# Patient Record
Sex: FEMALE | Race: WHITE | NOT HISPANIC OR LATINO | Employment: OTHER | URBAN - METROPOLITAN AREA
[De-identification: names, ages, dates, MRNs, and addresses within clinical notes are randomized per-mention and may not be internally consistent; named-entity substitution may affect disease eponyms.]

---

## 2017-05-01 ENCOUNTER — HOSPITAL ENCOUNTER (OUTPATIENT)
Dept: RADIOLOGY | Facility: HOSPITAL | Age: 67
Discharge: HOME | End: 2017-05-01
Attending: ORTHOPAEDIC SURGERY
Payer: COMMERCIAL

## 2017-05-01 ENCOUNTER — OFFICE VISIT (OUTPATIENT)
Dept: ORTHOPEDICS | Facility: CLINIC | Age: 67
End: 2017-05-01
Payer: COMMERCIAL

## 2017-05-01 VITALS — WEIGHT: 209 LBS | HEIGHT: 66 IN | BODY MASS INDEX: 33.59 KG/M2

## 2017-05-01 DIAGNOSIS — M25.812 SHOULDER IMPINGEMENT, LEFT: Primary | ICD-10-CM

## 2017-05-01 DIAGNOSIS — M25.511 BILATERAL SHOULDER PAIN, UNSPECIFIED CHRONICITY: ICD-10-CM

## 2017-05-01 DIAGNOSIS — M75.42 IMPINGEMENT SYNDROME OF LEFT SHOULDER: ICD-10-CM

## 2017-05-01 DIAGNOSIS — R52 PAIN: ICD-10-CM

## 2017-05-01 DIAGNOSIS — M25.512 BILATERAL SHOULDER PAIN, UNSPECIFIED CHRONICITY: ICD-10-CM

## 2017-05-01 DIAGNOSIS — R52 PAIN: Primary | ICD-10-CM

## 2017-05-01 PROCEDURE — 1160F RVW MEDS BY RX/DR IN RCRD: CPT | Mod: S$GLB,,, | Performed by: ORTHOPAEDIC SURGERY

## 2017-05-01 PROCEDURE — 99999 PR PBB SHADOW E&M-EST. PATIENT-LVL II: CPT | Mod: PBBFAC,,, | Performed by: ORTHOPAEDIC SURGERY

## 2017-05-01 PROCEDURE — 99213 OFFICE O/P EST LOW 20 MIN: CPT | Mod: 25,S$GLB,, | Performed by: ORTHOPAEDIC SURGERY

## 2017-05-01 PROCEDURE — 73030 X-RAY EXAM OF SHOULDER: CPT | Mod: TC,LT

## 2017-05-01 PROCEDURE — 73030 X-RAY EXAM OF SHOULDER: CPT | Mod: 26,LT,, | Performed by: RADIOLOGY

## 2017-05-01 PROCEDURE — 20610 DRAIN/INJ JOINT/BURSA W/O US: CPT | Mod: LT,S$GLB,, | Performed by: ORTHOPAEDIC SURGERY

## 2017-05-01 RX ORDER — DICLOFENAC SODIUM 10 MG/G
GEL TOPICAL
Qty: 100 G | Refills: 3 | Status: SHIPPED | OUTPATIENT
Start: 2017-05-01 | End: 2017-08-08

## 2017-05-01 RX ORDER — TRIAMCINOLONE ACETONIDE 40 MG/ML
40 INJECTION, SUSPENSION INTRA-ARTICULAR; INTRAMUSCULAR
Status: COMPLETED | OUTPATIENT
Start: 2017-05-01 | End: 2017-05-01

## 2017-05-01 RX ADMIN — TRIAMCINOLONE ACETONIDE 40 MG: 40 INJECTION, SUSPENSION INTRA-ARTICULAR; INTRAMUSCULAR at 11:05

## 2017-05-01 NOTE — PROGRESS NOTES
HISTORY OF PRESENT ILLNESS:  Ms. Fong seen previously for right shoulder   rotator cuff repair, doing fine with the right shoulder, but now having a   flare-up of pain in the left shoulder.  She had an injection about two years ago   with great results.  She would like to have that repeated today.    PHYSICAL EXAMINATION:  LEFT SHOULDER:  No tenderness, no swelling.  Range of motion full.  She does   have a mildly positive impingement sign with abduction, internal rotation of the   shoulder.  Strength is good.  No instability.  NEUROLOGIC:  Normal.    X-RAYS:  AP and lateral, left shoulder, demonstrates spurring at the   anterolateral acromion, some involvement of the AC joint and slight irregularity   at the greater tuberosity.    IMPRESSION:  Left shoulder impingement.    PLAN:  Injection performed left shoulder, combination of Kenalog 40 mg, 2 mL   Xylocaine, sterile technique.  Also recommended she avoid overhead lifting,   continue anti-inflammatory medication by mouth and if symptoms persist, we may   need to get an MRI scan to check the rotator cuff.  Follow up in 1-2 months.      BRITTNY  dd: 05/01/2017 11:34:47 (CDT)  td: 05/02/2017 04:13:56 (CDT)  Doc ID   #[id number]  Job ID #[id number]    CC:

## 2017-07-16 ENCOUNTER — HOSPITAL ENCOUNTER (OUTPATIENT)
Facility: HOSPITAL | Age: 67
Discharge: HOME | End: 2017-07-17
Attending: EMERGENCY MEDICINE | Admitting: INTERNAL MEDICINE
Payer: COMMERCIAL

## 2017-07-16 DIAGNOSIS — E21.0 PRIMARY HYPERPARATHYROIDISM: ICD-10-CM

## 2017-07-16 DIAGNOSIS — R42 VERTIGO: Primary | ICD-10-CM

## 2017-07-16 DIAGNOSIS — R42 DIZZINESS: ICD-10-CM

## 2017-07-16 DIAGNOSIS — R11.0 NAUSEA: ICD-10-CM

## 2017-07-16 LAB
ALBUMIN SERPL BCP-MCNC: 4.1 G/DL
ALP SERPL-CCNC: 105 U/L
ALT SERPL W/O P-5'-P-CCNC: 17 U/L
ANION GAP SERPL CALC-SCNC: 17 MMOL/L
AST SERPL-CCNC: 15 U/L
BASOPHILS # BLD AUTO: 0.05 K/UL
BASOPHILS NFR BLD: 0.5 %
BILIRUB SERPL-MCNC: 0.4 MG/DL
BUN SERPL-MCNC: 18 MG/DL
CALCIUM SERPL-MCNC: 11.6 MG/DL
CHLORIDE SERPL-SCNC: 107 MMOL/L
CO2 SERPL-SCNC: 17 MMOL/L
CREAT SERPL-MCNC: 0.8 MG/DL
DIFFERENTIAL METHOD: ABNORMAL
EOSINOPHIL # BLD AUTO: 0.2 K/UL
EOSINOPHIL NFR BLD: 2.3 %
ERYTHROCYTE [DISTWIDTH] IN BLOOD BY AUTOMATED COUNT: 13.1 %
EST. GFR  (AFRICAN AMERICAN): >60 ML/MIN/1.73 M^2
EST. GFR  (NON AFRICAN AMERICAN): >60 ML/MIN/1.73 M^2
GLUCOSE SERPL-MCNC: 141 MG/DL
HCT VFR BLD AUTO: 42.3 %
HGB BLD-MCNC: 14.4 G/DL
LYMPHOCYTES # BLD AUTO: 4.3 K/UL
LYMPHOCYTES NFR BLD: 41.6 %
MCH RBC QN AUTO: 31.4 PG
MCHC RBC AUTO-ENTMCNC: 34 %
MCV RBC AUTO: 92 FL
MONOCYTES # BLD AUTO: 0.6 K/UL
MONOCYTES NFR BLD: 5.9 %
NEUTROPHILS # BLD AUTO: 5.1 K/UL
NEUTROPHILS NFR BLD: 49.5 %
PLATELET # BLD AUTO: 324 K/UL
PMV BLD AUTO: 9.9 FL
POTASSIUM SERPL-SCNC: 3.1 MMOL/L
PROT SERPL-MCNC: 7.3 G/DL
RBC # BLD AUTO: 4.59 M/UL
SODIUM SERPL-SCNC: 141 MMOL/L
WBC # BLD AUTO: 10.36 K/UL

## 2017-07-16 PROCEDURE — 63600175 PHARM REV CODE 636 W HCPCS: Performed by: EMERGENCY MEDICINE

## 2017-07-16 PROCEDURE — 99285 EMERGENCY DEPT VISIT HI MDM: CPT | Mod: 25

## 2017-07-16 PROCEDURE — 93010 ELECTROCARDIOGRAM REPORT: CPT | Mod: ,,, | Performed by: INTERNAL MEDICINE

## 2017-07-16 PROCEDURE — 85025 COMPLETE CBC W/AUTO DIFF WBC: CPT

## 2017-07-16 PROCEDURE — 93005 ELECTROCARDIOGRAM TRACING: CPT

## 2017-07-16 PROCEDURE — 80053 COMPREHEN METABOLIC PANEL: CPT

## 2017-07-16 PROCEDURE — 96361 HYDRATE IV INFUSION ADD-ON: CPT

## 2017-07-16 PROCEDURE — 96365 THER/PROPH/DIAG IV INF INIT: CPT

## 2017-07-16 PROCEDURE — 96375 TX/PRO/DX INJ NEW DRUG ADDON: CPT

## 2017-07-16 PROCEDURE — 25000003 PHARM REV CODE 250: Performed by: EMERGENCY MEDICINE

## 2017-07-16 RX ORDER — SODIUM CHLORIDE 9 MG/ML
1000 INJECTION, SOLUTION INTRAVENOUS
Status: COMPLETED | OUTPATIENT
Start: 2017-07-16 | End: 2017-07-16

## 2017-07-16 RX ORDER — SODIUM CHLORIDE 9 MG/ML
1000 INJECTION, SOLUTION INTRAVENOUS
Status: COMPLETED | OUTPATIENT
Start: 2017-07-16 | End: 2017-07-17

## 2017-07-16 RX ORDER — DIAZEPAM 10 MG/2ML
5 INJECTION INTRAMUSCULAR
Status: COMPLETED | OUTPATIENT
Start: 2017-07-16 | End: 2017-07-16

## 2017-07-16 RX ORDER — ONDANSETRON 2 MG/ML
4 INJECTION INTRAMUSCULAR; INTRAVENOUS
Status: COMPLETED | OUTPATIENT
Start: 2017-07-16 | End: 2017-07-16

## 2017-07-16 RX ORDER — MECLIZINE HYDROCHLORIDE 25 MG/1
25 TABLET ORAL
Status: COMPLETED | OUTPATIENT
Start: 2017-07-16 | End: 2017-07-16

## 2017-07-16 RX ADMIN — SODIUM CHLORIDE 1000 ML: 0.9 INJECTION, SOLUTION INTRAVENOUS at 06:07

## 2017-07-16 RX ADMIN — DIAZEPAM 5 MG: 5 INJECTION, SOLUTION INTRAMUSCULAR; INTRAVENOUS at 06:07

## 2017-07-16 RX ADMIN — PROMETHAZINE HYDROCHLORIDE 25 MG: 25 INJECTION INTRAMUSCULAR; INTRAVENOUS at 08:07

## 2017-07-16 RX ADMIN — ONDANSETRON 4 MG: 2 INJECTION INTRAMUSCULAR; INTRAVENOUS at 06:07

## 2017-07-16 RX ADMIN — SODIUM CHLORIDE 1000 ML: 0.9 INJECTION, SOLUTION INTRAVENOUS at 10:07

## 2017-07-16 RX ADMIN — MECLIZINE HYDROCHLORIDE 25 MG: 25 TABLET ORAL at 07:07

## 2017-07-16 NOTE — ED NOTES
Pt arrives from home with c/o nausea, dizziness, and generalized weakness that started 30 min PTA. Denies vomiting, diarrhea, CP, and SOB. Pt is AAO upon arrival. Started vomiting when arrived to exam room. VSS.    APPEARANCE: Alert, oriented. +fatigue  CARDIAC: Normal rate and rhythm.  PERIPHERAL VASCULAR: peripheral pulses present. Normal cap refill. No edema. Warm to touch.    RESPIRATORY:Normal rate and effort. Respirations are equal and unlabored no obvious signs of distress.  GASTRO: soft, no tenderness, no abdominal distention. +N/V  MUSC: Full ROM. No bony tenderness or soft tissue tenderness. No obvious deformity.  SKIN: Skin is warm and dry, normal skin turgor.  NEURO: 5/5 strength major flexors/extensors bilaterally. Linda coma scale: eyes open spontaneously-4, oriented & converses-5, obeys commands-6. No neurological abnormalities.   MENTAL STATUS: awake, alert and aware of environment.

## 2017-07-17 ENCOUNTER — TELEPHONE (OUTPATIENT)
Dept: FAMILY MEDICINE | Facility: CLINIC | Age: 67
End: 2017-07-17

## 2017-07-17 VITALS
OXYGEN SATURATION: 98 % | WEIGHT: 185 LBS | HEIGHT: 65 IN | RESPIRATION RATE: 18 BRPM | TEMPERATURE: 97 F | SYSTOLIC BLOOD PRESSURE: 164 MMHG | DIASTOLIC BLOOD PRESSURE: 85 MMHG | BODY MASS INDEX: 30.82 KG/M2 | HEART RATE: 80 BPM

## 2017-07-17 LAB
25(OH)D3+25(OH)D2 SERPL-MCNC: 40 NG/ML
ALBUMIN SERPL BCP-MCNC: 3.6 G/DL
ALP SERPL-CCNC: 92 U/L
ALT SERPL W/O P-5'-P-CCNC: 11 U/L
ANION GAP SERPL CALC-SCNC: 7 MMOL/L
AST SERPL-CCNC: 12 U/L
BASOPHILS # BLD AUTO: 0.01 K/UL
BASOPHILS NFR BLD: 0.1 %
BILIRUB SERPL-MCNC: 0.4 MG/DL
BUN SERPL-MCNC: 11 MG/DL
CALCIUM SERPL-MCNC: 10.1 MG/DL
CHLORIDE SERPL-SCNC: 113 MMOL/L
CHOLEST/HDLC SERPL: 3.2 {RATIO}
CO2 SERPL-SCNC: 23 MMOL/L
CREAT SERPL-MCNC: 0.7 MG/DL
DIFFERENTIAL METHOD: ABNORMAL
EOSINOPHIL # BLD AUTO: 0 K/UL
EOSINOPHIL NFR BLD: 0.1 %
ERYTHROCYTE [DISTWIDTH] IN BLOOD BY AUTOMATED COUNT: 13.2 %
EST. GFR  (AFRICAN AMERICAN): >60 ML/MIN/1.73 M^2
EST. GFR  (NON AFRICAN AMERICAN): >60 ML/MIN/1.73 M^2
GLUCOSE SERPL-MCNC: 95 MG/DL
HCT VFR BLD AUTO: 41.1 %
HDL/CHOLESTEROL RATIO: 30.8 %
HDLC SERPL-MCNC: 185 MG/DL
HDLC SERPL-MCNC: 57 MG/DL
HGB BLD-MCNC: 13.6 G/DL
LDLC SERPL CALC-MCNC: 117.6 MG/DL
LYMPHOCYTES # BLD AUTO: 0.9 K/UL
LYMPHOCYTES NFR BLD: 11 %
MAGNESIUM SERPL-MCNC: 2.3 MG/DL
MCH RBC QN AUTO: 31.1 PG
MCHC RBC AUTO-ENTMCNC: 33.1 %
MCV RBC AUTO: 94 FL
MONOCYTES # BLD AUTO: 0.3 K/UL
MONOCYTES NFR BLD: 3.8 %
NEUTROPHILS # BLD AUTO: 7.2 K/UL
NEUTROPHILS NFR BLD: 84.8 %
NONHDLC SERPL-MCNC: 128 MG/DL
PHOSPHATE SERPL-MCNC: 2.5 MG/DL
PLATELET # BLD AUTO: 279 K/UL
PMV BLD AUTO: 9.7 FL
POTASSIUM SERPL-SCNC: 3.6 MMOL/L
PROT SERPL-MCNC: 6.5 G/DL
PTH-INTACT SERPL-MCNC: 169 PG/ML
RBC # BLD AUTO: 4.37 M/UL
SODIUM SERPL-SCNC: 143 MMOL/L
TRIGL SERPL-MCNC: 52 MG/DL
TSH SERPL DL<=0.005 MIU/L-ACNC: 0.94 UIU/ML
VIT B12 SERPL-MCNC: 602 PG/ML
WBC # BLD AUTO: 8.48 K/UL

## 2017-07-17 PROCEDURE — 85025 COMPLETE CBC W/AUTO DIFF WBC: CPT

## 2017-07-17 PROCEDURE — 80053 COMPREHEN METABOLIC PANEL: CPT

## 2017-07-17 PROCEDURE — 83735 ASSAY OF MAGNESIUM: CPT

## 2017-07-17 PROCEDURE — 36415 COLL VENOUS BLD VENIPUNCTURE: CPT

## 2017-07-17 PROCEDURE — 82607 VITAMIN B-12: CPT

## 2017-07-17 PROCEDURE — 94761 N-INVAS EAR/PLS OXIMETRY MLT: CPT

## 2017-07-17 PROCEDURE — 83970 ASSAY OF PARATHORMONE: CPT

## 2017-07-17 PROCEDURE — 25000003 PHARM REV CODE 250: Performed by: STUDENT IN AN ORGANIZED HEALTH CARE EDUCATION/TRAINING PROGRAM

## 2017-07-17 PROCEDURE — 82306 VITAMIN D 25 HYDROXY: CPT

## 2017-07-17 PROCEDURE — 84100 ASSAY OF PHOSPHORUS: CPT

## 2017-07-17 PROCEDURE — G0378 HOSPITAL OBSERVATION PER HR: HCPCS

## 2017-07-17 PROCEDURE — 84443 ASSAY THYROID STIM HORMONE: CPT

## 2017-07-17 PROCEDURE — 80061 LIPID PANEL: CPT

## 2017-07-17 RX ORDER — MECLIZINE HYDROCHLORIDE 25 MG/1
25 TABLET ORAL 3 TIMES DAILY PRN
Qty: 30 TABLET | Refills: 3 | Status: SHIPPED | OUTPATIENT
Start: 2017-07-17 | End: 2017-09-01 | Stop reason: SDUPTHER

## 2017-07-17 RX ORDER — SODIUM CHLORIDE 9 MG/ML
INJECTION, SOLUTION INTRAVENOUS CONTINUOUS
Status: DISCONTINUED | OUTPATIENT
Start: 2017-07-17 | End: 2017-07-17 | Stop reason: HOSPADM

## 2017-07-17 RX ORDER — POTASSIUM CHLORIDE 20 MEQ/1
20 TABLET, EXTENDED RELEASE ORAL ONCE
Status: COMPLETED | OUTPATIENT
Start: 2017-07-17 | End: 2017-07-17

## 2017-07-17 RX ORDER — ENOXAPARIN SODIUM 100 MG/ML
40 INJECTION SUBCUTANEOUS EVERY 24 HOURS
Status: DISCONTINUED | OUTPATIENT
Start: 2017-07-17 | End: 2017-07-17 | Stop reason: HOSPADM

## 2017-07-17 RX ORDER — IBUPROFEN 200 MG
16 TABLET ORAL
Status: DISCONTINUED | OUTPATIENT
Start: 2017-07-17 | End: 2017-07-17 | Stop reason: HOSPADM

## 2017-07-17 RX ORDER — SODIUM,POTASSIUM PHOSPHATES 280-250MG
1 POWDER IN PACKET (EA) ORAL ONCE
Status: COMPLETED | OUTPATIENT
Start: 2017-07-17 | End: 2017-07-17

## 2017-07-17 RX ORDER — GLUCAGON 1 MG
1 KIT INJECTION
Status: DISCONTINUED | OUTPATIENT
Start: 2017-07-17 | End: 2017-07-17 | Stop reason: HOSPADM

## 2017-07-17 RX ORDER — ONDANSETRON 8 MG/1
8 TABLET, ORALLY DISINTEGRATING ORAL EVERY 6 HOURS PRN
Status: DISCONTINUED | OUTPATIENT
Start: 2017-07-17 | End: 2017-07-17 | Stop reason: HOSPADM

## 2017-07-17 RX ORDER — IBUPROFEN 200 MG
24 TABLET ORAL
Status: DISCONTINUED | OUTPATIENT
Start: 2017-07-17 | End: 2017-07-17 | Stop reason: HOSPADM

## 2017-07-17 RX ORDER — CHOLECALCIFEROL (VITAMIN D3) 25 MCG
1000 TABLET ORAL DAILY
Status: DISCONTINUED | OUTPATIENT
Start: 2017-07-17 | End: 2017-07-17 | Stop reason: HOSPADM

## 2017-07-17 RX ORDER — MECLIZINE HYDROCHLORIDE 25 MG/1
25 TABLET ORAL 3 TIMES DAILY PRN
Status: DISCONTINUED | OUTPATIENT
Start: 2017-07-17 | End: 2017-07-17 | Stop reason: HOSPADM

## 2017-07-17 RX ADMIN — MECLIZINE HYDROCHLORIDE 25 MG: 25 TABLET ORAL at 05:07

## 2017-07-17 RX ADMIN — VITAMIN D, TAB 1000IU (100/BT) 1000 UNITS: 25 TAB at 09:07

## 2017-07-17 RX ADMIN — ONDANSETRON 8 MG: 4 TABLET, ORALLY DISINTEGRATING ORAL at 09:07

## 2017-07-17 RX ADMIN — SODIUM CHLORIDE: 0.9 INJECTION, SOLUTION INTRAVENOUS at 03:07

## 2017-07-17 RX ADMIN — ESTROGENS, CONJUGATED 0.62 MG: 0.62 TABLET, FILM COATED ORAL at 09:07

## 2017-07-17 RX ADMIN — POTASSIUM & SODIUM PHOSPHATES POWDER PACK 280-160-250 MG 1 PACKET: 280-160-250 PACK at 09:07

## 2017-07-17 RX ADMIN — SODIUM CHLORIDE: 0.9 INJECTION, SOLUTION INTRAVENOUS at 08:07

## 2017-07-17 RX ADMIN — POTASSIUM CHLORIDE 20 MEQ: 1500 TABLET, EXTENDED RELEASE ORAL at 01:07

## 2017-07-17 RX ADMIN — ONDANSETRON 8 MG: 4 TABLET, ORALLY DISINTEGRATING ORAL at 01:07

## 2017-07-17 RX ADMIN — SODIUM CHLORIDE: 0.9 INJECTION, SOLUTION INTRAVENOUS at 05:07

## 2017-07-17 RX ADMIN — MECLIZINE HYDROCHLORIDE 25 MG: 25 TABLET ORAL at 01:07

## 2017-07-17 RX ADMIN — POTASSIUM CHLORIDE 20 MEQ: 20 TABLET, EXTENDED RELEASE ORAL at 05:07

## 2017-07-17 NOTE — PLAN OF CARE
TN met with patient, spouse and son at bedside.   Patient lives with spouse.  No HH or DME.   Patient still drives.   TN will continue to assess discharge needs.    Follow-up With  Details  Why  Contact Info   Ashli Stubbs MD     josee browning, will call you with follow-up appointment date/time.  [address]  [address]  [place] [address]  [phone]           07/17/17 1041   Discharge Assessment   Assessment Type Discharge Planning Assessment   Confirmed/corrected address and phone number on facesheet? Yes   Assessment information obtained from? Patient;Medical Record   Type of Healthcare Directive Received Living will   If Healthcare Directive is received, is it scanned into Epic? no (comment)   Prior to hospitilization cognitive status: Alert/Oriented   Prior to hospitalization functional status: Independent   Current cognitive status: Alert/Oriented   Current Functional Status: Independent   Arrived From home or self-care   Lives With spouse   Able to Return to Prior Arrangements yes   Is patient able to care for self after discharge? Yes   How many people do you have in your home that can help with your care after discharge? 1   Who are your caregiver(s) and their phone number(s)? Christopher [phone]   Patient's perception of discharge disposition home or selfcare   Readmission Within The Last 30 Days no previous admission in last 30 days   Patient currently receives home health services? No   Does the patient currently use HME? No   Equipment Currently Used at Home none   Do you have any problems affording any of your prescribed medications? No   Is the patient taking medications as prescribed? yes   Does the patient have transportation to healthcare appointments? Yes   Transportation Available car;family or friend will provide   On Dialysis? No   Does the patient receive services at the [facility]? No   Discharge Plan A Home with family   Discharge Plan B Home with  family;Home Health   Patient/Family In Agreement With Plan yes     Jie Olivarez RN  Transition Navigator  [phone]

## 2017-07-17 NOTE — TELEPHONE ENCOUNTER
----- Message from Robin Tucker sent at 7/17/2017 11:41 AM CDT -----  Contact: Case Mgt  Please contact patient at [phone] to schedule hospital fu in two weeks.

## 2017-07-17 NOTE — PLAN OF CARE
Problem: Patient Care Overview  Goal: Plan of Care Review  Pt on room air with SpO2 100 %. No respiratory distress noted. Will continue to monitor.

## 2017-07-17 NOTE — ED PROVIDER NOTES
Encounter Date: 7/16/2017       History     Chief Complaint   Patient presents with    Dizziness     with nausea and generalized weakness x 30 min PTA. -vomiting, CP, or SOB     This is a 66-year-old female reports that she had a strange popping/buzzing sensation in her ear earlier today followed by severe dizziness and vomiting which started after eating dinner.  She reports that it feels like the room was spinning around her.  With any movement of her head that dizziness gets much worse.  The patient denies any headache.  No chest pain or shortness of breath.  She has generalized weakness but no focal weakness.  No sensory loss.  No visual change.  No past history of vertigo.      The history is provided by the patient.     Review of patient's allergies indicates:   Allergen Reactions    Requip [ropinirole] Swelling     Other reaction(s): palpitations, swelling of tongue     Past Medical History:   Diagnosis Date    Allergy     Diverticulosis     Obesity (BMI 30.0-34.9) 12/1/2015    Osteopenia 12/1/2015    next bone density 4/2016, seeing endocrine for hyperparathyroidism      Past Surgical History:   Procedure Laterality Date    APPENDECTOMY      HYSTERECTOMY      ROTATOR CUFF REPAIR Right 12/2014    TUBAL LIGATION       Family History   Problem Relation Age of Onset    Diabetes Father     Dementia Father     Rheum arthritis Neg Hx     Lupus Neg Hx     Inflammatory bowel disease Neg Hx      Social History   Substance Use Topics    Smoking status: Never Smoker    Smokeless tobacco: Not on file    Alcohol use No     Review of Systems   Constitutional: Negative for fever.   HENT: Negative for sore throat.    Respiratory: Negative for shortness of breath.    Cardiovascular: Negative for chest pain.   Gastrointestinal: Negative for nausea.   Genitourinary: Negative for dysuria.   Musculoskeletal: Negative for back pain.   Skin: Negative for rash.   Neurological: Positive for dizziness. Negative for  weakness and numbness.   Hematological: Does not bruise/bleed easily.   All other systems reviewed and are negative.      Physical Exam     Initial Vitals [07/16/17 1822]   BP Pulse Resp Temp SpO2   (!) 155/83 95 16 97.7 °F (36.5 °C) 100 %      MAP       107         Physical Exam    Nursing note and vitals reviewed.  Constitutional: She appears well-developed and well-nourished.   HENT:   Head: Normocephalic and atraumatic.   Right Ear: External ear normal.   Left Ear: External ear normal. A middle ear effusion is present.   Eyes: Conjunctivae are normal. Pupils are equal, round, and reactive to light. Right eye exhibits no discharge. Left eye exhibits no discharge. No scleral icterus. Right eye exhibits nystagmus. Right eye exhibits normal extraocular motion. Left eye exhibits nystagmus.   Neck: Normal range of motion. Neck supple.   Cardiovascular: Normal rate, regular rhythm and normal heart sounds. Exam reveals no gallop and no friction rub.    No murmur heard.  Pulmonary/Chest: Breath sounds normal. No stridor. No respiratory distress. She has no wheezes. She has no rhonchi. She has no rales.   Abdominal: Soft. Bowel sounds are normal. She exhibits no distension and no mass. There is no tenderness. There is no rebound and no guarding.   Neurological: She is alert and oriented to person, place, and time. She has normal strength. She displays normal reflexes. No cranial nerve deficit or sensory deficit.   +ross halpike    Skin: Skin is warm and dry. Capillary refill takes less than 2 seconds.   Psychiatric: She has a normal mood and affect. Her behavior is normal. Judgment and thought content normal.         ED Course   Procedures  Labs Reviewed   CBC W/ AUTO DIFFERENTIAL - Abnormal; Notable for the following:        Result Value    MCH 31.4 (*)     All other components within normal limits   COMPREHENSIVE METABOLIC PANEL - Abnormal; Notable for the following:     Potassium 3.1 (*)     CO2 17 (*)     Glucose 141  (*)     Calcium 11.6 (*)     Anion Gap 17 (*)     All other components within normal limits     EKG Readings: (Independently Interpreted)   Rhythm: Normal Sinus Rhythm. Heart Rate: 85. Ectopy: No Ectopy. Conduction: Normal. T Waves: Normal. Clinical Impression: Normal Sinus Rhythm Other Impression: Nonspecific ST abnoirmality      Imaging Results          CT Head Without Contrast (Final result)  Result time 07/16/17 20:34:12    Final result by Waqar Lopez MD (07/16/17 20:34:12)                 Impression:        1.  No acute intracranial findings identified. Further evaluation/followup as warranted.        Electronically signed by: WAQAR LOPEZ MD, MD  Date:     07/16/17  Time:    20:34              Narrative:    COMPARISON: None    FINDINGS: Patient is rotated and tilted within the scanner. No evidence of hemorrhage, mass, midline shift or acute major vascular territory infarct.  Gray white interface appears intact.  There is age appropriate mild generalized cerebral atrophy.  The ventricles are midline without distortion by mass effect.  No extra-axial hemorrhage or mass.      The extracranial structures are within normal limits.  Calvarium is intact.  Visualized paranasal sinuses are clear.  Mastoid air cells are clear.                                 Medical Decision Making:   Initial Assessment:   Patient with severe vertigo which appears peripheral, no neurologic findings on exam except nystagmus. Vertigo is increased with head movement and patient did have some ear popping/irritation earlier today which increases suspicion of inner ear pathology/vestibular neuritis or otolith malpositioning.  Patient actively vomiting on arrival in the ED. Patient will be admitted because of intractability of vertigo, although she did have some improvement in the ED>                    ED Course     Clinical Impression:   The primary encounter diagnosis was Vertigo. Diagnoses of Dizziness, Primary hyperparathyroidism, and  Nausea were also pertinent to this visit.    Disposition:   Disposition: Admitted  Condition: Stable                        Peggy Sotelo MD  07/19/17 3316

## 2017-07-17 NOTE — TELEPHONE ENCOUNTER
----- Message from Dedra Eisenberg sent at 7/17/2017  1:41 PM CDT -----  Contact: [phone]/ self   Patient called in returning your call. Please advise

## 2017-07-17 NOTE — MEDICAL/APP STUDENT
[facility] L3 Progress Note    S: Patient still feels dizzy and ringing in her left ear, but is less dizzy than yesterday. She feels nauseous, but has not vomited overnight. She denies fever, chills, shortness of breath and pain.    O:  Vitals: T 97.7-98.2 P 67-96 R 16-23 //83  SpO2   I/O: In 240 mL (PO) Out 875 mL (urine+1 stool)     Orthostatic Bp:  Supine: 130/70  P 92  Sittin/89   P 78  Standin/72  P 87    General:  HEENT: Normocephalic. On examination of extraocular eye motion, nystagmus in both eyes when looking to the right, but not when looking towards the left. Nystagmus when attempting to converge. Pupils were equal and reactive to light and accomodation. Palate was midline. Moist mucous membrane.   Resp: Lungs clear to auscultation bilaterally  Cv: Regular rate and rhythm. No murmurs heard. Dorsalis pedis 2+ bilaterally. Radial pulse 2+ bilaterally.  Abdo: Active bowel sounds. Non-distended and non-tender to palpation.  Extremities: Upper extremity strength 4/5 on right (previous rotator cuff tear and repair), 5/5 on left. Equal  strength in both hands. Lower extremity strength 5/5 bilaterally. Able to localize pressure in all extremities.   Neuro: Both hands shake when attempting to touch finger to nose and tracking. Patient is able to alternate hand movements.    Labs (last 24 hours):  CBC:   WBC  8.48   Hemoglobin 13.6   Hematocrit 41.1   Platelets 279   MCV  94   MCH  31.1   Gran  7.2 84.8%   Lymph  0.9 11%   Mono  0.3 3.8%   Eos  0.0 0.1%   Baso  0.01 0.1%     Metabolic Panel:   Na  143   K  3.6   Cl  113   Co2  23   BUN  11   Cr  0.7   Glucose 95   Ca  10.1   Phos  2.5   Mg  2.3   Alk Phos 92   Tot Prot 6.5   Albumin 3.6   Tbili  0.4   AST  12   ALT  11    Vitamins:   Vitamin B-12  602   Vit D, 25-hydroxy 40    Thyroid:   TSH  0.942   PTH  169     Lipid Profile:   Cholesterol 185   HDL  57   LDL  117.6   Triglycerides 52    Imaging:  CT Scan without  contrast: no acute intracranial findings.    EKG: Normal sinus rhythm  Possible Left atrial enlargement  Nonspecific ST and T wave abnormality     Medications:   Enoxaparin 40 mg  SubQ Daily   Estrogens 0.625 mg PO Daily   Vitamin D 1000 U  PO Daily   Meclizine 25 mg  PO TID, PRN   Ondansetron 8 mg  PO Q6hrs, PRN    Allergies: Ropinirole (dizziness, vertigo)   Ropinirole was tried in the past due to potential restless leg syndrome. Pt no longer takes medications, and denies having symptoms of RLS.    A/P:    Vertigo and Nausea:  -Patient is still has vertigo and nausea. She feels improved from yesterday.  -CT with contrast was negative for acute changes.  -EKG showed possible left atrial enlargement and non-specific S and T wave abnormality.  -Neuro consulted. Recommends outpatient PT for vestibular PT for BPPV.    Hyperparathyroidism:  -PTH elevated at 169.   -Ca was 11.6 on admission. Now 10.1.  -continue Vit D.    Ppx: enoxaparin  Diet: Adult regular  Dispo: with resolution of symptoms, follow neuro recs.    Assessment and plan to be discussed with team.    Cole Dominguez  [facility] L3 Progress Note  7.17.2017 8:50 AM

## 2017-07-17 NOTE — H&P
[facility] - [facility]   History and Physical  Intern Note    Admitting Team: [facility] Internal Medicine Team B  Attending Physician: Dr. Kaden Richard  Resident: Dr. Gerri Torres  Interns: Dr. Victor Manuel Pleitez and Dr. Rasheed Cuellar  Night Float: Dr. Ja Cruz    Date of Admit: 7/16/2017    Chief Complaint     Dizziness for 6 hours    Subjective:      History of Present Illness:  Carlene Fong is a 66 y.o.  female with no active PMHx. The patient presented to [facility] on 7/16/2017 with a primary complaint of dizziness x 6 hours.    The patient was in their usual state of health until yesterday evening while as she was painting she began to feel very dizzy and weak. She noted that the room suddenly started spinning and she became very nauseous. She endorses a buzzing sound in her L ear which has been continuous since the dizziness first began. She denies any headache, vision changes, or focal weakness, chest pain, SOB, fever, or recent illness. She states that the dizziness is worsened with any movement of her head, but not worsened or brought on by certain positions. Her dizziness has continued since it first began at home and was only made better by the meclizine given in the ED. She vomited more than 5 times while in the ER which relieved with zofran. She denies every having any episodes like this before. Her only difference in normal routine is that she took a suppository earlier today for constipation.     Past Medical History:  Past Medical History:   Diagnosis Date    Allergy     Diverticulosis     Obesity (BMI 30.0-34.9) 12/1/2015    Osteopenia 12/1/2015    next bone density 4/2016, seeing endocrine for hyperparathyroidism        Past Surgical History:  Past Surgical History:   Procedure Laterality Date    APPENDECTOMY      HYSTERECTOMY      ROTATOR CUFF REPAIR Right 12/2014    TUBAL LIGATION         Allergies:  Review of patient's allergies  "indicates:   Allergen Reactions    Requip [ropinirole] Swelling     Other reaction(s): palpitations, swelling of tongue       Home Medications:  Prior to Admission medications    Medication Sig Start Date End Date Taking? Authorizing Provider   estrogens, conjugated, (PREMARIN) 0.625 MG tablet Take 0.625 mg by mouth as directed. Every other day    Historical Provider, MD   meloxicam (MOBIC) 7.5 MG tablet Take 1-2 pills daily for arthritis symptoms  Patient taking differently: Take 7.5 mg by mouth once daily. Take 1-2 pills daily for arthritis symptoms 16   Ashli Stubbs MD   vitamin D 1000 units Tab Take 185 mg by mouth once daily.    Historical Provider, MD       Family History:  Family History   Problem Relation Age of Onset    Diabetes Father     Dementia Father        Social History:  Social History   Substance Use Topics    Smoking status: Never Smoker    Smokeless tobacco: Not on file    Alcohol use No       Review of Systems:  Pertinent items are noted in HPI. All other systems are reviewed and are negative.    Health Maintaince :   Primary Care Physician: Dr. Stubbs  Immunizations:   TDap is up to date.  Influenza is up to date.  Pneumovax is up to date.  Cancer Screening:  PAP: is up to date.  Mammogram: is up to date.   Colonoscopy: is up to date.      Objective:   Last 24 Hour Vital Signs:  BP  Min: 116/55  Max: 155/83  Temp  Av.7 °F (36.5 °C)  Min: 97.7 °F (36.5 °C)  Max: 97.7 °F (36.5 °C)  Pulse  Av.6  Min: 79  Max: 96  Resp  Av  Min: 16  Max: 23  SpO2  Av.2 %  Min: 95 %  Max: 100 %  Height  Av' 5" (165.1 cm)  Min: 5' 5" (165.1 cm)  Max: 5' 5" (165.1 cm)  Weight  Av.9 kg (185 lb)  Min: 83.9 kg (185 lb)  Max: 83.9 kg (185 lb)  Body mass index is 30.79 kg/m².  No intake/output data recorded.    Physical Examination:  General appearance: cooperative, fatigued and mild distress  Head: Normocephalic, without obvious abnormality, atraumatic  Eyes: positive " findings: PERRL, EOMI, horizontal nystagmus noted on R gaze and at rest  Neck: no adenopathy, no carotid bruit, no JVD and supple, symmetrical, trachea midline  Lungs: clear to auscultation bilaterally  Heart: regular rate and rhythm, S1, S2 normal, no murmur, click, rub or gallop  Abdomen: soft, non-tender; bowel sounds normal; no masses,  no organomegaly  Extremities: extremities normal, atraumatic, no cyanosis or edema  Pulses: 2+ and symmetric  Neurologic: Mental status: Alert, oriented, thought content appropriate  Cranial nerves: II: visual field normal, II: pupils equal, round, reactive to light and accommodation, III,IV,VI: extraocular muscles horizontal nystagmus noted at rest, accentuated on R gaze  Motor:grossly normal  Gait: Unable to be assessed secondary to dizziness    Laboratory:  Most Recent Data:  CBC: Lab Results   Component Value Date    WBC 10.36 07/16/2017    HGB 14.4 07/16/2017    HCT 42.3 07/16/2017     07/16/2017    MCV 92 07/16/2017    RDW 13.1 07/16/2017     BMP: Lab Results   Component Value Date     07/16/2017    K 3.1 (L) 07/16/2017     07/16/2017    CO2 17 (L) 07/16/2017    BUN 18 07/16/2017    CREATININE 0.8 07/16/2017     (H) 07/16/2017    CALCIUM 11.6 (H) 07/16/2017    PHOS 3.0 10/31/2011     LFTs: Lab Results   Component Value Date    PROT 7.3 07/16/2017    ALBUMIN 4.1 07/16/2017    BILITOT 0.4 07/16/2017    AST 15 07/16/2017    ALKPHOS 105 07/16/2017    ALT 17 07/16/2017     Trended Lab Data:    Recent Labs  Lab 07/16/17  1837   WBC 10.36   HGB 14.4   HCT 42.3      MCV 92   RDW 13.1      K 3.1*      CO2 17*   BUN 18   CREATININE 0.8   *   PROT 7.3   ALBUMIN 4.1   BILITOT 0.4   AST 15   ALKPHOS 105   ALT 17     Radiology:  Imaging Results          CT Head Without Contrast (Final result)  Result time 07/16/17 20:34:12    Final result by Musa Lopez MD (07/16/17 20:34:12)                 Impression:        1.  No acute  intracranial findings identified. Further evaluation/followup as warranted.        Electronically signed by: WAQAR BASS MD, MD  Date:     07/16/17  Time:    20:34              Narrative:    COMPARISON: None    FINDINGS: Patient is rotated and tilted within the scanner. No evidence of hemorrhage, mass, midline shift or acute major vascular territory infarct.  Gray white interface appears intact.  There is age appropriate mild generalized cerebral atrophy.  The ventricles are midline without distortion by mass effect.  No extra-axial hemorrhage or mass.      The extracranial structures are within normal limits.  Calvarium is intact.  Visualized paranasal sinuses are clear.  Mastoid air cells are clear.                                 Assessment:     Carlene Fong is a 66 y.o. female with no active PMHx presenting at [facility] with new onset continuous vertigo x 6 hours.      Plan:     Vertigo   - non-positional, continuous room spinning with associated N/V and tinnitus  - denies HA, no focal neuro deficits, denies hearing loss  - CT head neg  - received meclizine, zofran, phenergan, diazepam and 1L NS in the ED  - differential includes Meniere's vs Vestibular neuritis vs ischemic cause   - q4 neurochecks, meclizine 25mg PRN, Zofran PRN  - reevaluate in the AM, if continued vertigo, consider steroids for vestibular neuritis    Hypokalemia   - K at 3.1 on admit, likely 2/2 vomiting  -replace and recheck in Am, check Mg in AM    Hx of primary hyperparathyrodism  - check PTH, Ca at 11.6 on admission  - DXA in 12/16 w/ osteopenia in R femoral neck  - continue Vit D / D 25      PPX: Lovenox  Diet: Adult Regular  Dispo: likely DC tomorrow w/ resolution of symptoms    Code Status:     Full    Ja Cruz MD  [facility] HO-I  [facility] IM Service Team B    [facility] Medicine Hospitalist Pager numbers:   [facility] Hospitalist Medicine Team A (Theo/Felisa): [phone]  [facility] Hospitalist Medicine Team B  (Jose Manuel/Darvin):

## 2017-07-17 NOTE — CONSULTS
Consult Note  [facility]  Admit Date: 7/16/2017   LOS: 0 days   SUBJECTIVE:   Follow-up For: Vertigo    65 yo female with pmhx of arthritis presented to the hospital for dizziness x 6 hours. She was at her patio fixing the window and felt the room spinning around her. She also became nauseas. She had this feeling persistently for about 6 hours. When she came to the ED she vomited multiple times. She also noticed buzzing in her left ear which has been there since she started with dizziness. She denies headache, vision changes, focal weaknes, chest pain, light headedness, fever, chills, SOB, recent illness or sick contact. The dizziness has subsided now but whenever she gets up and moves or goes to the restroom she endorses mild dizziness. She had meclizine and zofran given in the ED that helped with her symptoms. She denies any similar episodes in the past. She denies getting dehydrated. She does endorse intentional weight loss of 37 lbs in the past 10 months. Her diet has been healthy and she drinks lot of water. She denies smoking, etoh and any other illicit drugs.      Review of Systems   Constitutional: Negative.    HENT: Negative.    Respiratory: Negative.    Cardiovascular: Negative.    Genitourinary: Negative.    Musculoskeletal: Negative.    Skin: Negative.    Neurological: Positive for dizziness. Negative for tingling, tremors, sensory change, speech change, focal weakness, seizures and loss of consciousness.     OBJECTIVE:   Vital Signs (Most Recent)  Temp: 98.4 °F (36.9 °C) (07/17/17 0800)  Pulse: 92 (07/17/17 0800)  Resp: 18 (07/17/17 0800)  BP: (!) 143/77 (07/17/17 0800)  SpO2: 98 % (07/17/17 1146)    I & O (Last 24H):  Intake/Output Summary (Last 24 hours) at 07/17/17 1232  Last data filed at 07/17/17 0900   Gross per 24 hour   Intake              240 ml   Output              875 ml   Net             -635 ml     Wt Readings from Last 3 Encounters:   07/16/17 83.9 kg (185 lb)   05/01/17 94.8 kg (209  lb)   12/02/16 95.2 kg (209 lb 14.1 oz)       Current Diet Order   Procedures    Diet Adult Regular        Physical Exam   Constitutional: She is oriented to person, place, and time and well-developed, well-nourished, and in no distress. No distress.   HENT:   Head: Normocephalic and atraumatic.   Eyes: Conjunctivae and EOM are normal. Pupils are equal, round, and reactive to light. Right eye exhibits no discharge. Left eye exhibits no discharge.   Neck: Normal range of motion. Neck supple. No JVD present. No tracheal deviation present. No thyromegaly present.   Cardiovascular: Normal rate, regular rhythm, normal heart sounds and intact distal pulses.    Pulmonary/Chest: Effort normal and breath sounds normal. No respiratory distress. She has no wheezes. She has no rales.   Abdominal: Soft. Bowel sounds are normal. She exhibits no distension. There is no tenderness. There is no rebound and no guarding.   Musculoskeletal: Normal range of motion. She exhibits no edema, tenderness or deformity.   Neurological: She is alert and oriented to person, place, and time. She has normal sensation, normal strength, normal reflexes and intact cranial nerves. She is not agitated and not disoriented. She displays no weakness, facial symmetry and normal speech. No cranial nerve deficit or sensory deficit. She exhibits normal muscle tone. She has a normal Romberg Test. She shows no pronator drift. Coordination and gait normal.   Reflex Scores:       Tricep reflexes are 2+ on the right side and 2+ on the left side.       Bicep reflexes are 2+ on the right side and 2+ on the left side.       Brachioradialis reflexes are 2+ on the right side and 2+ on the left side.       Patellar reflexes are 2+ on the right side and 2+ on the left side.       Achilles reflexes are 2+ on the right side and 2+ on the left side.  Skin: She is not diaphoretic.   Nursing note and vitals reviewed.    Laboratory Data:  CBC    Recent Labs  Lab 07/16/17  1930  07/17/17  0529   WBC 10.36 8.48   RBC 4.59 4.37   HGB 14.4 13.6   HCT 42.3 41.1    279   MCV 92 94   MCH 31.4* 31.1*   MCHC 34.0 33.1     CMP    Recent Labs  Lab 07/16/17  1837 07/17/17  0528   CALCIUM 11.6* 10.1   PROT 7.3 6.5    143   K 3.1* 3.6   CO2 17* 23    113*   BUN 18 11   CREATININE 0.8 0.7   ALKPHOS 105 92   ALT 17 11   AST 15 12   BILITOT 0.4 0.4     POCT-Glucose  No results found for: POCTGLUCOSE  COAGS  No results for input(s): INR, APTT in the last 168 hours.    Invalid input(s): PT  UA  No results for input(s): COLORU, CLARITYU, SPECGRAV, PHUR, PROTEINUA, GLUCOSEU, BLOODU, WBCU, RBCU, BACTERIA, MUCUS in the last 24 hours.    Invalid input(s):  BILIRUBINCON  MICRO  Microbiology Results (last 7 days)     ** No results found for the last 168 hours. **        LIPID PANEL  Lab Results   Component Value Date    CHOL 185 07/17/2017     Lab Results   Component Value Date    HDL 57 07/17/2017     Lab Results   Component Value Date    LDLCALC 117.6 07/17/2017     Lab Results   Component Value Date    TRIG 52 07/17/2017     Lab Results   Component Value Date    CHOLHDL 30.8 07/17/2017       ASSESSMENT/PLAN:   Carlene Fong is a 66 y.o. female with no pmhx admitted for dizziness.    Vertigo:   - likely 2/2 benign positional vertigo vs vestibular neuritis   - No HA, focal weakness or neuro deficits, no hearing loss  - S/P meclizine and zofran in the ED that resolved the symptoms.   - CT of the head: Negative.   - Meclizine 25 mg PRN.   - She can follow up with her PCP and MRI can be done as in outpt.   - Recommend outpt PT for vestibular PT for BPPV.     Case to be discussed with Dr. Nelson.      7/17/2017 Jorge Kwong MD  [facility]   12:32 PM

## 2017-07-17 NOTE — PLAN OF CARE
Problem: Patient Care Overview  Goal: Plan of Care Review  Room air SpO2   96%. Pt with no apparent distress noted. Will continue to monitor.

## 2017-07-18 NOTE — PLAN OF CARE
Follow-up With  Details  Why  Contact Info   Ashli Stubbs MD     josee browning, will call you with follow-up appointment date/time.  [address]  [address]  [place] [address]  [phone]   Luda Nelson MD  Schedule an appointment as soon as possible for a visit in 1 week    [address]  [address]  [place] [address]  [phone]        Future Appointments  Date Time Provider Department Center   8/8/2017 11:00 AM Ashli Stubbs MD [facility] [place] [facility]        07/18/17 0805   Final Note   Assessment Type Final Discharge Note   Discharge Disposition Home   Discharge planning education complete? Yes   Hospital Follow Up  Appt(s) scheduled? Yes   Discharge plans and expectations educations in teach back method with documentation complete? Yes   Offered [facility] -- Bedside Delivery? n/a   Discharge/Hospital Encounter Summary to (non-[facility]) PCP n/a   Referral to Outpatient Case Management complete? n/a   Referral to / orders for Home Health Complete? n/a   30 day supply of medicines given at discharge, if documented non-compliance / non-adherence? n/a   Any social issues identified prior to discharge? No   Did you assess the readiness or willingness of the family or caregiver to support self management of care? Yes   Right Care Referral Info   Post Acute Recommendation No Care     Jie Olivarez RN  Transition Navigator  [phone]

## 2017-07-18 NOTE — NURSING
Iv site removed. NO active bleeding noted. Tele monitor removed for discharge. Patient discharge instruction instruction sheets and educational printouts given to pt and discussed with patient and  . Both verbalized clear understanding of all discussed. Patient d/c'd via w/c with escort service and  with all of patient's belongings. At present no distress noted.

## 2017-07-18 NOTE — DISCHARGE INSTRUCTIONS
DIZZINESS (VERTIGO) WITH MEDICINES, MANAGING (ENGLISH) View Edit Remove  VERTIGO, UNSPECIFIED (ENGLISH) View Edit Remove  DIZZINESS (VERTIGO) AND BALANCE PROBLEMS: STAYING SAFE (ENGLISH) View Edit Remove  MECLIZINE TABLETS OR CAPSULES (ENGLISH) View Edit Remove

## 2017-07-19 NOTE — DISCHARGE SUMMARY
[facility] Hospitalist Medicine Discharge Summary    Primary Team: U Medicine Team B  Attending Physician: Kaden Richard  Resident: Gerri Torres  Intern: Victor Manuel Pleitez    Date of Admit: 7/16/2017  Date of Discharge: 7/17/17    Discharge to: home  Condition: stable    Discharge Diagnoses     Patient Active Problem List   Diagnosis    Primary hyperparathyroidism    Mass of breast-benign    Positive anti-CCP test    Rheumatoid factor positive    Impingement syndrome of left shoulder    Dry eyes    Arthritis of knee    Joint pain    Obesity (BMI 30.0-34.9)    Vitamin D deficiency    H/O hysterectomy for benign disease    Osteopenia    History of appendectomy    Abnormal mammogram    Vertigo    Dizziness    Nausea       Consultants and Procedures     Consultants:  Neurology    Procedures:   --    Brief History of Present Illness     Carlene Fong is a 66 y.o.  female with no active PMHx. The patient presented to [facility] on 7/16/2017 with a primary complaint of dizziness x 6 hours.     The patient was in their usual state of health until yesterday evening while as she was painting she began to feel very dizzy and weak. She noted that the room suddenly started spinning and she became very nauseous. She endorses a buzzing sound in her L ear which has been continuous since the dizziness first began. She denies any headache, vision changes, or focal weakness, chest pain, SOB, fever, or recent illness. She states that the dizziness is worsened with any movement of her head, but not worsened or brought on by certain positions. Her dizziness has continued since it first began at home and was only made better by the meclizine given in the ED. She vomited more than 5 times while in the ER which relieved with zofran. She denies every having any episodes like this before. Her only difference in normal routine is that she took a suppository earlier today for constipation.      In hospital, CT head was negative. Patient tested positive with ross- hallpike maneuver and  responded to meclizine and zofran and symptoms subsided. Patient was diagnosed with benign positional vertigo and per neurology recommendation patient was scheduled for outpatient MRI and followup with neurologist. Patient discharged with meclizine.    Hospital Course By Problem with Pertinent Findings     Vertigo   - non-positional, continuous room spinning with associated N/V and tinnitus  - denies HA, no focal neuro deficits, denies hearing loss  - CT head neg  - received meclizine, zofran, phenergan, diazepam and 1L NS in the ED  - q4 neurochecks, meclizine 25mg PRN, Zofran PRN  - neurology consulted, patient tested positive with ross-hallpike maneuver  - neuro recommends follow up MRI and follow up in neurology clinic  - discharge with meclizine     Hypokalemia   - K at 3.1 on admit, likely 2/2 vomiting  -replaced, today K 3.6 and Mg 2.3     Hx of primary hyperparathyrodism  - Ca at 11.6 on admission,   - DXA in 12/16 w/ osteopenia in R femoral neck  - continue Vit D / D 25  - Phos of 2.5  - TSH of 0.94         Discharge Medications        Medication List      START taking these medications    meclizine 25 mg tablet  Commonly known as:  ANTIVERT  Take 1 tablet (25 mg total) by mouth 3 (three) times daily as needed for Dizziness.        CHANGE how you take these medications    meloxicam 7.5 MG tablet  Commonly known as:  MOBIC  Take 1-2 pills daily for arthritis symptoms  What changed:  · how much to take  · how to take this  · when to take this  · additional instructions        CONTINUE taking these medications    co-enzyme Q-10 30 mg capsule     diclofenac sodium 1 % Gel  Commonly known as:  VOLTAREN  APPLY 2GRAMS TO THE SKIN 3 TIMES A DAY     econazole nitrate 1 % cream  Apply topically 2 (two) times daily. Apply to affected toenail and surrounding skin twice a day.     estrogens (conjugated) 0.625 MG  tablet  Commonly known as:  PREMARIN     FLAXSEED ORAL     vitamin D 1000 units Tab           Where to Get Your Medications      These medications were sent to [facility]/pharmacy #[id number] - [place] - [address] AT [facility]  [address] [place] [address]    Phone:  [phone]   · meclizine 25 mg tablet         Discharge Information:   Diet:  regular    Physical Activity:  As tolerated    Instructions:  1. Take all medications as prescribed  2. Keep all follow-up appointments  3. Return to the hospital or call your primary care physicians if any worsening symptoms such as dizziness, lightheadedness, fainting, shortness of breath, chest pain occur.      Follow-Up Appointments:  Neurology - Dr Nelson (within 1 week)    Victor Manuel Pleitez  [facility] Internal Medicine, -I

## 2017-08-08 ENCOUNTER — OFFICE VISIT (OUTPATIENT)
Dept: FAMILY MEDICINE | Facility: CLINIC | Age: 67
End: 2017-08-08
Payer: COMMERCIAL

## 2017-08-08 ENCOUNTER — HOSPITAL ENCOUNTER (OUTPATIENT)
Dept: RADIOLOGY | Facility: HOSPITAL | Age: 67
Discharge: HOME | End: 2017-08-08
Attending: FAMILY MEDICINE
Payer: COMMERCIAL

## 2017-08-08 VITALS
DIASTOLIC BLOOD PRESSURE: 85 MMHG | SYSTOLIC BLOOD PRESSURE: 123 MMHG | BODY MASS INDEX: 29.69 KG/M2 | HEIGHT: 66 IN | OXYGEN SATURATION: 96 % | HEART RATE: 96 BPM | WEIGHT: 184.75 LBS

## 2017-08-08 DIAGNOSIS — E55.9 VITAMIN D DEFICIENCY: ICD-10-CM

## 2017-08-08 DIAGNOSIS — R42 VERTIGO: Primary | ICD-10-CM

## 2017-08-08 DIAGNOSIS — R06.2 WHEEZING: ICD-10-CM

## 2017-08-08 DIAGNOSIS — R11.2 NAUSEA AND VOMITING IN ADULT: ICD-10-CM

## 2017-08-08 DIAGNOSIS — F41.9 ANXIETY: ICD-10-CM

## 2017-08-08 DIAGNOSIS — Z79.899 MEDICATION MANAGEMENT: ICD-10-CM

## 2017-08-08 DIAGNOSIS — E66.3 OVERWEIGHT (BMI 25.0-29.9): ICD-10-CM

## 2017-08-08 DIAGNOSIS — E21.0 PRIMARY HYPERPARATHYROIDISM: ICD-10-CM

## 2017-08-08 PROCEDURE — 99999 PR PBB SHADOW E&M-EST. PATIENT-LVL III: CPT | Mod: PBBFAC,,, | Performed by: FAMILY MEDICINE

## 2017-08-08 PROCEDURE — 71020 XR CHEST PA AND LATERAL: CPT | Mod: TC

## 2017-08-08 PROCEDURE — 71020 XR CHEST PA AND LATERAL: CPT | Mod: 26,,, | Performed by: RADIOLOGY

## 2017-08-08 PROCEDURE — 99215 OFFICE O/P EST HI 40 MIN: CPT | Mod: S$GLB,,, | Performed by: FAMILY MEDICINE

## 2017-08-08 PROCEDURE — 3008F BODY MASS INDEX DOCD: CPT | Mod: S$GLB,,, | Performed by: FAMILY MEDICINE

## 2017-08-08 RX ORDER — DIAZEPAM 2 MG/1
2 TABLET ORAL EVERY 6 HOURS PRN
COMMUNITY
End: 2017-12-06

## 2017-08-08 RX ORDER — DIAZEPAM 5 MG/1
5 TABLET ORAL EVERY 6 HOURS PRN
Qty: 15 TABLET | Refills: 0 | Status: SHIPPED | OUTPATIENT
Start: 2017-08-08 | End: 2017-09-07

## 2017-08-08 RX ORDER — ONDANSETRON HYDROCHLORIDE 8 MG/1
8 TABLET, FILM COATED ORAL EVERY 8 HOURS PRN
Qty: 15 TABLET | Refills: 1 | Status: SHIPPED | OUTPATIENT
Start: 2017-08-08 | End: 2022-03-13 | Stop reason: ALTCHOICE

## 2017-08-08 NOTE — PROGRESS NOTES
" Office Visit    Patient Name: Carlene Fong    : 1950  MRN: [id number]    Subjective:  Carlene is a 66 y.o. female who presents today for:    Follow-up (hospital follow up)    Carlene Fong is a 66 y.o. caucasion female, overall healthy, with past medical history significant only for primary hyperparathyroidism that is followed by endocrine Dr. Archuleta. . The patient presented to [facility] on 2017 with a primary complaint of dizziness x 6 hours.     Excerpt from From discharge summary "The patient was in their usual state of health until yesterday evening while as she was painting she began to feel very dizzy and weak. She noted that the room suddenly started spinning and she became very nauseous. She endorses a buzzing sound in her L ear which has been continuous since the dizziness first began. She denies any headache, vision changes, or focal weakness, chest pain, SOB, fever, or recent illness. She states that the dizziness is worsened with any movement of her head, but not worsened or brought on by certain positions. Her dizziness has continued since it first began at home and was only made better by the meclizine given in the ED. She vomited more than 5 times while in the ER which relieved with zofran. She denies every having any episodes like this before. Her only difference in normal routine is that she took a suppository earlier today for constipation.   In hospital, CT head was negative. Patient tested positive with ross- hallpike maneuver and  responded to meclizine and zofran and symptoms subsided. Patient was diagnosed with benign positional vertigo and per neurology recommendation patient was scheduled for outpatient MRI and followup with neurologist. Patient discharged with meclizine."    Lab work performed during observation in the hospital including CBC, CMP, TSH, B12, PTH, vitamin D, magnesium, phosphorus overall unremarkable except for mildly low " potassium and phosphorus, likely due to vomiting.  Labs otherwise unremarkable. As per above CT scan of head unremarkable.     Since discharge she is no longer dizzy-- has had no further episodes of room spinning and no further nausea/ vomiting.  Has some associated anxiety (underlying family stress with her son and her new grand baby). No neurologic deficits but feels like she is slightly leaning to the left. Her left ear feels a little stopped up.  She saw an outside ENT Dr. Callejas, and overall his ENT evaluation was unremarkable.  He did order a vestibular study that she has scheduled for next week.  He is considering a possible diagnosis of Ménière's along with BPPV.     Prior to her incident of vertigo, patient was exercising regularly with excellent exercise tolerance.  She is not having chest pain shortness of breath or palpitations with exertion.  She had been feeling in her usual state of health other than with some increased anxiety as mentioned.         Past Medical History  Past Medical History:   Diagnosis Date    Diverticulosis     Osteopenia 12/1/2015    next bone density 4/2016, seeing endocrine for hyperparathyroidism        Past Surgical History  Past Surgical History:   Procedure Laterality Date    APPENDECTOMY      HYSTERECTOMY      ROTATOR CUFF REPAIR Right 12/2014    TUBAL LIGATION         Family History  Family History   Problem Relation Age of Onset    Diabetes Father     Dementia Father     Rheum arthritis Neg Hx     Lupus Neg Hx     Inflammatory bowel disease Neg Hx        Social History  Social History     Social History    Marital status:      Spouse name: N/A    Number of children: N/A    Years of education: N/A     Occupational History    Not on file.     Social History Main Topics    Smoking status: Never Smoker    Smokeless tobacco: Never Used    Alcohol use No    Drug use: No    Sexual activity: Yes     Partners: Male      Comment: ; 2 children  "    Other Topics Concern    Not on file     Social History Narrative    No narrative on file       Current Medications  Medications reviewed and updated.     Allergies   Review of patient's allergies indicates:   Allergen Reactions    Requip [ropinirole] Swelling     Other reaction(s): palpitations, swelling of tongue       Review of Systems (Pertinent positives)  Review of Systems   Constitutional: Negative for activity change, chills, fever and unexpected weight change.   HENT: Negative for hearing loss, rhinorrhea and trouble swallowing.    Eyes: Positive for visual disturbance. Negative for discharge.   Respiratory: Negative for chest tightness and wheezing.    Cardiovascular: Positive for palpitations. Negative for chest pain.   Gastrointestinal: Negative for blood in stool, constipation, diarrhea and vomiting (resolved).   Endocrine: Negative for polydipsia and polyuria.   Genitourinary: Negative for difficulty urinating, dysuria, hematuria and menstrual problem.   Musculoskeletal: Negative for arthralgias, joint swelling and neck pain.   Neurological: Positive for weakness and headaches. Negative for numbness.   Psychiatric/Behavioral: Negative for confusion and dysphoric mood. The patient is nervous/anxious.        /85   Pulse 96   Ht 5' 6" (1.676 m)   Wt 83.8 kg (184 lb 11.9 oz)   SpO2 96%   BMI 29.82 kg/m²     Physical Exam   Constitutional: She is oriented to person, place, and time. She appears well-developed and well-nourished. No distress.   HENT:   Head: Normocephalic and atraumatic.   Right Ear: Tympanic membrane normal. Tympanic membrane is not erythematous and not bulging.   Left Ear: Tympanic membrane normal. Tympanic membrane is not erythematous and not bulging.   Nose: No mucosal edema or rhinorrhea.   Mouth/Throat: No oropharyngeal exudate or posterior oropharyngeal erythema.   Eyes: Conjunctivae are normal. Right eye exhibits nystagmus (with left gomez gaze). Left eye exhibits " nystagmus (with leftward gaze).   Cardiovascular: Normal rate, regular rhythm and normal heart sounds.    Pulmonary/Chest: Effort normal. She has wheezes (isolated wheeze of right lower lung field) in the right lower field.   Musculoskeletal: She exhibits no edema.   Neurological: She is alert and oriented to person, place, and time. She has normal strength. No cranial nerve deficit. She displays a negative Romberg sign. Coordination normal.   Skin: Skin is warm and dry.   Psychiatric: She has a normal mood and affect.   Vitals reviewed.        Assessment/Plan:  Carlene Fong is a 66 y.o. female who presents today for :    Carlene was seen today for follow-up.    Diagnoses and all orders for this visit:    Vertigo  Comments:  suspected BPPV, weaning off meclezine and overall ok. ENT eval ongoing, vestibular study and MRI scheduled. if eval neg, next step is therapy for BPPV  Orders:  -     Cancel: MRI Brain W WO Contrast; Future  -     diazePAM (VALIUM) 5 MG tablet; Take 1 tablet (5 mg total) by mouth every 6 (six) hours as needed for Anxiety.  -     MRI Brain W WO Contrast; Future    Nausea and vomiting in adult  Comments:  resolved since discharge  Orders:  -     ondansetron (ZOFRAN) 8 MG tablet; Take 1 tablet (8 mg total) by mouth every 8 (eight) hours as needed for Nausea.    Primary hyperparathyroidism  Comments:  PTH improved to 169 with testing in the ER, calcium and vitamin D level normal    Vitamin D deficiency    Overweight (BMI 25.0-29.9)    Medication management    Wheezing  -     X-Ray Chest PA And Lateral; Future    Anxiety  Comments:  will need to readdress this if work up is negative and symptoms persist            ICD-10-CM ICD-9-CM    1. Vertigo R42 780.4 diazePAM (VALIUM) 5 MG tablet      MRI Brain W WO Contrast      CANCELED: MRI Brain W WO Contrast    suspected BPPV, weaning off meclezine and overall ok. ENT eval ongoing, vestibular study and MRI scheduled. if eval neg, next step is  therapy for BPPV   2. Nausea and vomiting in adult R11.2 787.01 ondansetron (ZOFRAN) 8 MG tablet    resolved since discharge   3. Primary hyperparathyroidism E21.0 252.01     PTH improved to 169 with testing in the ER, calcium and vitamin D level normal   4. Vitamin D deficiency E55.9 268.9    5. Overweight (BMI 25.0-29.9) E66.3 278.02    6. Medication management Z79.899 V58.69    7. Wheezing R06.2 786.07 X-Ray Chest PA And Lateral   8. Anxiety F41.9 300.00     will need to readdress this if work up is negative and symptoms persist       Return for return as needed for new concerns.

## 2017-08-10 ENCOUNTER — TELEPHONE (OUTPATIENT)
Dept: FAMILY MEDICINE | Facility: CLINIC | Age: 67
End: 2017-08-10

## 2017-08-10 NOTE — TELEPHONE ENCOUNTER
Called patient to review results: that chest x-ray is overall unremarkable.  There are no acute findings, but there is some chronic, possible scar tissue, that likely accounts for the wheezing heard on her exam.  No further testing is needed for this. Patient verbalized understanding.

## 2017-08-15 ENCOUNTER — PATIENT MESSAGE (OUTPATIENT)
Dept: FAMILY MEDICINE | Facility: CLINIC | Age: 67
End: 2017-08-15

## 2017-08-15 DIAGNOSIS — H81.09 MENIERE DISEASE, UNSPECIFIED LATERALITY: Primary | ICD-10-CM

## 2017-08-18 ENCOUNTER — PATIENT MESSAGE (OUTPATIENT)
Dept: FAMILY MEDICINE | Facility: CLINIC | Age: 67
End: 2017-08-18

## 2017-08-18 ENCOUNTER — HOSPITAL ENCOUNTER (OUTPATIENT)
Dept: RADIOLOGY | Facility: HOSPITAL | Age: 67
Discharge: HOME | End: 2017-08-18
Attending: FAMILY MEDICINE
Payer: COMMERCIAL

## 2017-08-18 DIAGNOSIS — R42 VERTIGO: ICD-10-CM

## 2017-08-18 PROCEDURE — 25500020 PHARM REV CODE 255: Performed by: FAMILY MEDICINE

## 2017-08-18 PROCEDURE — 70553 MRI BRAIN STEM W/O & W/DYE: CPT | Mod: 26,,, | Performed by: RADIOLOGY

## 2017-08-18 PROCEDURE — 70553 MRI BRAIN STEM W/O & W/DYE: CPT | Mod: TC

## 2017-08-18 PROCEDURE — 63600175 PHARM REV CODE 636 W HCPCS: Performed by: STUDENT IN AN ORGANIZED HEALTH CARE EDUCATION/TRAINING PROGRAM

## 2017-08-18 PROCEDURE — A9585 GADOBUTROL INJECTION: HCPCS | Performed by: FAMILY MEDICINE

## 2017-08-18 RX ORDER — GADOBUTROL 604.72 MG/ML
9 INJECTION INTRAVENOUS
Status: COMPLETED | OUTPATIENT
Start: 2017-08-18 | End: 2017-08-18

## 2017-08-18 RX ORDER — MIDAZOLAM HYDROCHLORIDE 1 MG/ML
2 INJECTION INTRAMUSCULAR; INTRAVENOUS ONCE
Status: COMPLETED | OUTPATIENT
Start: 2017-08-18 | End: 2017-08-18

## 2017-08-18 RX ADMIN — GADOBUTROL 9 ML: 604.72 INJECTION INTRAVENOUS at 02:08

## 2017-08-18 RX ADMIN — MIDAZOLAM HYDROCHLORIDE 2 MG: 1 INJECTION, SOLUTION INTRAMUSCULAR; INTRAVENOUS at 02:08

## 2017-08-18 NOTE — PROGRESS NOTES
Patient to MRI wide bore scanner, versed 2 mg IVP given for anxiolysis, patient tolerated well, left in care of MRI staff

## 2017-08-23 ENCOUNTER — TELEPHONE (OUTPATIENT)
Dept: FAMILY MEDICINE | Facility: CLINIC | Age: 67
End: 2017-08-23

## 2017-08-23 NOTE — TELEPHONE ENCOUNTER
----- Message from Zora Zamora sent at 8/22/2017  4:08 PM CDT -----  Contact: [phone]  Patient called in returning your call. Please advise.

## 2017-08-23 NOTE — TELEPHONE ENCOUNTER
Returned patient's call. Patient stated she found out who was calling her from [facility], it wasn't our office.

## 2017-08-30 ENCOUNTER — PATIENT MESSAGE (OUTPATIENT)
Dept: FAMILY MEDICINE | Facility: CLINIC | Age: 67
End: 2017-08-30

## 2017-09-01 RX ORDER — MECLIZINE HYDROCHLORIDE 25 MG/1
25 TABLET ORAL 3 TIMES DAILY PRN
Qty: 30 TABLET | Refills: 3 | Status: SHIPPED | OUTPATIENT
Start: 2017-09-01 | End: 2018-01-10

## 2017-09-01 NOTE — TELEPHONE ENCOUNTER
----- Message from Beulah Espinoza sent at 9/1/2017  3:14 PM CDT -----  Contact: Self [phone]  Patient is calling to get refills on her medication sent to [facility]/pharmacy #[id number] - [place], [place] - [address] AT [facility] [phone] (Phone)  [phone] (Fax)      1. meclizine (ANTIVERT) 25 mg tablet 90 tablet

## 2017-10-05 ENCOUNTER — TELEPHONE (OUTPATIENT)
Dept: FAMILY MEDICINE | Facility: CLINIC | Age: 67
End: 2017-10-05

## 2017-10-05 NOTE — TELEPHONE ENCOUNTER
----- Message from Keira Herrera MA sent at 10/4/2017  1:59 PM CDT -----  Contact: [phone]/self  Annual is scheduled in January. Patient is requesting labs prior to appointment. Please advise.   ----- Message -----  From: Hellen Stubbs  Sent: 10/4/2017  11:41 AM  To: [facility] Staff    Patient is requesting orders for blood work. Please advise.

## 2017-10-05 NOTE — TELEPHONE ENCOUNTER
I reviewed Carlene's chart and she recently had extensive Lab work in July when she went to the ER-- everything from all electrolytes, to blood count and thyroid, to cholesterol and vitamin D and A1c. All of these labs looked great.  Because she is not on any prescription medications, I only feel that she needs labs at most once yearly. I would recommend that she just wait to see me and then depending on how she is feeling we go ahead and order any pertinent ones at that time.  Does this sound like a good plan to Carlene? If not then please let me know thanks

## 2017-10-12 DIAGNOSIS — M19.90 ARTHRITIS: ICD-10-CM

## 2017-10-12 RX ORDER — MELOXICAM 7.5 MG/1
TABLET ORAL
Qty: 180 TABLET | Refills: 2 | Status: SHIPPED | OUTPATIENT
Start: 2017-10-12 | End: 2018-02-26 | Stop reason: ALTCHOICE

## 2017-12-04 ENCOUNTER — TELEPHONE (OUTPATIENT)
Dept: FAMILY MEDICINE | Facility: CLINIC | Age: 67
End: 2017-12-04

## 2017-12-04 NOTE — TELEPHONE ENCOUNTER
----- Message from Antonella Conway sent at 12/4/2017 12:34 PM CST -----  Contact: self, [phone]  Patient called in requesting to speak with you, states she fell a week ago and hurt the back of her head and is now having headaches. States a nurse, friend of hers, checked on her and made sure she didn't have a concussion but wants to make sure.  Please advise.

## 2017-12-04 NOTE — TELEPHONE ENCOUNTER
Returned patient's call. She stated she fell off a chair last Sunday putting up her [place]. She hit the back of her head. Her neighbor is a nurse and came and checked her out and said she was fine. She started to get headaches yesterday. She wants to ensure it is not matty to the fall. She thinks it could be sinus related. She would like to know your opinion. I did schedule her 10/06 @ 8:40 for evaluation.

## 2017-12-05 ENCOUNTER — TELEPHONE (OUTPATIENT)
Dept: FAMILY MEDICINE | Facility: CLINIC | Age: 67
End: 2017-12-05

## 2017-12-05 NOTE — TELEPHONE ENCOUNTER
Spoke with patient and reviewed recommendation. She verbalized understanding. She stated she thinks it is due to her sinuses. She will call first thing tomorrow morning and cancel if she is feeling better.

## 2017-12-05 NOTE — TELEPHONE ENCOUNTER
Sinus headaches tend to be in the facial areas-- forehead and cheeks primarily, so if her headache is in this area then it's more likely to be sinus.  Headaches in the back of her head would be more likely to be related to her fall, especially if there is associated neck pain.  I would be happy to see her Wednesday if her pain is ongoing, thanks

## 2017-12-05 NOTE — TELEPHONE ENCOUNTER
----- Message from Dedra Eisenberg sent at 12/5/2017  1:59 PM CST -----  Contact: [phone]/ self   Pt its requesting to speak with the nurse in regarding  a problem she is having . Please advise

## 2017-12-06 ENCOUNTER — OFFICE VISIT (OUTPATIENT)
Dept: FAMILY MEDICINE | Facility: CLINIC | Age: 67
End: 2017-12-06
Payer: COMMERCIAL

## 2017-12-06 VITALS
BODY MASS INDEX: 28.45 KG/M2 | HEIGHT: 66 IN | HEART RATE: 101 BPM | OXYGEN SATURATION: 96 % | WEIGHT: 177 LBS | DIASTOLIC BLOOD PRESSURE: 76 MMHG | SYSTOLIC BLOOD PRESSURE: 118 MMHG

## 2017-12-06 DIAGNOSIS — R51.9 ACUTE NONINTRACTABLE HEADACHE, UNSPECIFIED HEADACHE TYPE: Primary | ICD-10-CM

## 2017-12-06 DIAGNOSIS — R10.2 PELVIC PAIN: ICD-10-CM

## 2017-12-06 DIAGNOSIS — J30.2 ACUTE SEASONAL ALLERGIC RHINITIS, UNSPECIFIED TRIGGER: ICD-10-CM

## 2017-12-06 DIAGNOSIS — T23.271A PARTIAL THICKNESS BURN OF RIGHT WRIST, INITIAL ENCOUNTER: ICD-10-CM

## 2017-12-06 PROCEDURE — 99214 OFFICE O/P EST MOD 30 MIN: CPT | Mod: S$GLB,,, | Performed by: FAMILY MEDICINE

## 2017-12-06 PROCEDURE — 99999 PR PBB SHADOW E&M-EST. PATIENT-LVL IV: CPT | Mod: PBBFAC,,, | Performed by: FAMILY MEDICINE

## 2017-12-06 RX ORDER — MINERAL OIL
180 ENEMA (ML) RECTAL DAILY
Qty: 30 TABLET | Refills: 11 | Status: SHIPPED | OUTPATIENT
Start: 2017-12-06 | End: 2018-01-10

## 2017-12-06 RX ORDER — HYDROCHLOROTHIAZIDE 12.5 MG/1
12.5 TABLET ORAL EVERY OTHER DAY
COMMUNITY
End: 2020-02-07 | Stop reason: SDUPTHER

## 2017-12-06 NOTE — PATIENT INSTRUCTIONS
No evidence of concussion or residual symptoms of head trauma from fall with head injury.  Treat for headache with a leg and Flonase 2 sprays each nostril daily.    Continue ibuprofen, ice rest, cushioned seating for residual pelvic pain.     For burn, stop the Neosporin and instead apply either Aquaphor or Vaseline 2-3 times daily to keep the lesion moist.  Keep it covered.  Send a message in 1 week with progress.

## 2017-12-06 NOTE — PROGRESS NOTES
Office Visit    Patient Name: Carlene Fong    : 1950  MRN: [id number]    Subjective:  Carlene is a 67 y.o. female who presents today for:    Headache (fall, burn, no refills needed, pneumo vaccine)    68 yo patient of mine with PMH significant for Meniere's disease, arthritis, osteopenia, and anxiety here today for evaluation of headaches following a fall.  10 days ago she was standing on a step stool to hang a bow on her radha tree when he slipped out from under her and she fell backwards landing on her pelvis and then subsequently fell backwards and hit the back of her head on the floor.  She did experience fairly instant pain of her pelvis and occipital region of her head, but she did not lose consciousness.  She called her friend who is a nurse who did a neurologic evaluation which was reported as normal.  She did not have any residual nausea, vomiting, neurologic deficits.  She did develop a hematoma in the occipital region, but this is now resolving.    Today she does present for evaluation of headache, but this headache is actually in the front of her head and in the cheek and forehead region.  She has a history of ALLERGIC rhinitis that does usually flare at this time of year and she has been noticing some nasal congestion and postnasal drip.  She is also due for her eye exam and has this scheduled next month.    As far as residual symptoms from the fall, she does have some ongoing bilateral pelvic pain in the regions of her initial tuberosities.  She reports pain with sitting and has been sitting on some ice packs for relief.  She has some bruising, but this is resolving.  She does not have numbness tingling or weakness extending down her legs.    Finally, she presents today for evaluation of a burn that occurred 2 weeks ago on her right wrist while she was cooking for the holidays.  She has been putting Neosporin on it 2 times a day but the burning does not seem to be healing.  She has  not had any drainage or spreading redness but the surrounding area is puffy/swollen.      Past Medical History  Past Medical History:   Diagnosis Date    Abnormal mammogram 8/10/2016    Followed up with diagnostic mammogram and ultrasound 10/22/2015at [facility]  that showed no concerning findings with recommendation to continue yearly screening.     Anxiety 8/8/2017    will need to readdress this if work up is negative and symptoms persist    Arthritis of knee 1/7/2015    Diverticulosis     Meniere's disease 8/15/2017    Osteopenia 12/1/2015    next bone density 4/2016, seeing endocrine for hyperparathyroidism     Positive anti-CCP test 4/7/2014    Primary hyperparathyroidism 8/26/2013    will be following up with Dr. [facility] in 2016, blood work today. stoped taking calcitonin due to nausea     Rheumatoid factor positive 4/7/2014    Vertigo 7/16/2017    Vitamin D deficiency 12/1/2015       Past Surgical History  Past Surgical History:   Procedure Laterality Date    APPENDECTOMY      HYSTERECTOMY      ROTATOR CUFF REPAIR Right 12/2014    TUBAL LIGATION         Family History  Family History   Problem Relation Age of Onset    Diabetes Father     Dementia Father     Rheum arthritis Neg Hx     Lupus Neg Hx     Inflammatory bowel disease Neg Hx        Social History  Social History     Social History    Marital status:      Spouse name: N/A    Number of children: N/A    Years of education: N/A     Occupational History    Not on file.     Social History Main Topics    Smoking status: Never Smoker    Smokeless tobacco: Never Used    Alcohol use No    Drug use: No    Sexual activity: Yes     Partners: Male      Comment: ; 2 children     Other Topics Concern    Not on file     Social History Narrative    No narrative on file       Current Medications  Medications reviewed and updated.     Allergies   Review of patient's allergies indicates:   Allergen Reactions    Requip  "[ropinirole] Swelling     Other reaction(s): palpitations, swelling of tongue       Review of Systems (Pertinent positives)  Review of Systems   Constitutional: Negative for activity change and unexpected weight change.   HENT: Negative for hearing loss, rhinorrhea and trouble swallowing.    Eyes: Negative for discharge and visual disturbance.   Respiratory: Negative for chest tightness and wheezing.    Cardiovascular: Negative for chest pain and palpitations.   Gastrointestinal: Negative for blood in stool, constipation, diarrhea and vomiting.   Endocrine: Negative for polydipsia and polyuria.   Genitourinary: Negative for difficulty urinating, dysuria, hematuria and menstrual problem.   Musculoskeletal: Positive for arthralgias. Negative for joint swelling and neck pain.   Skin: Positive for wound (burn).   Neurological: Positive for headaches. Negative for weakness.   Psychiatric/Behavioral: Negative for confusion and dysphoric mood.       /76   Pulse 101   Ht 5' 6" (1.676 m)   Wt 80.3 kg (177 lb 0.5 oz)   SpO2 96%   BMI 28.57 kg/m²     Physical Exam   Constitutional: She is oriented to person, place, and time. She appears well-developed and well-nourished. No distress.   HENT:   Head: Normocephalic and atraumatic.   Nose: Mucosal edema and rhinorrhea present. Right sinus exhibits no maxillary sinus tenderness and no frontal sinus tenderness. Left sinus exhibits no maxillary sinus tenderness and no frontal sinus tenderness.   Eyes: Conjunctivae are normal.   Cardiovascular: Normal rate and regular rhythm.    Pulmonary/Chest: Effort normal and breath sounds normal.   Musculoskeletal: She exhibits no edema.        Cervical back: She exhibits tenderness (of residual small occipital hematoma). She exhibits normal range of motion and no bony tenderness.        Back:    Neurological: She is alert and oriented to person, place, and time. She has normal strength. No cranial nerve deficit.   Skin: Skin is warm " and dry. Burn noted.        Psychiatric: She has a normal mood and affect.   Vitals reviewed.        Assessment/Plan:  Carlene Fong is a 67 y.o. female who presents today for :    Carlene was seen today for headache.    Diagnoses and all orders for this visit:    Acute nonintractable headache, unspecified headache type    Acute seasonal allergic rhinitis, unspecified trigger  -     fexofenadine (ALLEGRA) 180 MG tablet; Take 1 tablet (180 mg total) by mouth once daily.    Pelvic pain    Partial thickness burn of right wrist, initial encounter            ICD-10-CM ICD-9-CM    1. Acute nonintractable headache, unspecified headache type R51 784.0    2. Acute seasonal allergic rhinitis, unspecified trigger J30.2 477.8 fexofenadine (ALLEGRA) 180 MG tablet   3. Pelvic pain R10.2 [id number]    4. Partial thickness burn of right wrist, initial encounter T23.271A 944.27        Patient Instructions   No evidence of concussion or residual symptoms of head trauma from fall with head injury.  Treat for headache with a leg and Flonase 2 sprays each nostril daily.    Continue ibuprofen, ice rest, cushioned seating for residual pelvic pain.     For burn, stop the Neosporin and instead apply either Aquaphor or Vaseline 2-3 times daily to keep the lesion moist.  Keep it covered.  Send a message in 1 week with progress.        Return for Follow-up as scheduled for annual exam with labs, sooner if concerns.

## 2017-12-12 ENCOUNTER — PATIENT MESSAGE (OUTPATIENT)
Dept: FAMILY MEDICINE | Facility: CLINIC | Age: 67
End: 2017-12-12

## 2017-12-19 ENCOUNTER — TELEPHONE (OUTPATIENT)
Dept: FAMILY MEDICINE | Facility: CLINIC | Age: 67
End: 2017-12-19

## 2017-12-19 NOTE — TELEPHONE ENCOUNTER
----- Message from Antonella Conway sent at 12/19/2017 11:51 AM CST -----  Contact: self, [phone]  Patient requests to speak with you regarding pain she is having from fall weeks ago. Please advise.

## 2017-12-19 NOTE — TELEPHONE ENCOUNTER
Returned patient call. She said she came in a few weeks ago due to the fall she had. She said she is still in severe pain. She has been taking tylenol and it has not been helping. She has also been doing the recommended exercises and still has no relief. She would like something called in. Please advise.

## 2017-12-20 ENCOUNTER — TELEPHONE (OUTPATIENT)
Dept: FAMILY MEDICINE | Facility: CLINIC | Age: 67
End: 2017-12-20

## 2017-12-20 DIAGNOSIS — M54.9 BACK PAIN, UNSPECIFIED BACK LOCATION, UNSPECIFIED BACK PAIN LATERALITY, UNSPECIFIED CHRONICITY: Primary | ICD-10-CM

## 2017-12-20 RX ORDER — TRAMADOL HYDROCHLORIDE 50 MG/1
50 TABLET ORAL EVERY 12 HOURS PRN
Qty: 20 TABLET | Refills: 0 | Status: SHIPPED | OUTPATIENT
Start: 2017-12-20 | End: 2017-12-26

## 2017-12-20 NOTE — TELEPHONE ENCOUNTER
The headaches have subsided and the burn is healing. Patient stated the pain radiates in her buttocks and throughout her hips due to the fall and something stronger called in.Please advise.

## 2017-12-20 NOTE — TELEPHONE ENCOUNTER
Spoke with patient. She said she has taken Tramadol before and she thinks it will help. Please advise.

## 2017-12-20 NOTE — TELEPHONE ENCOUNTER
"Is the ongoing pain she is having just the bruising and pain in her "sit bones" region-- ie her buttocks/ pelvis or is she still having some headaches too?  Is she still having bruising?      Also, how is her burn?  Please find out a little more information and I will try to advise DR Viera or Bertrand on what I think is the appropriate next step-- since I'm not in the office I would need to have them send in things like pain meds, so let me know and I will get back to you/ them, thanks  "

## 2017-12-21 ENCOUNTER — TELEPHONE (OUTPATIENT)
Dept: FAMILY MEDICINE | Facility: CLINIC | Age: 67
End: 2017-12-21

## 2017-12-21 NOTE — TELEPHONE ENCOUNTER
----- Message from Antonella Zoraida sent at 12/20/2017  1:33 PM CST -----  Contact: [facility], [phone]  States patient called for her prescription but they haven't received anything. Please advise.

## 2017-12-26 ENCOUNTER — HOSPITAL ENCOUNTER (OUTPATIENT)
Dept: RADIOLOGY | Facility: HOSPITAL | Age: 67
Discharge: HOME | End: 2017-12-26
Attending: NURSE PRACTITIONER
Payer: COMMERCIAL

## 2017-12-26 ENCOUNTER — TELEPHONE (OUTPATIENT)
Dept: FAMILY MEDICINE | Facility: CLINIC | Age: 67
End: 2017-12-26

## 2017-12-26 ENCOUNTER — OFFICE VISIT (OUTPATIENT)
Dept: FAMILY MEDICINE | Facility: CLINIC | Age: 67
End: 2017-12-26
Payer: COMMERCIAL

## 2017-12-26 VITALS
SYSTOLIC BLOOD PRESSURE: 120 MMHG | BODY MASS INDEX: 28.63 KG/M2 | HEIGHT: 66 IN | DIASTOLIC BLOOD PRESSURE: 70 MMHG | WEIGHT: 178.13 LBS | HEART RATE: 80 BPM

## 2017-12-26 DIAGNOSIS — M54.41 ACUTE RIGHT-SIDED LOW BACK PAIN WITH RIGHT-SIDED SCIATICA: Primary | ICD-10-CM

## 2017-12-26 DIAGNOSIS — M47.817 FACET HYPERTROPHY OF LUMBOSACRAL REGION: ICD-10-CM

## 2017-12-26 DIAGNOSIS — M54.41 ACUTE RIGHT-SIDED LOW BACK PAIN WITH RIGHT-SIDED SCIATICA: ICD-10-CM

## 2017-12-26 PROCEDURE — 99213 OFFICE O/P EST LOW 20 MIN: CPT | Mod: S$GLB,,, | Performed by: NURSE PRACTITIONER

## 2017-12-26 PROCEDURE — 72100 X-RAY EXAM L-S SPINE 2/3 VWS: CPT | Mod: TC

## 2017-12-26 PROCEDURE — 72100 X-RAY EXAM L-S SPINE 2/3 VWS: CPT | Mod: 26,,, | Performed by: RADIOLOGY

## 2017-12-26 PROCEDURE — 99999 PR PBB SHADOW E&M-EST. PATIENT-LVL III: CPT | Mod: PBBFAC,,, | Performed by: NURSE PRACTITIONER

## 2017-12-26 RX ORDER — GABAPENTIN 300 MG/1
300 CAPSULE ORAL 3 TIMES DAILY PRN
Qty: 90 CAPSULE | Refills: 0 | Status: SHIPPED | OUTPATIENT
Start: 2017-12-26 | End: 2018-01-22 | Stop reason: SDUPTHER

## 2017-12-26 RX ORDER — TRAMADOL HYDROCHLORIDE 50 MG/1
50 TABLET ORAL EVERY 12 HOURS PRN
Qty: 20 TABLET | Refills: 0 | Status: SHIPPED | OUTPATIENT
Start: 2017-12-26 | End: 2018-01-05

## 2017-12-26 NOTE — TELEPHONE ENCOUNTER
Spoke to pt and stated that she will go to the Urgent care. Pt was offered an appt with Dr. Stubbs on 1/31, but declined. Stated that she needed to be today.

## 2017-12-26 NOTE — PROGRESS NOTES

## 2017-12-26 NOTE — TELEPHONE ENCOUNTER
----- Message from Dedra Eisenberg sent at 12/26/2017  7:06 AM CST -----  Contact: [phone]/ self   Pt its requesting an appointment for this week , states she is still in pain . Please advise

## 2017-12-27 ENCOUNTER — PATIENT MESSAGE (OUTPATIENT)
Dept: FAMILY MEDICINE | Facility: CLINIC | Age: 67
End: 2017-12-27

## 2017-12-27 DIAGNOSIS — M54.41 ACUTE RIGHT-SIDED LOW BACK PAIN WITH RIGHT-SIDED SCIATICA: ICD-10-CM

## 2017-12-27 DIAGNOSIS — M47.817 FACET HYPERTROPHY OF LUMBOSACRAL REGION: Primary | ICD-10-CM

## 2017-12-27 NOTE — PROGRESS NOTES
Subjective:       Patient ID: Carlene Fong is a 67 y.o. female.    Chief Complaint: Leg Pain (right)    Leg Pain    The incident occurred 3 to 5 days ago. The incident occurred at home. The injury mechanism was a fall. The pain is present in the right leg. The quality of the pain is described as aching. The pain is at a severity of 9/10. The pain is severe. The pain has been constant since onset. She reports no foreign bodies present. The symptoms are aggravated by movement and palpation. She has tried NSAIDs for the symptoms. The treatment provided no relief.     Review of Systems   Constitutional: Negative for activity change and unexpected weight change.   HENT: Negative for hearing loss, rhinorrhea and trouble swallowing.    Eyes: Negative for discharge and visual disturbance.   Respiratory: Negative for chest tightness and wheezing.    Cardiovascular: Negative for chest pain and palpitations.   Gastrointestinal: Negative for blood in stool, constipation, diarrhea and vomiting.   Endocrine: Negative for polydipsia and polyuria.   Genitourinary: Negative for difficulty urinating, dysuria, hematuria and menstrual problem.   Musculoskeletal: Positive for arthralgias and back pain. Negative for joint swelling and neck pain.   Neurological: Negative for weakness and headaches.   Psychiatric/Behavioral: Negative for confusion and dysphoric mood.       Objective:      Physical Exam   Constitutional: She is oriented to person, place, and time. She appears well-developed and well-nourished. No distress.   HENT:   Head: Normocephalic and atraumatic.   Eyes: EOM are normal. Pupils are equal, round, and reactive to light.   Neck: Neck supple. No JVD present. No tracheal deviation present.   Cardiovascular: Normal rate, regular rhythm, normal heart sounds and intact distal pulses.    No murmur heard.  Pulmonary/Chest: Effort normal and breath sounds normal. No respiratory distress. She has no wheezes. She has no  rales.   Abdominal: Soft. Bowel sounds are normal. She exhibits no distension and no mass. There is no tenderness.   Musculoskeletal: Normal range of motion. She exhibits no edema or tenderness.        Lumbar back: She exhibits bony tenderness and pain.   Neurological: She is alert and oriented to person, place, and time. Coordination normal.   Skin: Skin is warm and dry. No erythema. No pallor.   Psychiatric: She has a normal mood and affect. Her behavior is normal. Judgment and thought content normal. Cognition and memory are normal. She expresses no homicidal and no suicidal ideation.   Nursing note and vitals reviewed.      Assessment:       1. Acute right-sided low back pain with right-sided sciatica    2. Facet hypertrophy of lumbosacral region        Plan:       Carlene was seen today for leg pain.    Diagnoses and all orders for this visit:    Acute right-sided low back pain with right-sided sciatica  -     X-Ray Lumbar Spine AP And Lateral; Future  -     traMADol (ULTRAM) 50 mg tablet; Take 1 tablet (50 mg total) by mouth every 12 (twelve) hours as needed for Pain.  -     gabapentin (NEURONTIN) 300 MG capsule; Take 1 capsule (300 mg total) by mouth 3 (three) times daily as needed.    Facet hypertrophy of lumbosacral region  -     traMADol (ULTRAM) 50 mg tablet; Take 1 tablet (50 mg total) by mouth every 12 (twelve) hours as needed for Pain.  -     gabapentin (NEURONTIN) 300 MG capsule; Take 1 capsule (300 mg total) by mouth 3 (three) times daily as needed.    Follow-up with PCP if symptoms worsen or fail to improve.

## 2017-12-28 NOTE — TELEPHONE ENCOUNTER
Please call Carlene to schedule her MRI and schedule her with Dr Avitia, pain management as per attached referral, thanks

## 2018-01-10 ENCOUNTER — OFFICE VISIT (OUTPATIENT)
Dept: FAMILY MEDICINE | Facility: CLINIC | Age: 68
End: 2018-01-10
Payer: COMMERCIAL

## 2018-01-10 VITALS
DIASTOLIC BLOOD PRESSURE: 73 MMHG | HEART RATE: 76 BPM | OXYGEN SATURATION: 98 % | HEIGHT: 65 IN | BODY MASS INDEX: 29.57 KG/M2 | WEIGHT: 177.5 LBS | SYSTOLIC BLOOD PRESSURE: 107 MMHG

## 2018-01-10 DIAGNOSIS — Z79.899 MEDICATION MANAGEMENT: ICD-10-CM

## 2018-01-10 DIAGNOSIS — E83.39 HYPOPHOSPHATEMIA: ICD-10-CM

## 2018-01-10 DIAGNOSIS — Z00.00 ROUTINE GENERAL MEDICAL EXAMINATION AT A HEALTH CARE FACILITY: Primary | ICD-10-CM

## 2018-01-10 DIAGNOSIS — H81.09 MENIERE'S DISEASE, UNSPECIFIED LATERALITY: ICD-10-CM

## 2018-01-10 DIAGNOSIS — R92.8 ABNORMAL MAMMOGRAM: ICD-10-CM

## 2018-01-10 DIAGNOSIS — E66.3 OVERWEIGHT (BMI 25.0-29.9): ICD-10-CM

## 2018-01-10 DIAGNOSIS — M54.50 CHRONIC BILATERAL LOW BACK PAIN WITHOUT SCIATICA: ICD-10-CM

## 2018-01-10 DIAGNOSIS — E21.0 PRIMARY HYPERPARATHYROIDISM: ICD-10-CM

## 2018-01-10 DIAGNOSIS — R76.8 RHEUMATOID FACTOR POSITIVE: ICD-10-CM

## 2018-01-10 DIAGNOSIS — F41.9 ANXIETY: ICD-10-CM

## 2018-01-10 DIAGNOSIS — Z90.710 H/O HYSTERECTOMY FOR BENIGN DISEASE: ICD-10-CM

## 2018-01-10 DIAGNOSIS — M85.80 OSTEOPENIA, UNSPECIFIED LOCATION: ICD-10-CM

## 2018-01-10 DIAGNOSIS — G89.29 CHRONIC BILATERAL LOW BACK PAIN WITHOUT SCIATICA: ICD-10-CM

## 2018-01-10 DIAGNOSIS — E55.9 VITAMIN D DEFICIENCY: ICD-10-CM

## 2018-01-10 DIAGNOSIS — Z23 NEED FOR VACCINATION WITH 13-POLYVALENT PNEUMOCOCCAL CONJUGATE VACCINE: ICD-10-CM

## 2018-01-10 PROCEDURE — 90670 PCV13 VACCINE IM: CPT | Mod: S$GLB,,, | Performed by: FAMILY MEDICINE

## 2018-01-10 PROCEDURE — 90471 IMMUNIZATION ADMIN: CPT | Mod: S$GLB,,, | Performed by: FAMILY MEDICINE

## 2018-01-10 PROCEDURE — 99397 PER PM REEVAL EST PAT 65+ YR: CPT | Mod: 25,S$GLB,, | Performed by: FAMILY MEDICINE

## 2018-01-10 PROCEDURE — 99999 PR PBB SHADOW E&M-EST. PATIENT-LVL IV: CPT | Mod: PBBFAC,,, | Performed by: FAMILY MEDICINE

## 2018-01-10 RX ORDER — DICLOFENAC SODIUM 10 MG/G
2 GEL TOPICAL 3 TIMES DAILY
Qty: 100 G | Refills: 5 | Status: SHIPPED | OUTPATIENT
Start: 2018-01-10 | End: 2019-07-08 | Stop reason: SDUPTHER

## 2018-01-10 NOTE — PATIENT INSTRUCTIONS
Health maintenance reviewed and up-to-date.  Prevnar 13 pneumonia vaccine today.  Continue regular exercise, eye and dental exams.  Add 2 exercises for pelvic stabilization and strengthening as current low back issues likely due to sacroiliac pain.    #1: Bridge exercises with ball.  Later on back bridge pelvis and squeeze ball between knees.  Due to sets of 10-20 every day.    #2 quadruped exercises: Go on hands and knees with a neutral spine.  Consider balancing a yardstick across back.  Lift opposite arm and opposite leg-okay-- focus on lengthening the limbs and not lifting them high up into the air.  Hold each position for a few seconds then return to neutral and repeat for 2 sets of 20 twice daily.

## 2018-01-10 NOTE — PROGRESS NOTES
Office Visit    Patient Name: Carlene Fong    : 1950  MRN: [id number]    Subjective:  Carlene is a 67 y.o. female who presents today for:    Annual Exam (refill diclofenac sodium)    Carlene presents today for annual wellness exam and monitoring of chronic conditions-- primary hyperparathyroidism, vitamin D deficiency, osteopenia, overweight, history of positive rheumatoid factor without clinical evidence of rheumatoid arthritis, diabetes, recent fall with subsequent head and back pain.     Also sees gynecology for yearly physicals-- Dr De Dios. She has had a hysterectomy with removal of one ovary.      She has been feeling overall well.     General lifestyle habits are as follows: Diet is described as healthy and eating more consistent meals, exercise is described as fair-- good with cardio--walking 3x/week-- worse with recent low back pain, sleep is described as good-- sleeps 8 hours nightly and does not snore. Weight is down about 30 lbs in the last year!      Immunizations: Shingles shot and influenza vaccine up to date, TDaP 2015, PREVNAR 13 today 1/10/2018     Screening tests: colonoscopy 2009-- normal-->repeat in 10 years , DEXA 2016 --osteopenia and repeat 2 years, mammograms-- up to date per Dr. De Dios GYN every November,  No longer needs PAPS     Eye/Dental: up to date, monitoring left eye cataract-- referral placed to Dr Ojeda       Past Medical History  Past Medical History:   Diagnosis Date    Abnormal mammogram 8/10/2016    Followed up with diagnostic mammogram and ultrasound 10/22/2015at [facility]  that showed no concerning findings with recommendation to continue yearly screening.     Anxiety 2017    will need to readdress this if work up is negative and symptoms persist    Arthritis of knee 2015    Diverticulosis     Meniere's disease 8/15/2017    Osteopenia 2015    next bone density 2016, seeing endocrine for hyperparathyroidism     Positive anti-CCP test  4/7/2014    Primary hyperparathyroidism 8/26/2013    will be following up with Dr. [facility] in 2016, blood work today. stoped taking calcitonin due to nausea     Rheumatoid factor positive 4/7/2014    Vertigo 7/16/2017    Vitamin D deficiency 12/1/2015       Past Surgical History  Past Surgical History:   Procedure Laterality Date    APPENDECTOMY      HYSTERECTOMY      ROTATOR CUFF REPAIR Right 12/2014    TUBAL LIGATION         Family History  Family History   Problem Relation Age of Onset    Diabetes Father     Dementia Father     Rheum arthritis Neg Hx     Lupus Neg Hx     Inflammatory bowel disease Neg Hx        Social History  Social History     Social History    Marital status:      Spouse name: N/A    Number of children: N/A    Years of education: N/A     Occupational History    Not on file.     Social History Main Topics    Smoking status: Never Smoker    Smokeless tobacco: Never Used    Alcohol use No    Drug use: No    Sexual activity: Yes     Partners: Male      Comment: ; 2 children     Other Topics Concern    Not on file     Social History Narrative    No narrative on file       Current Medications  Medications reviewed and updated.     Allergies   Review of patient's allergies indicates:   Allergen Reactions    Requip [ropinirole] Swelling     Other reaction(s): palpitations, swelling of tongue       Review of Systems (Pertinent positives)  Review of Systems   Constitutional: Negative for activity change and unexpected weight change.   HENT: Negative for hearing loss, rhinorrhea and trouble swallowing.    Eyes: Negative for discharge and visual disturbance.   Respiratory: Negative for chest tightness and wheezing.    Cardiovascular: Negative for chest pain and palpitations.   Gastrointestinal: Negative for blood in stool, constipation, diarrhea and vomiting.   Endocrine: Negative for polydipsia and polyuria.   Genitourinary: Negative for difficulty  "urinating, dysuria, hematuria and menstrual problem.   Musculoskeletal: Positive for arthralgias. Negative for joint swelling and neck pain.   Neurological: Negative for weakness and headaches.   Psychiatric/Behavioral: Negative for confusion and dysphoric mood.       /73   Pulse 76   Ht 5' 5" (1.651 m)   Wt 80.5 kg (177 lb 7.5 oz)   SpO2 98%   BMI 29.53 kg/m²     Physical Exam   Constitutional: She is oriented to person, place, and time. She appears well-developed and well-nourished. No distress.   HENT:   Head: Normocephalic and atraumatic.   Right Ear: Ear canal normal. Tympanic membrane is not erythematous and not bulging.   Left Ear: Ear canal normal. Tympanic membrane is not erythematous and not bulging.   Mouth/Throat: No oropharyngeal exudate.   Eyes: Conjunctivae are normal.   Neck: Carotid bruit is not present. No thyroid mass and no thyromegaly present.   Cardiovascular: Normal rate, regular rhythm and normal heart sounds.    No murmur heard.  Pulses:       Dorsalis pedis pulses are 2+ on the right side, and 2+ on the left side.   Pulmonary/Chest: Effort normal and breath sounds normal. No respiratory distress.   Abdominal: Soft. Bowel sounds are normal. She exhibits no distension and no mass. There is no hepatosplenomegaly. There is no tenderness.   Musculoskeletal: Normal range of motion.   Lymphadenopathy:     She has no cervical adenopathy.   Neurological: She is alert and oriented to person, place, and time.   Skin: Skin is warm and dry. No rash noted.   Psychiatric: She has a normal mood and affect.   Vitals reviewed.        Assessment/Plan:  Carlene Fong is a 67 y.o. female who presents today for :    Carlene was seen today for annual exam.    Diagnoses and all orders for this visit:    Routine general medical examination at a health care facility  -     Hemoglobin A1c; Future  -     Comprehensive metabolic panel; Future  -     Lipid panel; Future  -     CBC auto differential; " Future  -     TSH; Future  -     Vitamin D; Future  -     PTH, intact; Future  -     PHOSPHORUS; Future    Overweight (BMI 25.0-29.9)  -     Hemoglobin A1c; Future  -     Comprehensive metabolic panel; Future  -     Lipid panel; Future    H/O hysterectomy for benign disease    Osteopenia, unspecified location  -     TSH; Future  -     Vitamin D; Future    Vitamin D deficiency  -     Vitamin D; Future    Primary hyperparathyroidism  -     TSH; Future  -     Vitamin D; Future  -     PTH, intact; Future    Meniere's disease, unspecified laterality    Rheumatoid factor positive    Anxiety  -     TSH; Future    Abnormal mammogram    Hypophosphatemia  -     PHOSPHORUS; Future    Medication management  -     Hemoglobin A1c; Future  -     Comprehensive metabolic panel; Future  -     Lipid panel; Future  -     CBC auto differential; Future  -     TSH; Future  -     Vitamin D; Future  -     PTH, intact; Future  -     PHOSPHORUS; Future    Need for vaccination with 13-polyvalent pneumococcal conjugate vaccine  -     (In Office Administered) Pneumococcal Conjugate Vaccine (13 Valent) (IM)    Chronic bilateral low back pain without sciatica  -     diclofenac sodium 1 % Gel; Apply 2 g topically 3 (three) times daily.            ICD-10-CM ICD-9-CM    1. Routine general medical examination at a health care facility Z00.00 V70.0 Hemoglobin A1c      Comprehensive metabolic panel      Lipid panel      CBC auto differential      TSH      Vitamin D      PTH, intact      PHOSPHORUS   2. Overweight (BMI 25.0-29.9) E66.3 278.02 Hemoglobin A1c      Comprehensive metabolic panel      Lipid panel   3. H/O hysterectomy for benign disease Z98.890 V88.01     Z90.710     4. Osteopenia, unspecified location M85.80 733.90 TSH      Vitamin D   5. Vitamin D deficiency E55.9 268.9 Vitamin D   6. Primary hyperparathyroidism E21.0 252.01 TSH      Vitamin D      PTH, intact   7. Meniere's disease, unspecified laterality H81.09 386.00    8. Rheumatoid  factor positive R76.8 795.79    9. Anxiety F41.9 300.00 TSH   10. Abnormal mammogram R92.8 793.80    11. Hypophosphatemia E83.39 275.3 PHOSPHORUS   12. Medication management Z79.899 V58.69 Hemoglobin A1c      Comprehensive metabolic panel      Lipid panel      CBC auto differential      TSH      Vitamin D      PTH, intact      PHOSPHORUS   13. Need for vaccination with 13-polyvalent pneumococcal conjugate vaccine Z23 V03.82 (In Office Administered) Pneumococcal Conjugate Vaccine (13 Valent) (IM)   14. Chronic bilateral low back pain without sciatica M54.5 724.2 diclofenac sodium 1 % Gel    G89.29 338.29        Patient Instructions   Health maintenance reviewed and up-to-date.  Prevnar 13 pneumonia vaccine today.  Continue regular exercise, eye and dental exams.  Add 2 exercises for pelvic stabilization and strengthening as current low back issues likely due to sacroiliac pain.    #1: Bridge exercises with ball.  Later on back bridge pelvis and squeeze ball between knees.  Due to sets of 10-20 every day.    #2 quadruped exercises: Go on hands and knees with a neutral spine.  Consider balancing a yardstick across back.  Lift opposite arm and opposite leg-okay-- focus on lengthening the limbs and not lifting them high up into the air.  Hold each position for a few seconds then return to neutral and repeat for 2 sets of 20 twice daily.        Return in about 1 year (around 1/10/2019) for return as needed for new concerns.

## 2018-01-11 ENCOUNTER — LAB VISIT (OUTPATIENT)
Dept: LAB | Facility: HOSPITAL | Age: 68
End: 2018-01-11
Attending: FAMILY MEDICINE
Payer: COMMERCIAL

## 2018-01-11 DIAGNOSIS — M85.80 OSTEOPENIA, UNSPECIFIED LOCATION: ICD-10-CM

## 2018-01-11 DIAGNOSIS — E21.0 PRIMARY HYPERPARATHYROIDISM: ICD-10-CM

## 2018-01-11 DIAGNOSIS — E66.3 OVERWEIGHT (BMI 25.0-29.9): ICD-10-CM

## 2018-01-11 DIAGNOSIS — F41.9 ANXIETY: ICD-10-CM

## 2018-01-11 DIAGNOSIS — E55.9 VITAMIN D DEFICIENCY: ICD-10-CM

## 2018-01-11 DIAGNOSIS — E83.39 HYPOPHOSPHATEMIA: ICD-10-CM

## 2018-01-11 DIAGNOSIS — Z79.899 MEDICATION MANAGEMENT: ICD-10-CM

## 2018-01-11 DIAGNOSIS — Z00.00 ROUTINE GENERAL MEDICAL EXAMINATION AT A HEALTH CARE FACILITY: ICD-10-CM

## 2018-01-11 LAB
25(OH)D3+25(OH)D2 SERPL-MCNC: 34 NG/ML
ALBUMIN SERPL BCP-MCNC: 3.6 G/DL
ALP SERPL-CCNC: 176 U/L
ALT SERPL W/O P-5'-P-CCNC: 13 U/L
ANION GAP SERPL CALC-SCNC: 6 MMOL/L
AST SERPL-CCNC: 15 U/L
BASOPHILS # BLD AUTO: 0.05 K/UL
BASOPHILS NFR BLD: 0.8 %
BILIRUB SERPL-MCNC: 0.5 MG/DL
BUN SERPL-MCNC: 17 MG/DL
CALCIUM SERPL-MCNC: 11.3 MG/DL
CHLORIDE SERPL-SCNC: 109 MMOL/L
CHOLEST SERPL-MCNC: 197 MG/DL
CHOLEST/HDLC SERPL: 3.4 {RATIO}
CO2 SERPL-SCNC: 28 MMOL/L
CREAT SERPL-MCNC: 0.7 MG/DL
DIFFERENTIAL METHOD: ABNORMAL
EOSINOPHIL # BLD AUTO: 0.2 K/UL
EOSINOPHIL NFR BLD: 3.6 %
ERYTHROCYTE [DISTWIDTH] IN BLOOD BY AUTOMATED COUNT: 12.3 %
EST. GFR  (AFRICAN AMERICAN): >60 ML/MIN/1.73 M^2
EST. GFR  (NON AFRICAN AMERICAN): >60 ML/MIN/1.73 M^2
ESTIMATED AVG GLUCOSE: 88 MG/DL
GLUCOSE SERPL-MCNC: 87 MG/DL
HBA1C MFR BLD HPLC: 4.7 %
HCT VFR BLD AUTO: 43.3 %
HDLC SERPL-MCNC: 58 MG/DL
HDLC SERPL: 29.4 %
HGB BLD-MCNC: 14.3 G/DL
LDLC SERPL CALC-MCNC: 122.8 MG/DL
LYMPHOCYTES # BLD AUTO: 1.6 K/UL
LYMPHOCYTES NFR BLD: 24.6 %
MCH RBC QN AUTO: 31.1 PG
MCHC RBC AUTO-ENTMCNC: 33 G/DL
MCV RBC AUTO: 94 FL
MONOCYTES # BLD AUTO: 0.4 K/UL
MONOCYTES NFR BLD: 7 %
NEUTROPHILS # BLD AUTO: 4 K/UL
NEUTROPHILS NFR BLD: 63.8 %
NONHDLC SERPL-MCNC: 139 MG/DL
PHOSPHATE SERPL-MCNC: 2.7 MG/DL
PLATELET # BLD AUTO: 320 K/UL
PMV BLD AUTO: 9.5 FL
POTASSIUM SERPL-SCNC: 4.2 MMOL/L
PROT SERPL-MCNC: 6.9 G/DL
PTH-INTACT SERPL-MCNC: 187 PG/ML
RBC # BLD AUTO: 4.6 M/UL
SODIUM SERPL-SCNC: 143 MMOL/L
TRIGL SERPL-MCNC: 81 MG/DL
TSH SERPL DL<=0.005 MIU/L-ACNC: 1.49 UIU/ML
WBC # BLD AUTO: 6.33 K/UL

## 2018-01-11 PROCEDURE — 83036 HEMOGLOBIN GLYCOSYLATED A1C: CPT

## 2018-01-11 PROCEDURE — 36415 COLL VENOUS BLD VENIPUNCTURE: CPT

## 2018-01-11 PROCEDURE — 82306 VITAMIN D 25 HYDROXY: CPT

## 2018-01-11 PROCEDURE — 80053 COMPREHEN METABOLIC PANEL: CPT

## 2018-01-11 PROCEDURE — 80061 LIPID PANEL: CPT

## 2018-01-11 PROCEDURE — 84443 ASSAY THYROID STIM HORMONE: CPT

## 2018-01-11 PROCEDURE — 84100 ASSAY OF PHOSPHORUS: CPT

## 2018-01-11 PROCEDURE — 83970 ASSAY OF PARATHORMONE: CPT

## 2018-01-11 PROCEDURE — 85025 COMPLETE CBC W/AUTO DIFF WBC: CPT

## 2018-01-12 ENCOUNTER — PATIENT MESSAGE (OUTPATIENT)
Dept: FAMILY MEDICINE | Facility: CLINIC | Age: 68
End: 2018-01-12

## 2018-01-15 ENCOUNTER — PATIENT MESSAGE (OUTPATIENT)
Dept: FAMILY MEDICINE | Facility: CLINIC | Age: 68
End: 2018-01-15

## 2018-01-22 DIAGNOSIS — M47.817 FACET HYPERTROPHY OF LUMBOSACRAL REGION: ICD-10-CM

## 2018-01-22 DIAGNOSIS — M54.41 ACUTE RIGHT-SIDED LOW BACK PAIN WITH RIGHT-SIDED SCIATICA: ICD-10-CM

## 2018-01-22 RX ORDER — GABAPENTIN 300 MG/1
300 CAPSULE ORAL 3 TIMES DAILY PRN
Qty: 90 CAPSULE | Refills: 0 | Status: SHIPPED | OUTPATIENT
Start: 2018-01-22 | End: 2018-02-26

## 2018-01-26 ENCOUNTER — PATIENT MESSAGE (OUTPATIENT)
Dept: FAMILY MEDICINE | Facility: CLINIC | Age: 68
End: 2018-01-26

## 2018-02-01 ENCOUNTER — OFFICE VISIT (OUTPATIENT)
Dept: URGENT CARE | Facility: CLINIC | Age: 68
End: 2018-02-01
Payer: COMMERCIAL

## 2018-02-01 VITALS
SYSTOLIC BLOOD PRESSURE: 118 MMHG | TEMPERATURE: 99 F | BODY MASS INDEX: 29.49 KG/M2 | RESPIRATION RATE: 18 BRPM | HEART RATE: 84 BPM | HEIGHT: 65 IN | DIASTOLIC BLOOD PRESSURE: 88 MMHG | WEIGHT: 177 LBS | OXYGEN SATURATION: 98 %

## 2018-02-01 DIAGNOSIS — M54.40 ACUTE LEFT-SIDED LOW BACK PAIN WITH SCIATICA, SCIATICA LATERALITY UNSPECIFIED: Primary | ICD-10-CM

## 2018-02-01 DIAGNOSIS — S20.222A CONTUSION OF LEFT SIDE OF BACK, INITIAL ENCOUNTER: ICD-10-CM

## 2018-02-01 LAB
BILIRUB UR QL STRIP: NEGATIVE
GLUCOSE UR QL STRIP: NEGATIVE
KETONES UR QL STRIP: NEGATIVE
LEUKOCYTE ESTERASE UR QL STRIP: POSITIVE
PH, POC UA: 6.5 (ref 5–8)
POC BLOOD, URINE: NEGATIVE
POC NITRATES, URINE: NEGATIVE
PROT UR QL STRIP: POSITIVE
SP GR UR STRIP: 1.01 (ref 1–1.03)
UROBILINOGEN UR STRIP-ACNC: ABNORMAL (ref 0.1–1.1)

## 2018-02-01 PROCEDURE — 96372 THER/PROPH/DIAG INJ SC/IM: CPT | Mod: S$GLB,,, | Performed by: FAMILY MEDICINE

## 2018-02-01 PROCEDURE — 81003 URINALYSIS AUTO W/O SCOPE: CPT | Mod: QW,S$GLB,, | Performed by: FAMILY MEDICINE

## 2018-02-01 PROCEDURE — 99213 OFFICE O/P EST LOW 20 MIN: CPT | Mod: 25,S$GLB,, | Performed by: FAMILY MEDICINE

## 2018-02-01 RX ORDER — KETOROLAC TROMETHAMINE 30 MG/ML
30 INJECTION, SOLUTION INTRAMUSCULAR; INTRAVENOUS
Status: DISCONTINUED | OUTPATIENT
Start: 2018-02-01 | End: 2018-02-01

## 2018-02-01 RX ORDER — KETOROLAC TROMETHAMINE 30 MG/ML
30 INJECTION, SOLUTION INTRAMUSCULAR; INTRAVENOUS
Status: COMPLETED | OUTPATIENT
Start: 2018-02-01 | End: 2018-02-01

## 2018-02-01 RX ADMIN — KETOROLAC TROMETHAMINE 30 MG: 30 INJECTION, SOLUTION INTRAMUSCULAR; INTRAVENOUS at 11:02

## 2018-02-01 NOTE — PROGRESS NOTES
"Subjective:       Patient ID: Carlene Fong is a 67 y.o. female.    Vitals:  height is 5' 5" (1.651 m) and weight is 80.3 kg (177 lb). Her temperature is 98.8 °F (37.1 °C). Her blood pressure is 118/88 and her pulse is 84. Her respiration is 18 and oxygen saturation is 98%.     Chief Complaint: Back Pain    Pt fell and hot back on wall and towel fan. Lt side back pain.      Back Pain   This is a new problem. The current episode started today. The problem occurs constantly. The problem is unchanged. The quality of the pain is described as aching. The pain is at a severity of 6/10. The pain is moderate. The pain is the same all the time. The symptoms are aggravated by sitting. Pertinent negatives include no abdominal pain, chest pain, numbness or weakness. She has tried NSAIDs for the symptoms. The treatment provided mild relief.     Review of Systems   Constitution: Negative for weakness and malaise/fatigue.   HENT: Negative for nosebleeds.    Cardiovascular: Negative for chest pain and syncope.   Respiratory: Negative for shortness of breath.    Musculoskeletal: Positive for back pain. Negative for joint pain and neck pain.   Gastrointestinal: Negative for abdominal pain.   Genitourinary: Negative for hematuria.   Neurological: Negative for dizziness and numbness.       Objective:      Physical Exam   Constitutional: She is oriented to person, place, and time. She appears well-developed and well-nourished. She is cooperative.  Non-toxic appearance. She does not appear ill. No distress.   HENT:   Head: Normocephalic and atraumatic.   Right Ear: Hearing, tympanic membrane, external ear and ear canal normal.   Left Ear: Hearing, tympanic membrane, external ear and ear canal normal.   Nose: Nose normal. No mucosal edema, rhinorrhea or nasal deformity. No epistaxis. Right sinus exhibits no maxillary sinus tenderness and no frontal sinus tenderness. Left sinus exhibits no maxillary sinus tenderness and no frontal " sinus tenderness.   Mouth/Throat: Uvula is midline, oropharynx is clear and moist and mucous membranes are normal. No trismus in the jaw. Normal dentition. No uvula swelling. No posterior oropharyngeal erythema.   Eyes: Conjunctivae and lids are normal. Right eye exhibits no discharge. Left eye exhibits no discharge. No scleral icterus.   Sclera clear bilat   Neck: Trachea normal, normal range of motion, full passive range of motion without pain and phonation normal. Neck supple.   Cardiovascular: Normal rate, regular rhythm, normal heart sounds, intact distal pulses and normal pulses.    Pulmonary/Chest: Effort normal and breath sounds normal. She has no wheezes. She exhibits no tenderness.   Abdominal: Soft. Normal appearance and bowel sounds are normal. She exhibits no distension, no pulsatile midline mass and no mass. There is no tenderness.   Musculoskeletal: Normal range of motion. She exhibits no edema or deformity.   Left lower rib cage above hip  With mild tenderness  BACK:  Non tender, felxion/extension normal     Neurological: She is alert and oriented to person, place, and time. She exhibits normal muscle tone. Coordination normal.   Skin: Skin is warm, dry and intact. She is not diaphoretic. No pallor.   Psychiatric: She has a normal mood and affect. Her speech is normal and behavior is normal. Judgment and thought content normal. Cognition and memory are normal.   Nursing note and vitals reviewed.      Assessment:       1. Acute left-sided low back pain with sciatica, sciatica laterality unspecified    2. Contusion of left side of back, initial encounter        Plan:         Acute left-sided low back pain with sciatica, sciatica laterality unspecified  -     POCT Urinalysis, Dipstick, Automated, W/O Scope    Contusion of left side of back, initial encounter  -     POCT Urinalysis, Dipstick, Automated, W/O Scope    Other orders  -     ketorolac injection 30 mg; Inject 1 mL (30 mg total) into the muscle  one time.        Cont. Meloxicam daily  If persistent pain RTC or to ER

## 2018-02-01 NOTE — PATIENT INSTRUCTIONS
General Neck and Back Pain    Both neck and back pain are usually caused by injury to the muscles or ligaments of the spine. Sometimes the disks that separate each bone of the spine may cause pain by pressing on a nearby nerve. Back and neck pain may appear after a sudden twisting or bending force (such as in a car accident), or sometimes after a simple awkward movement. In either case, muscle spasm is often present and adds to the pain.  Acute neck and back pain usually gets better in 1 to 2 weeks. Pain related to disk disease, arthritis in the spinal joints or spinal stenosis (narrowing of the spinal canal) can become chronic and last for months or years.  Back and neck pain are common problems. Most people feel better in 1 or 2 weeks, and most of the rest in 1 to 2 months. Most people can remain active.  People experience and describe pain differently.  · Pain can be sharp, stabbing, shooting, aching, cramping, or burning  · Movement, standing, bending, lifting, sitting, or walking may worsen the pain  · Pain can be localized to one spot or area, or it can be more generalized  · Pain can spread or radiate upwards, downwards, to the front, or go down your arms  · Muscle spasm may occur.  Most of the time mechanical problems with the muscles or spine cause the pain. it is usually caused by an injury, whether known or not, to the muscles or ligaments. While illnesses can cause back pain, it is usually not caused by a serious illness. Pain is usually related to physical activity, whether sports, exercise, work, or normal activity. Sometimes it can occur without an identifiable cause. This can happen simply by stretching or moving wrong, without noting pain at the time. Other causes include:  · Overexertion, lifting, pushing, pulling incorrectly or too aggressively.  · Sudden twisting, bending or stretching from an accident (car or fall), or accidental movement.  · Poor posture  · Poor conditioning, lack of regular  exercise  · Spinal disc disease or arthritis  · Stress  · Pregnancy, or illness like appendicitis, bladder or kidney infection, pelvic infections   Home care  · For neck pain: Use a comfortable pillow that supports the head and keeps the spine in a neutral position. The position of the head should not be tilted forward or backward.  · When in bed, try to find a position of comfort. A firm mattress is best. Try lying flat on your back with pillows under your knees. You can also try lying on your side with your knees bent up towards your chest and a pillow between your knees.  · At first, do not try to stretch out the sore spots. If there is a strain, it is not like the good soreness you get after exercising without an injury. In this case, stretching may make it worse.  · Avoid prolonged sitting, long car rides or travel. This puts more stress on the lower back than standing or walking.  · During the first 24 to 72 hours after an injury, apply an ice pack to the painful area for 20 minutes and then remove it for 20 minutes over a period of 60 to 90 minutes or several times a day.   · You can alternate ice and heat therapies. Talk with your healthcare provider about the best treatment for your back or neck pain. As a safety precaution, do not use a heating pad at bedtime. Sleeping with a heating pad can lead to skin burns or tissue damage.  · Therapeutic massage can help relax the back and neck muscles without stretching them.  · Be aware of safe lifting methods and do not lift anything over 15 pounds until all the pain is gone.  Medications  Talk to your healthcare provider before using medicine, especially if you have other medical problems or are taking other medicines.  · You may use over-the-counter medicine to control pain, unless another pain medicine was prescribed. If you have chronic conditions like diabetes, liver or kidney disease, stomach ulcers,  gastrointestinal bleeding, or are taking blood thinner  medicines.  · Be careful if you are given pain medicines, narcotics, or medicine for muscle spasm. They can cause drowsiness, and can affect your coordination, reflexes, and judgment. Do not drive or operate heavy machinery.  Follow-up care  Follow up with your healthcare provider, or as advised. Physical therapy or further tests may be needed.  If X-rays were taken, you will be notified of any new findings that may affect your care.  Call 911  Seek emergency medical care if any of the following occur:  · Trouble breathing  · Confusion  · Very drowsy or trouble awakening  · Fainting or loss of consciousness  · Rapid or very slow heart rate  · Loss of bowel or bladder control  When to seek medical advice  Call your healthcare provider right away if any of these occur:  · Pain becomes worse or spreads into your arms or legs  · Weakness, numbness or pain in one or both arms or legs  · Numbness in the groin area  · Difficulty walking  · Fever of 100.4ºF (38ºC) or higher, or as directed by your healthcare provider  Date Last Reviewed: 7/1/2016 © 2000-2017 [employer]. [address] [place] [address], [place] [address]. All rights reserved. This information is not intended as a substitute for professional medical care. Always follow your healthcare professional's instructions.

## 2018-02-10 ENCOUNTER — OFFICE VISIT (OUTPATIENT)
Dept: URGENT CARE | Facility: CLINIC | Age: 68
End: 2018-02-10
Payer: COMMERCIAL

## 2018-02-10 VITALS
DIASTOLIC BLOOD PRESSURE: 86 MMHG | RESPIRATION RATE: 18 BRPM | HEIGHT: 65 IN | HEART RATE: 80 BPM | TEMPERATURE: 99 F | WEIGHT: 177 LBS | BODY MASS INDEX: 29.49 KG/M2 | SYSTOLIC BLOOD PRESSURE: 132 MMHG | OXYGEN SATURATION: 99 %

## 2018-02-10 DIAGNOSIS — S22.32XD CLOSED FRACTURE OF ONE RIB OF LEFT SIDE WITH ROUTINE HEALING, SUBSEQUENT ENCOUNTER: ICD-10-CM

## 2018-02-10 DIAGNOSIS — S20.212D CHEST WALL CONTUSION, LEFT, SUBSEQUENT ENCOUNTER: ICD-10-CM

## 2018-02-10 DIAGNOSIS — R52 PAIN: Primary | ICD-10-CM

## 2018-02-10 PROCEDURE — 99214 OFFICE O/P EST MOD 30 MIN: CPT | Mod: S$GLB,,, | Performed by: FAMILY MEDICINE

## 2018-02-10 PROCEDURE — 3008F BODY MASS INDEX DOCD: CPT | Mod: S$GLB,,, | Performed by: FAMILY MEDICINE

## 2018-02-10 RX ORDER — LIDOCAINE 50 MG/G
1 PATCH TOPICAL DAILY
Qty: 15 PATCH | Refills: 1 | Status: SHIPPED | OUTPATIENT
Start: 2018-02-10 | End: 2018-02-26

## 2018-02-10 RX ORDER — HYDROCODONE BITARTRATE AND ACETAMINOPHEN 5; 325 MG/1; MG/1
1 TABLET ORAL
Qty: 10 TABLET | Refills: 0 | Status: SHIPPED | OUTPATIENT
Start: 2018-02-10 | End: 2018-02-20

## 2018-02-10 NOTE — PROGRESS NOTES
"Subjective:       Patient ID: Carlene Fong is a 67 y.o. female.    Vitals:  height is 5' 5" (1.651 m) and weight is 80.3 kg (177 lb). Her temperature is 98.5 °F (36.9 °C). Her blood pressure is 132/86 and her pulse is 80. Her respiration is 18 and oxygen saturation is 99%.     Chief Complaint: Chest Pain    Chest Pain    This is a recurrent problem. The current episode started 1 to 4 weeks ago. The onset quality is sudden. The problem occurs constantly. The problem has been gradually worsening. The pain is at a severity of 9/10. The pain is severe. The pain radiates to the lower back. Associated symptoms include malaise/fatigue. Pertinent negatives include no abdominal pain, back pain, dizziness, numbness, shortness of breath, syncope or weakness. The pain is aggravated by nothing.     Review of Systems   Constitution: Positive for malaise/fatigue. Negative for weakness.   HENT: Negative for nosebleeds.    Cardiovascular: Negative for chest pain and syncope.   Respiratory: Negative for shortness of breath.    Musculoskeletal: Positive for falls. Negative for back pain, joint pain and neck pain.   Gastrointestinal: Negative for abdominal pain.   Genitourinary: Negative for hematuria.   Neurological: Negative for dizziness and numbness.       Objective:      Physical Exam   Constitutional: She is oriented to person, place, and time. She appears well-developed and well-nourished. She is cooperative.  Non-toxic appearance. She does not appear ill. No distress.   HENT:   Head: Normocephalic and atraumatic.   Right Ear: Hearing, tympanic membrane, external ear and ear canal normal.   Left Ear: Hearing, tympanic membrane, external ear and ear canal normal.   Nose: Nose normal. No mucosal edema, rhinorrhea or nasal deformity. No epistaxis. Right sinus exhibits no maxillary sinus tenderness and no frontal sinus tenderness. Left sinus exhibits no maxillary sinus tenderness and no frontal sinus tenderness. "   Mouth/Throat: Uvula is midline, oropharynx is clear and moist and mucous membranes are normal. No trismus in the jaw. Normal dentition. No uvula swelling. No posterior oropharyngeal erythema.   Eyes: Conjunctivae and lids are normal. No scleral icterus.   Sclera clear bilat   Neck: Trachea normal, full passive range of motion without pain and phonation normal. Neck supple.   Cardiovascular: Normal rate, regular rhythm, normal heart sounds, intact distal pulses and normal pulses.    Pulmonary/Chest: Effort normal and breath sounds normal. No respiratory distress. She has no decreased breath sounds. She has no wheezes. She has no rhonchi. She has no rales.           Abdominal: Soft. Normal appearance and bowel sounds are normal. She exhibits no distension. There is no hepatosplenomegaly. There is no tenderness. There is CVA tenderness. There is no rigidity, no rebound, no guarding and negative Elkins's sign.   Musculoskeletal: Normal range of motion. She exhibits no edema or deformity.   Neurological: She is alert and oriented to person, place, and time. She exhibits normal muscle tone. Coordination normal.   Skin: Skin is warm, dry and intact. She is not diaphoretic. No pallor.   Psychiatric: She has a normal mood and affect. Her speech is normal and behavior is normal. Judgment and thought content normal. Cognition and memory are normal.   Nursing note and vitals reviewed.      Assessment:       1. Pain    2. Chest wall contusion, left, subsequent encounter    3. Closed fracture of one rib of left side with routine healing, subsequent encounter        Plan:     radiology reading on Xray shows fracture at left 7th rib.  Patient was shown the Xray and result.     X-ray Lumbar Spine Ap And Lateral    Result Date: 12/26/2017  Lumbar spine AP lateral. Findings: 3 views. There is an S-shaped scoliosis of the thoracolumbar spine. The vertebral body heights are satisfactorily preserved. There is loss of disc space height  with degenerative endplate change and facet hypertrophy at L4-5 and L5-S1. There is no fracture or dislocation.     As above. Electronically signed by: FAMILIA RIVAS MD Date:     12/26/17 Time:    16:27         X-ray Lumbar Spine Ap And Lateral    Result Date: 12/26/2017  Lumbar spine AP lateral. Findings: 3 views. There is an S-shaped scoliosis of the thoracolumbar spine. The vertebral body heights are satisfactorily preserved. There is loss of disc space height with degenerative endplate change and facet hypertrophy at L4-5 and L5-S1. There is no fracture or dislocation.     As above. Electronically signed by: FAMILIA RIVAS MD Date:     12/26/17 Time:    16:27     Pain  -     X-Ray Ribs 2 View Left; Future; Expected date: 02/10/2018    Chest wall contusion, left, subsequent encounter  -     lidocaine (LIDODERM) 5 %; Place 1 patch onto the skin once daily. Remove & Discard patch within 12 hours or as directed by MD  Dispense: 15 patch; Refill: 1  -     hydrocodone-acetaminophen 5-325mg (NORCO) 5-325 mg per tablet; Take 1 tablet by mouth every 24 hours as needed for Pain (use especially at bedtime.).  Dispense: 10 tablet; Refill: 0    Closed fracture of one rib of left side with routine healing, subsequent encounter        Follow Up Comments   Make sure that you follow up with your primary care doctor in the next 2-5 days if needed .  Return to the Urgent Care if signs or symptoms change and certainly if you have worsening symptoms go to the nearest emergency department for further evaluation.

## 2018-02-10 NOTE — PATIENT INSTRUCTIONS
Chest Contusion    A contusion is a bruise to the skin, muscle, or ribs. It may cause pain, tenderness, and swelling. It may turn the skin purple until it heals. Contusions take a few days to a few weeks to heal.  Home care  Follow these guidelines when caring for yourself at home:  · Rest. Dont do any heavy lifting or strenuous activity. Dont do any activity that causes pain.  · Put an ice pack on the injured area. Do this for 20 minutes every 1 to 2 hours the first day. You can make an ice pack by wrapping a plastic bag of ice cubes in a thin towel. Continue to use the ice pack 3 to 4 times a day for the next 2 days. Then use the ice pack as needed to ease pain and swelling.  · After 1 to 2 days you may put a warm compress on the area. Do this for 10 minutes several times a day. A warm compress is a clean cloth thats damp with warm water.  · Hold a pillow to the affected area when you cough. This will help ease pain.  · You may use acetaminophen or ibuprofen to control pain, unless another pain medicine was prescribed. If you have chronic liver or kidney disease, talk with your health care provider before using these medicines. Also talk with your provider if youve had a stomach ulcer or GI bleeding.  Follow-up care  Follow up with your health care provider during the next week, or as advised.  When to seek medical advice  Call your health care provider right away if any of these occur:  · Shortness of breath, difficulty breathing, or breathing fast  · Chest pain gets worse when you breathe  · Severe pain that comes on suddenly or lasts more than an hour  · Dizziness, weakness, or fainting  · New abdominal pain or abdominal pain that gets worse  ·  Fever of 101ºF (38.3ºC) or higher, or as directed by your health care provider  Date Last Reviewed: 2/15/2015  © 3843-6431 [employer]. [address] [place] [address], [place] [address]. All rights reserved. This information is not intended as a substitute  for professional medical care. Always follow your healthcare professional's instructions.      Carlene was seen today for chest pain.    Diagnoses and all orders for this visit:    Pain  -     X-Ray Ribs 2 View Left; Future    Chest wall contusion, left, subsequent encounter  -     lidocaine (LIDODERM) 5 %; Place 1 patch onto the skin once daily. Remove & Discard patch within 12 hours or as directed by MD  -     hydrocodone-acetaminophen 5-325mg (NORCO) 5-325 mg per tablet; Take 1 tablet by mouth every 24 hours as needed for Pain (use especially at bedtime.).            Follow Up Comments   Make sure that you follow up with your primary care doctor in the next 2-5 days if needed .  Return to the Urgent Care if signs or symptoms change and certainly if you have worsening symptoms go to the nearest emergency department for further evaluation.     Carlene Ragsdale MD

## 2018-02-16 ENCOUNTER — TELEPHONE (OUTPATIENT)
Dept: FAMILY MEDICINE | Facility: CLINIC | Age: 68
End: 2018-02-16

## 2018-02-16 NOTE — TELEPHONE ENCOUNTER
Returned patient's call. She passed out on 02/01. She cracked a rib. She was prescribed pain medication. She has been having pain and bloating in her stomach. She thinks it may be due to the hydrocodone and moloxicam. She took zantac yesterday and her stomach feels much better. She said if the symptoms worsen she will call us and schedule an appt.

## 2018-02-16 NOTE — TELEPHONE ENCOUNTER
----- Message from Sana Lopez sent at 2/15/2018 12:24 PM CST -----  Contact: [phone]/self  Patient would like to be seen sooner than the next available appointment. Please advise.

## 2018-02-26 ENCOUNTER — OFFICE VISIT (OUTPATIENT)
Dept: FAMILY MEDICINE | Facility: CLINIC | Age: 68
End: 2018-02-26
Payer: COMMERCIAL

## 2018-02-26 ENCOUNTER — LAB VISIT (OUTPATIENT)
Dept: LAB | Facility: HOSPITAL | Age: 68
End: 2018-02-26
Attending: FAMILY MEDICINE
Payer: COMMERCIAL

## 2018-02-26 VITALS
DIASTOLIC BLOOD PRESSURE: 87 MMHG | SYSTOLIC BLOOD PRESSURE: 132 MMHG | WEIGHT: 178.38 LBS | OXYGEN SATURATION: 99 % | TEMPERATURE: 98 F | HEIGHT: 65 IN | HEART RATE: 86 BPM | BODY MASS INDEX: 29.72 KG/M2

## 2018-02-26 DIAGNOSIS — K21.9 GASTROESOPHAGEAL REFLUX DISEASE, ESOPHAGITIS PRESENCE NOT SPECIFIED: Primary | ICD-10-CM

## 2018-02-26 DIAGNOSIS — R10.13 EPIGASTRIC ABDOMINAL PAIN: ICD-10-CM

## 2018-02-26 DIAGNOSIS — K21.9 GASTROESOPHAGEAL REFLUX DISEASE, ESOPHAGITIS PRESENCE NOT SPECIFIED: ICD-10-CM

## 2018-02-26 DIAGNOSIS — S22.32XD CLOSED FRACTURE OF ONE RIB OF LEFT SIDE WITH ROUTINE HEALING: ICD-10-CM

## 2018-02-26 DIAGNOSIS — R76.8 RHEUMATOID FACTOR POSITIVE: ICD-10-CM

## 2018-02-26 DIAGNOSIS — H81.09 MENIERE'S DISEASE, UNSPECIFIED LATERALITY: ICD-10-CM

## 2018-02-26 DIAGNOSIS — Z79.1 LONG TERM (CURRENT) USE OF NON-STEROIDAL ANTI-INFLAMMATORIES (NSAID): ICD-10-CM

## 2018-02-26 LAB
ALBUMIN SERPL BCP-MCNC: 4.2 G/DL
ALP SERPL-CCNC: 140 U/L
ALT SERPL W/O P-5'-P-CCNC: 15 U/L
ANION GAP SERPL CALC-SCNC: 4 MMOL/L
AST SERPL-CCNC: 14 U/L
BASOPHILS # BLD AUTO: 0.03 K/UL
BASOPHILS NFR BLD: 0.4 %
BILIRUB SERPL-MCNC: 0.5 MG/DL
BUN SERPL-MCNC: 14 MG/DL
CALCIUM SERPL-MCNC: 12 MG/DL
CHLORIDE SERPL-SCNC: 105 MMOL/L
CO2 SERPL-SCNC: 31 MMOL/L
CREAT SERPL-MCNC: 0.7 MG/DL
CRP SERPL-MCNC: 1.9 MG/L
DIFFERENTIAL METHOD: NORMAL
EOSINOPHIL # BLD AUTO: 0.1 K/UL
EOSINOPHIL NFR BLD: 1.6 %
ERYTHROCYTE [DISTWIDTH] IN BLOOD BY AUTOMATED COUNT: 13.1 %
ERYTHROCYTE [SEDIMENTATION RATE] IN BLOOD BY WESTERGREN METHOD: 13 MM/HR
EST. GFR  (AFRICAN AMERICAN): >60 ML/MIN/1.73 M^2
EST. GFR  (NON AFRICAN AMERICAN): >60 ML/MIN/1.73 M^2
GLUCOSE SERPL-MCNC: 98 MG/DL
HCT VFR BLD AUTO: 44.8 %
HGB BLD-MCNC: 14.7 G/DL
LIPASE SERPL-CCNC: 47 U/L
LYMPHOCYTES # BLD AUTO: 1.7 K/UL
LYMPHOCYTES NFR BLD: 22.3 %
MCH RBC QN AUTO: 30.9 PG
MCHC RBC AUTO-ENTMCNC: 32.8 G/DL
MCV RBC AUTO: 94 FL
MONOCYTES # BLD AUTO: 0.4 K/UL
MONOCYTES NFR BLD: 4.6 %
NEUTROPHILS # BLD AUTO: 5.3 K/UL
NEUTROPHILS NFR BLD: 71 %
PLATELET # BLD AUTO: 346 K/UL
PMV BLD AUTO: 9.4 FL
POTASSIUM SERPL-SCNC: 4.4 MMOL/L
PROT SERPL-MCNC: 7.3 G/DL
RBC # BLD AUTO: 4.75 M/UL
SODIUM SERPL-SCNC: 140 MMOL/L
WBC # BLD AUTO: 7.53 K/UL

## 2018-02-26 PROCEDURE — 3008F BODY MASS INDEX DOCD: CPT | Mod: S$GLB,,, | Performed by: FAMILY MEDICINE

## 2018-02-26 PROCEDURE — 80053 COMPREHEN METABOLIC PANEL: CPT

## 2018-02-26 PROCEDURE — 85025 COMPLETE CBC W/AUTO DIFF WBC: CPT

## 2018-02-26 PROCEDURE — 86140 C-REACTIVE PROTEIN: CPT

## 2018-02-26 PROCEDURE — 85652 RBC SED RATE AUTOMATED: CPT

## 2018-02-26 PROCEDURE — 86677 HELICOBACTER PYLORI ANTIBODY: CPT

## 2018-02-26 PROCEDURE — 36415 COLL VENOUS BLD VENIPUNCTURE: CPT

## 2018-02-26 PROCEDURE — 99999 PR PBB SHADOW E&M-EST. PATIENT-LVL IV: CPT | Mod: PBBFAC,,, | Performed by: FAMILY MEDICINE

## 2018-02-26 PROCEDURE — 99214 OFFICE O/P EST MOD 30 MIN: CPT | Mod: S$GLB,,, | Performed by: FAMILY MEDICINE

## 2018-02-26 PROCEDURE — 83690 ASSAY OF LIPASE: CPT

## 2018-02-26 RX ORDER — OMEPRAZOLE 40 MG/1
40 CAPSULE, DELAYED RELEASE ORAL
Qty: 90 CAPSULE | Refills: 1 | Status: SHIPPED | OUTPATIENT
Start: 2018-02-26 | End: 2018-05-01 | Stop reason: DRUGHIGH

## 2018-02-26 NOTE — PATIENT INSTRUCTIONS
Trial of Omeprazole for 12 weeks then try to go back down to Zantac.  Suspect Acid Reflux.  Blood work to eval pancreas, biliary enzymes, H pylori, etc. Follow up 8 weeks sooner if concerns. STOP MOBIC and try tylenol as needed. Rheumatoid inflammatory markers today to see if there is inflammation.

## 2018-02-26 NOTE — PROGRESS NOTES
Office Visit    Patient Name: Carlene Fong    : 1950  MRN: [id number]    Subjective:  Carlene is a 67 y.o. female who presents today for:    Follow-up (urgent care follow up) and Bloated    Patient is here for follow up of  eval following recent fall on Feb 10, 2018.  She suffered a mechanical fall in her bathroom and sustained trauma to her left thoracic.  Chest ray showed left lower rib fracture and advised on supportive care during healing process.  In the last 2 weeks there have been no complications- no CP/ SOB, etc. Pain is manageable.  Mobic for her arthritis helps.She has been taking this every day.     Since  she has been having some diffuse abdominal cramping that are most pronounced in the epigastric area.  She had some labs in UC that per her report showed a mildly elevated alk phos.  No overt associated with food/eating.  Bowel movements have been ok-- some constipation.  Felling better with OTC zantac. Some associated belching Some radiation of pain around right flank but no dysuria or urinary symptoms. No nausea or vomiting. No fever chills. No BRBPR or dark stools.     Past Medical History  Past Medical History:   Diagnosis Date    Abnormal mammogram 8/10/2016    Followed up with diagnostic mammogram and ultrasound 10/22/2015at [facility]  that showed no concerning findings with recommendation to continue yearly screening.     Anxiety 2017    will need to readdress this if work up is negative and symptoms persist    Arthritis of knee 2015    Diverticulosis     Meniere's disease 8/15/2017    Osteopenia 2015    next bone density 2016, seeing endocrine for hyperparathyroidism     Positive anti-CCP test 2014    Primary hyperparathyroidism 2013    will be following up with Dr. [facility] in 2016, blood work today. stoped taking calcitonin due to nausea     Rheumatoid factor positive 2014    Vertigo 2017    Vitamin D deficiency  "12/1/2015       Past Surgical History  Past Surgical History:   Procedure Laterality Date    APPENDECTOMY      HYSTERECTOMY      ROTATOR CUFF REPAIR Right 12/2014    TUBAL LIGATION         Family History  Family History   Problem Relation Age of Onset    Diabetes Father     Dementia Father     Rheum arthritis Neg Hx     Lupus Neg Hx     Inflammatory bowel disease Neg Hx        Social History  Social History     Social History    Marital status:      Spouse name: N/A    Number of children: N/A    Years of education: N/A     Occupational History    Not on file.     Social History Main Topics    Smoking status: Never Smoker    Smokeless tobacco: Never Used    Alcohol use No    Drug use: No    Sexual activity: Yes     Partners: Male      Comment: ; 2 children     Other Topics Concern    Not on file     Social History Narrative    No narrative on file       Current Medications  Medications reviewed and updated.     Allergies   Review of patient's allergies indicates:   Allergen Reactions    Requip [ropinirole] Swelling     Other reaction(s): palpitations, swelling of tongue       Review of Systems (Pertinent positives)  Review of Systems   Constitutional: Negative for chills and fever.   Respiratory: Negative for cough and shortness of breath.    Cardiovascular: Negative for chest pain.   Gastrointestinal: Positive for abdominal pain and constipation. Negative for diarrhea, nausea and vomiting.   Genitourinary: Negative for dysuria, frequency and hematuria.   Musculoskeletal: Positive for myalgias. Negative for arthralgias.   Neurological: Negative for headaches.       /87 (BP Location: Right arm, Patient Position: Sitting)   Pulse 86   Temp 97.8 °F (36.6 °C) (Oral)   Ht 5' 5" (1.651 m)   Wt 80.9 kg (178 lb 5.6 oz)   SpO2 99%   BMI 29.68 kg/m²     Physical Exam   Constitutional: She is oriented to person, place, and time. She appears well-developed and well-nourished. No " distress.   HENT:   Head: Normocephalic and atraumatic.   Mouth/Throat: No oropharyngeal exudate.   Eyes: Conjunctivae are normal.   Cardiovascular: Normal rate and regular rhythm.    Pulmonary/Chest: Effort normal and breath sounds normal.   Abdominal: Soft. Normal appearance and bowel sounds are normal. There is no hepatosplenomegaly. There is tenderness in the epigastric area. There is no rebound, no guarding and negative Elkins's sign.   Musculoskeletal: She exhibits no edema.   Neurological: She is alert and oriented to person, place, and time.   Skin: Skin is warm and dry.   Psychiatric: She has a normal mood and affect.   Vitals reviewed.        Assessment/Plan:  Carlene Fong is a 67 y.o. female who presents today for :    Carlene was seen today for follow-up and bloated.    Diagnoses and all orders for this visit:    Gastroesophageal reflux disease, esophagitis presence not specified  Comments:  trial of 12 weeks of PPI then try to step down to Zantac. check H pylori lipase given epigastric pain  Orders:  -     omeprazole (PRILOSEC) 40 MG capsule; Take 1 capsule (40 mg total) by mouth before breakfast.  -     Comprehensive metabolic panel; Future  -     CBC auto differential; Future    Long term (current) use of non-steroidal anti-inflammatories (nsaid)  -     Comprehensive metabolic panel; Future    Meniere's disease, unspecified laterality    Closed fracture of one rib of left side with routine healing    Epigastric abdominal pain  -     Comprehensive metabolic panel; Future  -     LIPASE; Future  -     H.Pylori Antibody IgG; Future  -     CBC auto differential; Future    Rheumatoid factor positive  Comments:  inflamatory markers. stop mobic. may need repeat rheumatology consult to discuss alternatives to NSAIDs depending on clinical status/results  Orders:  -     Sedimentation rate, manual; Future  -     C-reactive protein; Future            ICD-10-CM ICD-9-CM    1. Gastroesophageal reflux  disease, esophagitis presence not specified K21.9 530.81 omeprazole (PRILOSEC) 40 MG capsule      Comprehensive metabolic panel      CBC auto differential    trial of 12 weeks of PPI then try to step down to Zantac. check H pylori lipase given epigastric pain   2. Long term (current) use of non-steroidal anti-inflammatories (nsaid) Z79.1 V58.64 Comprehensive metabolic panel   3. Meniere's disease, unspecified laterality H81.09 386.00    4. Closed fracture of one rib of left side with routine healing S22.32XD V54.19    5. Epigastric abdominal pain R10.13 789.06 Comprehensive metabolic panel      LIPASE      H.Pylori Antibody IgG      CBC auto differential   6. Rheumatoid factor positive R76.8 795.79 Sedimentation rate, manual      C-reactive protein    inflamatory markers. stop mobic. may need repeat rheumatology consult to discuss alternatives to NSAIDs depending on clinical status/results       Patient Instructions   Trial of Omeprazole for 12 weeks then try to go back down to Zantac.  Suspect Acid Reflux.  Blood work to eval pancreas, biliary enzymes, H pylori, etc. Follow up 8 weeks sooner if concerns. STOP MOBIC and try tylenol as needed. Rheumatoid inflammatory markers today to see if there is inflammation.         Follow-up in about 8 weeks (around 4/23/2018) for return as needed for new concerns.

## 2018-02-26 NOTE — MEDICAL/APP STUDENT
Subjective:       Patient ID: Carlene Fong is a 67 y.o. female.    Chief Complaint: Follow-up (urgent care follow up) and Bloated    Patient is here for follow up of her urgent care visit Feb 10, 2018. Patient had fainted and fallen in her bathroom and hurt her back. XRay at urgent care showed left closed rib fracture. Patient says this has improved and has had no complications since. No chest pain/SOB.    Patient also reports since Mardi [place]as she has had diffuse abdominal pain, most frequently in the epigastric region. This pain is 8-9/10 at worse and is episodic. Patient has also had increased gas and belching since this time. No associated stimulus or precipitating factors. Not associated with food intake. Patient has taken zantac during this episodes and says this has helped her pain considerably. Patient is taking mobic 7.5 daily. Patient says pain has happened at night once, waking her up. No nausea or vomiting, no changes in her bowel habits. She sometimes gets constipation but this is very mild. No changes in stool. Some pain in her right flank/back but no urinary symptoms: no burning on urination, suprapubic pain, cloudy urine or changes in odor. Patient denies any recent fevers/chills.           Review of Systems   Constitutional: Negative for chills and fever.   Respiratory: Negative for cough and shortness of breath.    Cardiovascular: Negative for chest pain.   Gastrointestinal: Positive for abdominal pain and constipation. Negative for abdominal distention, blood in stool, diarrhea, nausea and vomiting.   Genitourinary: Negative for difficulty urinating and dysuria.   Musculoskeletal: Positive for arthralgias.   Neurological: Negative for dizziness, light-headedness and headaches.       Objective:       Vitals:    02/26/18 1040   BP: 132/87   Pulse: 86   Temp: 97.8 °F (36.6 °C)       Physical Exam   Constitutional: She appears well-developed and well-nourished.   HENT:   Head: Normocephalic.    Eyes: Pupils are equal, round, and reactive to light.   Cardiovascular: Normal rate, regular rhythm and normal heart sounds.    Pulmonary/Chest: Effort normal and breath sounds normal.   Abdominal: Soft. Normal appearance and bowel sounds are normal. There is no hepatosplenomegaly. There is tenderness in the epigastric area. There is no rigidity, no guarding, no CVA tenderness, no tenderness at McBurney's point and negative Elkins's sign.   Neurological: She is alert.       Assessment:       1. Gastroesophageal reflux disease, esophagitis presence not specified    2. Long term (current) use of non-steroidal anti-inflammatories (nsaid)    3. Meniere's disease, unspecified laterality    4. Closed fracture of one rib of left side with routine healing    5. Epigastric abdominal pain    6. Rheumatoid factor positive        Plan:       GERD  - suspect this given her continued mobic usage over past several years and that pain has subsided with zantac use.   - trial of PPI for 12 weeks   - patient advised that mobic can cause ulcers, discontinue mobic for pain and use tylenol prn for pain.  - patient advised certain diets can exacerbate GERD: limit alcohol, caffeine and fatty foods    Epigastric abdominal pain  - bloodwork including CBC, CMP, Lipase, H. pylori to rule out other possible pathologies such as pancreatitis, biliary causes.     Rheumatoid Factor: Positive  - CRP/ESR check    Follow Up in 8 weeks for PPI trial, will contact patient if any abnormalities in lab work.

## 2018-02-28 LAB — H PYLORI IGG SERPL QL IA: NEGATIVE

## 2018-03-06 ENCOUNTER — PATIENT MESSAGE (OUTPATIENT)
Dept: FAMILY MEDICINE | Facility: CLINIC | Age: 68
End: 2018-03-06

## 2018-04-16 ENCOUNTER — OFFICE VISIT (OUTPATIENT)
Dept: URGENT CARE | Facility: CLINIC | Age: 68
End: 2018-04-16
Payer: COMMERCIAL

## 2018-04-16 VITALS
RESPIRATION RATE: 16 BRPM | SYSTOLIC BLOOD PRESSURE: 130 MMHG | HEART RATE: 74 BPM | HEIGHT: 65 IN | OXYGEN SATURATION: 99 % | DIASTOLIC BLOOD PRESSURE: 80 MMHG | BODY MASS INDEX: 29.66 KG/M2 | WEIGHT: 178 LBS | TEMPERATURE: 99 F

## 2018-04-16 DIAGNOSIS — M54.41 RIGHT-SIDED LOW BACK PAIN WITH RIGHT-SIDED SCIATICA, UNSPECIFIED CHRONICITY: Primary | ICD-10-CM

## 2018-04-16 DIAGNOSIS — N39.0 URINARY TRACT INFECTION WITHOUT HEMATURIA, SITE UNSPECIFIED: ICD-10-CM

## 2018-04-16 LAB
BILIRUB UR QL STRIP: NEGATIVE
GLUCOSE UR QL STRIP: NEGATIVE
KETONES UR QL STRIP: NEGATIVE
LEUKOCYTE ESTERASE UR QL STRIP: POSITIVE
PH, POC UA: 7.5 (ref 5–8)
POC BLOOD, URINE: NEGATIVE
POC NITRATES, URINE: NEGATIVE
PROT UR QL STRIP: POSITIVE
SP GR UR STRIP: 1.01 (ref 1–1.03)
UROBILINOGEN UR STRIP-ACNC: NORMAL (ref 0.1–1.1)

## 2018-04-16 PROCEDURE — 81003 URINALYSIS AUTO W/O SCOPE: CPT | Mod: QW,S$GLB,, | Performed by: PHYSICIAN ASSISTANT

## 2018-04-16 PROCEDURE — 96372 THER/PROPH/DIAG INJ SC/IM: CPT | Mod: S$GLB,,, | Performed by: FAMILY MEDICINE

## 2018-04-16 PROCEDURE — 99214 OFFICE O/P EST MOD 30 MIN: CPT | Mod: 25,S$GLB,, | Performed by: PHYSICIAN ASSISTANT

## 2018-04-16 RX ORDER — KETOROLAC TROMETHAMINE 30 MG/ML
30 INJECTION, SOLUTION INTRAMUSCULAR; INTRAVENOUS
Status: COMPLETED | OUTPATIENT
Start: 2018-04-16 | End: 2018-04-16

## 2018-04-16 RX ORDER — NITROFURANTOIN 25; 75 MG/1; MG/1
100 CAPSULE ORAL 2 TIMES DAILY
Qty: 10 CAPSULE | Refills: 0 | Status: SHIPPED | OUTPATIENT
Start: 2018-04-16 | End: 2018-04-21

## 2018-04-16 RX ORDER — METHOCARBAMOL 750 MG/1
750 TABLET, FILM COATED ORAL NIGHTLY PRN
Qty: 20 TABLET | Refills: 0 | Status: SHIPPED | OUTPATIENT
Start: 2018-04-16 | End: 2018-04-23

## 2018-04-16 RX ADMIN — KETOROLAC TROMETHAMINE 30 MG: 30 INJECTION, SOLUTION INTRAMUSCULAR; INTRAVENOUS at 11:04

## 2018-04-16 NOTE — PROGRESS NOTES
"Subjective:       Patient ID: Carlene Fong is a 67 y.o. female.    Vitals:  height is 5' 5" (1.651 m) and weight is 80.7 kg (178 lb). Her tympanic temperature is 98.8 °F (37.1 °C). Her blood pressure is 130/80 and her pulse is 74. Her respiration is 16 and oxygen saturation is 99%.     Chief Complaint: Back Pain (right)    Patient presents with worsening right sided lower back pain radiating to right leg and right lower abdomen for 7 days. She states she has had sciatic pain in the past and this feels the same.       Back Pain   This is a new problem. The current episode started in the past 7 days. The problem occurs constantly. The problem is unchanged. The pain is present in the sacro-iliac. The quality of the pain is described as aching, burning, cramping, shooting and stabbing. The pain radiates to the right knee and right thigh. The pain is at a severity of 8/10. The pain is moderate. The pain is the same all the time. The symptoms are aggravated by lying down. Stiffness is present all day. Associated symptoms include leg pain (right). Pertinent negatives include no abdominal pain, bladder incontinence, bowel incontinence, chest pain, dysuria, fever, headaches, numbness, paresis, paresthesias, pelvic pain, tingling or weakness. She has tried heat and ice (tylenol) for the symptoms. The treatment provided no relief.     Review of Systems   Constitution: Negative for fever, weakness and malaise/fatigue.   Eyes: Negative for pain.   Cardiovascular: Negative for chest pain.   Respiratory: Negative for cough, sputum production and wheezing.    Skin: Negative for color change and rash.   Musculoskeletal: Positive for back pain and stiffness. Negative for falls, muscle cramps and muscle weakness.   Gastrointestinal: Positive for nausea. Negative for abdominal pain, bowel incontinence, diarrhea and vomiting.   Genitourinary: Negative for bladder incontinence, dysuria, hematuria, pelvic pain and urgency. "   Neurological: Negative for disturbances in coordination, headaches, numbness, paresthesias and tingling.       Objective:      Physical Exam   Constitutional: She is oriented to person, place, and time. Vital signs are normal. She appears well-developed and well-nourished. She does not appear ill. No distress.   HENT:   Head: Normocephalic and atraumatic.   Right Ear: External ear normal.   Left Ear: External ear normal.   Nose: Nose normal.   Eyes: Conjunctivae, EOM and lids are normal. Right eye exhibits no discharge. Left eye exhibits no discharge.   Neck: Normal range of motion. Neck supple. No spinous process tenderness and no muscular tenderness present. No neck rigidity. No edema, no erythema and normal range of motion present.   Cardiovascular: Normal rate, regular rhythm and normal heart sounds.  Exam reveals no gallop and no friction rub.    No murmur heard.  Pulmonary/Chest: Effort normal and breath sounds normal. No respiratory distress. She has no decreased breath sounds. She has no wheezes. She has no rhonchi. She has no rales.   Abdominal: Normal appearance and bowel sounds are normal. There is tenderness (mild) in the right lower quadrant. There is no rigidity, no rebound, no guarding, no CVA tenderness, no tenderness at McBurney's point and negative Elkins's sign.   Musculoskeletal: Normal range of motion.        Cervical back: Normal. She exhibits no tenderness.        Thoracic back: Normal. She exhibits no tenderness.        Lumbar back: She exhibits tenderness (right sided; muscular) and pain. She exhibits normal range of motion, no bony tenderness, no swelling and no spasm.   Neurological: She is alert and oriented to person, place, and time. She has normal strength. No sensory deficit.   Skin: Skin is warm and dry. No bruising and no rash noted. She is not diaphoretic. No erythema. No pallor.   Psychiatric: She has a normal mood and affect. Her behavior is normal.   Nursing note and vitals  reviewed.      Assessment:       1. Right-sided low back pain with right-sided sciatica, unspecified chronicity    2. Urinary tract infection without hematuria, site unspecified        Office Visit on 04/16/2018   Component Date Value Ref Range Status    POC Blood, Urine 04/16/2018 Negative  Negative Final    POC Bilirubin, Urine 04/16/2018 Negative  Negative Final    POC Urobilinogen, Urine 04/16/2018 Normal  0.1 - 1.1 Final    POC Ketones, Urine 04/16/2018 Negative  Negative Final    POC Protein, Urine 04/16/2018 Positive* Negative Final    POC Nitrates, Urine 04/16/2018 Negative  Negative Final    POC Glucose, Urine 04/16/2018 Negative  Negative Final    pH, UA 04/16/2018 7.5  5 - 8 Final    POC Specific Gravity, Urine 04/16/2018 1.015  1.003 - 1.029 Final    POC Leukocytes, Urine 04/16/2018 Positive* Negative Final     Plan:       Discussed symptoms for which patient should report to the emergency room. Discussed treatment options with patient. Patient expressed verbal understanding and agreement with treatment plan.     Right-sided low back pain with right-sided sciatica, unspecified chronicity  -     POCT Urinalysis, Dipstick, Automated, W/O Scope  -     ketorolac injection 30 mg; Inject 1 mL (30 mg total) into the muscle one time.  -     methocarbamol (ROBAXIN) 750 MG Tab; Take 1 tablet (750 mg total) by mouth nightly as needed.  Dispense: 20 tablet; Refill: 0    Urinary tract infection without hematuria, site unspecified  -     nitrofurantoin, macrocrystal-monohydrate, (MACROBID) 100 MG capsule; Take 1 capsule (100 mg total) by mouth 2 (two) times daily.  Dispense: 10 capsule; Refill: 0  -     Urine culture      Patient Instructions     -Please take antibiotic to completion.  -We will call with urine culture results if positive.   -Refrain from taking any ibuprofen for the rest of the day today.   -Take muscle relaxer at night only. This medication will cause drowsiness.     Please follow up with  your primary care provider within 2-5 days if your signs and symptoms have not resolved or worsen.     If your condition worsens or fails to improve we recommend that you receive another evaluation at the emergency room immediately or contact your primary medical clinic to discuss your concerns.   You must understand that you have received an Urgent Care treatment only and that you may be released before all of your medical problems are known or treated. You, the patient, will arrange for follow up care as instructed.         Self-Care for Low Back Pain    Most people have low back pain now and then. In many cases, it isnt serious and self-care can help. Sometimes low back pain can be a sign of a bigger problem. Call your healthcare provider if your pain returns often or gets worse over time. For the long-term care of your back, get regular exercise, lose any excess weight and learn good posture.  Take a short rest  Lying down during the day may be beneficial for short periods of time if severe pain increases with sitting or standing. Long-term bed rest could be detrimental.  Reduce pain and swelling  Cold reduces swelling. Both cold and heat can reduce pain. Protect your skin by placing a towel between your body and the ice or heat source.  · For the first few days, apply an ice pack for 15 to 20 minutes .  · After the first few days, try heat for 15 minutes at a time to ease pain. Never sleep on a heating pad.  · Over-the-counter medicine can help control pain and swelling. Try aspirin or ibuprofen.  Exercise  Exercise can help your back heal. It also helps your back get stronger and more flexible, preventing any reinjury. Ask your healthcare provider about specific exercises for your back.  Use good posture to avoid reinjury  · When moving, bend at the hips and knees. Dont bend at the waist or twist around.  · When lifting, keep the object close to your body. Dont try to lift more than you can handle.  · When  sitting, keep your lower back supported. Use a rolled-up towel as needed.  Seek immediate medical care if:  · Youre unable to stand or walk.  · You have a temperature over 100.4°F (38.0°C)  · You have frequent, painful, or bloody urination.  · You have severe abdominal pain.  · You have a sharp, stabbing pain.  · Your pain is constant.  · You have pain or numbness in your leg.  · You feel pain in a new area of your back.  · You notice that the pain isnt decreasing after more than a week.   Date Last Reviewed: 9/29/2015  © 2296-3369 [employer]. [address] [place] [address], [place] [address]. All rights reserved. This information is not intended as a substitute for professional medical care. Always follow your healthcare professional's instructions.

## 2018-04-18 LAB
BACTERIA UR CULT: NO GROWTH
BACTERIA UR CULT: NORMAL

## 2018-04-20 ENCOUNTER — OFFICE VISIT (OUTPATIENT)
Dept: INTERNAL MEDICINE | Facility: CLINIC | Age: 68
End: 2018-04-20
Payer: COMMERCIAL

## 2018-04-20 VITALS
HEIGHT: 65 IN | WEIGHT: 180.56 LBS | BODY MASS INDEX: 30.08 KG/M2 | HEART RATE: 96 BPM | DIASTOLIC BLOOD PRESSURE: 70 MMHG | SYSTOLIC BLOOD PRESSURE: 110 MMHG

## 2018-04-20 DIAGNOSIS — R21 RASH: ICD-10-CM

## 2018-04-20 DIAGNOSIS — E21.0 PRIMARY HYPERPARATHYROIDISM: ICD-10-CM

## 2018-04-20 DIAGNOSIS — B02.9 HERPES ZOSTER WITHOUT COMPLICATION: Primary | ICD-10-CM

## 2018-04-20 DIAGNOSIS — R10.31 RIGHT LOWER QUADRANT ABDOMINAL PAIN: ICD-10-CM

## 2018-04-20 DIAGNOSIS — M54.41 ACUTE RIGHT-SIDED LOW BACK PAIN WITH RIGHT-SIDED SCIATICA: ICD-10-CM

## 2018-04-20 PROCEDURE — 99214 OFFICE O/P EST MOD 30 MIN: CPT | Mod: S$GLB,,, | Performed by: INTERNAL MEDICINE

## 2018-04-20 PROCEDURE — 99999 PR PBB SHADOW E&M-EST. PATIENT-LVL III: CPT | Mod: PBBFAC,,, | Performed by: INTERNAL MEDICINE

## 2018-04-20 RX ORDER — GABAPENTIN 300 MG/1
300 CAPSULE ORAL 2 TIMES DAILY
Qty: 60 CAPSULE | Refills: 0 | Status: SHIPPED | OUTPATIENT
Start: 2018-04-20 | End: 2018-07-12 | Stop reason: SDUPTHER

## 2018-04-20 RX ORDER — PREDNISONE 20 MG/1
TABLET ORAL
Qty: 20 TABLET | Refills: 0 | Status: SHIPPED | OUTPATIENT
Start: 2018-04-20 | End: 2018-08-07

## 2018-04-20 RX ORDER — VALACYCLOVIR HYDROCHLORIDE 1 G/1
1000 TABLET, FILM COATED ORAL 3 TIMES DAILY
Qty: 21 TABLET | Refills: 0 | Status: SHIPPED | OUTPATIENT
Start: 2018-04-20 | End: 2018-08-07

## 2018-04-20 NOTE — PROGRESS NOTES
Subjective:       Patient ID: Carlene Fong is a 67 y.o. female.    Chief Complaint: Back Pain and Rash    HPI 67-year-old female presents to clinic today regular patient of Dr. Stubbs.  Recently she went to a physician for urgent care on 4/16 with acute onset of right-sided lower back pain with some radiation into her leg and her right lower abdomen.  She thought it may have been sciatica since she's experienced this in the past.  She was treated with a Toradol injection and Flexeril presents back here today due to the pain that's protracted and feels still fairly severe.  She also developed a rash in the right lower abdomen.  She's been stressed lately as her mother recently had knee replacement and she herself is undergoing of primary hyperparathyroidism workup.  She denies any blood in urine or urologic symptoms.  Review of Systems  otherwise negative  Objective:      Physical Exam  General: Well-appearing, well-nourished.  No distress  HEENT: conjunctivae are normal.  Pupils are equal and reative to light.  TM's are clear and intact bilaterally.  Hearing is grossly normal.  Nasopharynx is clear.  Oropharynx is clear.  Neck: Supple.  No thyroid megaly.  No bruits.  Lymph: No cervical or supraclavicular adenopathy.  Heart: Regular rate and rhythm, without murmur, rub or gallop.  Lungs: Clear to auscultation; respiratory effort normal.  Abdomen: Soft, nontender, nondistended.  Normoactive bowel sounds.  No hepatomegaly.  No masses.  Extremities: Good distal pulses.  No edema.  Psych: Oriented to time person place.  Judgment and insight seem unimpaired.  Mood and affect are appropriate.  Skin right lower abdomen into the right lower pelvis she has erythematous papular vesicular lesions  Assessment:       1. Herpes zoster without complication    2. Primary hyperparathyroidism    3. Acute right-sided low back pain with right-sided sciatica    4. Right lower quadrant abdominal pain    5. Rash        Plan:        Carlene was seen today for back pain and rash.    Diagnoses and all orders for this visit:    Herpes zoster without complication  -     valACYclovir (VALTREX) 1000 MG tablet; Take 1 tablet (1,000 mg total) by mouth 3 (three) times daily.  -     predniSONE (DELTASONE) 20 MG tablet; Take 3 pills daily for 3 days, then 2 pills daily for 3 days, then 1 pill daily for 3 days, then 1/2 pill daily for 4 days.  -     gabapentin (NEURONTIN) 300 MG capsule; Take 1 capsule (300 mg total) by mouth 2 (two) times daily.  Discussed use, benefits, as well as potential adverse side effects.  Discussed and counseled on this condition.  Precautions given related to contact.  She has a 4-month-old grandbaby contact precautions given to avoid direct contact until lesions have crusted and healed over.  Patient has and already takes Neurontin in the past for sciatica and she was advised to continue to do so as it could have clinical benefit for her neuropathic pain.  Primary hyperparathyroidism  Undergoing workup with her endocrinologist  Acute right-sided low back pain with right-sided sciatica  Differential diagnosis includes shingles zoster with postherpetic neuralgia versus sciatica versus less likely kidney stone.  Right lower quadrant abdominal pain  See treatment plan above  Rash  See treatment plan above.

## 2018-04-20 NOTE — PATIENT INSTRUCTIONS

## 2018-05-01 ENCOUNTER — OFFICE VISIT (OUTPATIENT)
Dept: FAMILY MEDICINE | Facility: CLINIC | Age: 68
End: 2018-05-01
Payer: COMMERCIAL

## 2018-05-01 VITALS
SYSTOLIC BLOOD PRESSURE: 102 MMHG | BODY MASS INDEX: 30.85 KG/M2 | HEIGHT: 65 IN | WEIGHT: 185.19 LBS | OXYGEN SATURATION: 98 % | HEART RATE: 67 BPM | DIASTOLIC BLOOD PRESSURE: 69 MMHG | TEMPERATURE: 98 F

## 2018-05-01 DIAGNOSIS — E21.0 PRIMARY HYPERPARATHYROIDISM: ICD-10-CM

## 2018-05-01 DIAGNOSIS — E55.9 VITAMIN D DEFICIENCY: ICD-10-CM

## 2018-05-01 DIAGNOSIS — K21.9 GASTROESOPHAGEAL REFLUX DISEASE, ESOPHAGITIS PRESENCE NOT SPECIFIED: Primary | Chronic | ICD-10-CM

## 2018-05-01 DIAGNOSIS — L91.8 INFLAMED SKIN TAG: ICD-10-CM

## 2018-05-01 DIAGNOSIS — B02.9 HERPES ZOSTER WITHOUT COMPLICATION: ICD-10-CM

## 2018-05-01 DIAGNOSIS — E83.52 SERUM CALCIUM ELEVATED: ICD-10-CM

## 2018-05-01 PROCEDURE — 99999 PR PBB SHADOW E&M-EST. PATIENT-LVL IV: CPT | Mod: PBBFAC,,, | Performed by: FAMILY MEDICINE

## 2018-05-01 PROCEDURE — 99214 OFFICE O/P EST MOD 30 MIN: CPT | Mod: S$GLB,,, | Performed by: FAMILY MEDICINE

## 2018-05-01 RX ORDER — OMEPRAZOLE 20 MG/1
20 CAPSULE, DELAYED RELEASE ORAL DAILY
Qty: 90 CAPSULE | Refills: 3 | Status: SHIPPED | OUTPATIENT
Start: 2018-05-01 | End: 2020-02-07

## 2018-05-01 NOTE — PROGRESS NOTES
" Office Visit    Patient Name: Carlene Fong    : 1950  MRN: [id number]    Subjective:  Carlene is a 67 y.o. female who presents today for:    Follow-up    66 yo patient of mine last seen 2018 for abdominal discomfort thought to be secondary to GERD, with patient instructions as follows:  "Trial of Omeprazole for 12 weeks then try to go back down to Zantac.  Suspect Acid Reflux.  Blood work to eval pancreas, biliary enzymes, H pylori, etc. Follow up 8 weeks sooner if concerns. STOP MOBIC and try tylenol as needed. Rheumatoid inflammatory markers today to see if there is inflammation."     Lab results including CMP, CBC, Lipase, H Pylori, ESR/CRP unremarkable except for significantly elevated calcium level- being treating by Dr Archuleta [facility] for Hyperparathyroidism.       She reports that since being on prescription omeprazole, her symptoms are all significantly improved.  She no longer reports epigastric pain, no longer has belching, and her digestion feels back to normal.    She did follow-up with Dr. Archuleta her endocrinologist about her elevated calcium, and he performed a parathyroid scan.  Marianne is waiting to hear back from his office with the results, but a surgery for parathyroidectomy is likely being scheduled.     Finally, since her last visit with me he did have a significant case of shingles along her right low back and groin region.  She had received the shingles shot a couple of years ago.  She was treated by Dr. Nicki Tucker with a course of steroids and gabapentin.  Her rash is almost totally resolved and the gabapentin is helping control her pain.      Past Medical History  Past Medical History:   Diagnosis Date    Abnormal mammogram 8/10/2016    Followed up with diagnostic mammogram and ultrasound 10[facility]22/[facility]  that showed no concerning findings with recommendation to continue yearly screening.     Anxiety 2017    will need to readdress this if work up is negative and " symptoms persist    Arthritis of knee 1/7/2015    Diverticulosis     Meniere's disease 8/15/2017    Osteopenia 12/1/2015    next bone density 4/2016, seeing endocrine for hyperparathyroidism     Positive anti-CCP test 4/7/2014    Primary hyperparathyroidism 8/26/2013    will be following up with Dr. Archuleta Endocrin[facility] in 2016, blood work today. stoped taking calcitonin due to nausea     Rheumatoid factor positive 4/7/2014    Vertigo 7/16/2017    Vitamin D deficiency 12/1/2015       Past Surgical History  Past Surgical History:   Procedure Laterality Date    APPENDECTOMY      HYSTERECTOMY      ROTATOR CUFF REPAIR Right 12/2014    TUBAL LIGATION         Family History  Family History   Problem Relation Age of Onset    Diabetes Father     Dementia Father     Rheum arthritis Neg Hx     Lupus Neg Hx     Inflammatory bowel disease Neg Hx        Social History  Social History     Social History    Marital status:      Spouse name: N/A    Number of children: N/A    Years of education: N/A     Occupational History    Not on file.     Social History Main Topics    Smoking status: Never Smoker    Smokeless tobacco: Never Used    Alcohol use No    Drug use: No    Sexual activity: Yes     Partners: Male      Comment: ; 2 children     Other Topics Concern    Not on file     Social History Narrative    No narrative on file       Current Medications  Medications reviewed and updated.     Allergies   Review of patient's allergies indicates:   Allergen Reactions    Requip [ropinirole] Swelling     Other reaction(s): palpitations, swelling of tongue       Review of Systems (Pertinent positives)  Review of Systems   Constitutional: Negative for activity change and unexpected weight change.   HENT: Negative for hearing loss, rhinorrhea and trouble swallowing.    Eyes: Negative for discharge and visual disturbance.   Respiratory: Negative for chest tightness and wheezing.    Cardiovascular:  "Negative for chest pain and palpitations.   Gastrointestinal: Negative for blood in stool, constipation, diarrhea and vomiting.   Endocrine: Negative for polydipsia and polyuria.   Genitourinary: Negative for difficulty urinating, dysuria, hematuria and menstrual problem.   Musculoskeletal: Negative for arthralgias, joint swelling and neck pain.   Neurological: Negative for weakness and headaches.   Psychiatric/Behavioral: Negative for confusion and dysphoric mood.       /69 (BP Location: Left arm, Patient Position: Sitting)   Pulse 67   Temp 98 °F (36.7 °C) (Oral)   Ht 5' 5" (1.651 m)   Wt 84 kg (185 lb 3 oz)   SpO2 98%   BMI 30.82 kg/m²     Physical Exam   Constitutional: She is oriented to person, place, and time. She appears well-developed and well-nourished. No distress.   HENT:   Head: Normocephalic and atraumatic.   Eyes: Conjunctivae are normal.   Cardiovascular: Normal rate and regular rhythm.    Pulmonary/Chest: Effort normal and breath sounds normal.   Abdominal: Soft. Bowel sounds are normal. She exhibits no distension. There is no tenderness. There is no rebound and no guarding.   Musculoskeletal: She exhibits no edema.   Neurological: She is alert and oriented to person, place, and time.   Skin: Skin is warm and dry.        Psychiatric: She has a normal mood and affect.   Vitals reviewed.        Assessment/Plan:  Carlene Fong is a 67 y.o. female who presents today for :    Carlene was seen today for follow-up.    Diagnoses and all orders for this visit:    Gastroesophageal reflux disease, esophagitis presence not specified  Comments:  will try lower dose omeprazole to see if symptoms remain controlled, has done very well on the RX strength  Orders:  -     omeprazole (PRILOSEC) 20 MG capsule; Take 1 capsule (20 mg total) by mouth once daily.    Primary hyperparathyroidism  Comments:  Had a recent parathyroid scan by Dr. Archuleta her endocrinologist.  She may need to have her " parathyroids ressected as her calcium level is rising    Vitamin D deficiency  Comments:  continue 2,000 IU daily replacement    Serum calcium elevated  Comments:  following with Endocrine dr Archuleta-- 2/2 primary hyperparathyroidism    Herpes zoster without complication  Comments:  rash almost totally resolved, feeling better     Inflamed skin tag  Comments:  will plan to shave off at next visit            ICD-10-CM ICD-9-CM    1. Gastroesophageal reflux disease, esophagitis presence not specified K21.9 530.81 omeprazole (PRILOSEC) 20 MG capsule    will try lower dose omeprazole to see if symptoms remain controlled, has done very well on the RX strength   2. Primary hyperparathyroidism E21.0 252.01     Had a recent parathyroid scan by Dr. Archuleta her endocrinologist.  She may need to have her parathyroids ressected as her calcium level is rising   3. Vitamin D deficiency E55.9 268.9     continue 2,000 IU daily replacement   4. Serum calcium elevated E83.52 275.42     following with Endocrine dr Archuleta-- 2/2 primary hyperparathyroidism   5. Herpes zoster without complication B02.9 053.9     rash almost totally resolved, feeling better    6. Inflamed skin tag L91.8 701.9      686.9     will plan to shave off at next visit       There are no Patient Instructions on file for this visit.      Follow-up in about 3 months (around 8/1/2018) for return as needed for new concerns.

## 2018-05-02 ENCOUNTER — PATIENT MESSAGE (OUTPATIENT)
Dept: FAMILY MEDICINE | Facility: CLINIC | Age: 68
End: 2018-05-02

## 2018-05-02 DIAGNOSIS — E55.9 VITAMIN D DEFICIENCY: Primary | ICD-10-CM

## 2018-05-02 DIAGNOSIS — E21.0 PRIMARY HYPERPARATHYROIDISM: ICD-10-CM

## 2018-05-02 DIAGNOSIS — E83.52 HYPERCALCEMIA: ICD-10-CM

## 2018-05-03 PROBLEM — E83.52 HYPERCALCEMIA: Status: ACTIVE | Noted: 2018-05-03

## 2018-05-04 ENCOUNTER — LAB VISIT (OUTPATIENT)
Dept: LAB | Facility: HOSPITAL | Age: 68
End: 2018-05-04
Attending: FAMILY MEDICINE
Payer: COMMERCIAL

## 2018-05-04 DIAGNOSIS — E55.9 VITAMIN D DEFICIENCY: ICD-10-CM

## 2018-05-04 DIAGNOSIS — E21.0 PRIMARY HYPERPARATHYROIDISM: ICD-10-CM

## 2018-05-04 DIAGNOSIS — E83.52 HYPERCALCEMIA: ICD-10-CM

## 2018-05-04 LAB
25(OH)D3+25(OH)D2 SERPL-MCNC: 28 NG/ML
ALBUMIN SERPL BCP-MCNC: 3.5 G/DL
ALP SERPL-CCNC: 97 U/L
ALT SERPL W/O P-5'-P-CCNC: 12 U/L
ANION GAP SERPL CALC-SCNC: 6 MMOL/L
AST SERPL-CCNC: 11 U/L
BILIRUB SERPL-MCNC: 0.4 MG/DL
BUN SERPL-MCNC: 16 MG/DL
CA-I BLDV-SCNC: 1.52 MMOL/L
CALCIUM SERPL-MCNC: 10.9 MG/DL
CHLORIDE SERPL-SCNC: 106 MMOL/L
CO2 SERPL-SCNC: 29 MMOL/L
CREAT SERPL-MCNC: 0.7 MG/DL
EST. GFR  (AFRICAN AMERICAN): >60 ML/MIN/1.73 M^2
EST. GFR  (NON AFRICAN AMERICAN): >60 ML/MIN/1.73 M^2
GLUCOSE SERPL-MCNC: 83 MG/DL
POTASSIUM SERPL-SCNC: 4.5 MMOL/L
PROT SERPL-MCNC: 6.4 G/DL
PTH-INTACT SERPL-MCNC: 213.1 PG/ML
SODIUM SERPL-SCNC: 141 MMOL/L

## 2018-05-04 PROCEDURE — 36415 COLL VENOUS BLD VENIPUNCTURE: CPT

## 2018-05-04 PROCEDURE — 83970 ASSAY OF PARATHORMONE: CPT

## 2018-05-04 PROCEDURE — 82306 VITAMIN D 25 HYDROXY: CPT

## 2018-05-04 PROCEDURE — 82330 ASSAY OF CALCIUM: CPT

## 2018-05-04 PROCEDURE — 80053 COMPREHEN METABOLIC PANEL: CPT

## 2018-05-10 ENCOUNTER — TELEPHONE (OUTPATIENT)
Dept: FAMILY MEDICINE | Facility: CLINIC | Age: 68
End: 2018-05-10

## 2018-05-10 DIAGNOSIS — E21.0 PRIMARY HYPERPARATHYROIDISM: ICD-10-CM

## 2018-05-10 DIAGNOSIS — E83.52 HYPERCALCEMIA: ICD-10-CM

## 2018-05-10 DIAGNOSIS — M85.80 OSTEOPENIA, UNSPECIFIED LOCATION: Primary | ICD-10-CM

## 2018-05-10 NOTE — TELEPHONE ENCOUNTER
Here is the referral to Dr Balderas. Please also advise that the DOUBLE her daily vitamin D supplement as her level is a little low and this can encourage parathyroid (PTH) elevation thanks.

## 2018-05-11 NOTE — TELEPHONE ENCOUNTER
Poke with patient, reviewed labs, and informed her that her CD is ready for . She verbalized understanding.

## 2018-07-12 ENCOUNTER — PATIENT MESSAGE (OUTPATIENT)
Dept: FAMILY MEDICINE | Facility: CLINIC | Age: 68
End: 2018-07-12

## 2018-07-12 DIAGNOSIS — B02.9 HERPES ZOSTER WITHOUT COMPLICATION: ICD-10-CM

## 2018-07-12 RX ORDER — GABAPENTIN 300 MG/1
300 CAPSULE ORAL 2 TIMES DAILY
Qty: 180 CAPSULE | Refills: 3 | Status: SHIPPED | OUTPATIENT
Start: 2018-07-12 | End: 2019-09-16 | Stop reason: SDUPTHER

## 2018-07-19 ENCOUNTER — OFFICE VISIT (OUTPATIENT)
Dept: ORTHOPEDICS | Facility: CLINIC | Age: 68
End: 2018-07-19
Payer: COMMERCIAL

## 2018-07-19 VITALS — BODY MASS INDEX: 30.85 KG/M2 | WEIGHT: 185.19 LBS | HEIGHT: 65 IN

## 2018-07-19 DIAGNOSIS — M75.42 IMPINGEMENT SYNDROME OF LEFT SHOULDER: Primary | ICD-10-CM

## 2018-07-19 PROCEDURE — 99999 PR PBB SHADOW E&M-EST. PATIENT-LVL II: CPT | Mod: PBBFAC,,, | Performed by: ORTHOPAEDIC SURGERY

## 2018-07-19 PROCEDURE — 3008F BODY MASS INDEX DOCD: CPT | Mod: CPTII,S$GLB,, | Performed by: ORTHOPAEDIC SURGERY

## 2018-07-19 PROCEDURE — 20610 DRAIN/INJ JOINT/BURSA W/O US: CPT | Mod: LT,S$GLB,, | Performed by: ORTHOPAEDIC SURGERY

## 2018-07-19 PROCEDURE — 99213 OFFICE O/P EST LOW 20 MIN: CPT | Mod: 25,S$GLB,, | Performed by: ORTHOPAEDIC SURGERY

## 2018-07-19 RX ORDER — TRIAMCINOLONE ACETONIDE 40 MG/ML
40 INJECTION, SUSPENSION INTRA-ARTICULAR; INTRAMUSCULAR
Status: COMPLETED | OUTPATIENT
Start: 2018-07-19 | End: 2018-07-19

## 2018-07-19 RX ADMIN — TRIAMCINOLONE ACETONIDE 40 MG: 40 INJECTION, SUSPENSION INTRA-ARTICULAR; INTRAMUSCULAR at 04:07
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
## 2018-08-07 ENCOUNTER — TELEPHONE (OUTPATIENT)
Dept: FAMILY MEDICINE | Facility: CLINIC | Age: 68
End: 2018-08-07

## 2018-08-07 ENCOUNTER — OFFICE VISIT (OUTPATIENT)
Dept: FAMILY MEDICINE | Facility: CLINIC | Age: 68
End: 2018-08-07
Payer: COMMERCIAL

## 2018-08-07 ENCOUNTER — LAB VISIT (OUTPATIENT)
Dept: LAB | Facility: HOSPITAL | Age: 68
End: 2018-08-07
Attending: FAMILY MEDICINE
Payer: COMMERCIAL

## 2018-08-07 VITALS
HEART RATE: 68 BPM | BODY MASS INDEX: 31.81 KG/M2 | OXYGEN SATURATION: 98 % | DIASTOLIC BLOOD PRESSURE: 72 MMHG | SYSTOLIC BLOOD PRESSURE: 111 MMHG | HEIGHT: 65 IN | WEIGHT: 190.94 LBS

## 2018-08-07 DIAGNOSIS — L91.8 INFLAMED SKIN TAG: Primary | ICD-10-CM

## 2018-08-07 DIAGNOSIS — K21.9 GASTROESOPHAGEAL REFLUX DISEASE, ESOPHAGITIS PRESENCE NOT SPECIFIED: ICD-10-CM

## 2018-08-07 DIAGNOSIS — E21.0 PRIMARY HYPERPARATHYROIDISM: ICD-10-CM

## 2018-08-07 DIAGNOSIS — E83.52 HYPERCALCEMIA: ICD-10-CM

## 2018-08-07 DIAGNOSIS — E55.9 VITAMIN D DEFICIENCY: ICD-10-CM

## 2018-08-07 DIAGNOSIS — D48.5 NEOPLASM OF UNCERTAIN BEHAVIOR OF SKIN: ICD-10-CM

## 2018-08-07 DIAGNOSIS — E55.9 VITAMIN D DEFICIENCY: Primary | ICD-10-CM

## 2018-08-07 DIAGNOSIS — E66.9 OBESITY (BMI 30.0-34.9): ICD-10-CM

## 2018-08-07 DIAGNOSIS — M85.80 OSTEOPENIA, UNSPECIFIED LOCATION: ICD-10-CM

## 2018-08-07 LAB
ALBUMIN SERPL BCP-MCNC: 4.3 G/DL
ALP SERPL-CCNC: 96 U/L
ALT SERPL W/O P-5'-P-CCNC: 12 U/L
ANION GAP SERPL CALC-SCNC: 6 MMOL/L
AST SERPL-CCNC: 12 U/L
BILIRUB SERPL-MCNC: 0.8 MG/DL
BUN SERPL-MCNC: 16 MG/DL
CA-I BLDV-SCNC: 1.46 MMOL/L
CALCIUM SERPL-MCNC: 12 MG/DL
CHLORIDE SERPL-SCNC: 105 MMOL/L
CO2 SERPL-SCNC: 31 MMOL/L
CREAT SERPL-MCNC: 0.7 MG/DL
EST. GFR  (AFRICAN AMERICAN): >60 ML/MIN/1.73 M^2
EST. GFR  (NON AFRICAN AMERICAN): >60 ML/MIN/1.73 M^2
GLUCOSE SERPL-MCNC: 84 MG/DL
POTASSIUM SERPL-SCNC: 4 MMOL/L
PROT SERPL-MCNC: 7.5 G/DL
PTH-INTACT SERPL-MCNC: 179.5 PG/ML
SODIUM SERPL-SCNC: 142 MMOL/L
TSH SERPL DL<=0.005 MIU/L-ACNC: 1.5 UIU/ML

## 2018-08-07 PROCEDURE — 99999 PR PBB SHADOW E&M-EST. PATIENT-LVL IV: CPT | Mod: PBBFAC,,, | Performed by: FAMILY MEDICINE

## 2018-08-07 PROCEDURE — 36415 COLL VENOUS BLD VENIPUNCTURE: CPT

## 2018-08-07 PROCEDURE — 83970 ASSAY OF PARATHORMONE: CPT

## 2018-08-07 PROCEDURE — 88305 TISSUE EXAM BY PATHOLOGIST: CPT | Performed by: PATHOLOGY

## 2018-08-07 PROCEDURE — 84443 ASSAY THYROID STIM HORMONE: CPT

## 2018-08-07 PROCEDURE — 88305 TISSUE EXAM BY PATHOLOGIST: CPT | Mod: 26,,, | Performed by: PATHOLOGY

## 2018-08-07 PROCEDURE — 11055 PARING/CUTG B9 HYPRKER LES 1: CPT | Mod: S$GLB,,, | Performed by: FAMILY MEDICINE

## 2018-08-07 PROCEDURE — 99214 OFFICE O/P EST MOD 30 MIN: CPT | Mod: 25,S$GLB,, | Performed by: FAMILY MEDICINE

## 2018-08-07 PROCEDURE — 3008F BODY MASS INDEX DOCD: CPT | Mod: CPTII,S$GLB,, | Performed by: FAMILY MEDICINE

## 2018-08-07 PROCEDURE — 82330 ASSAY OF CALCIUM: CPT

## 2018-08-07 PROCEDURE — 80053 COMPREHEN METABOLIC PANEL: CPT

## 2018-08-07 NOTE — TELEPHONE ENCOUNTER
Spoke to Stephanie in [facility] lab and was not able to add a Vitamin D. Stephanie stated that a gold tube had to be drawn. Vitamin D would have to be a new order. Pls advise if you would like for the pt to come back for the Vitamin D.

## 2018-08-07 NOTE — TELEPHONE ENCOUNTER
Patient had labs this am-- can you call the lab and see if it's possible to add a vitamin D level to what they keli? Here is the order thanks!

## 2018-08-07 NOTE — PROGRESS NOTES
Office Visit    Patient Name: Carlene Fong    : 1950  MRN: [id number]    Subjective:  Carlene is a 67 y.o. female who presents today for:    Follow-up (3 month)    66 yo patient of mine with h/o GERD, osteopenia w/ mild vitamin D deficiency, hyperparathyroidism with increasing calcium, Meniere's, here today for follow up and to have inflamed skin tag removed from left anterior bra line.     Inflamed skin tag along bra line: has been present for several months. Causes pain and irritation with friction, which is inevitable due to location, and she is experiencing worsening redness and irritation. Possibly growing and becoming more bothersome.     Hyperparathyroidism with elevated Calcium:  She has been following with Endocrine Dr Archuleta who performed a parathyroid uptake scan and based on those results decided that surgery is not indicated at that time. Previous labs showed elevated ionized calcium of 1.52 2018. PTH was 213 and vitamin D was 28. She denies symptoms of elevated calcium including no history of kidney stones, bone pain, GI disturbances, etc.     Past Medical History  Past Medical History:   Diagnosis Date    Abnormal mammogram 8/10/2016    Followed up with diagnostic mammogram and ultrasound [facility]  that showed no concerning findings with recommendation to continue yearly screening.     Anxiety 2017    will need to readdress this if work up is negative and symptoms persist    Arthritis of knee 2015    Diverticulosis     Meniere's disease 8/15/2017    Osteopenia 2015    next bone density 2016, seeing endocrine for hyperparathyroidism     Positive anti-CCP test 2014    Primary hyperparathyroidism 2013    will be following up with Dr. Archuleta Endocrinology in 2016, blood work today. stoped taking calcitonin due to nausea     Rheumatoid factor positive 2014    Vertigo 2017    Vitamin D deficiency 2015       Past Surgical  History  Past Surgical History:   Procedure Laterality Date    APPENDECTOMY      HYSTERECTOMY      ROTATOR CUFF REPAIR Right 12/2014    TUBAL LIGATION         Family History  Family History   Problem Relation Age of Onset    Diabetes Father     Dementia Father     Rheum arthritis Neg Hx     Lupus Neg Hx     Inflammatory bowel disease Neg Hx        Social History  Social History     Social History    Marital status:      Spouse name: N/A    Number of children: N/A    Years of education: N/A     Occupational History    Not on file.     Social History Main Topics    Smoking status: Never Smoker    Smokeless tobacco: Never Used    Alcohol use No    Drug use: No    Sexual activity: Yes     Partners: Male      Comment: ; 2 children     Other Topics Concern    Not on file     Social History Narrative    No narrative on file       Current Medications  Medications reviewed and updated.     Allergies   Review of patient's allergies indicates:   Allergen Reactions    Requip [ropinirole] Swelling     Other reaction(s): palpitations, swelling of tongue       Review of Systems (Pertinent positives)  Review of Systems   Constitutional: Negative for activity change and unexpected weight change.   HENT: Negative for hearing loss, rhinorrhea and trouble swallowing.    Eyes: Negative for discharge and visual disturbance.   Respiratory: Negative for chest tightness and wheezing.    Cardiovascular: Negative for chest pain and palpitations.   Gastrointestinal: Negative for blood in stool, constipation, diarrhea and vomiting.   Endocrine: Negative for polydipsia and polyuria.   Genitourinary: Negative for difficulty urinating, dysuria, hematuria and menstrual problem.   Musculoskeletal: Negative for arthralgias, joint swelling and neck pain.   Skin:        Bothersome skin lesion of left anterior chest wall    Neurological: Negative for weakness and headaches.   Psychiatric/Behavioral: Negative for  "confusion and dysphoric mood.       /72 (BP Location: Left arm, Patient Position: Sitting)   Pulse 68   Ht 5' 5" (1.651 m)   Wt 86.6 kg (190 lb 14.7 oz)   SpO2 98%   BMI 31.77 kg/m²     Physical Exam   Constitutional: She is oriented to person, place, and time. She appears well-developed and well-nourished. No distress.   HENT:   Head: Normocephalic and atraumatic.   Mouth/Throat: No oropharyngeal exudate.   Eyes: Conjunctivae are normal.   Neck: No thyromegaly present.   Cardiovascular: Normal rate and regular rhythm.    Pulmonary/Chest: Effort normal and breath sounds normal.   Musculoskeletal: She exhibits no edema.   Lymphadenopathy:     She has no cervical adenopathy.   Neurological: She is alert and oriented to person, place, and time.   Skin: Skin is warm and dry.        Psychiatric: She has a normal mood and affect.   Vitals reviewed.        Assessment/Plan:  Carlene Fong is a 67 y.o. female who presents today for :    Carlene was seen today for follow-up.    Diagnoses and all orders for this visit:    Inflamed skin tag  Comments:  removed and sent to pathology  Orders:  -     Tissue Specimen To Pathology, Internal Medicine    Primary hyperparathyroidism  -     PTH, intact; Future  -     Calcium, ionized; Future  -     Comprehensive metabolic panel; Future  -     TSH; Future    Osteopenia, unspecified location  Comments:  needs repeat bone density and will order at her upcoming physical  Orders:  -     PTH, intact; Future  -     Comprehensive metabolic panel; Future  -     TSH; Future    Vitamin D deficiency  -     PTH, intact; Future    Hypercalcemia  Comments:  if calcium labs are worsening consider changing from HCTZ to Lasix (though HCTZ helps her Menieres and dose is low), calcitonin, bisphosphonate, etc  Orders:  -     PTH, intact; Future  -     Calcium, ionized; Future  -     Comprehensive metabolic panel; Future    Gastroesophageal reflux disease, esophagitis presence not " specified    Obesity (BMI 30.0-34.9)    Neoplasm of uncertain behavior of skin  -     Tissue Specimen To Pathology, Internal Medicine            ICD-10-CM ICD-9-CM    1. Inflamed skin tag L91.8 701.9 Tissue Specimen To Pathology, Internal Medicine     686.9     removed and sent to pathology   2. Primary hyperparathyroidism E21.0 252.01 PTH, intact      Calcium, ionized      Comprehensive metabolic panel      TSH   3. Osteopenia, unspecified location M85.80 733.90 PTH, intact      Comprehensive metabolic panel      TSH    needs repeat bone density and will order at her upcoming physical   4. Vitamin D deficiency E55.9 268.9 PTH, intact   5. Hypercalcemia E83.52 275.42 PTH, intact      Calcium, ionized      Comprehensive metabolic panel    if calcium labs are worsening consider changing from HCTZ to Lasix (though HCTZ helps her Menieres and dose is low), calcitonin, bisphosphonate, etc   6. Gastroesophageal reflux disease, esophagitis presence not specified K21.9 530.81    7. Obesity (BMI 30.0-34.9) E66.9 278.00    8. Neoplasm of uncertain behavior of skin D48.5 238.2 Tissue Specimen To Pathology, Internal Medicine       There are no Patient Instructions on file for this visit.      Follow-up in about 4 months (around 12/7/2018) for annual exam January 2019, follow up sooner if concerns or if indicated based on labs.

## 2018-08-13 ENCOUNTER — PATIENT MESSAGE (OUTPATIENT)
Dept: FAMILY MEDICINE | Facility: CLINIC | Age: 68
End: 2018-08-13

## 2018-08-13 ENCOUNTER — TELEPHONE (OUTPATIENT)
Dept: FAMILY MEDICINE | Facility: CLINIC | Age: 68
End: 2018-08-13

## 2018-08-13 DIAGNOSIS — E21.0 PRIMARY HYPERPARATHYROIDISM: Primary | ICD-10-CM

## 2018-08-13 DIAGNOSIS — E55.9 VITAMIN D DEFICIENCY: ICD-10-CM

## 2018-08-13 DIAGNOSIS — R79.89 ABNORMAL CBC: ICD-10-CM

## 2018-08-13 DIAGNOSIS — E66.9 OBESITY (BMI 30.0-34.9): ICD-10-CM

## 2018-08-13 DIAGNOSIS — E83.52 HYPERCALCEMIA: ICD-10-CM

## 2018-08-23 ENCOUNTER — PATIENT MESSAGE (OUTPATIENT)
Dept: FAMILY MEDICINE | Facility: CLINIC | Age: 68
End: 2018-08-23

## 2018-10-25 ENCOUNTER — TELEPHONE (OUTPATIENT)
Dept: ORTHOPEDICS | Facility: CLINIC | Age: 68
End: 2018-10-25

## 2018-10-25 ENCOUNTER — HOSPITAL ENCOUNTER (OUTPATIENT)
Dept: RADIOLOGY | Facility: HOSPITAL | Age: 68
Discharge: HOME | End: 2018-10-25
Attending: ORTHOPAEDIC SURGERY
Payer: COMMERCIAL

## 2018-10-25 ENCOUNTER — OFFICE VISIT (OUTPATIENT)
Dept: ORTHOPEDICS | Facility: CLINIC | Age: 68
End: 2018-10-25
Payer: COMMERCIAL

## 2018-10-25 VITALS — WEIGHT: 190 LBS | BODY MASS INDEX: 31.65 KG/M2 | HEIGHT: 65 IN

## 2018-10-25 DIAGNOSIS — M25.532 LEFT WRIST PAIN: Primary | ICD-10-CM

## 2018-10-25 DIAGNOSIS — M25.532 LEFT WRIST PAIN: ICD-10-CM

## 2018-10-25 DIAGNOSIS — G56.02 CARPAL TUNNEL SYNDROME OF LEFT WRIST: ICD-10-CM

## 2018-10-25 PROCEDURE — 73100 X-RAY EXAM OF WRIST: CPT | Mod: TC,FY,LT

## 2018-10-25 PROCEDURE — 1101F PT FALLS ASSESS-DOCD LE1/YR: CPT | Mod: CPTII,S$GLB,, | Performed by: ORTHOPAEDIC SURGERY

## 2018-10-25 PROCEDURE — 99999 PR PBB SHADOW E&M-EST. PATIENT-LVL III: CPT | Mod: PBBFAC,,, | Performed by: ORTHOPAEDIC SURGERY

## 2018-10-25 PROCEDURE — 73100 X-RAY EXAM OF WRIST: CPT | Mod: 26,LT,, | Performed by: RADIOLOGY

## 2018-10-25 PROCEDURE — 99213 OFFICE O/P EST LOW 20 MIN: CPT | Mod: 25,S$GLB,, | Performed by: ORTHOPAEDIC SURGERY

## 2018-10-25 PROCEDURE — 3008F BODY MASS INDEX DOCD: CPT | Mod: CPTII,S$GLB,, | Performed by: ORTHOPAEDIC SURGERY

## 2018-10-25 PROCEDURE — 20526 THER INJECTION CARP TUNNEL: CPT | Mod: LT,S$GLB,, | Performed by: ORTHOPAEDIC SURGERY

## 2018-10-25 RX ORDER — TRIAMCINOLONE ACETONIDE 40 MG/ML
20 INJECTION, SUSPENSION INTRA-ARTICULAR; INTRAMUSCULAR
Status: COMPLETED | OUTPATIENT
Start: 2018-10-25 | End: 2018-10-25

## 2018-10-25 RX ADMIN — TRIAMCINOLONE ACETONIDE 20 MG: 40 INJECTION, SUSPENSION INTRA-ARTICULAR; INTRAMUSCULAR at 03:10

## 2018-10-25 NOTE — TELEPHONE ENCOUNTER
----- Message from Angela Boss sent at 10/25/2018  9:52 AM CDT -----  Contact: Self [phone]  Patient is returning your call.  Please advise.

## 2018-10-25 NOTE — PROGRESS NOTES
HISTORY OF PRESENT ILLNESS:  Ms. Fong seen previously for shoulder problems,   returns today with complaints of pain in the left hand and wrist.  Her symptoms   have been present for about a month or so.  She describes a burning type pain,   which radiates from the wrist into the fingers, mainly the left index and middle   finger.  She reports some stiffness as well.  No history of trauma or injury.    PHYSICAL EXAMINATION:  EXTREMITIES:  Left hand has some mild swelling.  Positive Tinel sign at the   wrist.  Range of motion of fingers full, but she has a little bit of stiffness   with flexion of the digits and  strength slightly decreased.    X-RAYS:  AP and lateral of left wrist demonstrate maybe some mild degenerative   changes, otherwise no abnormalities.    IMPRESSION:  1.  Left wrist pain.  2.  Probable carpal tunnel syndrome.    PLAN:  Although her presentation is not typical, I think this is most likely   carpal tunnel syndrome.    I explained the nature of the problem to the patient.  I have given her a wrist   brace, mainly for nighttime use and recommended an injection.  After a pause for   time-out, she identified the left wrist, injected left carpal tunnel with   combination of Kenalog 20 mg, 0.5 mL Xylocaine, sterile technique, tolerated the   procedure well without complication.    Follow up in one month for recheck.      BRITTNY  dd: 10/25/2018 15:03:36 (CDT)  td: 10/26/2018 06:26:45 (CDT)  Doc ID   #[id number]  Job ID #[id number]    CC:

## 2018-10-25 NOTE — TELEPHONE ENCOUNTER
----- Message from Yary Lamb sent at 10/25/2018  7:05 AM CDT -----  No. [phone]   Patient is having wrist pain which goes to her fingers.   She would like an appointment today.    Please call.

## 2019-01-14 PROBLEM — M47.817 FACET HYPERTROPHY OF LUMBOSACRAL REGION: Status: RESOLVED | Noted: 2017-12-27 | Resolved: 2019-01-14

## 2019-01-15 ENCOUNTER — HOSPITAL ENCOUNTER (OUTPATIENT)
Dept: RADIOLOGY | Facility: HOSPITAL | Age: 69
Discharge: HOME | End: 2019-01-15
Attending: FAMILY MEDICINE
Payer: COMMERCIAL

## 2019-01-15 ENCOUNTER — OFFICE VISIT (OUTPATIENT)
Dept: FAMILY MEDICINE | Facility: CLINIC | Age: 69
End: 2019-01-15
Payer: COMMERCIAL

## 2019-01-15 VITALS
OXYGEN SATURATION: 97 % | WEIGHT: 194 LBS | DIASTOLIC BLOOD PRESSURE: 69 MMHG | HEART RATE: 77 BPM | HEIGHT: 65 IN | BODY MASS INDEX: 32.32 KG/M2 | SYSTOLIC BLOOD PRESSURE: 100 MMHG

## 2019-01-15 DIAGNOSIS — Z12.11 COLON CANCER SCREENING: ICD-10-CM

## 2019-01-15 DIAGNOSIS — Z78.0 POSTMENOPAUSAL: ICD-10-CM

## 2019-01-15 DIAGNOSIS — Z90.710 H/O HYSTERECTOMY FOR BENIGN DISEASE: ICD-10-CM

## 2019-01-15 DIAGNOSIS — E83.52 HYPERCALCEMIA: ICD-10-CM

## 2019-01-15 DIAGNOSIS — K21.9 GASTROESOPHAGEAL REFLUX DISEASE, ESOPHAGITIS PRESENCE NOT SPECIFIED: ICD-10-CM

## 2019-01-15 DIAGNOSIS — H81.09 MENIERE'S DISEASE, UNSPECIFIED LATERALITY: ICD-10-CM

## 2019-01-15 DIAGNOSIS — G56.02 CARPAL TUNNEL SYNDROME OF LEFT WRIST: ICD-10-CM

## 2019-01-15 DIAGNOSIS — Z79.899 ENCOUNTER FOR MONITORING LONG-TERM PROTON PUMP INHIBITOR THERAPY: ICD-10-CM

## 2019-01-15 DIAGNOSIS — E66.3 OVERWEIGHT (BMI 25.0-29.9): ICD-10-CM

## 2019-01-15 DIAGNOSIS — E21.0 PRIMARY HYPERPARATHYROIDISM: ICD-10-CM

## 2019-01-15 DIAGNOSIS — M85.80 OSTEOPENIA, UNSPECIFIED LOCATION: ICD-10-CM

## 2019-01-15 DIAGNOSIS — R76.8 POSITIVE ANTI-CCP TEST: ICD-10-CM

## 2019-01-15 DIAGNOSIS — Z23 NEED FOR 23-POLYVALENT PNEUMOCOCCAL POLYSACCHARIDE VACCINE: ICD-10-CM

## 2019-01-15 DIAGNOSIS — R92.8 ABNORMAL MAMMOGRAM: ICD-10-CM

## 2019-01-15 DIAGNOSIS — R76.8 RHEUMATOID FACTOR POSITIVE: ICD-10-CM

## 2019-01-15 DIAGNOSIS — E55.9 VITAMIN D DEFICIENCY: ICD-10-CM

## 2019-01-15 DIAGNOSIS — F41.9 ANXIETY: ICD-10-CM

## 2019-01-15 DIAGNOSIS — Z00.00 ROUTINE GENERAL MEDICAL EXAMINATION AT A HEALTH CARE FACILITY: ICD-10-CM

## 2019-01-15 DIAGNOSIS — Z51.81 ENCOUNTER FOR MONITORING LONG-TERM PROTON PUMP INHIBITOR THERAPY: ICD-10-CM

## 2019-01-15 DIAGNOSIS — Z00.00 ROUTINE GENERAL MEDICAL EXAMINATION AT A HEALTH CARE FACILITY: Primary | ICD-10-CM

## 2019-01-15 DIAGNOSIS — R42 VERTIGO: ICD-10-CM

## 2019-01-15 PROCEDURE — 99397 PER PM REEVAL EST PAT 65+ YR: CPT | Mod: 25,S$GLB,, | Performed by: FAMILY MEDICINE

## 2019-01-15 PROCEDURE — 99999 PR PBB SHADOW E&M-EST. PATIENT-LVL III: ICD-10-PCS | Mod: PBBFAC,,, | Performed by: FAMILY MEDICINE

## 2019-01-15 PROCEDURE — 99999 PR PBB SHADOW E&M-EST. PATIENT-LVL III: CPT | Mod: PBBFAC,,, | Performed by: FAMILY MEDICINE

## 2019-01-15 PROCEDURE — 90471 PNEUMOCOCCAL POLYSACCHARIDE VACCINE 23-VALENT =>2YO SQ IM: ICD-10-PCS | Mod: S$GLB,,, | Performed by: FAMILY MEDICINE

## 2019-01-15 PROCEDURE — 99397 PR PREVENTIVE VISIT,EST,65 & OVER: ICD-10-PCS | Mod: 25,S$GLB,, | Performed by: FAMILY MEDICINE

## 2019-01-15 PROCEDURE — 77080 DEXA BONE DENSITY SPINE HIP: ICD-10-PCS | Mod: 26,,, | Performed by: RADIOLOGY

## 2019-01-15 PROCEDURE — 90471 IMMUNIZATION ADMIN: CPT | Mod: S$GLB,,, | Performed by: FAMILY MEDICINE

## 2019-01-15 PROCEDURE — 77080 DXA BONE DENSITY AXIAL: CPT | Mod: TC

## 2019-01-15 PROCEDURE — 90732 PPSV23 VACC 2 YRS+ SUBQ/IM: CPT | Mod: S$GLB,,, | Performed by: FAMILY MEDICINE

## 2019-01-15 PROCEDURE — 90732 PNEUMOCOCCAL POLYSACCHARIDE VACCINE 23-VALENT =>2YO SQ IM: ICD-10-PCS | Mod: S$GLB,,, | Performed by: FAMILY MEDICINE

## 2019-01-15 PROCEDURE — 77080 DXA BONE DENSITY AXIAL: CPT | Mod: 26,,, | Performed by: RADIOLOGY

## 2019-01-15 RX ORDER — FLUTICASONE PROPIONATE 50 MCG
2 SPRAY, SUSPENSION (ML) NASAL DAILY
Qty: 16 G | Refills: 11 | Status: SHIPPED | OUTPATIENT
Start: 2019-01-15 | End: 2019-12-12 | Stop reason: SDUPTHER

## 2019-01-17 ENCOUNTER — PATIENT MESSAGE (OUTPATIENT)
Dept: FAMILY MEDICINE | Facility: CLINIC | Age: 69
End: 2019-01-17

## 2019-01-17 ENCOUNTER — TELEPHONE (OUTPATIENT)
Dept: FAMILY MEDICINE | Facility: CLINIC | Age: 69
End: 2019-01-17

## 2019-01-17 DIAGNOSIS — E83.52 HYPERCALCEMIA: ICD-10-CM

## 2019-01-17 DIAGNOSIS — M81.0 POSTMENOPAUSAL OSTEOPOROSIS: ICD-10-CM

## 2019-01-17 DIAGNOSIS — E21.0 PRIMARY HYPERPARATHYROIDISM: ICD-10-CM

## 2019-01-17 DIAGNOSIS — E55.9 VITAMIN D DEFICIENCY: Primary | ICD-10-CM

## 2019-01-21 ENCOUNTER — OFFICE VISIT (OUTPATIENT)
Dept: ENDOCRINOLOGY | Facility: CLINIC | Age: 69
End: 2019-01-21
Attending: INTERNAL MEDICINE
Payer: COMMERCIAL

## 2019-01-21 VITALS
HEART RATE: 89 BPM | SYSTOLIC BLOOD PRESSURE: 132 MMHG | DIASTOLIC BLOOD PRESSURE: 62 MMHG | BODY MASS INDEX: 32.95 KG/M2 | WEIGHT: 197.75 LBS | HEIGHT: 65 IN

## 2019-01-21 DIAGNOSIS — M81.6 LOCALIZED OSTEOPOROSIS WITHOUT CURRENT PATHOLOGICAL FRACTURE: ICD-10-CM

## 2019-01-21 DIAGNOSIS — E66.3 OVERWEIGHT (BMI 25.0-29.9): ICD-10-CM

## 2019-01-21 DIAGNOSIS — E21.0 PRIMARY HYPERPARATHYROIDISM: Primary | ICD-10-CM

## 2019-01-21 PROCEDURE — 3288F FALL RISK ASSESSMENT DOCD: CPT | Mod: CPTII,S$GLB,, | Performed by: INTERNAL MEDICINE

## 2019-01-21 PROCEDURE — 1100F PR PT FALLS ASSESS DOC 2+ FALLS/FALL W/INJURY/YR: ICD-10-PCS | Mod: CPTII,S$GLB,, | Performed by: INTERNAL MEDICINE

## 2019-01-21 PROCEDURE — 1100F PTFALLS ASSESS-DOCD GE2>/YR: CPT | Mod: CPTII,S$GLB,, | Performed by: INTERNAL MEDICINE

## 2019-01-21 PROCEDURE — 99204 OFFICE O/P NEW MOD 45 MIN: CPT | Mod: S$GLB,,, | Performed by: INTERNAL MEDICINE

## 2019-01-21 PROCEDURE — 3288F PR FALLS RISK ASSESSMENT DOCUMENTED: ICD-10-PCS | Mod: CPTII,S$GLB,, | Performed by: INTERNAL MEDICINE

## 2019-01-21 PROCEDURE — 99204 PR OFFICE/OUTPT VISIT, NEW, LEVL IV, 45-59 MIN: ICD-10-PCS | Mod: S$GLB,,, | Performed by: INTERNAL MEDICINE

## 2019-01-21 NOTE — PROGRESS NOTES
Subjective:      Patient ID: Carlene Fong is a 68 y.o. female who presents to clinic today for new evaluation and treatment of   Chief Complaint   Patient presents with    Consult   Hypercalcemia/Hyperparathyroidism.    With regards to the hypercalcemia/ primary hyperparathyroidism:    Daily intake of calcium is: diet.  Taking vitamin D: yes    Denies constipation, depression, polyuria     Last bmd was: (1/15/2019) - osteoporosis (R fem -2.8)  Denies fractures, height loss or kidney stones    Prescribed HCTZ 12.5mg daily.  Had sestimibe scan.       With regards to osteoporosis:   Hysterectomy     Took premarin, stopped >2-3y ago. Low dose.   During her 50s for about 10y.     current medication:  None.    Calcium intake? no  Vit D intake?  2000 iu a day   Weight bearing exercise?   Walking - 3x week, 20-30min  Recent falls? No   Fell one year ago.  Hit wall and cracked ribbed. Hadn't eaten and fainted.    They have made fall precautions in house     No recent fractures   No significant height loss (>2 inches).    Tob use?  None   EtOH use? none     No current diarrhea or h/o malabsorption  Only takes prilosec as needed.     Denies chronic exposure to steroid therapy, anticoagulants, or antiseizure medications.   Denies history of thyroid disease, anemia, kidney stones or kidney disease.     Denies active malignancy, history of malignancy the involved the bone, prior radiation treatment, or unexplained elevations of alk phos on labs.       Review of Systems   Constitutional: Negative for unexpected weight change.   Eyes: Negative for visual disturbance.   Respiratory: Negative for shortness of breath.    Cardiovascular: Negative for chest pain.   Gastrointestinal: Negative for abdominal pain.   Genitourinary: Negative for urgency.   Musculoskeletal: Negative for arthralgias.   Skin: Negative for wound.   Neurological: Negative for headaches.   Hematological: Does not bruise/bleed easily.    Psychiatric/Behavioral: Negative for sleep disturbance.       Objective:     Vitals:    01/21/19 1327   BP: 132/62   Pulse: 89     Body mass index is 32.91 kg/m².    Physical Exam   Constitutional: She is oriented to person, place, and time. She appears well-developed. No distress.   HENT:   Right Ear: External ear normal.   Left Ear: External ear normal.   Nose: Nose normal.   Hearing normal  Dentition good   Neck: No tracheal deviation present. No thyromegaly present.   Cardiovascular: Normal rate and intact distal pulses.   No murmur heard.  Pulmonary/Chest: Effort normal and breath sounds normal.   Abdominal: Soft. There is no tenderness. There is no guarding. No hernia.   Musculoskeletal: She exhibits no edema, tenderness (no pinpoint tenderness to spine) or deformity.   Neurological: She is alert and oriented to person, place, and time. She has normal reflexes. No cranial nerve deficit.   Skin: No rash noted. She is not diaphoretic.   No nodules   Psychiatric: She has a normal mood and affect. Her behavior is normal. Judgment and thought content normal.   Vitals reviewed.      Relevant labs and images have been reviewed.     Lab Results   Component Value Date     01/15/2019    K 4.8 01/15/2019     01/15/2019    CO2 28 01/15/2019    BUN 16 01/15/2019    CREATININE 0.7 01/15/2019    CALCIUM 11.8 (H) 01/15/2019    ANIONGAP 6 (L) 01/15/2019    ESTGFRAFRICA >60 01/15/2019    EGFRNONAA >60 01/15/2019       Lab Results   Component Value Date    .4 (H) 01/15/2019    CALCIUM 11.8 (H) 01/15/2019    CAION 1.53 (H) 01/15/2019    PHOS 2.7 01/11/2018    PPDOXQHD15SB 47 01/15/2019   a1c 4.9%       DXA 1/2019  FINDINGS:  The L1 to L4 vertebral bone mineral density is equal to 0.900 g/cm squared with a T score of -2.4.  There has been significant change relative to the prior study.  There is a 7.4% bone mineral density loss compared to the prior exam.    The right femoral neck bone mineral density is equal  to 0.651 g/cm squared with a T score of -2.8.    There has been  significant change relative to the prior study.  There is a 9.9% bone mineral density loss calculated at the neck mean bilaterally compared to the prior study.    There is a 25.1% risk of a major osteoporotic fracture and a 4.6% risk of hip fracture in the next 10 years (FRAX).      Impression     Osteoporosis     Reviewed sestimibe in clinic.    Assessment/Plan:     1. Primary hyperparathyroidism    2. Localized osteoporosis without current pathological fracture    3. Overweight (BMI 25.0-29.9)      March 2019 - cruise.     1.  Primary hyperparathyroidism  Hypercalcemia secondary to hyperparathyroidism.  Now with osteoporosis.    Recommend further imaging for localization despite negative sestimibe.  Asx.  Surgical indication - 1 > ULN, osteoporosis.  Patient is on HCTZ, but low dose and every other day for Meniere's disease.  US neck ordered - patient prefers [place] location.    2. Osteoporosis  Fem neck.  PHPT, Surgical hysterectomy, reflux  Discussed treatment options - bisphosphonate v prolia.  Given secondary cause of PHPT, would recommend addressing this first prior to treatment.  If patient needs to delay surgical intervention or workup, consider prolia sooner.  Fall prevention discussed.  Continue HCTZ to assist with control of symptoms of vertigo.  Patient v/u.  Less likely to be a significant contributor, although somewhat, to hypercalcemia.  Risk v benefit of hypercalcemia v fall and risk of fracture considered.     3. Overweight (BMI 32.9)  Recommend continued walking and weight bearing exercises.  Follow healthy lifestyle diet.  On low sodium diet for Meniere's.        Plan:  Parathyroid US - pending results may need further imaging (ie. 4D CT scan).  Ultimately, if imaging does not assist with localization, patient may need surgical neck exploration and removal of 1 or more parathyroid glands.    RTC pending results.        Natasha  Chaz Burnett MD  Endocrinology Fellow     This case has been reviewed with staff, Dr. Balderas.

## 2019-01-21 NOTE — LETTER
January 21, 2019      Ashli Stubbs MD  [address]  [address]  [place] [address]           [facility] [address]  [address]  [place] [address]  Phone: [phone]  Fax: [phone]          Patient: Carlene Fong   MR Number: [id number]   YOB: 1950   Date of Visit: 1/21/2019       Dear Dr. Ashli Stubbs:    Thank you for referring Carlene Fong to me for evaluation. Attached you will find relevant portions of my assessment and plan of care.    If you have questions, please do not hesitate to call me. I look forward to following Carlene Fong along with you.    Sincerely,    Natasha Burnett MD    Enclosure  CC:  No Recipients    If you would like to receive this communication electronically, please contact [email or web address] or [phone] to request more information on [employer] access.    For providers and/or their staff who would like to refer a patient to [facility], please contact us through our one-stop-shop provider referral line, [facility], at [phone].    If you feel you have received this communication in error or would no longer like to receive these types of communications, please e-mail [email or web address]

## 2019-01-23 ENCOUNTER — HOSPITAL ENCOUNTER (OUTPATIENT)
Dept: RADIOLOGY | Facility: HOSPITAL | Age: 69
Discharge: HOME | End: 2019-01-23
Attending: INTERNAL MEDICINE
Payer: COMMERCIAL

## 2019-01-23 DIAGNOSIS — E21.0 PRIMARY HYPERPARATHYROIDISM: ICD-10-CM

## 2019-01-23 PROCEDURE — 76536 US EXAM OF HEAD AND NECK: CPT | Mod: TC

## 2019-01-23 PROCEDURE — 76536 US SOFT TISSUE HEAD NECK THYROID: ICD-10-PCS | Mod: 26,,, | Performed by: RADIOLOGY

## 2019-01-23 PROCEDURE — 76536 US EXAM OF HEAD AND NECK: CPT | Mod: 26,,, | Performed by: RADIOLOGY

## 2019-01-29 ENCOUNTER — TELEPHONE (OUTPATIENT)
Dept: ENDOCRINOLOGY | Facility: CLINIC | Age: 69
End: 2019-01-29

## 2019-01-29 NOTE — TELEPHONE ENCOUNTER
Parathyroid US shows thyroid nodules that do not meet FNA criteria.  No parathyroid nodules on exam.    Recommend further imaging with parathyroid protocol CT scan and referral to surgery.  Patient did not answer. Left voicemail.    Will send message through patient portal.    Natasha Burnett MD  Endocrinology Fellow

## 2019-01-31 ENCOUNTER — PATIENT MESSAGE (OUTPATIENT)
Dept: ENDOCRINOLOGY | Facility: CLINIC | Age: 69
End: 2019-01-31

## 2019-01-31 DIAGNOSIS — E21.3 HYPERPARATHYROIDISM: ICD-10-CM

## 2019-01-31 NOTE — TELEPHONE ENCOUNTER
Called patient to discuss message.    Plan for 4D CT scan.  Declined associated surgery referral at this time.  Citing that if the scan is unremarkable she would not be interested in exploratory surgery.    Natasha Burnett MD  Endocrinology Fellow

## 2019-02-04 ENCOUNTER — TELEPHONE (OUTPATIENT)
Dept: ORTHOPEDICS | Facility: CLINIC | Age: 69
End: 2019-02-04

## 2019-02-04 NOTE — TELEPHONE ENCOUNTER
----- Message from Hellen Stubbs sent at 2/4/2019  7:04 AM CST -----  Contact: [phone]/self  Patient would like to be seen today. Please advise.

## 2019-02-04 NOTE — TELEPHONE ENCOUNTER
----- Message from Angela Boss sent at 2/4/2019 11:27 AM CST -----  Contact: Self [phone] or [phone]  Patient is returning your call.  Please advise.

## 2019-02-04 NOTE — TELEPHONE ENCOUNTER
----- Message from Saundra Ta sent at 2/4/2019  1:38 PM CST -----  Contact: self/[phone]  Patient is returning your call.  Please advise

## 2019-02-05 ENCOUNTER — HOSPITAL ENCOUNTER (OUTPATIENT)
Dept: RADIOLOGY | Facility: HOSPITAL | Age: 69
Discharge: HOME | End: 2019-02-05
Attending: INTERNAL MEDICINE
Payer: COMMERCIAL

## 2019-02-05 ENCOUNTER — OFFICE VISIT (OUTPATIENT)
Dept: ORTHOPEDICS | Facility: CLINIC | Age: 69
End: 2019-02-05
Payer: COMMERCIAL

## 2019-02-05 VITALS — WEIGHT: 197.75 LBS | HEIGHT: 65 IN | BODY MASS INDEX: 32.95 KG/M2

## 2019-02-05 DIAGNOSIS — E21.3 HYPERPARATHYROIDISM: ICD-10-CM

## 2019-02-05 DIAGNOSIS — G56.01 RIGHT CARPAL TUNNEL SYNDROME: Primary | ICD-10-CM

## 2019-02-05 PROCEDURE — 1101F PT FALLS ASSESS-DOCD LE1/YR: CPT | Mod: CPTII,S$GLB,, | Performed by: ORTHOPAEDIC SURGERY

## 2019-02-05 PROCEDURE — 20526 PR INJECT CARPAL TUNNEL: ICD-10-PCS | Mod: RT,S$GLB,, | Performed by: ORTHOPAEDIC SURGERY

## 2019-02-05 PROCEDURE — 70492 CT SFT TSUE NCK W/O & W/DYE: CPT | Mod: 26,,, | Performed by: RADIOLOGY

## 2019-02-05 PROCEDURE — 99999 PR PBB SHADOW E&M-EST. PATIENT-LVL III: ICD-10-PCS | Mod: PBBFAC,,, | Performed by: ORTHOPAEDIC SURGERY

## 2019-02-05 PROCEDURE — 99999 PR PBB SHADOW E&M-EST. PATIENT-LVL III: CPT | Mod: PBBFAC,,, | Performed by: ORTHOPAEDIC SURGERY

## 2019-02-05 PROCEDURE — 99214 PR OFFICE/OUTPT VISIT, EST, LEVL IV, 30-39 MIN: ICD-10-PCS | Mod: 25,S$GLB,, | Performed by: ORTHOPAEDIC SURGERY

## 2019-02-05 PROCEDURE — 1101F PR PT FALLS ASSESS DOC 0-1 FALLS W/OUT INJ PAST YR: ICD-10-PCS | Mod: CPTII,S$GLB,, | Performed by: ORTHOPAEDIC SURGERY

## 2019-02-05 PROCEDURE — 99214 OFFICE O/P EST MOD 30 MIN: CPT | Mod: 25,S$GLB,, | Performed by: ORTHOPAEDIC SURGERY

## 2019-02-05 PROCEDURE — 70492 CT SFT TSUE NCK W/O & W/DYE: CPT | Mod: TC

## 2019-02-05 PROCEDURE — 20526 THER INJECTION CARP TUNNEL: CPT | Mod: RT,S$GLB,, | Performed by: ORTHOPAEDIC SURGERY

## 2019-02-05 PROCEDURE — 25500020 PHARM REV CODE 255: Performed by: INTERNAL MEDICINE

## 2019-02-05 PROCEDURE — 70492 CT NECK PARATHYROID (4D): ICD-10-PCS | Mod: 26,,, | Performed by: RADIOLOGY

## 2019-02-05 RX ORDER — TRIAMCINOLONE ACETONIDE 40 MG/ML
20 INJECTION, SUSPENSION INTRA-ARTICULAR; INTRAMUSCULAR
Status: COMPLETED | OUTPATIENT
Start: 2019-02-05 | End: 2019-02-05

## 2019-02-05 RX ADMIN — IOHEXOL 75 ML: 350 INJECTION, SOLUTION INTRAVENOUS at 08:02

## 2019-02-05 RX ADMIN — TRIAMCINOLONE ACETONIDE 20 MG: 40 INJECTION, SUSPENSION INTRA-ARTICULAR; INTRAMUSCULAR at 04:02

## 2019-02-05 NOTE — PROGRESS NOTES
"Subjective:      Patient ID: Carlene Fong is a 68 y.o. female.    Chief Complaint: Pain of the Right Hand      HPI: Carlene Fong  Is here for a new complaint.  She  Was previously seen by Dr. Horner in October for a left hand and wrist burning pain  At which time she was treated with a corticosteroid injection of the left carpal tunnel.  Patient reports complete resolution of those symptoms after the injection.  However, she is here today "identical" symptoms on the opposite, right, hand.  She reports a 4 day history of severe burning sensation in her right hand  And fingers.  She states it feels like her hands are on fire.  She denies any numbness or tingling.   Associated symptoms include difficulty forming a fist and hand weakness.   She denies any history of injury or aggravation.  She has not tried  Over-the-counter anti-inflammatories and a cold towel wrap with minimal relief.    Past Medical History:   Diagnosis Date    Abnormal mammogram 8/10/2016    Followed up with diagnostic mammogram and ultrasound 10[facility]  that showed no concerning findings with recommendation to continue yearly screening.     Anxiety 8/8/2017    will need to readdress this if work up is negative and symptoms persist    Arthritis of knee 1/7/2015    Diverticulosis     Meniere's disease 8/15/2017    Osteopenia 12/1/2015    next bone density 4/2016, seeing endocrine for hyperparathyroidism     Positive anti-CCP test 4/7/2014    Primary hyperparathyroidism 8/26/2013    will be following up with Dr. Archuleta [facility]ocr[facility]ology in 2016, blood work today. stoped taking calcitonin due to nausea     Rheumatoid factor positive 4/7/2014    Vertigo 7/16/2017    Vitamin D deficiency 12/1/2015       Current Outpatient Medications:     co-enzyme Q-10 30 mg capsule, Take 30 mg by mouth once daily. , Disp: , Rfl:     diclofenac sodium 1 % Gel, Apply 2 g topically 3 (three) times daily. (Patient taking differently: Apply 2 " "g topically 3 (three) times daily as needed. ), Disp: 100 g, Rfl: 5    FLAXSEED ORAL, Take by mouth once daily. , Disp: , Rfl:     fluticasone (FLONASE) 50 mcg/actuation nasal spray, 2 sprays (100 mcg total) by Each Nare route once daily., Disp: 16 g, Rfl: 11    gabapentin (NEURONTIN) 300 MG capsule, Take 1 capsule (300 mg total) by mouth 2 (two) times daily. (Patient taking differently: Take 300 mg by mouth once daily. ), Disp: 180 capsule, Rfl: 3    hydroCHLOROthiazide (HYDRODIURIL) 12.5 MG Tab, Take 12.5 mg by mouth once daily., Disp: , Rfl:     omeprazole (PRILOSEC) 20 MG capsule, Take 1 capsule (20 mg total) by mouth once daily. (Patient taking differently: Take 20 mg by mouth daily as needed. ), Disp: 90 capsule, Rfl: 3    ondansetron (ZOFRAN) 8 MG tablet, Take 1 tablet (8 mg total) by mouth every 8 (eight) hours as needed for Nausea., Disp: 15 tablet, Rfl: 1    vitamin D 1000 units Tab, Take 185 mg by mouth once daily., Disp: , Rfl:   No current facility-administered medications for this visit.   Review of patient's allergies indicates:   Allergen Reactions    Requip [ropinirole] Swelling     Other reaction(s): palpitations, swelling of tongue       Ht 5' 5" (1.651 m)   Wt 89.7 kg (197 lb 12 oz)   BMI 32.91 kg/m²     Review of Systems   Constitution: Negative for chills and fever.   Cardiovascular: Negative for chest pain and palpitations.   Respiratory: Negative for shortness of breath and wheezing.    Skin: Negative for poor wound healing and rash.   Musculoskeletal: Positive for stiffness.   Gastrointestinal: Negative for nausea and vomiting.   Genitourinary: Negative for dysuria and hematuria.   Neurological: Positive for numbness and paresthesias. Negative for seizures and tremors.   Psychiatric/Behavioral: Negative for altered mental status.   Allergic/Immunologic: Negative for environmental allergies and persistent infections.         Objective:    Ortho Exam        right upper extremity:  " Significant for positive Tinel's at the wrist.  Patient has about 45° of active flexion of both PIP and DIP joints.  She  Cannot actively make a fist.  However, on manual manipulation of each finger  I achieved full finger flexion.  Sensation intact.  Pulses present.  Cap refill brisk.  GEN: Well developed, well nourished  female. AAOX3. No acute distress.   Normocephalic, atraumatic.   EMILIA  Breathing unlabored.  Mood and affect  appropriate.     Assessment:     Imaging:  No new imaging        1. Right carpal tunnel syndrome          Plan:            patient has had previous atypical carpal tunnel presentation on the opposite, left hand, that responded very well to a corticosteroid injection of the carpal tunnel.    Today, I  Recommended and performed corticosteroid injection of the right carpal tunnel.  Patient tolerated well.   continue anti-inflammatories.    Patient was fitted and provided with a brace for nighttime use.   encouraged patient to perform warm water soaks in the morning to improve stiffness.   encouraged passive  Finger flexion and fist formation.  No Follow-up on file.

## 2019-02-12 ENCOUNTER — PATIENT MESSAGE (OUTPATIENT)
Dept: ENDOCRINOLOGY | Facility: CLINIC | Age: 69
End: 2019-02-12

## 2019-02-12 ENCOUNTER — TELEPHONE (OUTPATIENT)
Dept: ENDOCRINOLOGY | Facility: HOSPITAL | Age: 69
End: 2019-02-12

## 2019-02-26 ENCOUNTER — OFFICE VISIT (OUTPATIENT)
Dept: ORTHOPEDICS | Facility: CLINIC | Age: 69
End: 2019-02-26
Payer: COMMERCIAL

## 2019-02-26 ENCOUNTER — HOSPITAL ENCOUNTER (OUTPATIENT)
Dept: RADIOLOGY | Facility: HOSPITAL | Age: 69
Discharge: HOME | End: 2019-02-26
Attending: ORTHOPAEDIC SURGERY
Payer: COMMERCIAL

## 2019-02-26 VITALS — WEIGHT: 197 LBS | HEIGHT: 65 IN | BODY MASS INDEX: 32.82 KG/M2

## 2019-02-26 DIAGNOSIS — M25.511 RIGHT SHOULDER PAIN, UNSPECIFIED CHRONICITY: Primary | ICD-10-CM

## 2019-02-26 DIAGNOSIS — M25.511 RIGHT SHOULDER PAIN, UNSPECIFIED CHRONICITY: ICD-10-CM

## 2019-02-26 DIAGNOSIS — M67.911 DISORDER OF RIGHT ROTATOR CUFF: ICD-10-CM

## 2019-02-26 PROCEDURE — 99214 PR OFFICE/OUTPT VISIT, EST, LEVL IV, 30-39 MIN: ICD-10-PCS | Mod: 25,S$GLB,, | Performed by: ORTHOPAEDIC SURGERY

## 2019-02-26 PROCEDURE — 1101F PT FALLS ASSESS-DOCD LE1/YR: CPT | Mod: CPTII,S$GLB,, | Performed by: ORTHOPAEDIC SURGERY

## 2019-02-26 PROCEDURE — 99999 PR PBB SHADOW E&M-EST. PATIENT-LVL III: CPT | Mod: PBBFAC,,, | Performed by: ORTHOPAEDIC SURGERY

## 2019-02-26 PROCEDURE — 73030 X-RAY EXAM OF SHOULDER: CPT | Mod: 26,RT,, | Performed by: RADIOLOGY

## 2019-02-26 PROCEDURE — 99214 OFFICE O/P EST MOD 30 MIN: CPT | Mod: 25,S$GLB,, | Performed by: ORTHOPAEDIC SURGERY

## 2019-02-26 PROCEDURE — 1101F PR PT FALLS ASSESS DOC 0-1 FALLS W/OUT INJ PAST YR: ICD-10-PCS | Mod: CPTII,S$GLB,, | Performed by: ORTHOPAEDIC SURGERY

## 2019-02-26 PROCEDURE — 99999 PR PBB SHADOW E&M-EST. PATIENT-LVL III: ICD-10-PCS | Mod: PBBFAC,,, | Performed by: ORTHOPAEDIC SURGERY

## 2019-02-26 PROCEDURE — 73030 X-RAY EXAM OF SHOULDER: CPT | Mod: TC,PN,RT

## 2019-02-26 PROCEDURE — 20610 DRAIN/INJ JOINT/BURSA W/O US: CPT | Mod: RT,S$GLB,, | Performed by: ORTHOPAEDIC SURGERY

## 2019-02-26 PROCEDURE — 73030 XR SHOULDER COMPLETE 2 OR MORE VIEWS RIGHT: ICD-10-PCS | Mod: 26,RT,, | Performed by: RADIOLOGY

## 2019-02-26 PROCEDURE — 20610 PR DRAIN/INJECT LARGE JOINT/BURSA: ICD-10-PCS | Mod: RT,S$GLB,, | Performed by: ORTHOPAEDIC SURGERY

## 2019-02-26 RX ORDER — TRIAMCINOLONE ACETONIDE 40 MG/ML
40 INJECTION, SUSPENSION INTRA-ARTICULAR; INTRAMUSCULAR
Status: COMPLETED | OUTPATIENT
Start: 2019-02-26 | End: 2019-02-26

## 2019-02-26 RX ADMIN — TRIAMCINOLONE ACETONIDE 40 MG: 40 INJECTION, SUSPENSION INTRA-ARTICULAR; INTRAMUSCULAR at 03:02

## 2019-02-26 NOTE — PROGRESS NOTES
Subjective:      Patient ID: Carlene Fong is a 68 y.o. female.    Chief Complaint: Pain of the Right Hand and Pain of the Right Shoulder      HPI: Carlene Fong is here in follow-up.  She was seen approximately 3 weeks ago with complaints of right hand burning pain and was treated with a corticosteroid injection of the right carpal tunnel.  Today the patient reports complete resolution of burning sensation and finger stiffness.      Unrelated, she has new complaints today. She is 3 years status post right rotator cuff repair. She has done really well after surgery until recently.  About a week ago she was mopping in her house and did not have any injury however, afterwards felt achy pain in her right shoulder that is worse at night.  Pain does not radiate.  She denies any numbness and tingling.  She has had similar flares in the past and treated with ice and ibuprofen.  Conservative treatment is not currently helpful.  Patient is leaving for a cruise next week and would like symptomatic relief.    Past Medical History:   Diagnosis Date    Abnormal mammogram 8/10/2016    Followed up with diagnostic mammogram and ultrasound [facility]  that showed no concerning findings with recommendation to continue yearly screening.     Anxiety 8/8/2017    will need to readdress this if work up is negative and symptoms persist    Arthritis of knee 1/7/2015    Diverticulosis     Meniere's disease 8/15/2017    Osteopenia 12/1/2015    next bone density 4/2016, seeing endocrine for hyperparathyroidism     Positive anti-CCP test 4/7/2014    Primary hyperparathyroidism 8/26/2013    will be following up with Dr. Archuleta Endocrinology in 2016, blood work today. stoped taking calcitonin due to nausea     Rheumatoid factor positive 4/7/2014    Vertigo 7/16/2017    Vitamin D deficiency 12/1/2015       Current Outpatient Medications:     co-enzyme Q-10 30 mg capsule, Take 30 mg by mouth once daily. , Disp: ,  "Rfl:     diclofenac sodium 1 % Gel, Apply 2 g topically 3 (three) times daily. (Patient taking differently: Apply 2 g topically 3 (three) times daily as needed. ), Disp: 100 g, Rfl: 5    FLAXSEED ORAL, Take by mouth once daily. , Disp: , Rfl:     fluticasone (FLONASE) 50 mcg/actuation nasal spray, 2 sprays (100 mcg total) by Each Nare route once daily., Disp: 16 g, Rfl: 11    gabapentin (NEURONTIN) 300 MG capsule, Take 1 capsule (300 mg total) by mouth 2 (two) times daily. (Patient taking differently: Take 300 mg by mouth once daily. ), Disp: 180 capsule, Rfl: 3    hydroCHLOROthiazide (HYDRODIURIL) 12.5 MG Tab, Take 12.5 mg by mouth once daily., Disp: , Rfl:     omeprazole (PRILOSEC) 20 MG capsule, Take 1 capsule (20 mg total) by mouth once daily. (Patient taking differently: Take 20 mg by mouth daily as needed. ), Disp: 90 capsule, Rfl: 3    ondansetron (ZOFRAN) 8 MG tablet, Take 1 tablet (8 mg total) by mouth every 8 (eight) hours as needed for Nausea., Disp: 15 tablet, Rfl: 1    vitamin D 1000 units Tab, Take 185 mg by mouth once daily., Disp: , Rfl:     Current Facility-Administered Medications:     triamcinolone acetonide injection 40 mg, 40 mg, Other, 1 time in Clinic/HOD, Laly Jackson PA-C  Review of patient's allergies indicates:   Allergen Reactions    Requip [ropinirole] Swelling     Other reaction(s): palpitations, swelling of tongue       Ht 5' 5" (1.651 m)   Wt 89.4 kg (197 lb)   BMI 32.78 kg/m²     Review of Systems   Constitution: Negative for chills and fever.   Cardiovascular: Negative for chest pain and palpitations.   Respiratory: Negative for shortness of breath and wheezing.    Skin: Negative for poor wound healing and rash.   Musculoskeletal: Positive for joint pain.   Gastrointestinal: Negative for nausea and vomiting.   Genitourinary: Negative for dysuria and hematuria.   Neurological: Negative for numbness, paresthesias, seizures and tremors.   Psychiatric/Behavioral: " Negative for altered mental status.   Allergic/Immunologic: Negative for environmental allergies and persistent infections.         Objective:    Ortho Exam       Right upper extremity:  Range of motion wrist and fingers full.  Sensation intact.  strength full.  No tenderness or swelling. Pulses present.  Cap refill brisk   Right shoulder  Skin: Significant for small healed arthroscopic incisions.  Atrophy: none noted.  Tenderness to palpation:  Anterior.  AROM (deg): abduction-180, flexion-180 with pain at endpoints, rotation- unrestricted, painful rotation- absent.  Rotator cuff: good, Impingement test- positive.  Cross arm adduction test- negative.  Instability testing: negative.   Distal neuro: normal, no muscle wasting or atrophy.  Pulses: Positive peripheral pulses..    Assessment:     Imaging:  Shoulder radiographs from today reveal postsurgical changes and mild AC joint arthritis. No elevation of the humeral head is noted.        1. Right shoulder pain, unspecified chronicity    2. Disorder of right rotator cuff          Plan:       Patient is doing very well after carpal tunnel injection.  For the new shoulder symptoms, I explained the nature of tendinitis and bursitis.  Avoid any overhead activities.  I recommended and performed corticosteroid injection of the right shoulder.  Patient tolerated well.  Ice and OTC analgesics as needed.    Orders Placed This Encounter    X-ray Shoulder 2 or More Views Right    triamcinolone acetonide injection 40 mg     Follow-up if symptoms worsen or fail to improve.

## 2019-02-26 NOTE — PROCEDURES
Procedures   PROCEDURE NOTE:  I have explained the risks, benefits, and alternatives of the procedure in detail.  The patient voices understanding and all questions have been answered.  The patient agrees to proceed as planned, consents to injection. Pause for timeout. After a sterile prep of the skin performed in the normal fashion, the right shoulder is injected from the posterior approach using a 22 gauge needle with a combination of 2cc 1% lidocaine and 40 mg of kenalog. The patient is cautioned and immediate relief of pain is secondary to the local anesthetic and will be temporary.  After the anesthetic wears off there may be a increase in pain that may last for a few hours or a few days and they should use ice to help alleviate this flair up of pain.

## 2019-03-11 ENCOUNTER — PATIENT MESSAGE (OUTPATIENT)
Dept: ENDOCRINOLOGY | Facility: CLINIC | Age: 69
End: 2019-03-11

## 2019-03-13 ENCOUNTER — TELEPHONE (OUTPATIENT)
Dept: ENDOCRINOLOGY | Facility: CLINIC | Age: 69
End: 2019-03-13

## 2019-03-13 NOTE — TELEPHONE ENCOUNTER
----- Message from Cami Longoria sent at 3/13/2019 10:16 AM CDT -----  Contact: Self [phone]  PT is requesting a call to discuss parathyroid surgery.

## 2019-03-22 ENCOUNTER — PATIENT MESSAGE (OUTPATIENT)
Dept: ENDOCRINOLOGY | Facility: CLINIC | Age: 69
End: 2019-03-22

## 2019-03-27 ENCOUNTER — TELEPHONE (OUTPATIENT)
Dept: ENDOCRINOLOGY | Facility: CLINIC | Age: 69
End: 2019-03-27

## 2019-03-27 NOTE — TELEPHONE ENCOUNTER
----- Message from Cami Longoria sent at 3/27/2019 10:44 AM CDT -----  Contact: Self [phone]   PT is requesting a call at [phone]. She wants to speak with the doctor before scheduling her parathyroid surgery.

## 2019-03-29 NOTE — TELEPHONE ENCOUNTER
Returned patient phone call.    Had questions about surgery, available surgeons, timing of surgery, recovery time, etc.  Referred patient to either Dr. Guerin or Dr. Abdalla.  Patient is also considering a physician though [place].  Patient would like to read and research more about the surgeons prior to scheduling an appointment.     Also reviewed Parathyroid scan to explain localization and how it helps surgeons.    Patient to call/message me once she has decided on a surgeon for me to refer her too.  Did explain that this surgery will help with both her PTH level and OP.        Patient v/u.      Natasha Burnett MD  Endocrinology Fellow

## 2019-03-29 NOTE — TELEPHONE ENCOUNTER
Called patient.  Used home number as this was the number requested in her message.    No answer.  Left voicemail.  Patient has hyperparathyroidism, with localization on imaging.  Recommended surgery.  Patient hesitant, but would like further discussion prior to ordering surgery referral.      Natasha Burnett MD  Endocrinology Fellow

## 2019-03-30 ENCOUNTER — PATIENT MESSAGE (OUTPATIENT)
Dept: ENDOCRINOLOGY | Facility: CLINIC | Age: 69
End: 2019-03-30

## 2019-03-30 DIAGNOSIS — E21.0 PRIMARY HYPERPARATHYROIDISM: ICD-10-CM

## 2019-03-30 DIAGNOSIS — M81.6 LOCALIZED OSTEOPOROSIS WITHOUT CURRENT PATHOLOGICAL FRACTURE: ICD-10-CM

## 2019-03-30 DIAGNOSIS — E83.52 HYPERCALCEMIA: Primary | ICD-10-CM

## 2019-04-01 ENCOUNTER — PATIENT MESSAGE (OUTPATIENT)
Dept: ENDOCRINOLOGY | Facility: CLINIC | Age: 69
End: 2019-04-01

## 2019-04-01 NOTE — TELEPHONE ENCOUNTER
Patient decided on surgeon.    Placed referral to endocrine surgery department (Rm).       Natasha Burnett MD  Endocrinology Fellow

## 2019-04-04 ENCOUNTER — INITIAL CONSULT (OUTPATIENT)
Dept: SURGERY | Facility: CLINIC | Age: 69
End: 2019-04-04
Payer: COMMERCIAL

## 2019-04-04 DIAGNOSIS — E21.0 PRIMARY HYPERPARATHYROIDISM: Primary | ICD-10-CM

## 2019-04-04 PROCEDURE — 1101F PR PT FALLS ASSESS DOC 0-1 FALLS W/OUT INJ PAST YR: ICD-10-PCS | Mod: CPTII,S$GLB,, | Performed by: SURGERY

## 2019-04-04 PROCEDURE — 99999 PR PBB SHADOW E&M-EST. PATIENT-LVL III: CPT | Mod: PBBFAC,,, | Performed by: SURGERY

## 2019-04-04 PROCEDURE — 99999 PR PBB SHADOW E&M-EST. PATIENT-LVL III: ICD-10-PCS | Mod: PBBFAC,,, | Performed by: SURGERY

## 2019-04-04 PROCEDURE — 99204 OFFICE O/P NEW MOD 45 MIN: CPT | Mod: S$GLB,,, | Performed by: SURGERY

## 2019-04-04 PROCEDURE — 1101F PT FALLS ASSESS-DOCD LE1/YR: CPT | Mod: CPTII,S$GLB,, | Performed by: SURGERY

## 2019-04-04 PROCEDURE — 99204 PR OFFICE/OUTPT VISIT, NEW, LEVL IV, 45-59 MIN: ICD-10-PCS | Mod: S$GLB,,, | Performed by: SURGERY

## 2019-04-04 NOTE — Clinical Note
April 7, 2019      Natasha Burnett MD  [address] [address]           [facility]  [address] [facility] [address]  [place] [facility] [address]  Phone: [phone]          Patient: Carlene Fong   MR Number: [id number]   YOB: 1950   Date of Visit: 4/4/2019       Dear Dr. Natasha Burnett:    Thank you for referring Carlene Fong to me for evaluation. Attached you will find relevant portions of my assessment and plan of care.    If you have questions, please do not hesitate to call me. I look forward to following Carlene Fong along with you.    Sincerely,    Andie Mcfarlane MD    Enclosure  CC:  No Recipients    If you would like to receive this communication electronically, please contact [email or web address] or [phone] to request more information on [employer] access.    For providers and/or their staff who would like to refer a patient to [facility], please contact us through our one-stop-shop provider referral line, [facility], at [phone].    If you feel you have received this communication in error or would no longer like to receive these types of communications, please e-mail [email or web address]

## 2019-04-04 NOTE — PROGRESS NOTES
"I have reviewed the Resident's history and physical, assessment and plan. I agree with the findings. Any changes were made directly in the note below.    Andie Mcfarlane MD  Endocrine Surgery & General Surgery        Consult Note  Endocrine Surgery    Visit Diagnosis: Primary hyperparathyroidism [E21.0]    SUBJECTIVE:     Patient is a 68 y.o. female who was referred by Dr. Natasha Ware Br* and is here unaccompanied and presents for evaluation of primary hyperparathyroidism with osteoporosis dx 1/2019 (- 1 > ULN) and hypercalcemia at 12, . The patient has had complaints of elevation of the serum calcium and parathormone. Sestamibi at [facility] non localizing. CT neck parathyroid 2/5/19 with "multiple parathyroid adenomas"- right inferior 0.8 x 0.6 cm adjacent to the esophagus, subcentimeter nodule just superior to the posterior aspect of the right thyroid lobe (0.7 x 0.6 cm), and left inferior pole 6 mm nodule within th eleft mediastinal soft tissues. There is history of thiazide medication use, takes low dose HCTZ every other day, x 2 years. She has not had previous history of head or neck radiation.She denies sleep disturbance, joint pain, mood changes, abdominal pain, constipation, weakness, lethargy, increased urination and increased thirst. Furthermore, there is no history of pathologic fractures, malignancy, or personal history of thyroid, adrenal, or pancreatic abnormalities. Last BMD 1/15/19 with osteoporosis (R fem -2.8). The patient is vitamin D replete, 2000 iu daily. She  is not on calcium supplementation. She does not have familial history of endocrinopathies.    1/15/19:   .4  Vit D 47  iCa 1.53  Ca 11.8    No Phos    Otherwise healthy, METS >4.   No MI or CVA.    Review of patient's allergies indicates:   Allergen Reactions    Requip [ropinirole] Swelling     Other reaction(s): palpitations, swelling of tongue     Current Outpatient Medications   Medication Sig Dispense Refill    " co-enzyme Q-10 30 mg capsule Take 30 mg by mouth once daily.       diclofenac sodium 1 % Gel Apply 2 g topically 3 (three) times daily. (Patient taking differently: Apply 2 g topically 3 (three) times daily as needed. ) 100 g 5    FLAXSEED ORAL Take by mouth once daily.       fluticasone (FLONASE) 50 mcg/actuation nasal spray 2 sprays (100 mcg total) by Each Nare route once daily. 16 g 11    gabapentin (NEURONTIN) 300 MG capsule Take 1 capsule (300 mg total) by mouth 2 (two) times daily. (Patient taking differently: Take 300 mg by mouth once daily. ) 180 capsule 3    hydroCHLOROthiazide (HYDRODIURIL) 12.5 MG Tab Take 12.5 mg by mouth once daily.      omeprazole (PRILOSEC) 20 MG capsule Take 1 capsule (20 mg total) by mouth once daily. (Patient taking differently: Take 20 mg by mouth daily as needed. ) 90 capsule 3    ondansetron (ZOFRAN) 8 MG tablet Take 1 tablet (8 mg total) by mouth every 8 (eight) hours as needed for Nausea. 15 tablet 1    vitamin D 1000 units Tab Take 185 mg by mouth once daily.       No current facility-administered medications for this visit.        Past Medical History:   Diagnosis Date    Abnormal mammogram 8/10/2016    Followed up with diagnostic mammogram and ultrasound 10[facility]  that showed no concerning findings with recommendation to continue yearly screening.     Anxiety 8/8/2017    will need to readdress this if work up is negative and symptoms persist    Arthritis of knee 1/7/2015    Diverticulosis     Meniere's disease 8/15/2017    Osteopenia 12/1/2015    next bone density 4/2016, seeing endocrine for hyperparathyroidism     Positive anti-CCP test 4/7/2014    Primary hyperparathyroidism 8/26/2013    will be following up with Dr. [facility] in 2016, blood work today. stoped taking calcitonin due to nausea     Rheumatoid factor positive 4/7/2014    Vertigo 7/16/2017    Vitamin D deficiency 12/1/2015     Past Surgical History:   Procedure Laterality  "Date    APPENDECTOMY      HYSTERECTOMY      REPAIR ROTATOR CUFF ARTHROSCOPIC Right 12/5/2014    Performed by [facility]ner [facility] at [facility]    ROTATOR CUFF REPAIR Right 12/2014    TUBAL LIGATION       Social History     Tobacco Use    Smoking status: Never Smoker    Smokeless tobacco: Never Used   Substance Use Topics    Alcohol use: No    Drug use: No      Review of Systems  As above    OBJECTIVE:     Vital Signs:  /63   Pulse 80   Temp 98.2 °F (36.8 °C)   Resp 18   Ht 5' 5" (1.651 m)   Wt 97.3 kg (214 lb 8.1 oz)   BMI 35.70 kg/m²    Body mass index is 35.7 kg/m².    Physical Exam  General:  no distress, see vitals for BMI    Eyes:  conjunctivae/corneas clear   Neck: trachea midline and symmetric, no adenopathy    Thyroid:  thyroid not enlarged   Lung: non labored respirations   Heart:  regular rate and rhythm   Abdomen: soft   Skin/Extremities: warm and well-perfused   Pulses: 2+ and symmetric   Neuro: normal without focal findings and mental status, speech normal, alert and oriented x3     Imaging  Studies:  Date:       Results:     DEXA:   1/2019 Spine T Score: -2.4  Femoral neck T Score: -2.8   Ultrasound:  1/23/19 Normal sized thyroid.  No concerning thyroid nodules identified.  No parathyroid nodules.   Sestamebi/Parathyroid scan:   At [facility], 4/2018, non localizing   Parathyroid protocol CT scan:   2/5/19 Subcentimeter nodules within the soft tissues adjacent to the right lobe of the thyroid gland 1 along the superior aspect of the thyroid gland with a type C pattern of enhancement and a separate along the inferior aspect of the right thyroid gland posteriorly with type B enhancement.    There is a single subcentimeter nodular enhancement within the left para esophageal soft tissues along the posterior left lateral margin of the left inferior pole of the thyroid.  Compatible with type A lesion enhancement pattern.          Lab Review  24-Hour Urine Collection: N/A         Component " Value Date    Vit D, 25-Hydroxy 47 01/15/2019    PTH, Intact 178.4 (H) 01/15/2019    Calcium 11.8 (H) 01/15/2019    Phosphorus 2.7 01/11/2018    TSH 1.461 01/15/2019       ASSESSMENT/PLAN:       Assessment    Carlene Fong is an 68 y.o. female who presents with primary hyperparathyroidism, likely due to parathyroid adenoma(s). The above testing and examination support the diagnosis. Patient meets criteria for recommending parathyroid surgery. This is based on her history of reduced cortical bone density and Calcium level 1mg/dL above normal limit. She currently does not have convincing localization on imaging (Parathyroid protocol CT Scan), 2 right sided (inferior and superior) and one left inferior/ mediastinal.    Plan    1. Imaging: no further imaging  2. Medications: patient should continue current medications: OTC Vitamin D  3. The natural history and treatment options for primary hyperparathyroidism were discussed with the patient. Parathyroidectomy is the only curative treatment for primary hyperparathyroidism. Parathyroidectomy, both minimally invasive technique and standard four-gland exploration, and its risks were discussed. I was also able to duscuss the expected anmol-operative course and the risks of surgery including failure to localize the parathyroid gland, persistent or recurrent hyperparathyroidism with the possibility of the need for a second surgery, temporary or permanent hypoparathyroidism resulting in low blood calcium levels that require extensive medication to avoid serious degenerative conditions such as cataracts, brittle bones, muscle weakness and muscle irritability. Other risks include bleeding, infection, injury to the recurrent laryngeal nerves resulting in hoarseness or impairment of speech and the risks of general anesthetic including MI, CVA, sudden death or even reaction to anesthetic medications.The role of intraoperative PTH monitoring was also discussed. The patient  "understands the risks, any and all questions were answered to the patients satisfaction. The patient is amenable to surgery. Parathyroid surgery(4 gland exploration) will be scheduled in the near future.  4. Will ensure that patient is medically optimized prior to procedure- needs primary care clearance. In addition, the patient was given our "Understanding Parathyroid Disease" booklet and directed to the [employer] Patient Education portal as a resource.   "

## 2019-04-04 NOTE — LETTER
Endocrine/General Surgery  [address] [place] [address]  Phone: [phone]  Fax: [phone] April 7, 2019         Natasha Burnett MD  [facility] [address]    Patient: Carlene Fong   YOB: 1950   Date of Visit: 4/4/2019     Dear Dr. Burnett:    I saw Ms. Fong in clinic. Attached you will find a copy of my note.    As you know, she is a 68 y.o. female who presented with hypercalcemia. I was able to discuss the expected anmol-operative course. The current plan includes parathyroidectomy in the near future.    If you have any questions or concerns, please don't hesitate to contact my office. Again, thank you kindly for this referral.    Regards,        Andie Mcfarlane MD      CC  Ashli Stubbs MD  [address] [facility]  [address]  [place], [place] [address]  VIA In Basket

## 2019-04-04 NOTE — Clinical Note
Patient needs1) to be scheduled for par[facility]ro[facility] at [facility]2) CXR and EKG3) PCP clearanceThgloriasyayob

## 2019-04-08 ENCOUNTER — PATIENT MESSAGE (OUTPATIENT)
Dept: SURGERY | Facility: CLINIC | Age: 69
End: 2019-04-08

## 2019-04-08 VITALS
TEMPERATURE: 98 F | RESPIRATION RATE: 18 BRPM | SYSTOLIC BLOOD PRESSURE: 132 MMHG | HEART RATE: 80 BPM | WEIGHT: 191 LBS | DIASTOLIC BLOOD PRESSURE: 63 MMHG | BODY MASS INDEX: 31.82 KG/M2 | HEIGHT: 65 IN

## 2019-04-08 NOTE — PATIENT INSTRUCTIONS
"The patient was given our "Understanding Parathyroid Disease" booklet and directed to the American Association of [employer] patient education portal as a resource.    "

## 2019-04-09 DIAGNOSIS — E21.3 HYPERPARATHYROIDISM: ICD-10-CM

## 2019-04-09 DIAGNOSIS — E21.0 PRIMARY HYPERPARATHYROIDISM: Primary | ICD-10-CM

## 2019-04-09 RX ORDER — LIDOCAINE HYDROCHLORIDE 10 MG/ML
1 INJECTION, SOLUTION EPIDURAL; INFILTRATION; INTRACAUDAL; PERINEURAL ONCE
Status: CANCELLED | OUTPATIENT
Start: 2019-04-09 | End: 2019-04-09

## 2019-04-09 RX ORDER — SODIUM CHLORIDE 0.9 % (FLUSH) 0.9 %
10 SYRINGE (ML) INJECTION
Status: CANCELLED | OUTPATIENT
Start: 2019-04-09

## 2019-04-10 ENCOUNTER — CLINICAL SUPPORT (OUTPATIENT)
Dept: LAB | Facility: HOSPITAL | Age: 69
End: 2019-04-10
Attending: SURGERY
Payer: COMMERCIAL

## 2019-04-10 ENCOUNTER — HOSPITAL ENCOUNTER (OUTPATIENT)
Dept: RADIOLOGY | Facility: HOSPITAL | Age: 69
Discharge: HOME | End: 2019-04-10
Attending: SURGERY
Payer: COMMERCIAL

## 2019-04-10 DIAGNOSIS — E21.0 PRIMARY HYPERPARATHYROIDISM: ICD-10-CM

## 2019-04-10 PROCEDURE — 93005 ELECTROCARDIOGRAM TRACING: CPT

## 2019-04-10 PROCEDURE — 71046 X-RAY EXAM CHEST 2 VIEWS: CPT | Mod: 26,,, | Performed by: RADIOLOGY

## 2019-04-10 PROCEDURE — 71046 XR CHEST PA AND LATERAL: ICD-10-PCS | Mod: 26,,, | Performed by: RADIOLOGY

## 2019-04-10 PROCEDURE — 93010 ELECTROCARDIOGRAM REPORT: CPT | Mod: ,,, | Performed by: STUDENT IN AN ORGANIZED HEALTH CARE EDUCATION/TRAINING PROGRAM

## 2019-04-10 PROCEDURE — 93010 EKG 12-LEAD: ICD-10-PCS | Mod: ,,, | Performed by: STUDENT IN AN ORGANIZED HEALTH CARE EDUCATION/TRAINING PROGRAM

## 2019-04-10 PROCEDURE — 71046 X-RAY EXAM CHEST 2 VIEWS: CPT | Mod: TC,FY

## 2019-04-11 ENCOUNTER — DOCUMENTATION ONLY (OUTPATIENT)
Dept: SURGERY | Facility: CLINIC | Age: 69
End: 2019-04-11

## 2019-04-11 ENCOUNTER — TELEPHONE (OUTPATIENT)
Dept: FAMILY MEDICINE | Facility: CLINIC | Age: 69
End: 2019-04-11

## 2019-04-11 NOTE — PROGRESS NOTES
Good morning,     Ms. Fong is scheduled for a Parathyroidectomy on May 22, 2019 with Dr. Andie Browning and will need a medical clearance from her PCP.     Thank you,     ZORAN Nelson, LNC, RN   [phone]

## 2019-04-11 NOTE — TELEPHONE ENCOUNTER
----- Message from Josy Ventura RN sent at 4/11/2019  9:45 AM CDT -----  Regarding: PCP Clearance   Good morning,     Ms. Fong is scheduled for a Parathyroidectomy on Ma5 22, 2019 with Dr. Andie Browning and will need a medical clearance from her PCP.     Thank you,     ZORAN Nelson, LNC, RN   [phone]

## 2019-05-01 ENCOUNTER — PATIENT MESSAGE (OUTPATIENT)
Dept: ADMINISTRATIVE | Facility: OTHER | Age: 69
End: 2019-05-01

## 2019-05-07 PROBLEM — S22.32XD CLOSED FRACTURE OF ONE RIB OF LEFT SIDE WITH ROUTINE HEALING: Status: RESOLVED | Noted: 2018-02-10 | Resolved: 2019-05-07

## 2019-05-08 ENCOUNTER — OFFICE VISIT (OUTPATIENT)
Dept: FAMILY MEDICINE | Facility: CLINIC | Age: 69
End: 2019-05-08
Payer: COMMERCIAL

## 2019-05-08 ENCOUNTER — LAB VISIT (OUTPATIENT)
Dept: LAB | Facility: HOSPITAL | Age: 69
End: 2019-05-08
Attending: FAMILY MEDICINE
Payer: COMMERCIAL

## 2019-05-08 VITALS
HEIGHT: 66 IN | OXYGEN SATURATION: 97 % | TEMPERATURE: 98 F | BODY MASS INDEX: 30.76 KG/M2 | DIASTOLIC BLOOD PRESSURE: 70 MMHG | HEART RATE: 73 BPM | WEIGHT: 191.38 LBS | SYSTOLIC BLOOD PRESSURE: 100 MMHG

## 2019-05-08 DIAGNOSIS — M81.0 POSTMENOPAUSAL OSTEOPOROSIS: ICD-10-CM

## 2019-05-08 DIAGNOSIS — Z79.899 MEDICATION MANAGEMENT: ICD-10-CM

## 2019-05-08 DIAGNOSIS — Z01.810 PREOP CARDIOVASCULAR EXAM: ICD-10-CM

## 2019-05-08 DIAGNOSIS — H81.09 MENIERE'S DISEASE, UNSPECIFIED LATERALITY: ICD-10-CM

## 2019-05-08 DIAGNOSIS — K21.9 GASTROESOPHAGEAL REFLUX DISEASE, ESOPHAGITIS PRESENCE NOT SPECIFIED: ICD-10-CM

## 2019-05-08 DIAGNOSIS — Z51.81 ENCOUNTER FOR MONITORING LONG-TERM PROTON PUMP INHIBITOR THERAPY: ICD-10-CM

## 2019-05-08 DIAGNOSIS — E55.9 VITAMIN D DEFICIENCY: ICD-10-CM

## 2019-05-08 DIAGNOSIS — E83.52 HYPERCALCEMIA: ICD-10-CM

## 2019-05-08 DIAGNOSIS — Z79.899 ENCOUNTER FOR MONITORING LONG-TERM PROTON PUMP INHIBITOR THERAPY: ICD-10-CM

## 2019-05-08 DIAGNOSIS — E21.0 PRIMARY HYPERPARATHYROIDISM: ICD-10-CM

## 2019-05-08 DIAGNOSIS — E66.9 OBESITY (BMI 30.0-34.9): ICD-10-CM

## 2019-05-08 LAB
25(OH)D3+25(OH)D2 SERPL-MCNC: 42 NG/ML (ref 30–96)
ALBUMIN SERPL BCP-MCNC: 4 G/DL (ref 3.5–5.2)
ALP SERPL-CCNC: 111 U/L (ref 55–135)
ALT SERPL W/O P-5'-P-CCNC: 14 U/L (ref 10–44)
ANION GAP SERPL CALC-SCNC: 7 MMOL/L (ref 8–16)
AST SERPL-CCNC: 15 U/L (ref 10–40)
BASOPHILS # BLD AUTO: 0.04 K/UL (ref 0–0.2)
BASOPHILS NFR BLD: 0.5 % (ref 0–1.9)
BILIRUB SERPL-MCNC: 0.4 MG/DL (ref 0.1–1)
BUN SERPL-MCNC: 16 MG/DL (ref 8–23)
CALCIUM SERPL-MCNC: 11.7 MG/DL (ref 8.7–10.5)
CHLORIDE SERPL-SCNC: 107 MMOL/L (ref 95–110)
CO2 SERPL-SCNC: 27 MMOL/L (ref 23–29)
CREAT SERPL-MCNC: 0.7 MG/DL (ref 0.5–1.4)
DIFFERENTIAL METHOD: ABNORMAL
EOSINOPHIL # BLD AUTO: 0.1 K/UL (ref 0–0.5)
EOSINOPHIL NFR BLD: 1.8 % (ref 0–8)
ERYTHROCYTE [DISTWIDTH] IN BLOOD BY AUTOMATED COUNT: 13.2 % (ref 11.5–14.5)
EST. GFR  (AFRICAN AMERICAN): >60 ML/MIN/1.73 M^2
EST. GFR  (NON AFRICAN AMERICAN): >60 ML/MIN/1.73 M^2
GLUCOSE SERPL-MCNC: 85 MG/DL (ref 70–110)
HCT VFR BLD AUTO: 43.3 % (ref 37–48.5)
HGB BLD-MCNC: 14.3 G/DL (ref 12–16)
LYMPHOCYTES # BLD AUTO: 1.5 K/UL (ref 1–4.8)
LYMPHOCYTES NFR BLD: 18.8 % (ref 18–48)
MAGNESIUM SERPL-MCNC: 2.3 MG/DL (ref 1.6–2.6)
MCH RBC QN AUTO: 31.1 PG (ref 27–31)
MCHC RBC AUTO-ENTMCNC: 33 G/DL (ref 32–36)
MCV RBC AUTO: 94 FL (ref 82–98)
MONOCYTES # BLD AUTO: 0.5 K/UL (ref 0.3–1)
MONOCYTES NFR BLD: 6.1 % (ref 4–15)
NEUTROPHILS # BLD AUTO: 5.8 K/UL (ref 1.8–7.7)
NEUTROPHILS NFR BLD: 72.7 % (ref 38–73)
PLATELET # BLD AUTO: 298 K/UL (ref 150–350)
PMV BLD AUTO: 9.5 FL (ref 9.2–12.9)
POTASSIUM SERPL-SCNC: 4.1 MMOL/L (ref 3.5–5.1)
PROT SERPL-MCNC: 7.1 G/DL (ref 6–8.4)
PTH-INTACT SERPL-MCNC: 215.6 PG/ML (ref 9–77)
RBC # BLD AUTO: 4.6 M/UL (ref 4–5.4)
SODIUM SERPL-SCNC: 141 MMOL/L (ref 136–145)
VIT B12 SERPL-MCNC: 710 PG/ML (ref 210–950)
WBC # BLD AUTO: 7.93 K/UL (ref 3.9–12.7)

## 2019-05-08 PROCEDURE — 99999 PR PBB SHADOW E&M-EST. PATIENT-LVL III: CPT | Mod: PBBFAC,,, | Performed by: FAMILY MEDICINE

## 2019-05-08 PROCEDURE — 99999 PR PBB SHADOW E&M-EST. PATIENT-LVL III: ICD-10-PCS | Mod: PBBFAC,,, | Performed by: FAMILY MEDICINE

## 2019-05-08 PROCEDURE — 36415 COLL VENOUS BLD VENIPUNCTURE: CPT

## 2019-05-08 PROCEDURE — 80053 COMPREHEN METABOLIC PANEL: CPT

## 2019-05-08 PROCEDURE — 82306 VITAMIN D 25 HYDROXY: CPT

## 2019-05-08 PROCEDURE — 99214 PR OFFICE/OUTPT VISIT, EST, LEVL IV, 30-39 MIN: ICD-10-PCS | Mod: S$GLB,,, | Performed by: FAMILY MEDICINE

## 2019-05-08 PROCEDURE — 82607 VITAMIN B-12: CPT

## 2019-05-08 PROCEDURE — 85025 COMPLETE CBC W/AUTO DIFF WBC: CPT

## 2019-05-08 PROCEDURE — 1101F PR PT FALLS ASSESS DOC 0-1 FALLS W/OUT INJ PAST YR: ICD-10-PCS | Mod: CPTII,S$GLB,, | Performed by: FAMILY MEDICINE

## 2019-05-08 PROCEDURE — 83735 ASSAY OF MAGNESIUM: CPT

## 2019-05-08 PROCEDURE — 1101F PT FALLS ASSESS-DOCD LE1/YR: CPT | Mod: CPTII,S$GLB,, | Performed by: FAMILY MEDICINE

## 2019-05-08 PROCEDURE — 99214 OFFICE O/P EST MOD 30 MIN: CPT | Mod: S$GLB,,, | Performed by: FAMILY MEDICINE

## 2019-05-08 PROCEDURE — 83970 ASSAY OF PARATHORMONE: CPT

## 2019-05-08 NOTE — H&P (VIEW-ONLY)
Office Visit    Patient Name: Carlene Fong    : 1950  MRN: [id number]    Subjective:  Carlene is a 68 y.o. female who presents today for:    Pre-op Exam    Surgery: parathyroidectomy, Parathyroid surgery(4 gland exploration)  Indication: osteoporosis dx 1/15/2019 and hypercalcemia at 12,  2/2 hyperparathyroidism  Surgeon and anticipated date of surgery: Dr Mcfarlane on 19    Feeling Healthy and Well Without Symptoms of Illness: yes, denies, f/c/n/v, sore throat/URI sx, dysuria, diarrhea, rash    Exercise Tolerance:  Can walk several blocks briskly(walks most days) and denies chest pain, shortness of breath, palpitations, dizziness/lightheadedness, edema.     Previous Trouble with Anesthesia: NONE, some concern for associated vertigo and plans to get scopolamine patch    Easy Bleeding/Bruising? NO  Use of anticoagulants, blood thinners?: occasional advil and aspirin-- will avoid from now until post surgery, no fish oil supplements    Chronic Conditions well Controlled: yes. Has h/o Meniere's that has been controlled with HCTZ 12.5 every other day (minimized HCTZ due to hypercalcemia), only uncontrolled condition has been the hyperparathyroidism for which she is receiving surgery    CT Neck 19 Parathyroid protocol: Overall concerning for multiple parathyroid adenomas    Chest x-ray and EKG from 04/10/2019 reviewed with no concerns    Past Medical History  Past Medical History:   Diagnosis Date    Abnormal mammogram 8/10/2016    Followed up with diagnostic mammogram and ultrasound [facility]  that showed no concerning findings with recommendation to continue yearly screening.     Anxiety 2017    will need to readdress this if work up is negative and symptoms persist    Arthritis of knee 2015    Diverticulosis     Meniere's disease 8/15/2017    Osteopenia 2015    next bone density 2016, seeing endocrine for hyperparathyroidism     Positive anti-CCP test  4/7/2014    Primary hyperparathyroidism 8/26/2013    will be following up with Dr. [facility]uleta [facility] in 2016, blood work today. stoped taking calcitonin due to nausea     Rheumatoid factor positive 4/7/2014    Vertigo 7/16/2017    Vitamin D deficiency 12/1/2015       Past Surgical History  Past Surgical History:   Procedure Laterality Date    APPENDECTOMY      HYSTERECTOMY      REPAIR ROTATOR CUFF ARTHROSCOPIC Right 12/5/2014    [facility]    ROTATOR CUFF REPAIR Right 12/2014    TUBAL LIGATION         Family History  Family History   Problem Relation Age of Onset    Diabetes Father     Dementia Father     Rheum arthritis Neg Hx     Lupus Neg Hx     Inflammatory bowel disease Neg Hx        Social History  Social History     Socioeconomic History    Marital status:      Spouse name: Not on file    Number of children: Not on file    Years of education: Not on file    Highest education level: Not on file   Occupational History    Not on file   Social Needs    Financial resource strain: Not hard at all    Food insecurity:     Worry: Never true     Inability: Never true    Transportation needs:     Medical: No     Non-medical: No   Tobacco Use    Smoking status: Never Smoker    Smokeless tobacco: Never Used   Substance and Sexual Activity    Alcohol use: No     Frequency: Never     Binge frequency: Never    Drug use: No    Sexual activity: Yes     Partners: Male     Comment: ; 2 children   Lifestyle    Physical activity:     Days per week: 5 days     Minutes per session: 20 min    Stress: To some extent   Relationships    Social connections:     Talks on phone: More than three times a week     Gets together: Three times a week     Attends [employer] service: Not on file     Active member of club or organization: Yes     Attends meetings of clubs or organizations: More than 4 times per year     Relationship status:    Other Topics  "Concern    Not on file   Social History Narrative    Not on file       Current Medications  Medications reviewed and updated.     Allergies   Review of patient's allergies indicates:   Allergen Reactions    Requip [ropinirole] Swelling     Other reaction(s): palpitations, swelling of tongue       Review of Systems (Pertinent positives)  Review of Systems   Constitutional: Negative for activity change and unexpected weight change.   HENT: Positive for postnasal drip. Negative for hearing loss, rhinorrhea and trouble swallowing.    Eyes: Negative for discharge and visual disturbance.   Respiratory: Negative for chest tightness and wheezing.    Cardiovascular: Negative for chest pain and palpitations.   Gastrointestinal: Negative for blood in stool, constipation, diarrhea and vomiting.   Endocrine: Negative for polydipsia and polyuria.   Genitourinary: Negative for difficulty urinating, dysuria, hematuria and menstrual problem.   Musculoskeletal: Negative for arthralgias, joint swelling and neck pain.   Allergic/Immunologic: Positive for environmental allergies.   Neurological: Negative for weakness and headaches.   Psychiatric/Behavioral: Negative for confusion and dysphoric mood.       /70   Pulse 73   Temp 98.2 °F (36.8 °C)   Ht 5' 6" (1.676 m)   Wt 86.8 kg (191 lb 5.8 oz)   SpO2 97%   BMI 30.89 kg/m²     Physical Exam   Constitutional: She is oriented to person, place, and time. She appears well-developed and well-nourished. No distress.   HENT:   Head: Normocephalic and atraumatic.   Mouth/Throat: Oropharynx is clear and moist. No oropharyngeal exudate.   Eyes: Conjunctivae are normal.   Neck: Normal range of motion. Neck supple. No tracheal deviation present.   Cardiovascular: Normal rate and regular rhythm.   Pulmonary/Chest: Effort normal and breath sounds normal.   Musculoskeletal: She exhibits no edema.   Lymphadenopathy:     She has no cervical adenopathy.   Neurological: She is alert and " oriented to person, place, and time.   Skin: Skin is warm and dry.   Psychiatric: She has a normal mood and affect.   Vitals reviewed.        Assessment/Plan:  Carlene Fong is a 68 y.o. female who presents today for :    Carlene was seen today for pre-op exam.    Diagnoses and all orders for this visit:    Primary hyperparathyroidism  Comments:  manefestations include significant hypercalcemia & osteoporosis-- therefore surgery scheduled for parathyroid exploration & parathyroidectomy  Orders:  -     Comprehensive metabolic panel; Future  -     Vitamin D; Future  -     Magnesium; Future  -     PTH, intact; Future  -     Cancel: EKG 12-lead; Future    Hypercalcemia  Comments:  have minimized HCTZ, replaced Vitamin D. 2/2 hyperparathyroidism & parathyroidectomy scheduled  Orders:  -     Comprehensive metabolic panel; Future  -     Cancel: IN OFFICE EKG 12-LEAD (to Muse)  -     Magnesium; Future  -     Cancel: EKG 12-lead; Future    Preop cardiovascular exam  Comments:  PATIENT IS CLEAR FOR SURGERY. CHRONIC CONDITIONS ADEQUATELY CONTROLLED, EKG WNL, EXERCISE TOLERANCE >4 METS. LABS PENDING & WILL UPDATE W/ RESULTS  Orders:  -     Comprehensive metabolic panel; Future  -     Cancel: IN OFFICE EKG 12-LEAD (to Muse)  -     Vitamin D; Future  -     Magnesium; Future  -     Vitamin B12; Future  -     Cancel: EKG 12-lead; Future  -     CBC auto differential; Future    Vitamin D deficiency  Comments:  have adequately replaced to normal range, continue supplement and recheck with labs  Orders:  -     Vitamin D; Future    Postmenopausal osteoporosis  Comments:  Noted on DEXA 1/15/2019, continue vit D, correct hyperparathyroidism, WB exercises, recheck in 2 years.   Orders:  -     Vitamin D; Future    Obesity (BMI 30.0-34.9)    Gastroesophageal reflux disease, esophagitis presence not specified  Comments:  stable with omeprazole, PPI labs ordered    Encounter for monitoring long-term proton pump inhibitor  therapy    Meniere's disease, unspecified laterality  Comments:  stable on HCTZ 12.5 QOD for maintenance of inner ear fluid balance.   Orders:  -     Vitamin D; Future  -     Magnesium; Future  -     Vitamin B12; Future    Medication management  -     Comprehensive metabolic panel; Future  -     Vitamin D; Future  -     Magnesium; Future  -     Vitamin B12; Future  -     PTH, intact; Future  -     CBC auto differential; Future    Other orders  -     Cancel: CBC auto differential; Future            ICD-10-CM ICD-9-CM    1. Primary hyperparathyroidism E21.0 252.01 Comprehensive metabolic panel      Vitamin D      Magnesium      PTH, intact      CANCELED: EKG 12-lead    manefestations include significant hypercalcemia & osteoporosis-- therefore surgery scheduled for parathyroid exploration & parathyroidectomy   2. Hypercalcemia E83.52 275.42 Comprehensive metabolic panel      Magnesium      CANCELED: IN OFFICE EKG 12-LEAD (to Muse)      CANCELED: EKG 12-lead    have minimized HCTZ, replaced Vitamin D. 2/2 hyperparathyroidism & parathyroidectomy scheduled   3. Preop cardiovascular exam Z01.810 V72.81 Comprehensive metabolic panel      Vitamin D      Magnesium      Vitamin B12      CBC auto differential      CANCELED: IN OFFICE EKG 12-LEAD (to Muse)      CANCELED: EKG 12-lead    PATIENT IS CLEAR FOR SURGERY. CHRONIC CONDITIONS ADEQUATELY CONTROLLED, EKG WNL, EXERCISE TOLERANCE >4 METS. LABS PENDING & WILL UPDATE W/ RESULTS   4. Vitamin D deficiency E55.9 268.9 Vitamin D    have adequately replaced to normal range, continue supplement and recheck with labs   5. Postmenopausal osteoporosis M81.0 733.01 Vitamin D    Noted on DEXA 1/15/2019, continue vit D, correct hyperparathyroidism, WB exercises, recheck in 2 years.    6. Obesity (BMI 30.0-34.9) E66.9 278.00    7. Gastroesophageal reflux disease, esophagitis presence not specified K21.9 530.81     stable with omeprazole, PPI labs ordered   8. Encounter for monitoring  long-term proton pump inhibitor therapy Z51.81 V58.83     Z79.899 V58.69    9. Meniere's disease, unspecified laterality H81.09 386.00 Vitamin D      Magnesium      Vitamin B12    stable on HCTZ 12.5 QOD for maintenance of inner ear fluid balance.    10. Medication management Z79.899 V58.69 Comprehensive metabolic panel      Vitamin D      Magnesium      Vitamin B12      PTH, intact      CBC auto differential       There are no Patient Instructions on file for this visit.      Follow up for CLEAR FOR PARATHYROIDECTOMY SURGERY, return as needed for new concerns.

## 2019-05-08 NOTE — PROGRESS NOTES
Office Visit    Patient Name: Carlene Fong    : 1950  MRN: [id number]    Subjective:  Carlene is a 68 y.o. female who presents today for:    Pre-op Exam    Surgery: parathyroidectomy, Parathyroid surgery(4 gland exploration)  Indication: osteoporosis dx 1/15/2019 and hypercalcemia at 12,  2/2 hyperparathyroidism  Surgeon and anticipated date of surgery: Dr Mcfarlane on 19    Feeling Healthy and Well Without Symptoms of Illness: yes, denies, f/c/n/v, sore throat/URI sx, dysuria, diarrhea, rash    Exercise Tolerance:  Can walk several blocks briskly(walks most days) and denies chest pain, shortness of breath, palpitations, dizziness/lightheadedness, edema.     Previous Trouble with Anesthesia: NONE, some concern for associated vertigo and plans to get scopolamine patch    Easy Bleeding/Bruising? NO  Use of anticoagulants, blood thinners?: occasional advil and aspirin-- will avoid from now until post surgery, no fish oil supplements    Chronic Conditions well Controlled: yes. Has h/o Meniere's that has been controlled with HCTZ 12.5 every other day (minimized HCTZ due to hypercalcemia), only uncontrolled condition has been the hyperparathyroidism for which she is receiving surgery    CT Neck 19 Parathyroid protocol: Overall concerning for multiple parathyroid adenomas    Chest x-ray and EKG from 04/10/2019 reviewed with no concerns    Past Medical History  Past Medical History:   Diagnosis Date    Abnormal mammogram 8/10/2016    Followed up with diagnostic mammogram and ultrasound 10/22/2015at [facility]  that showed no concerning findings with recommendation to continue yearly screening.     Anxiety 2017    will need to readdress this if work up is negative and symptoms persist    Arthritis of knee 2015    Diverticulosis     Meniere's disease 8/15/2017    Osteopenia 2015    next bone density 2016, seeing endocrine for hyperparathyroidism     Positive anti-CCP test  4/7/2014    Primary hyperparathyroidism 8/26/2013    will be following up with Dr. [facility] in 2016, blood work today. stoped taking calcitonin due to nausea     Rheumatoid factor positive 4/7/2014    Vertigo 7/16/2017    Vitamin D deficiency 12/1/2015       Past Surgical History  Past Surgical History:   Procedure Laterality Date    APPENDECTOMY      HYSTERECTOMY      REPAIR ROTATOR CUFF ARTHROSCOPIC Right 12/5/2014    [facility]    ROTATOR CUFF REPAIR Right 12/2014    TUBAL LIGATION         Family History  Family History   Problem Relation Age of Onset    Diabetes Father     Dementia Father     Rheum arthritis Neg Hx     Lupus Neg Hx     Inflammatory bowel disease Neg Hx        Social History  Social History     Socioeconomic History    Marital status:      Spouse name: Not on file    Number of children: Not on file    Years of education: Not on file    Highest education level: Not on file   Occupational History    Not on file   Social Needs    Financial resource strain: Not hard at all    Food insecurity:     Worry: Never true     Inability: Never true    Transportation needs:     Medical: No     Non-medical: No   Tobacco Use    Smoking status: Never Smoker    Smokeless tobacco: Never Used   Substance and Sexual Activity    Alcohol use: No     Frequency: Never     Binge frequency: Never    Drug use: No    Sexual activity: Yes     Partners: Male     Comment: ; 2 children   Lifestyle    Physical activity:     Days per week: 5 days     Minutes per session: 20 min    Stress: To some extent   Relationships    Social connections:     Talks on phone: More than three times a week     Gets together: Three times a week     Attends [employer] service: Not on file     Active member of club or organization: Yes     Attends meetings of clubs or organizations: More than 4 times per year     Relationship status:    Other Topics  "Concern    Not on file   Social History Narrative    Not on file       Current Medications  Medications reviewed and updated.     Allergies   Review of patient's allergies indicates:   Allergen Reactions    Requip [ropinirole] Swelling     Other reaction(s): palpitations, swelling of tongue       Review of Systems (Pertinent positives)  Review of Systems   Constitutional: Negative for activity change and unexpected weight change.   HENT: Positive for postnasal drip. Negative for hearing loss, rhinorrhea and trouble swallowing.    Eyes: Negative for discharge and visual disturbance.   Respiratory: Negative for chest tightness and wheezing.    Cardiovascular: Negative for chest pain and palpitations.   Gastrointestinal: Negative for blood in stool, constipation, diarrhea and vomiting.   Endocrine: Negative for polydipsia and polyuria.   Genitourinary: Negative for difficulty urinating, dysuria, hematuria and menstrual problem.   Musculoskeletal: Negative for arthralgias, joint swelling and neck pain.   Allergic/Immunologic: Positive for environmental allergies.   Neurological: Negative for weakness and headaches.   Psychiatric/Behavioral: Negative for confusion and dysphoric mood.       /70   Pulse 73   Temp 98.2 °F (36.8 °C)   Ht 5' 6" (1.676 m)   Wt 86.8 kg (191 lb 5.8 oz)   SpO2 97%   BMI 30.89 kg/m²     Physical Exam   Constitutional: She is oriented to person, place, and time. She appears well-developed and well-nourished. No distress.   HENT:   Head: Normocephalic and atraumatic.   Mouth/Throat: Oropharynx is clear and moist. No oropharyngeal exudate.   Eyes: Conjunctivae are normal.   Neck: Normal range of motion. Neck supple. No tracheal deviation present.   Cardiovascular: Normal rate and regular rhythm.   Pulmonary/Chest: Effort normal and breath sounds normal.   Musculoskeletal: She exhibits no edema.   Lymphadenopathy:     She has no cervical adenopathy.   Neurological: She is alert and " oriented to person, place, and time.   Skin: Skin is warm and dry.   Psychiatric: She has a normal mood and affect.   Vitals reviewed.        Assessment/Plan:  Carlene Fong is a 68 y.o. female who presents today for :    Carlene was seen today for pre-op exam.    Diagnoses and all orders for this visit:    Primary hyperparathyroidism  Comments:  manefestations include significant hypercalcemia & osteoporosis-- therefore surgery scheduled for parathyroid exploration & parathyroidectomy  Orders:  -     Comprehensive metabolic panel; Future  -     Vitamin D; Future  -     Magnesium; Future  -     PTH, intact; Future  -     Cancel: EKG 12-lead; Future    Hypercalcemia  Comments:  have minimized HCTZ, replaced Vitamin D. 2/2 hyperparathyroidism & parathyroidectomy scheduled  Orders:  -     Comprehensive metabolic panel; Future  -     Cancel: IN OFFICE EKG 12-LEAD (to Muse)  -     Magnesium; Future  -     Cancel: EKG 12-lead; Future    Preop cardiovascular exam  Comments:  PATIENT IS CLEAR FOR SURGERY. CHRONIC CONDITIONS ADEQUATELY CONTROLLED, EKG WNL, EXERCISE TOLERANCE >4 METS. LABS PENDING & WILL UPDATE W/ RESULTS  Orders:  -     Comprehensive metabolic panel; Future  -     Cancel: IN OFFICE EKG 12-LEAD (to Muse)  -     Vitamin D; Future  -     Magnesium; Future  -     Vitamin B12; Future  -     Cancel: EKG 12-lead; Future  -     CBC auto differential; Future    Vitamin D deficiency  Comments:  have adequately replaced to normal range, continue supplement and recheck with labs  Orders:  -     Vitamin D; Future    Postmenopausal osteoporosis  Comments:  Noted on DEXA 1/15/2019, continue vit D, correct hyperparathyroidism, WB exercises, recheck in 2 years.   Orders:  -     Vitamin D; Future    Obesity (BMI 30.0-34.9)    Gastroesophageal reflux disease, esophagitis presence not specified  Comments:  stable with omeprazole, PPI labs ordered    Encounter for monitoring long-term proton pump inhibitor  therapy    Meniere's disease, unspecified laterality  Comments:  stable on HCTZ 12.5 QOD for maintenance of inner ear fluid balance.   Orders:  -     Vitamin D; Future  -     Magnesium; Future  -     Vitamin B12; Future    Medication management  -     Comprehensive metabolic panel; Future  -     Vitamin D; Future  -     Magnesium; Future  -     Vitamin B12; Future  -     PTH, intact; Future  -     CBC auto differential; Future    Other orders  -     Cancel: CBC auto differential; Future            ICD-10-CM ICD-9-CM    1. Primary hyperparathyroidism E21.0 252.01 Comprehensive metabolic panel      Vitamin D      Magnesium      PTH, intact      CANCELED: EKG 12-lead    manefestations include significant hypercalcemia & osteoporosis-- therefore surgery scheduled for parathyroid exploration & parathyroidectomy   2. Hypercalcemia E83.52 275.42 Comprehensive metabolic panel      Magnesium      CANCELED: IN OFFICE EKG 12-LEAD (to Muse)      CANCELED: EKG 12-lead    have minimized HCTZ, replaced Vitamin D. 2/2 hyperparathyroidism & parathyroidectomy scheduled   3. Preop cardiovascular exam Z01.810 V72.81 Comprehensive metabolic panel      Vitamin D      Magnesium      Vitamin B12      CBC auto differential      CANCELED: IN OFFICE EKG 12-LEAD (to Muse)      CANCELED: EKG 12-lead    PATIENT IS CLEAR FOR SURGERY. CHRONIC CONDITIONS ADEQUATELY CONTROLLED, EKG WNL, EXERCISE TOLERANCE >4 METS. LABS PENDING & WILL UPDATE W/ RESULTS   4. Vitamin D deficiency E55.9 268.9 Vitamin D    have adequately replaced to normal range, continue supplement and recheck with labs   5. Postmenopausal osteoporosis M81.0 733.01 Vitamin D    Noted on DEXA 1/15/2019, continue vit D, correct hyperparathyroidism, WB exercises, recheck in 2 years.    6. Obesity (BMI 30.0-34.9) E66.9 278.00    7. Gastroesophageal reflux disease, esophagitis presence not specified K21.9 530.81     stable with omeprazole, PPI labs ordered   8. Encounter for monitoring  long-term proton pump inhibitor therapy Z51.81 V58.83     Z79.899 V58.69    9. Meniere's disease, unspecified laterality H81.09 386.00 Vitamin D      Magnesium      Vitamin B12    stable on HCTZ 12.5 QOD for maintenance of inner ear fluid balance.    10. Medication management Z79.899 V58.69 Comprehensive metabolic panel      Vitamin D      Magnesium      Vitamin B12      PTH, intact      CBC auto differential       There are no Patient Instructions on file for this visit.      Follow up for CLEAR FOR PARATHYROIDECTOMY SURGERY, return as needed for new concerns.

## 2019-05-08 NOTE — LETTER
May 8, 2019      Other  [address]  [place]  [place] [address]           [address]  [place] [address]  Phone: [phone]  Fax: [phone]          Patient: Carlene Fong   MR Number: [id number]   YOB: 1950   Date of Visit: 5/8/2019       Dear Other:    Thank you for referring Carlene Fong to me for evaluation. Attached you will find relevant portions of my assessment and plan of care.    If you have questions, please do not hesitate to call me. I look forward to following Carlene Fong along with you.    Sincerely,    Ashli Stubbs MD    Enclosure  CC:  No Recipients    If you would like to receive this communication electronically, please contact [email or web address] or [phone] to request more information on [employer] Link access.    For providers and/or their staff who would like to refer a patient to [facility], please contact us through our one-stop-shop provider referral line, [facility] , at [phone].    If you feel you have received this communication in error or would no longer like to receive these types of communications, please e-mail [email or web address]

## 2019-05-21 ENCOUNTER — ANESTHESIA EVENT (OUTPATIENT)
Dept: SURGERY | Facility: HOSPITAL | Age: 69
End: 2019-05-21
Payer: COMMERCIAL

## 2019-05-21 ENCOUNTER — TELEPHONE (OUTPATIENT)
Dept: SURGERY | Facility: CLINIC | Age: 69
End: 2019-05-21

## 2019-05-21 DIAGNOSIS — E21.3 HYPERPARATHYROIDISM: Primary | ICD-10-CM

## 2019-05-21 NOTE — TELEPHONE ENCOUNTER
Pre-op Call:  Spoke with pt and informed of surgery start time, arrival time and location. Reminded about NPO guidelines, washing with Hibiclens, transportation home, post-op labs, holding calcium, chewing tums to prevent tingling. All questions answered.

## 2019-05-21 NOTE — ANESTHESIA PREPROCEDURE EVALUATION
[facility]  Anesthesia Pre-Operative Evaluation         Patient Name: Carlene Fong  YOB: 1950  MRN: [id number]    SUBJECTIVE:     Pre-operative evaluation for Procedure(s) (LRB):  PARATHYROIDECTOMY (N/A)     05/21/2019    Carlene Fong is a 68 y.o. female w/ a significant PMHx of Menierre's disease, GERD, primary hyperparathyroidism who presents for parathyroidectomy. Patient reports history of vertigo and nausea related to Menierre's, plan for scopolamine patch preoperatively.     Patient now presents for the above procedure(s).    Prev airway: Easy mask ventilation. Grade I view on DL, ETT placed without complication.     Patient Active Problem List   Diagnosis    Primary hyperparathyroidism    Mass of breast-benign    Positive anti-CCP test    Rheumatoid factor positive    Impingement syndrome of left shoulder    Dry eyes    Arthritis of knee    Obesity (BMI 30.0-34.9)    Vitamin D deficiency    H/O hysterectomy for benign disease    Postmenopausal osteoporosis    History of appendectomy    Vertigo    Anxiety    Meniere's disease    Chest wall contusion, left, subsequent encounter    Gastroesophageal reflux disease    Hypercalcemia    Carpal tunnel syndrome of left wrist    Localized osteoporosis without current pathological fracture       Review of patient's allergies indicates:   Allergen Reactions    Requip [ropinirole] Swelling      palpitations, swelling of tongue       Current Inpatient Medications:      No current facility-administered medications on file prior to encounter.      Current Outpatient Medications on File Prior to Encounter   Medication Sig Dispense Refill    co-enzyme Q-10 30 mg capsule Take 30 mg by mouth once daily.       FLAXSEED ORAL Take by mouth once daily.       fluticasone (FLONASE) 50 mcg/actuation nasal spray 2 sprays (100 mcg total) by Each Nare route once daily. (Patient taking differently: 2 sprays by Each  Nare route nightly. ) 16 g 11    gabapentin (NEURONTIN) 300 MG capsule Take 1 capsule (300 mg total) by mouth 2 (two) times daily. (Patient taking differently: Take 300 mg by mouth every evening. ) 180 capsule 3    hydroCHLOROthiazide (HYDRODIURIL) 12.5 MG Tab Take 12.5 mg by mouth every other day.       vitamin D 1000 units Tab Take 185 mg by mouth once daily.      diclofenac sodium 1 % Gel Apply 2 g topically 3 (three) times daily. (Patient taking differently: Apply 2 g topically 3 (three) times daily as needed. ) 100 g 5    omeprazole (PRILOSEC) 20 MG capsule Take 1 capsule (20 mg total) by mouth once daily. 90 capsule 3    ondansetron (ZOFRAN) 8 MG tablet Take 1 tablet (8 mg total) by mouth every 8 (eight) hours as needed for Nausea. 15 tablet 1       Past Surgical History:   Procedure Laterality Date    APPENDECTOMY      HYSTERECTOMY      REPAIR ROTATOR CUFF ARTHROSCOPIC Right 12/5/2014    [facility]    ROTATOR CUFF REPAIR Right 12/2014    TUBAL LIGATION         Social History     Socioeconomic History    Marital status:      Spouse name: Not on file    Number of children: Not on file    Years of education: Not on file    Highest education level: Not on file   Occupational History    Not on file   Social Needs    Financial resource strain: Not hard at all    Food insecurity:     Worry: Never true     Inability: Never true    Transportation needs:     Medical: No     Non-medical: No   Tobacco Use    Smoking status: Never Smoker    Smokeless tobacco: Never Used   Substance and Sexual Activity    Alcohol use: No     Frequency: Never     Binge frequency: Never    Drug use: No    Sexual activity: Yes     Partners: Male     Comment: ; 2 children   Lifestyle    Physical activity:     Days per week: 5 days     Minutes per session: 20 min    Stress: To some extent   Relationships    Social connections:     Talks on phone: More than three  times a week     Gets together: Three times a week     Attends Judaism service: Not on file     Active member of club or organization: Yes     Attends meetings of clubs or organizations: More than 4 times per year     Relationship status:    Other Topics Concern    Not on file   Social History Narrative    Not on file       OBJECTIVE:     Vital Signs Range (Last 24H):         Significant Labs:  Lab Results   Component Value Date    WBC 7.93 05/08/2019    HGB 14.3 05/08/2019    HCT 43.3 05/08/2019     05/08/2019    CHOL 193 01/15/2019    TRIG 123 01/15/2019    HDL 49 01/15/2019    ALT 14 05/08/2019    AST 15 05/08/2019     05/08/2019    K 4.1 05/08/2019     05/08/2019    CREATININE 0.7 05/08/2019    BUN 16 05/08/2019    CO2 27 05/08/2019    TSH 1.461 01/15/2019    HGBA1C 4.9 01/15/2019     EKG: Normal sinus rhythm    2D ECHO:  No results found for this or any previous visit.      ASSESSMENT/PLAN:       Anesthesia Evaluation    I have reviewed the Patient Summary Reports.     I have reviewed the Medications.     Review of Systems  Anesthesia Hx:  No problems with previous Anesthesia Denies Hx of Anesthetic complications  History of prior surgery of interest to airway management or planning: Previous anesthesia: General, Nerve Block Personal Hx of Anesthesia complications, Post-Operative Nausea/Vomiting, in the past, but not with recent anesthetics / prophylaxis   Social:  Non-Smoker    Hematology/Oncology:  Hematology Normal   Oncology Normal     EENT/Dental:EENT/Dental Normal   Cardiovascular:  Cardiovascular Normal     Pulmonary:  Pulmonary Normal    Renal/:  Renal/ Normal     Hepatic/GI:   GERD, well controlled    Musculoskeletal:   Arthritis     Neurological:  Neurology Normal    Endocrine:   Hyperparathyroid        Physical Exam  General:  Well nourished    Airway/Jaw/Neck:  Airway Findings: Mouth Opening: Normal Tongue: Normal  General Airway Assessment: Adult  Mallampati: II   TM Distance: 4 - 6 cm  Jaw/Neck Findings:  Neck ROM: Normal ROM     Eyes/Ears/Nose:  EYES/EARS/NOSE FINDINGS: Normal   Dental:  Dental Findings: In tact   Chest/Lungs:  Chest/Lungs Findings: Clear to auscultation, Normal Respiratory Rate     Heart/Vascular:  Heart Findings: Rate: Normal  Rhythm: Regular Rhythm        Mental Status:  Mental Status Findings:  Cooperative, Alert and Oriented         Anesthesia Plan  Type of Anesthesia, risks & benefits discussed:  Anesthesia Type:  general  Patient's Preference:   Intra-op Monitoring Plan: arterial line and standard ASA monitors  Intra-op Monitoring Plan Comments:   Post Op Pain Control Plan: multimodal analgesia  Post Op Pain Control Plan Comments:   Induction:   IV  Beta Blocker:  Patient is not currently on a Beta-Blocker (No further documentation required).       Informed Consent: Patient understands risks and agrees with Anesthesia plan.  Questions answered. Anesthesia consent signed with patient.  ASA Score: 2     Day of Surgery Review of History & Physical:    H&P update referred to the surgeon.         Ready For Surgery From Anesthesia Perspective.

## 2019-05-22 ENCOUNTER — ANESTHESIA (OUTPATIENT)
Dept: SURGERY | Facility: HOSPITAL | Age: 69
End: 2019-05-22
Payer: COMMERCIAL

## 2019-05-22 ENCOUNTER — HOSPITAL ENCOUNTER (OUTPATIENT)
Facility: HOSPITAL | Age: 69
Discharge: HOME | End: 2019-05-22
Attending: SURGERY | Admitting: SURGERY
Payer: COMMERCIAL

## 2019-05-22 VITALS
HEART RATE: 82 BPM | TEMPERATURE: 98 F | RESPIRATION RATE: 16 BRPM | HEIGHT: 66 IN | WEIGHT: 191 LBS | SYSTOLIC BLOOD PRESSURE: 119 MMHG | OXYGEN SATURATION: 98 % | DIASTOLIC BLOOD PRESSURE: 70 MMHG | BODY MASS INDEX: 30.7 KG/M2

## 2019-05-22 DIAGNOSIS — E21.3 HYPERPARATHYROIDISM: ICD-10-CM

## 2019-05-22 DIAGNOSIS — E21.0 PRIMARY HYPERPARATHYROIDISM: ICD-10-CM

## 2019-05-22 LAB
PTH-INTACT SERPL-MCNC: 185 PG/ML (ref 9–77)
PTH-INTACT SERPL-MCNC: 39 PG/ML (ref 9–77)

## 2019-05-22 PROCEDURE — 88305 TISSUE EXAM BY PATHOLOGIST: CPT | Performed by: PATHOLOGY

## 2019-05-22 PROCEDURE — 88305 TISSUE EXAM BY PATHOLOGIST: CPT | Mod: 26,,, | Performed by: PATHOLOGY

## 2019-05-22 PROCEDURE — 25000003 PHARM REV CODE 250: Performed by: SURGERY

## 2019-05-22 PROCEDURE — 63600175 PHARM REV CODE 636 W HCPCS: Performed by: SURGERY

## 2019-05-22 PROCEDURE — 83970 ASSAY OF PARATHORMONE: CPT | Mod: 91

## 2019-05-22 PROCEDURE — D9220A PRA ANESTHESIA: Mod: ,,, | Performed by: ANESTHESIOLOGY

## 2019-05-22 PROCEDURE — 37000009 HC ANESTHESIA EA ADD 15 MINS: Performed by: SURGERY

## 2019-05-22 PROCEDURE — C9290 INJ, BUPIVACAINE LIPOSOME: HCPCS | Performed by: SURGERY

## 2019-05-22 PROCEDURE — 25000003 PHARM REV CODE 250: Performed by: STUDENT IN AN ORGANIZED HEALTH CARE EDUCATION/TRAINING PROGRAM

## 2019-05-22 PROCEDURE — 63600175 PHARM REV CODE 636 W HCPCS: Performed by: STUDENT IN AN ORGANIZED HEALTH CARE EDUCATION/TRAINING PROGRAM

## 2019-05-22 PROCEDURE — 37000008 HC ANESTHESIA 1ST 15 MINUTES: Performed by: SURGERY

## 2019-05-22 PROCEDURE — 36620 PR INSERT CATH,ART,PERCUT,SHORTTERM: ICD-10-PCS | Mod: 59,,, | Performed by: ANESTHESIOLOGY

## 2019-05-22 PROCEDURE — 94761 N-INVAS EAR/PLS OXIMETRY MLT: CPT

## 2019-05-22 PROCEDURE — 88331 PATH CONSLTJ SURG 1 BLK 1SPC: CPT | Mod: 26,,, | Performed by: PATHOLOGY

## 2019-05-22 PROCEDURE — 71000039 HC RECOVERY, EACH ADD'L HOUR: Performed by: SURGERY

## 2019-05-22 PROCEDURE — 27201423 OPTIME MED/SURG SUP & DEVICES STERILE SUPPLY: Performed by: SURGERY

## 2019-05-22 PROCEDURE — 36000707: Performed by: SURGERY

## 2019-05-22 PROCEDURE — 36000706: Performed by: SURGERY

## 2019-05-22 PROCEDURE — 88331 PATH CONSLTJ SURG 1 BLK 1SPC: CPT | Performed by: PATHOLOGY

## 2019-05-22 PROCEDURE — 71000033 HC RECOVERY, INTIAL HOUR: Performed by: SURGERY

## 2019-05-22 PROCEDURE — 60500 PR EXPLORE PARATHYROID GLANDS: ICD-10-PCS | Mod: ,,, | Performed by: SURGERY

## 2019-05-22 PROCEDURE — 88331 TISSUE SPECIMEN TO PATHOLOGY - SURGERY: ICD-10-PCS | Mod: 26,,, | Performed by: PATHOLOGY

## 2019-05-22 PROCEDURE — 60500 EXPLORE PARATHYROID GLANDS: CPT | Mod: ,,, | Performed by: SURGERY

## 2019-05-22 PROCEDURE — 88305 TISSUE SPECIMEN TO PATHOLOGY - SURGERY: ICD-10-PCS | Mod: 26,,, | Performed by: PATHOLOGY

## 2019-05-22 PROCEDURE — 27000221 HC OXYGEN, UP TO 24 HOURS

## 2019-05-22 PROCEDURE — 27201037 HC PRESSURE MONITORING SET UP

## 2019-05-22 PROCEDURE — D9220A PRA ANESTHESIA: ICD-10-PCS | Mod: ,,, | Performed by: ANESTHESIOLOGY

## 2019-05-22 PROCEDURE — 36620 INSERTION CATHETER ARTERY: CPT | Mod: 59,,, | Performed by: ANESTHESIOLOGY

## 2019-05-22 PROCEDURE — 71000015 HC POSTOP RECOV 1ST HR: Performed by: SURGERY

## 2019-05-22 RX ORDER — CALCIUM CARBONATE 500(1250)
2 TABLET,CHEWABLE ORAL 2 TIMES DAILY
Qty: 30 TABLET | Refills: 11 | COMMUNITY
Start: 2019-05-22 | End: 2019-05-22

## 2019-05-22 RX ORDER — KETAMINE HYDROCHLORIDE 10 MG/ML
INJECTION, SOLUTION INTRAMUSCULAR; INTRAVENOUS
Status: DISCONTINUED | OUTPATIENT
Start: 2019-05-22 | End: 2019-05-22

## 2019-05-22 RX ORDER — SODIUM CHLORIDE 0.9 % (FLUSH) 0.9 %
10 SYRINGE (ML) INJECTION
Status: DISCONTINUED | OUTPATIENT
Start: 2019-05-22 | End: 2019-05-22 | Stop reason: HOSPADM

## 2019-05-22 RX ORDER — OXYCODONE AND ACETAMINOPHEN 5; 325 MG/1; MG/1
1-2 TABLET ORAL EVERY 4 HOURS PRN
Qty: 11 TABLET | Refills: 0 | Status: SHIPPED | OUTPATIENT
Start: 2019-05-22 | End: 2019-05-22 | Stop reason: SDUPTHER

## 2019-05-22 RX ORDER — CEFAZOLIN SODIUM 1 G/3ML
2 INJECTION, POWDER, FOR SOLUTION INTRAMUSCULAR; INTRAVENOUS
Status: COMPLETED | OUTPATIENT
Start: 2019-05-22 | End: 2019-05-22

## 2019-05-22 RX ORDER — ACETAMINOPHEN 10 MG/ML
INJECTION, SOLUTION INTRAVENOUS
Status: DISCONTINUED | OUTPATIENT
Start: 2019-05-22 | End: 2019-05-22

## 2019-05-22 RX ORDER — SUCCINYLCHOLINE CHLORIDE 20 MG/ML
INJECTION INTRAMUSCULAR; INTRAVENOUS
Status: DISCONTINUED | OUTPATIENT
Start: 2019-05-22 | End: 2019-05-22

## 2019-05-22 RX ORDER — LIDOCAINE HCL/PF 100 MG/5ML
SYRINGE (ML) INTRAVENOUS
Status: DISCONTINUED | OUTPATIENT
Start: 2019-05-22 | End: 2019-05-22

## 2019-05-22 RX ORDER — OXYCODONE AND ACETAMINOPHEN 5; 325 MG/1; MG/1
1 TABLET ORAL EVERY 4 HOURS PRN
Status: DISCONTINUED | OUTPATIENT
Start: 2019-05-22 | End: 2019-05-22 | Stop reason: HOSPADM

## 2019-05-22 RX ORDER — OXYCODONE AND ACETAMINOPHEN 5; 325 MG/1; MG/1
1-2 TABLET ORAL EVERY 4 HOURS PRN
Qty: 10 TABLET | Refills: 0 | Status: SHIPPED | OUTPATIENT
Start: 2019-05-22 | End: 2019-05-22

## 2019-05-22 RX ORDER — HYDROMORPHONE HYDROCHLORIDE 1 MG/ML
0.2 INJECTION, SOLUTION INTRAMUSCULAR; INTRAVENOUS; SUBCUTANEOUS EVERY 5 MIN PRN
Status: DISCONTINUED | OUTPATIENT
Start: 2019-05-22 | End: 2019-05-22 | Stop reason: HOSPADM

## 2019-05-22 RX ORDER — DEXAMETHASONE SODIUM PHOSPHATE 4 MG/ML
INJECTION, SOLUTION INTRA-ARTICULAR; INTRALESIONAL; INTRAMUSCULAR; INTRAVENOUS; SOFT TISSUE
Status: DISCONTINUED | OUTPATIENT
Start: 2019-05-22 | End: 2019-05-22

## 2019-05-22 RX ORDER — OXYCODONE AND ACETAMINOPHEN 5; 325 MG/1; MG/1
1-2 TABLET ORAL EVERY 4 HOURS PRN
Qty: 11 TABLET | Refills: 0 | Status: SHIPPED | OUTPATIENT
Start: 2019-05-22 | End: 2019-06-19

## 2019-05-22 RX ORDER — DOCUSATE SODIUM 100 MG/1
100 CAPSULE, LIQUID FILLED ORAL 2 TIMES DAILY
Refills: 0 | COMMUNITY
Start: 2019-05-22 | End: 2020-02-07

## 2019-05-22 RX ORDER — ONDANSETRON 2 MG/ML
4 INJECTION INTRAMUSCULAR; INTRAVENOUS ONCE AS NEEDED
Status: DISCONTINUED | OUTPATIENT
Start: 2019-05-22 | End: 2019-05-22 | Stop reason: HOSPADM

## 2019-05-22 RX ORDER — ROCURONIUM BROMIDE 10 MG/ML
INJECTION, SOLUTION INTRAVENOUS
Status: DISCONTINUED | OUTPATIENT
Start: 2019-05-22 | End: 2019-05-22

## 2019-05-22 RX ORDER — EPHEDRINE SULFATE 50 MG/ML
INJECTION, SOLUTION INTRAVENOUS
Status: DISCONTINUED | OUTPATIENT
Start: 2019-05-22 | End: 2019-05-22

## 2019-05-22 RX ORDER — ONDANSETRON 2 MG/ML
INJECTION INTRAMUSCULAR; INTRAVENOUS
Status: DISCONTINUED | OUTPATIENT
Start: 2019-05-22 | End: 2019-05-22

## 2019-05-22 RX ORDER — SCOLOPAMINE TRANSDERMAL SYSTEM 1 MG/1
1 PATCH, EXTENDED RELEASE TRANSDERMAL
Status: DISCONTINUED | OUTPATIENT
Start: 2019-05-22 | End: 2019-05-22 | Stop reason: HOSPADM

## 2019-05-22 RX ORDER — MIDAZOLAM HYDROCHLORIDE 1 MG/ML
INJECTION, SOLUTION INTRAMUSCULAR; INTRAVENOUS
Status: DISCONTINUED | OUTPATIENT
Start: 2019-05-22 | End: 2019-05-22

## 2019-05-22 RX ORDER — PHENYLEPHRINE HYDROCHLORIDE 10 MG/ML
INJECTION INTRAVENOUS
Status: DISCONTINUED | OUTPATIENT
Start: 2019-05-22 | End: 2019-05-22

## 2019-05-22 RX ORDER — FENTANYL CITRATE 50 UG/ML
25 INJECTION, SOLUTION INTRAMUSCULAR; INTRAVENOUS EVERY 5 MIN PRN
Status: DISCONTINUED | OUTPATIENT
Start: 2019-05-22 | End: 2019-05-22 | Stop reason: HOSPADM

## 2019-05-22 RX ORDER — SODIUM CHLORIDE 9 MG/ML
INJECTION, SOLUTION INTRAVENOUS CONTINUOUS
Status: DISCONTINUED | OUTPATIENT
Start: 2019-05-22 | End: 2019-05-22 | Stop reason: HOSPADM

## 2019-05-22 RX ORDER — CALCIUM CARBONATE 500(1250)
2 TABLET,CHEWABLE ORAL 3 TIMES DAILY
Qty: 30 TABLET | Refills: 11 | Status: ON HOLD | COMMUNITY
Start: 2019-05-22 | End: 2019-05-27 | Stop reason: HOSPADM

## 2019-05-22 RX ORDER — PROPOFOL 10 MG/ML
VIAL (ML) INTRAVENOUS
Status: DISCONTINUED | OUTPATIENT
Start: 2019-05-22 | End: 2019-05-22

## 2019-05-22 RX ORDER — OXYCODONE AND ACETAMINOPHEN 5; 325 MG/1; MG/1
1-2 TABLET ORAL EVERY 4 HOURS PRN
Qty: 12 TABLET | Refills: 0 | OUTPATIENT
Start: 2019-05-22

## 2019-05-22 RX ORDER — CALCIUM CARBONATE 500(1250)
2 TABLET,CHEWABLE ORAL 3 TIMES DAILY
Qty: 30 TABLET | Refills: 11 | COMMUNITY
Start: 2019-05-22 | End: 2019-06-05

## 2019-05-22 RX ORDER — LIDOCAINE HYDROCHLORIDE 10 MG/ML
1 INJECTION, SOLUTION EPIDURAL; INFILTRATION; INTRACAUDAL; PERINEURAL ONCE
Status: COMPLETED | OUTPATIENT
Start: 2019-05-22 | End: 2019-05-22

## 2019-05-22 RX ORDER — FENTANYL CITRATE 50 UG/ML
INJECTION, SOLUTION INTRAMUSCULAR; INTRAVENOUS
Status: DISCONTINUED | OUTPATIENT
Start: 2019-05-22 | End: 2019-05-22

## 2019-05-22 RX ADMIN — DEXAMETHASONE SODIUM PHOSPHATE 4 MG: 4 INJECTION, SOLUTION INTRAMUSCULAR; INTRAVENOUS at 08:05

## 2019-05-22 RX ADMIN — KETAMINE HYDROCHLORIDE 20 MG: 10 INJECTION, SOLUTION INTRAMUSCULAR; INTRAVENOUS at 08:05

## 2019-05-22 RX ADMIN — SODIUM CHLORIDE: 0.9 INJECTION, SOLUTION INTRAVENOUS at 06:05

## 2019-05-22 RX ADMIN — ONDANSETRON 4 MG: 2 INJECTION INTRAMUSCULAR; INTRAVENOUS at 10:05

## 2019-05-22 RX ADMIN — MIDAZOLAM HYDROCHLORIDE 2 MG: 1 INJECTION, SOLUTION INTRAMUSCULAR; INTRAVENOUS at 08:05

## 2019-05-22 RX ADMIN — REMIFENTANIL HYDROCHLORIDE 0.05 MCG/KG/MIN: 1 INJECTION, POWDER, LYOPHILIZED, FOR SOLUTION INTRAVENOUS at 08:05

## 2019-05-22 RX ADMIN — LIDOCAINE HYDROCHLORIDE 75 MG: 20 INJECTION, SOLUTION INTRAVENOUS at 08:05

## 2019-05-22 RX ADMIN — SODIUM CHLORIDE, SODIUM GLUCONATE, SODIUM ACETATE, POTASSIUM CHLORIDE, MAGNESIUM CHLORIDE, SODIUM PHOSPHATE, DIBASIC, AND POTASSIUM PHOSPHATE: .53; .5; .37; .037; .03; .012; .00082 INJECTION, SOLUTION INTRAVENOUS at 10:05

## 2019-05-22 RX ADMIN — PROPOFOL 200 MG: 10 INJECTION, EMULSION INTRAVENOUS at 08:05

## 2019-05-22 RX ADMIN — EPHEDRINE SULFATE 10 MG: 50 INJECTION, SOLUTION INTRAMUSCULAR; INTRAVENOUS; SUBCUTANEOUS at 10:05

## 2019-05-22 RX ADMIN — PHENYLEPHRINE HYDROCHLORIDE 150 MCG: 10 INJECTION INTRAVENOUS at 09:05

## 2019-05-22 RX ADMIN — ACETAMINOPHEN 1000 MG: 10 INJECTION, SOLUTION INTRAVENOUS at 08:05

## 2019-05-22 RX ADMIN — CEFAZOLIN 2 G: 330 INJECTION, POWDER, FOR SOLUTION INTRAMUSCULAR; INTRAVENOUS at 08:05

## 2019-05-22 RX ADMIN — OXYCODONE HYDROCHLORIDE AND ACETAMINOPHEN 1 TABLET: 5; 325 TABLET ORAL at 11:05

## 2019-05-22 RX ADMIN — PHENYLEPHRINE HYDROCHLORIDE 100 MCG: 10 INJECTION INTRAVENOUS at 10:05

## 2019-05-22 RX ADMIN — SUCCINYLCHOLINE CHLORIDE 200 MG: 20 INJECTION, SOLUTION INTRAMUSCULAR; INTRAVENOUS at 08:05

## 2019-05-22 RX ADMIN — FENTANYL CITRATE 50 MCG: 50 INJECTION, SOLUTION INTRAMUSCULAR; INTRAVENOUS at 08:05

## 2019-05-22 RX ADMIN — ROCURONIUM BROMIDE 5 MG: 10 INJECTION, SOLUTION INTRAVENOUS at 08:05

## 2019-05-22 RX ADMIN — PHENYLEPHRINE HYDROCHLORIDE 100 MCG: 10 INJECTION INTRAVENOUS at 09:05

## 2019-05-22 RX ADMIN — PROPOFOL 30 MG: 10 INJECTION, EMULSION INTRAVENOUS at 10:05

## 2019-05-22 RX ADMIN — LIDOCAINE HYDROCHLORIDE 0.2 MG: 10 INJECTION, SOLUTION EPIDURAL; INFILTRATION; INTRACAUDAL; PERINEURAL at 06:05

## 2019-05-22 RX ADMIN — SCOPALAMINE 1 PATCH: 1 PATCH, EXTENDED RELEASE TRANSDERMAL at 07:05

## 2019-05-22 RX ADMIN — SODIUM CHLORIDE: 0.9 INJECTION, SOLUTION INTRAVENOUS at 08:05

## 2019-05-22 RX ADMIN — KETAMINE HYDROCHLORIDE 20 MG: 10 INJECTION, SOLUTION INTRAMUSCULAR; INTRAVENOUS at 10:05

## 2019-05-22 RX ADMIN — PHENYLEPHRINE HYDROCHLORIDE 50 MCG: 10 INJECTION INTRAVENOUS at 09:05

## 2019-05-22 NOTE — TRANSFER OF CARE
"Anesthesia Transfer of Care Note    Patient: Carlene Fong    Procedure(s) Performed: Procedure(s) (LRB):  PARATHYROIDECTOMY (N/A)    Patient location: PACU    Anesthesia Type: general    Transport from OR: Transported from OR on 6-10 L/min O2 by face mask with adequate spontaneous ventilation    Post pain: adequate analgesia    Post assessment: no apparent anesthetic complications and tolerated procedure well    Post vital signs: stable    Level of consciousness: awake, alert and oriented    Nausea/Vomiting: no nausea/vomiting    Complications: none    Transfer of care protocol was followed      Last vitals:   Visit Vitals  /67   Pulse 103   Temp 36.7 °C (98.1 °F) (Temporal)   Resp 14   Ht 5' 6" (1.676 m)   Wt 86.6 kg (191 lb)   SpO2 100%   Breastfeeding? No   BMI 30.83 kg/m²     "

## 2019-05-22 NOTE — BRIEF OP NOTE
[facility]  Brief Operative Note    SUMMARY     Surgery Date: 5/22/2019     Surgeon(s) and Role:     * Andie Mcfarlane MD - Primary     * Taj Pitts MD - Resident - Assisting        Pre-op Diagnosis:  Primary hyperparathyroidism [E21.0]    Post-op Diagnosis:  Post-Op Diagnosis Codes:     * Primary hyperparathyroidism [E21.0]    Procedure(s) (LRB):  PARATHYROIDECTOMY (N/A)    Anesthesia: General    Description of Procedure: Subtotal parathyroidectomy.  All four glands hypercellular.  Superior glands grossly abnormal.  Removed right inferior and superior, and left superior.  Left inferior parathyroid gland remains.  Tagged with prolene and clips    Description of the findings of the procedure: As above.  10 min PTH 39    Estimated Blood Loss: minimal         Specimens:   Specimen (12h ago, onward)    Start     Ordered    05/22/19 1018  Specimen to Pathology - Surgery  Once     Comments:  1. Question right superior parathyroid tissue - frozen2. Question right inferior parathyroid tissue - frozen3. Question left superior parathyroid tissue - frozen4. Question left inferior parathyroid tissue - frozen5. Question left inferior parathyroid tissue #2 - frozen6. Right superior parathyroid - perm7. Right inferior parathyroid - perm8. Left superior parathyroid - perm     Start Status     05/22/19 1018 Collected (05/22/19 1018) Order ID: [id number]       05/22/19 1018

## 2019-05-22 NOTE — PLAN OF CARE
Discharge instructions reviewed w/pt and , verbalized understanding. Pt in NADN.No complaints at this time. Tolerated liquids w/ no issues. Pt is still having nose and chin tingiling that they discussed w/ dr Abdalla. Pt and spouse ok to go home.

## 2019-05-22 NOTE — DISCHARGE INSTRUCTIONS
Recovering after Endocrine Surgery    Type of Procedure: Parathyroidectomy    Postoperative clinic appointment date: 1-2 weeks after surgery    Activity:  You may resume normal daily activities upon discharge.  This includes walking and climbing stairs.  Avoid heavy lifting and vigorous exercise for approximately 2 weeks.    Showering:  You may resume normal showering and bathing approximately 2 days after surgery.  Your incision does not require special care and it may get wet.    Incision:  Your neck incision is closed with absorbable sutures under the skin.  You will not need to have any stitches removed.      Pain:  After surgery you may experience pain at the incision, generalized neck pain, or a sore throat.  You will be given a prescription for pain relief.  Most patients will still have discomfort 2-3 days after surgery.  Many times, over the counter pain medications, such as Extra Strength Tylenol, are effective.    Driving:  Use your judgment in resuming to drive.  Avoid driving if you are still experiencing neck pain and are using prescription pain medications.      Return to Work:  Recovery after surgery depends on the individual.  Most patients can expect to return to work approximately 1-2 weeks after surgery.    Diet:  You may resume your normal diet after surgery without any restrictions.    Constipation:  Anesthesia and pain medication can cause a decrease in bowel motility.  If you experience constipation postoperatively, you may take over the counter laxatives such as Milk of Magnesia.    Calcium Supplements:  Calcium tablets may be recommended after your surgery.  Most often this is to promote bone health and prevent low blood calcium during recovery. Numbness and tingling in your fingertips or around your mouth may be experienced when calcium levels are low. You may chew up several extra tablets to relieve symptoms from low calcium. If tingling or numbness occurs, take up to 4 extra TUMS with a  glass of milk. If the symptoms do not subside within 30 minutes, please call your surgeon's office.       Medications:  You may resume taking medication as instructed by your MD at discharge from the hospital.     Questions/Concerns:  If you have any questions or concerns please call your doctor's office: Andie Mcfarlane MD,  [phone] or after hours call [phone] and ask for the General Surgery Resident on call.

## 2019-05-22 NOTE — OR NURSING
0915 Patient family updated via text message.                     Specimen to path    1. Question right superior parathyroid tissue - frozen  2. Question right inferior parathyroid tissue - frozen  3. Question left superior parathyroid tissue - frozen  4. Question left inferior parathyroid tissue - frozen  5. Question left inferior parathyroid tissue #2 - frozen  6. Right superior parathyroid - perm  7. Right inferior parathyroid - perm  8. Left superior parathyroid - perm

## 2019-05-23 ENCOUNTER — TELEPHONE (OUTPATIENT)
Dept: SURGERY | Facility: CLINIC | Age: 69
End: 2019-05-23

## 2019-05-23 NOTE — TELEPHONE ENCOUNTER
Called to ensure patient was able to  pain RX.  She got it last night.  Mild discomfort in chest noted.  Discussed likely related to positioning. Also discussed non-narcotic pain alternatives(ok for Tylenol and Ibuprofen).  All questions answered.

## 2019-05-24 ENCOUNTER — TELEPHONE (OUTPATIENT)
Dept: SURGERY | Facility: CLINIC | Age: 69
End: 2019-05-24

## 2019-05-24 ENCOUNTER — PATIENT MESSAGE (OUTPATIENT)
Dept: SURGERY | Facility: CLINIC | Age: 69
End: 2019-05-24

## 2019-05-24 NOTE — DISCHARGE SUMMARY
[facility]  General Surgery  Discharge Summary      Patient Name: Carlene Fong  MRN: [id number]  Admission Date: 5/22/2019  Hospital Length of Stay: 0 days  Discharge Date and Time: 5/22/2019  2:01 PM  Attending Physician: BONY Abdalla MD  Discharging Provider: Taj Pitts MD  Primary Care Provider: Ashli Stubbs MD     HPI: Pt presented for elective parathyroidectomy.  Did well post op.  DC home in stable condition    Procedure(s) (LRB):  PARATHYROIDECTOMY (N/A)     Hospital Course: As above    Consults:     Significant Diagnostic Studies: n/a    Pending Diagnostic Studies:     None        Final Active Diagnoses:    Diagnosis Date Noted POA    PRINCIPAL PROBLEM:  Hyperparathyroidism [E21.3] 05/22/2019 Yes      Problems Resolved During this Admission:      Discharged Condition: good    Disposition: Home or Self Care    Follow Up:  Follow-up Information     Andie Mcfarlane MD In 2 days.    Specialty:  General Surgery  Contact information:  [facility] [address] [id number] [phone]                 Patient Instructions:      Notify your health care provider if you experience any of the following:  increased confusion or weakness     Notify your health care provider if you experience any of the following:  persistent dizziness, light-headedness, or visual disturbances     Notify your health care provider if you experience any of the following:  worsening rash     Notify your health care provider if you experience any of the following:  severe persistent headache     Notify your health care provider if you experience any of the following:  difficulty breathing or increased cough     Notify your health care provider if you experience any of the following:  redness, tenderness, or signs of infection (pain, swelling, redness, odor or green/yellow discharge around incision site)     Notify your health care provider if you experience any of the following:  severe  uncontrolled pain     Notify your health care provider if you experience any of the following:  persistent nausea and vomiting or diarrhea     Notify your health care provider if you experience any of the following:  temperature >100.4     No dressing needed   Order Comments: Ok to shower gently over incision tomorrow and pat dry.  No swimming or soaking.  Keep area out of the sun.  Take 1000 mg of Tums BID.  Recommend trying Ibuprofen (aka Advil/motrin) per bottle recommendations, scheduled, to help decrease inflammatory pain for the next few days.  Use percocet or tylenol to supplement.  Do not take tylenol and percocet together.     Activity as tolerated     Medications:  Reconciled Home Medications:      Medication List      START taking these medications    docusate sodium 100 MG capsule  Commonly known as:  COLACE  Take 1 capsule (100 mg total) by mouth 2 (two) times daily.        CHANGE how you take these medications    diclofenac sodium 1 % Gel  Commonly known as:  VOLTAREN  Apply 2 g topically 3 (three) times daily.  What changed:    · when to take this  · reasons to take this     fluticasone propionate 50 mcg/actuation nasal spray  Commonly known as:  FLONASE  2 sprays (100 mcg total) by Each Nare route once daily.  What changed:  when to take this     gabapentin 300 MG capsule  Commonly known as:  NEURONTIN  Take 1 capsule (300 mg total) by mouth 2 (two) times daily.  What changed:  when to take this        CONTINUE taking these medications    co-enzyme Q-10 30 mg capsule  Take 30 mg by mouth once daily.     FLAXSEED ORAL  Take by mouth once daily.     hydroCHLOROthiazide 12.5 MG Tab  Commonly known as:  HYDRODIURIL  Take 12.5 mg by mouth every other day.     omeprazole 20 MG capsule  Commonly known as:  PRILOSEC  Take 1 capsule (20 mg total) by mouth once daily.     ondansetron 8 MG tablet  Commonly known as:  ZOFRAN  Take 1 tablet (8 mg total) by mouth every 8 (eight) hours as needed for Nausea.      vitamin D 1000 units Tab  Commonly known as:  VITAMIN D3  Take 185 mg by mouth once daily.            Taj Pitts MD  General Surgery  [facility]

## 2019-05-24 NOTE — TELEPHONE ENCOUNTER
Post-op Call:  Spoke with pt. She's doing well. No pian. Voice isn't back to baseline yet but informed that it will return over a course of time. Throat a little scratchy & irritated. She was informed that it will get better over time. She is aware of post-op appt, location and labs. She was reminded to not take the calcium before the lab work done.

## 2019-05-24 NOTE — OP NOTE
Operative Report  Endocrine Surgery    Date: 5/22/2019    Indications: This patient presents with biochemical evidence of primary hyperparathyroidism. Preoperative sestamibi imaging did not reveal increased uptake of radionuclide. Patient also underwent Parathyroid protocol CT which did 3 possible abnormal parathyroid glands(2 on right and one on left). An arterial line was placed.     Pre-Operative Diagnosis: primary hyperparathyroidism    Post-Operative Diagnosis: primary hyperparathyroidism    Procedure: Parathyroidectomy with IOPTH monitoring    Surgeon: Andie Mcfarlane M.D.    Assistants: RUCHI Pitts M.D.    Anesthesia: General     ASA Class: 2    Findings:   1.  Enlarged parathyroid glands bilaterally noted.  Right superior and left superior(largest) grossly enlarged.  Right inferior and left inferior minimally enlarged with the left inferior found within the thymus and appeared most normal clinically.  2.  Right superior, right inferior, and left superior parathyroid glands removed following confirmation of abnormal parathyroid tissue.  3. IOPTH levels as follows:   Baseline - 185   10 minute post subtotal excision - 39      Estimated Blood Loss (EBL): Minimal    Drains: None    Total IV Fluids: see anesthesia record     Specimens:  1. Question right superior parathyroid tissue - FS confirmed hypercellular parathyroid tisse  2. Question right inferior parathyroid tissue - FS confirmed hypercellular parathyroid tisse  3. Question left superior parathyroid tissue - FS confirmed hypercellular parathyroid tisse  4. Question left inferior parathyroid tissue - FS shows possible thymic tissue  5. Question left inferior parathyroid tissue #2 - FS confirmed hypercellular parathyroid tisse  6. Right superior parathyroid - perm  7. Right inferior parathyroid - perm  8. Left superior parathyroid - perm      Procedure in Detail:    The patient was seen in the Holding Room. The risks, benefits, complications, treatment  options, and expected outcomes were discussed with the patient. The possibilities of reaction to medication, pulmonary aspiration, bleeding, recurrent infection, possibility of normal findings, recurrent laryngeal nerve damage, the need for additional procedures, failure to diagnose a condition, and creating a complication requiring transfusion or operation were discussed with the patient. The patient concurred with the proposed plan, giving informed consent.  The site of surgery properly noted/marked. The patient was taken to Operating Room, identified as Carlene Fong and the procedure verified as Parathyroidectomy. A Time Out was held and the above information confirmed.    The patient was brought to the operating room and placed supine.  After induction of a general anesthetic, the neck was placed in extension and a pressure bag shoulder roll was placed between the scapulae. The neck was prepped and draped in standard fashion.  A 3.5 cm transverse cervical incision was created within a natural skin fold.  Dissection was carried through the platysma and flaps were raised both superior and inferiorly. The strap muscles were identified and divided in the midline.  Retractors were placed to expose the right side of the neck.  Using sharp dissection the thyroid lobe was mobilized in a medial direction, exposing the tracheoesophageal groove.  None of the blood supply to the thyroid gland was divided during this dissection.  Further dissection posterior revealed a superior parathyroid gland along the mid-aspect of the thyroid. This was grossly enlarged.  This was mobilized with sharp dissection.  The was biopsied with frozen section, revealing mild hypercellularity.     The exploration continued with exposure of the inferior gland seen as along the mid-aspect of the thyroid.  The right superior and right inferior parathyroid glands were intimately associated(kissing parathyroid glands).  This was biopsied in a  similar fashion.  Frozen section revealed similar histologic findings.  Attention was then directed to the contralateral neck.  The thyroid lobe was mobilized in a similar fashion.  The left superior and inferior parathyroid glands were identified in the posterior along the esophagus and within the thymus respectfully. The left superior parathyroid gland was grossly enlarged though inferior gland was only minimally enlarged(most normal appearing of the 4 glands). These were biopsied and submitted to pathology.  Initially biopsy if the inferior gland revealed thymic tissue.  A larger portion was sent to pathology and confirmed parathyroid tissue.    Based on these findings the decision was made to remove the right superior, right inferior and left superior parathyroid glands were removed.  The left inferior parathyroid gland was marked with a 4-0 Prolene suture and two clips for  identification at a later date if needed.   Both recurrent laryngeal nerves were identified and preserved during the dissection.    Parathyroid assay monitoring was performed intraoperatively revealing significant decrease compared to baseline levels. They decreased from 185(baseline) to 39(10minute post-excision) consistent with biochemical cure and a decision was made to terminate the exploration.    Hemostasis was achieved in the neck with bipolar cautery. Fibrillar was placed into the field of dissection to aid this process. 20 mL of Exparel mixed with Marcaine was placed into the strap muscles and subcutaneous tissues for post-op analgesia. Closure was performed by reapproximating the strap muscles in the midline with an interrupted 3-0 Vicryl suture.  The platysma was reapproximated with 3-0 Vicryl suture and the skin incision was closed with a 5-0 Monocryl knot-less, subcuticular closure. Sterile dressings were placed.      Instrument, sponge, and needle counts were correct prior to closure and at the conclusion of the case.      Implants: None    Complications: None; patient tolerated the procedure well.    Condition: stable    Disposition: PACU - hemodynamically stable.    Attending Attestation: I performed the procedure.

## 2019-05-26 ENCOUNTER — HOSPITAL ENCOUNTER (OUTPATIENT)
Facility: HOSPITAL | Age: 69
Discharge: HOME | End: 2019-05-27
Attending: EMERGENCY MEDICINE | Admitting: FAMILY MEDICINE
Payer: COMMERCIAL

## 2019-05-26 DIAGNOSIS — E83.51 HYPOCALCEMIA: ICD-10-CM

## 2019-05-26 DIAGNOSIS — R79.89 LOW SERUM THYROID STIMULATING HORMONE (TSH): Primary | ICD-10-CM

## 2019-05-26 DIAGNOSIS — R20.2 TINGLING: ICD-10-CM

## 2019-05-26 PROBLEM — E87.6 HYPOKALEMIA: Status: ACTIVE | Noted: 2019-05-26

## 2019-05-26 PROBLEM — E05.90 HYPERTHYROIDISM: Status: ACTIVE | Noted: 2019-05-26

## 2019-05-26 LAB
ALBUMIN SERPL BCP-MCNC: 3.6 G/DL (ref 3.5–5.2)
ALP SERPL-CCNC: 101 U/L (ref 55–135)
ALT SERPL W/O P-5'-P-CCNC: 13 U/L (ref 10–44)
ANION GAP SERPL CALC-SCNC: 13 MMOL/L (ref 8–16)
ANION GAP SERPL CALC-SCNC: 8 MMOL/L (ref 8–16)
AST SERPL-CCNC: 11 U/L (ref 10–40)
BASOPHILS # BLD AUTO: 0.02 K/UL (ref 0–0.2)
BASOPHILS NFR BLD: 0.3 % (ref 0–1.9)
BILIRUB SERPL-MCNC: 0.4 MG/DL (ref 0.1–1)
BILIRUB UR QL STRIP: NEGATIVE
BUN SERPL-MCNC: 11 MG/DL (ref 8–23)
BUN SERPL-MCNC: 12 MG/DL (ref 8–23)
CALCIUM SERPL-MCNC: 10.2 MG/DL (ref 8.7–10.5)
CALCIUM SERPL-MCNC: 8.4 MG/DL (ref 8.7–10.5)
CHLORIDE SERPL-SCNC: 104 MMOL/L (ref 95–110)
CHLORIDE SERPL-SCNC: 112 MMOL/L (ref 95–110)
CK SERPL-CCNC: 43 U/L (ref 20–180)
CLARITY UR: CLEAR
CO2 SERPL-SCNC: 26 MMOL/L (ref 23–29)
CO2 SERPL-SCNC: 26 MMOL/L (ref 23–29)
COLOR UR: YELLOW
CREAT SERPL-MCNC: 0.6 MG/DL (ref 0.5–1.4)
CREAT SERPL-MCNC: 0.7 MG/DL (ref 0.5–1.4)
DIFFERENTIAL METHOD: ABNORMAL
EOSINOPHIL # BLD AUTO: 0.1 K/UL (ref 0–0.5)
EOSINOPHIL NFR BLD: 1.4 % (ref 0–8)
ERYTHROCYTE [DISTWIDTH] IN BLOOD BY AUTOMATED COUNT: 12.9 % (ref 11.5–14.5)
EST. GFR  (AFRICAN AMERICAN): >60 ML/MIN/1.73 M^2
EST. GFR  (AFRICAN AMERICAN): >60 ML/MIN/1.73 M^2
EST. GFR  (NON AFRICAN AMERICAN): >60 ML/MIN/1.73 M^2
EST. GFR  (NON AFRICAN AMERICAN): >60 ML/MIN/1.73 M^2
ESTIMATED AVG GLUCOSE: 97 MG/DL (ref 68–131)
GLUCOSE SERPL-MCNC: 118 MG/DL (ref 70–110)
GLUCOSE SERPL-MCNC: 148 MG/DL (ref 70–110)
GLUCOSE UR QL STRIP: NEGATIVE
HBA1C MFR BLD HPLC: 5 % (ref 4–5.6)
HCT VFR BLD AUTO: 41.4 % (ref 37–48.5)
HGB BLD-MCNC: 13.8 G/DL (ref 12–16)
HGB UR QL STRIP: NEGATIVE
KETONES UR QL STRIP: NEGATIVE
LEUKOCYTE ESTERASE UR QL STRIP: NEGATIVE
LYMPHOCYTES # BLD AUTO: 1.5 K/UL (ref 1–4.8)
LYMPHOCYTES NFR BLD: 21.1 % (ref 18–48)
MAGNESIUM SERPL-MCNC: 1.8 MG/DL (ref 1.6–2.6)
MCH RBC QN AUTO: 31.1 PG (ref 27–31)
MCHC RBC AUTO-ENTMCNC: 33.3 G/DL (ref 32–36)
MCV RBC AUTO: 93 FL (ref 82–98)
MONOCYTES # BLD AUTO: 0.6 K/UL (ref 0.3–1)
MONOCYTES NFR BLD: 7.7 % (ref 4–15)
NEUTROPHILS # BLD AUTO: 5 K/UL (ref 1.8–7.7)
NEUTROPHILS NFR BLD: 69.4 % (ref 38–73)
NITRITE UR QL STRIP: NEGATIVE
PH UR STRIP: 8 [PH] (ref 5–8)
PHOSPHATE SERPL-MCNC: 3.1 MG/DL (ref 2.7–4.5)
PLATELET # BLD AUTO: 291 K/UL (ref 150–350)
PMV BLD AUTO: 9.1 FL (ref 9.2–12.9)
POTASSIUM SERPL-SCNC: 2.8 MMOL/L (ref 3.5–5.1)
POTASSIUM SERPL-SCNC: 3.7 MMOL/L (ref 3.5–5.1)
PROCALCITONIN SERPL IA-MCNC: <0.02 NG/ML
PROT SERPL-MCNC: 6.8 G/DL (ref 6–8.4)
PROT UR QL STRIP: NEGATIVE
RBC # BLD AUTO: 4.44 M/UL (ref 4–5.4)
SODIUM SERPL-SCNC: 143 MMOL/L (ref 136–145)
SODIUM SERPL-SCNC: 146 MMOL/L (ref 136–145)
SP GR UR STRIP: <=1.005 (ref 1–1.03)
T4 FREE SERPL-MCNC: 1.87 NG/DL (ref 0.71–1.51)
TSH SERPL DL<=0.005 MIU/L-ACNC: 0.08 UIU/ML (ref 0.4–4)
URN SPEC COLLECT METH UR: ABNORMAL
UROBILINOGEN UR STRIP-ACNC: NEGATIVE EU/DL
WBC # BLD AUTO: 7.17 K/UL (ref 3.9–12.7)

## 2019-05-26 PROCEDURE — 63600175 PHARM REV CODE 636 W HCPCS: Performed by: EMERGENCY MEDICINE

## 2019-05-26 PROCEDURE — 93005 ELECTROCARDIOGRAM TRACING: CPT

## 2019-05-26 PROCEDURE — 83735 ASSAY OF MAGNESIUM: CPT

## 2019-05-26 PROCEDURE — G0378 HOSPITAL OBSERVATION PER HR: HCPCS

## 2019-05-26 PROCEDURE — 36415 COLL VENOUS BLD VENIPUNCTURE: CPT

## 2019-05-26 PROCEDURE — 96375 TX/PRO/DX INJ NEW DRUG ADDON: CPT

## 2019-05-26 PROCEDURE — 93010 EKG 12-LEAD: ICD-10-PCS | Mod: ,,, | Performed by: STUDENT IN AN ORGANIZED HEALTH CARE EDUCATION/TRAINING PROGRAM

## 2019-05-26 PROCEDURE — 84439 ASSAY OF FREE THYROXINE: CPT

## 2019-05-26 PROCEDURE — 84443 ASSAY THYROID STIM HORMONE: CPT

## 2019-05-26 PROCEDURE — 96376 TX/PRO/DX INJ SAME DRUG ADON: CPT

## 2019-05-26 PROCEDURE — 85025 COMPLETE CBC W/AUTO DIFF WBC: CPT

## 2019-05-26 PROCEDURE — 93010 EKG 12-LEAD: ICD-10-PCS | Mod: 76,,, | Performed by: STUDENT IN AN ORGANIZED HEALTH CARE EDUCATION/TRAINING PROGRAM

## 2019-05-26 PROCEDURE — 96361 HYDRATE IV INFUSION ADD-ON: CPT

## 2019-05-26 PROCEDURE — 81003 URINALYSIS AUTO W/O SCOPE: CPT

## 2019-05-26 PROCEDURE — 12000002 HC ACUTE/MED SURGE SEMI-PRIVATE ROOM

## 2019-05-26 PROCEDURE — 80053 COMPREHEN METABOLIC PANEL: CPT

## 2019-05-26 PROCEDURE — 25000003 PHARM REV CODE 250: Performed by: FAMILY MEDICINE

## 2019-05-26 PROCEDURE — 84100 ASSAY OF PHOSPHORUS: CPT

## 2019-05-26 PROCEDURE — 93010 ELECTROCARDIOGRAM REPORT: CPT | Mod: 76,,, | Performed by: STUDENT IN AN ORGANIZED HEALTH CARE EDUCATION/TRAINING PROGRAM

## 2019-05-26 PROCEDURE — 96372 THER/PROPH/DIAG INJ SC/IM: CPT | Mod: 59

## 2019-05-26 PROCEDURE — 84145 PROCALCITONIN (PCT): CPT

## 2019-05-26 PROCEDURE — 93010 ELECTROCARDIOGRAM REPORT: CPT | Mod: ,,, | Performed by: STUDENT IN AN ORGANIZED HEALTH CARE EDUCATION/TRAINING PROGRAM

## 2019-05-26 PROCEDURE — 96374 THER/PROPH/DIAG INJ IV PUSH: CPT

## 2019-05-26 PROCEDURE — 83036 HEMOGLOBIN GLYCOSYLATED A1C: CPT

## 2019-05-26 PROCEDURE — 94761 N-INVAS EAR/PLS OXIMETRY MLT: CPT

## 2019-05-26 PROCEDURE — 82550 ASSAY OF CK (CPK): CPT

## 2019-05-26 PROCEDURE — 25000003 PHARM REV CODE 250: Performed by: EMERGENCY MEDICINE

## 2019-05-26 PROCEDURE — 99285 EMERGENCY DEPT VISIT HI MDM: CPT | Mod: 25

## 2019-05-26 PROCEDURE — 63600175 PHARM REV CODE 636 W HCPCS: Performed by: FAMILY MEDICINE

## 2019-05-26 PROCEDURE — 80048 BASIC METABOLIC PNL TOTAL CA: CPT

## 2019-05-26 RX ORDER — POLYETHYLENE GLYCOL 3350 17 G/17G
17 POWDER, FOR SOLUTION ORAL DAILY
Status: DISCONTINUED | OUTPATIENT
Start: 2019-05-27 | End: 2019-05-27 | Stop reason: HOSPADM

## 2019-05-26 RX ORDER — IBUPROFEN 200 MG
16 TABLET ORAL
Status: DISCONTINUED | OUTPATIENT
Start: 2019-05-26 | End: 2019-05-27 | Stop reason: HOSPADM

## 2019-05-26 RX ORDER — SODIUM CHLORIDE 0.9 % (FLUSH) 0.9 %
10 SYRINGE (ML) INJECTION
Status: DISCONTINUED | OUTPATIENT
Start: 2019-05-26 | End: 2019-05-27 | Stop reason: HOSPADM

## 2019-05-26 RX ORDER — CHOLECALCIFEROL (VITAMIN D3) 25 MCG
1000 TABLET ORAL DAILY
Status: DISCONTINUED | OUTPATIENT
Start: 2019-05-27 | End: 2019-05-27 | Stop reason: HOSPADM

## 2019-05-26 RX ORDER — CALCIUM CARBONATE 200(500)MG
1000 TABLET,CHEWABLE ORAL 4 TIMES DAILY
Status: DISCONTINUED | OUTPATIENT
Start: 2019-05-26 | End: 2019-05-27 | Stop reason: HOSPADM

## 2019-05-26 RX ORDER — CALCIUM CARBONATE 200(500)MG
1000 TABLET,CHEWABLE ORAL 3 TIMES DAILY
Status: DISCONTINUED | OUTPATIENT
Start: 2019-05-26 | End: 2019-05-26

## 2019-05-26 RX ORDER — GLUCAGON 1 MG
1 KIT INJECTION
Status: DISCONTINUED | OUTPATIENT
Start: 2019-05-26 | End: 2019-05-27 | Stop reason: HOSPADM

## 2019-05-26 RX ORDER — DEXTROSE 50 % IN WATER (D50W) INTRAVENOUS SYRINGE
25
Status: DISCONTINUED | OUTPATIENT
Start: 2019-05-26 | End: 2019-05-27 | Stop reason: HOSPADM

## 2019-05-26 RX ORDER — IBUPROFEN 200 MG
24 TABLET ORAL
Status: DISCONTINUED | OUTPATIENT
Start: 2019-05-26 | End: 2019-05-27 | Stop reason: HOSPADM

## 2019-05-26 RX ORDER — ENOXAPARIN SODIUM 100 MG/ML
30 INJECTION SUBCUTANEOUS EVERY 24 HOURS
Status: DISCONTINUED | OUTPATIENT
Start: 2019-05-26 | End: 2019-05-27

## 2019-05-26 RX ORDER — DEXTROSE 50 % IN WATER (D50W) INTRAVENOUS SYRINGE
12.5
Status: DISCONTINUED | OUTPATIENT
Start: 2019-05-26 | End: 2019-05-27 | Stop reason: HOSPADM

## 2019-05-26 RX ORDER — POTASSIUM CHLORIDE 7.45 MG/ML
10 INJECTION INTRAVENOUS
Status: DISCONTINUED | OUTPATIENT
Start: 2019-05-26 | End: 2019-05-27

## 2019-05-26 RX ORDER — DOCUSATE SODIUM 100 MG/1
100 CAPSULE, LIQUID FILLED ORAL 2 TIMES DAILY
Status: DISCONTINUED | OUTPATIENT
Start: 2019-05-26 | End: 2019-05-27 | Stop reason: HOSPADM

## 2019-05-26 RX ORDER — POTASSIUM CHLORIDE 20 MEQ/1
40 TABLET, EXTENDED RELEASE ORAL
Status: COMPLETED | OUTPATIENT
Start: 2019-05-26 | End: 2019-05-26

## 2019-05-26 RX ORDER — GABAPENTIN 300 MG/1
300 CAPSULE ORAL 2 TIMES DAILY
Status: DISCONTINUED | OUTPATIENT
Start: 2019-05-26 | End: 2019-05-27 | Stop reason: HOSPADM

## 2019-05-26 RX ORDER — POTASSIUM CHLORIDE 7.45 MG/ML
10 INJECTION INTRAVENOUS
Status: DISCONTINUED | OUTPATIENT
Start: 2019-05-27 | End: 2019-05-27 | Stop reason: HOSPADM

## 2019-05-26 RX ORDER — ACETAMINOPHEN 325 MG/1
650 TABLET ORAL EVERY 4 HOURS PRN
Status: DISCONTINUED | OUTPATIENT
Start: 2019-05-26 | End: 2019-05-27 | Stop reason: HOSPADM

## 2019-05-26 RX ORDER — HYDROCHLOROTHIAZIDE 12.5 MG/1
12.5 TABLET ORAL EVERY OTHER DAY
Status: DISCONTINUED | OUTPATIENT
Start: 2019-05-27 | End: 2019-05-27 | Stop reason: HOSPADM

## 2019-05-26 RX ORDER — POTASSIUM CHLORIDE 7.45 MG/ML
10 INJECTION INTRAVENOUS
Status: COMPLETED | OUTPATIENT
Start: 2019-05-26 | End: 2019-05-26

## 2019-05-26 RX ADMIN — ENOXAPARIN SODIUM 30 MG: 100 INJECTION SUBCUTANEOUS at 08:05

## 2019-05-26 RX ADMIN — SODIUM CHLORIDE 1000 ML: 0.9 INJECTION, SOLUTION INTRAVENOUS at 01:05

## 2019-05-26 RX ADMIN — CALCIUM GLUCONATE 1 G: 98 INJECTION, SOLUTION INTRAVENOUS at 06:05

## 2019-05-26 RX ADMIN — POTASSIUM CHLORIDE 10 MEQ: 7.46 INJECTION, SOLUTION INTRAVENOUS at 06:05

## 2019-05-26 RX ADMIN — GABAPENTIN 300 MG: 300 CAPSULE ORAL at 08:05

## 2019-05-26 RX ADMIN — POTASSIUM CHLORIDE 10 MEQ: 7.46 INJECTION, SOLUTION INTRAVENOUS at 08:05

## 2019-05-26 RX ADMIN — POTASSIUM CHLORIDE 40 MEQ: 20 TABLET, EXTENDED RELEASE ORAL at 06:05

## 2019-05-26 RX ADMIN — CALCIUM CARBONATE (ANTACID) CHEW TAB 500 MG 1000 MG: 500 CHEW TAB at 08:05

## 2019-05-26 RX ADMIN — SODIUM CHLORIDE 1000 ML: 0.9 INJECTION, SOLUTION INTRAVENOUS at 03:05

## 2019-05-26 RX ADMIN — LORAZEPAM 1 MG: 2 INJECTION INTRAMUSCULAR; INTRAVENOUS at 01:05

## 2019-05-26 RX ADMIN — POTASSIUM CHLORIDE 10 MEQ: 7.46 INJECTION, SOLUTION INTRAVENOUS at 09:05

## 2019-05-26 NOTE — ED NOTES
Pt continues to complain of tingling all over; no numbness per Pt.  Awake alert NAD, IV site healthy.  Will continue to monitor. CR monitor in use

## 2019-05-26 NOTE — HPI
Hetal Concepcion is a 69 y/o F with PMH osteoporosis (dx 1/15/2019), hyperparathyroidism and hypercalcemia - s/p parathyroidectomy on 5/22, present with 3 days history of progressive generalized body of tingling and numbness with associated hands swelling, clawing of fingers, feeling of malaise, cramps, and light-headedness. she denies, fever, nausea, headache, diarrhea or confusion.

## 2019-05-26 NOTE — ED NOTES
Pt resting on stretcher, watching tv with . No distress noted. On cardiac monitor. VS stable. CB in reach and SR up.

## 2019-05-26 NOTE — ED PROVIDER NOTES
"Encounter Date: 5/26/2019    SCRIBE #1 NOTE: I, Cristela Ott, am scribing for, and in the presence of,  Dr. Gudino. I have scribed the entire note.       History     Chief Complaint   Patient presents with    Dizziness     68 year old female presents to ed cc of lightheadedness with tingling to general body x 3 days patient reports parathyroid surgery [place] [facility] on 5/22 had 3 parathryoids removed at that time spoke with surgeon this morning about symptoms was advised to take tums for possible low calcium has had  slight improvement in symptoms after tums     Time seen by provider: 12:42 PM    This is a 68 y.o. female who presents with complaint of tingling and numbness throughout her body. Patient reports feeling "like her hands are swelling up" and feeling as if she may pass out due to light-headedness. Patient had parathyroid surgery x 5 days ago by Dr. Pitts and has experienced tingling since surgery that was worse since this morning. She says that tingling has increased today and she reports feeling like she cannot move her hands. She also reports chest pain that she describes as swelling. The patient denies any cramps, vomiting, diarrhea, SOB or confusion. Patient states she spoke with Dr. Pittswheliezer recommended she take PO calcium prior to arrival.     The history is provided by the patient.     Review of patient's allergies indicates:   Allergen Reactions    Requip [ropinirole] Swelling      palpitations, swelling of tongue     Past Medical History:   Diagnosis Date    Abnormal mammogram 8/10/2016    Followed up with diagnostic mammogram and ultrasound 10/22/2015at [facility]  that showed no concerning findings with recommendation to continue yearly screening.     Anxiety 8/8/2017    will need to readdress this if work up is negative and symptoms persist    Arthritis of knee 1/7/2015    Diverticulosis     Meniere's disease 8/15/2017    Osteopenia 12/1/2015    next bone density 4/2016, seeing " endocrine for hyperparathyroidism     Positive anti-CCP test 4/7/2014    Primary hyperparathyroidism 8/26/2013    will be following up with Dr. [facility] in 2016, blood work today. stoped taking calcitonin due to nausea     Rheumatoid factor positive 4/7/2014    Vertigo 7/16/2017    Vitamin D deficiency 12/1/2015     Past Surgical History:   Procedure Laterality Date    APPENDECTOMY      HYSTERECTOMY      PARATHYROIDECTOMY N/A 5/22/2019    Performed [facility] at [facility] OR 2ND [facility]    REPAIR ROTATOR CUFF ARTHROSCOPIC Right 12/5/2014    [facility] Jr[facility] at [facility] OR    ROTATOR CUFF REPAIR Right 12/2014    TUBAL LIGATION       Family History   Problem Relation Age of Onset    Diabetes Father     Dementia Father     Rheum arthritis Neg Hx     Lupus Neg Hx     Inflammatory bowel disease Neg Hx      Social History     Tobacco Use    Smoking status: Never Smoker    Smokeless tobacco: Never Used   Substance Use Topics    Alcohol use: No     Frequency: Never     Binge frequency: Never    Drug use: No     Review of Systems   Constitutional: Negative for chills and fever.   HENT: Negative for rhinorrhea, sore throat and trouble swallowing.    Eyes: Negative for visual disturbance.   Respiratory: Negative for cough and shortness of breath.    Cardiovascular: Positive for chest pain.   Gastrointestinal: Negative for abdominal pain, diarrhea and vomiting.   Genitourinary: Negative for dysuria and hematuria.   Musculoskeletal: Negative for back pain.   Skin: Negative for rash.   Neurological: Positive for light-headedness and numbness. Negative for headaches.        Tingling   Hematological: Negative for adenopathy.       Physical Exam     Initial Vitals [05/26/19 1210]   BP Pulse Resp Temp SpO2   (!) 149/74 94 20 98.4 °F (36.9 °C) 98 %      MAP       --         Physical Exam    Constitutional:  Non-toxic appearance. No distress.   HENT:   Head: Normocephalic and  atraumatic.   Eyes: Conjunctivae and EOM are normal. Pupils are equal, round, and reactive to light. Right eye exhibits no nystagmus. Left eye exhibits no nystagmus.   Neck: Neck supple.   Cardiovascular: Regular rhythm, S1 normal and S2 normal.   No murmur heard.  Pulmonary/Chest: Breath sounds normal. No respiratory distress. She has no wheezes. She has no rales.   Abdominal: Soft. She exhibits no distension. There is no tenderness.   Musculoskeletal: She exhibits no edema.   Left and right upper extremity contracted.   Moving all extremities.    Neurological: She is alert. No cranial nerve deficit. GCS eye subscore is 4. GCS verbal subscore is 5. GCS motor subscore is 6.   Reports diminished sensation in upper extremities bilaterally.    Skin: Skin is warm. No rash noted.   Transverse incision over her lower neck that appears dry and intact.    Psychiatric: She has a normal mood and affect. Her behavior is normal.         ED Course   Procedures  Labs Reviewed   CBC W/ AUTO DIFFERENTIAL - Abnormal; Notable for the following components:       Result Value    Mean Corpuscular Hemoglobin 31.1 (*)     MPV 9.1 (*)     All other components within normal limits   COMPREHENSIVE METABOLIC PANEL - Abnormal; Notable for the following components:    Glucose 118 (*)     All other components within normal limits   URINALYSIS, REFLEX TO URINE CULTURE - Abnormal; Notable for the following components:    Specific Gravity, UA <=1.005 (*)     All other components within normal limits    Narrative:     Preferred Collection Type->Urine, Clean Catch   TSH - Abnormal; Notable for the following components:    TSH 0.079 (*)     All other components within normal limits    Narrative:     TSH = ADD-ON TEST   BASIC METABOLIC PANEL - Abnormal; Notable for the following components:    Sodium 146 (*)     Potassium 2.8 (*)     Chloride 112 (*)     Glucose 148 (*)     Calcium 8.4 (*)     All other components within normal limits   T4, FREE -  Abnormal; Notable for the following components:    Free T4 1.87 (*)     All other components within normal limits    Narrative:     TSH = ADD-ON TEST   CALCIUM, IONIZED   MAGNESIUM   PHOSPHORUS   CK   PHOSPHORUS   MAGNESIUM   CK   TSH     EKG Readings: (Independently Interpreted)   Initial Reading: No STEMI.   Rate of 94. Normal sinus rhythm. QTc is 475.         Imaging Results          X-Ray Chest AP Portable (Final result)  Result time 05/26/19 13:21:27    Final result by Francisco Fernandes MD (05/26/19 13:21:27)                 Impression:      No acute findings.      Electronically signed by: Francisco Fernandes MD  Date:    05/26/2019  Time:    13:21             Narrative:    EXAMINATION:  XR CHEST AP PORTABLE    CLINICAL HISTORY:  dyspnea;    TECHNIQUE:  Single frontal view of the chest was performed.    COMPARISON:  04/10/2019    FINDINGS:  Heart size is normal.  Lungs are well expanded and show no acute consolidation.  No effusion or pneumothorax is seen monitor wires are present overlying the chest.                                 Medical Decision Making:   Differential Diagnosis:   Differential Diagnosis includes, but is not limited to:  CVA/TIA, vertigo, anemia/blood loss, cardiogenic shock, arrhythmia, orthostatic hypotension, dehydration, medication side effect, vitamin deficiency, liver disease, hypothyroidism, drug intoxication/withdrawal, metabolic derangement.    Clinical Tests:   Lab Tests: Ordered and Reviewed  Radiological Study: Ordered and Reviewed  Medical Tests: Ordered and Reviewed  ED Management:  68 year old female s/p parathyroid surgery with tingling and cramping of hands  History concerning for hypocalcemia  Given IVF and ativan as patient reports history of anxiety  Repeat labs with decreased calcium /potassium elevated low tsh/ free t4  Patient reported improvement of symptoms after IVF however given recent surgery and lab work we will admit patient for further evaluation and treatment. She  was given IV/ PO potassium in the ED and 1g of calcium gluconate                         ED Course as of May 27 1933   Sun May 26, 2019   1726 Basic metabolic panel(!) [RN]   1727 Basic metabolic panel(!) [RN]   1733 Patient informed of lab results does have tingling in mouth and fingers - given down trending calcium and potassium plan to observe for repeated electrolyte repletion and care    [RN]      ED Course User Index  [RN] Yaw Gudino Jr., MD     Clinical Impression:     1. Low serum thyroid stimulating hormone (TSH)    2. Tingling    3. Hypocalcemia        Disposition:   Disposition: Placed in Observation  Condition: Stable        I, Yaw Gudino,  personally performed the services described in this documentation. All medical record entries made by the scribe were at my direction and in my presence.  I have reviewed the chart and agree that the record reflects my personal performance and is accurate and complete. Yaw Gudino M.D. 7:38 PM05/27/2019  Scribe attestation                  Yaw Gudino Jr., MD  05/27/19 1938

## 2019-05-26 NOTE — SUBJECTIVE & OBJECTIVE
Past Medical History:   Diagnosis Date    Abnormal mammogram 8/10/2016    Followed up with diagnostic mammogram and ultrasound 10/22/2015at [facility]  that showed no concerning findings with recommendation to continue yearly screening.     Anxiety 8/8/2017    will need to readdress this if work up is negative and symptoms persist    Arthritis of knee 1/7/2015    Diverticulosis     Meniere's disease 8/15/2017    Osteopenia 12/1/2015    next bone density 4/2016, seeing endocrine for hyperparathyroidism     Positive anti-CCP test 4/7/2014    Primary hyperparathyroidism 8/26/2013    will be following up with Dr. Archuleta [facility] in 2016, blood work today. stoped taking calcitonin due to nausea     Rheumatoid factor positive 4/7/2014    Vertigo 7/16/2017    Vitamin D deficiency 12/1/2015       Past Surgical History:   Procedure Laterality Date    APPENDECTOMY      HYSTERECTOMY      PARATHYROIDECTOMY N/A 5/22/2019    Performed by Andie Mcfarlane [facility] at [facility] OR 2ND FLR    REPAIR ROTATOR CUFF ARTHROSCOPIC Right 12/5/2014    Performed by Yaw Horner Jr., [facility] at [facility] OR    ROTATOR CUFF REPAIR Right 12/2014    TUBAL LIGATION         Review of patient's allergies indicates:   Allergen Reactions    Requip [ropinirole] Swelling      palpitations, swelling of tongue       No current facility-administered medications on file prior to encounter.      Current Outpatient Medications on File Prior to Encounter   Medication Sig    calcium carbonate (OS-ABDOULAYE) 500 mg calcium (1,250 mg) chewable tablet Take 2 tablets (1,000 mg total) by mouth 3 (three) times daily. for 14 days    co-enzyme Q-10 30 mg capsule Take 30 mg by mouth once daily.     diclofenac sodium 1 % Gel Apply 2 g topically 3 (three) times daily. (Patient taking differently: Apply 2 g topically 3 (three) times daily as needed. )    docusate sodium (COLACE) 100 MG capsule Take 1 capsule (100 mg total) by mouth 2 (two) times daily.    FLAXSEED ORAL  Take by mouth once daily.     fluticasone (FLONASE) 50 mcg/actuation nasal spray 2 sprays (100 mcg total) by Each Nare route once daily. (Patient taking differently: 2 sprays by Each Nare route nightly. )    gabapentin (NEURONTIN) 300 MG capsule Take 1 capsule (300 mg total) by mouth 2 (two) times daily. (Patient taking differently: Take 300 mg by mouth every evening. )    hydroCHLOROthiazide (HYDRODIURIL) 12.5 MG Tab Take 12.5 mg by mouth every other day.     omeprazole (PRILOSEC) 20 MG capsule Take 1 capsule (20 mg total) by mouth once daily.    ondansetron (ZOFRAN) 8 MG tablet Take 1 tablet (8 mg total) by mouth every 8 (eight) hours as needed for Nausea.    oxyCODONE-acetaminophen (PERCOCET) 5-325 mg per tablet Take 1-2 tablets by mouth every 4 (four) hours as needed.    vitamin D 1000 units Tab Take 185 mg by mouth once daily.     Family History     Problem Relation (Age of Onset)    Dementia Father    Diabetes Father        Tobacco Use    Smoking status: Never Smoker    Smokeless tobacco: Never Used   Substance and Sexual Activity    Alcohol use: No     Frequency: Never     Binge frequency: Never    Drug use: No    Sexual activity: Yes     Partners: Male     Comment: ; 2 children     Review of Systems   Constitutional: Positive for fatigue. Negative for chills and fever.   HENT: Negative for ear discharge and postnasal drip.    Eyes: Positive for visual disturbance.   Respiratory: Positive for apnea. Negative for chest tightness and shortness of breath.    Cardiovascular: Positive for leg swelling. Negative for chest pain.   Gastrointestinal: Negative for diarrhea and nausea.   Neurological: Positive for weakness, light-headedness, numbness and headaches. Negative for speech difficulty.        Tingling sensation   Psychiatric/Behavioral: Negative for sleep disturbance. The patient is not hyperactive.      Objective:     Vital Signs (Most Recent):  Temp: 98 °F (36.7 °C) (05/26/19  1540)  Pulse: 89 (05/26/19 1804)  Resp: 18 (05/26/19 1732)  BP: 116/62 (05/26/19 1804)  SpO2: 98 % (05/26/19 1804) Vital Signs (24h Range):  Temp:  [98 °F (36.7 °C)-98.4 °F (36.9 °C)] 98 °F (36.7 °C)  Pulse:  [] 89  Resp:  [17-25] 18  SpO2:  [97 %-100 %] 98 %  BP: (105-174)/(60-86) 116/62     Weight: 83.9 kg (185 lb)  Body mass index is 29.86 kg/m².    Physical Exam   Constitutional: She is oriented to person, place, and time. She appears well-developed and well-nourished.   HENT:   Head: Normocephalic.   Eyes: Pupils are equal, round, and reactive to light.   Neck: Neck supple.   Cardiovascular: Normal rate, regular rhythm and normal heart sounds. Exam reveals no gallop and no friction rub.   No murmur heard.  Pulmonary/Chest: Breath sounds normal.   Abdominal: There is no tenderness.   Musculoskeletal: She exhibits tenderness.   Neurological: She is alert and oriented to person, place, and time.   Skin: No rash noted.         CRANIAL NERVES     CN III, IV, VI   Pupils are equal, round, and reactive to light.       Significant Labs:   A1C:   Recent Labs   Lab 01/15/19  0900   HGBA1C 4.9     CBC:   Recent Labs   Lab 05/26/19  1239   WBC 7.17   HGB 13.8   HCT 41.4        CMP:   Recent Labs   Lab 05/26/19  1259 05/26/19  1635    146*   K 3.7 2.8*    112*   CO2 26 26   * 148*   BUN 12 11   CREATININE 0.7 0.6   CALCIUM 10.2 8.4*   PROT 6.8  --    ALBUMIN 3.6  --    BILITOT 0.4  --    ALKPHOS 101  --    AST 11  --    ALT 13  --    ANIONGAP 13 8   EGFRNONAA >60 >60     Cardiac Markers: No results for input(s): CKMB, MYOGLOBIN, BNP, TROPISTAT in the last 48 hours.  Coagulation: No results for input(s): PT, INR, APTT in the last 48 hours.  Lactic Acid: No results for input(s): LACTATE in the last 48 hours.  Magnesium:   Recent Labs   Lab 05/26/19  1259   MG 1.8     Troponin: No results for input(s): TROPONINI in the last 48 hours.  TSH:   Recent Labs   Lab 05/26/19  1259   TSH 0.079*      Imaging Results          X-Ray Chest AP Portable (Final result)  Result time 05/26/19 13:21:27    Final result by Francisco Fernandes MD (05/26/19 13:21:27)                 Impression:      No acute findings.      Electronically signed by: Francisco Fernandes MD  Date:    05/26/2019  Time:    13:21             Narrative:    EXAMINATION:  XR CHEST AP PORTABLE    CLINICAL HISTORY:  dyspnea;    TECHNIQUE:  Single frontal view of the chest was performed.    COMPARISON:  04/10/2019    FINDINGS:  Heart size is normal.  Lungs are well expanded and show no acute consolidation.  No effusion or pneumothorax is seen monitor wires are present overlying the chest.                                  Significant Imaging: I have reviewed all pertinent imaging results/findings within the past 24 hours.

## 2019-05-26 NOTE — ASSESSMENT & PLAN NOTE
Tingling  S/p parathyroidectomy on 5/22  Consult endocrinology  Continue calcium replacement  monitor

## 2019-05-26 NOTE — ASSESSMENT & PLAN NOTE
Anxiety  TSH 0.079 on admission  S/p parathyroidectomy on 5/22  Consult endocrinology  monitor     Lovenox    Dispo: rehab placement   -PT consult ordered, will follow recommendations   -F/u  regarding home services or rehab placement

## 2019-05-27 VITALS
BODY MASS INDEX: 31.53 KG/M2 | HEART RATE: 83 BPM | WEIGHT: 196.19 LBS | SYSTOLIC BLOOD PRESSURE: 127 MMHG | DIASTOLIC BLOOD PRESSURE: 79 MMHG | HEIGHT: 66 IN | TEMPERATURE: 98 F | RESPIRATION RATE: 18 BRPM | OXYGEN SATURATION: 94 %

## 2019-05-27 DIAGNOSIS — R07.9 CHEST PAIN, UNSPECIFIED TYPE: Primary | ICD-10-CM

## 2019-05-27 PROBLEM — E87.6 HYPOKALEMIA: Status: RESOLVED | Noted: 2019-05-26 | Resolved: 2019-05-27

## 2019-05-27 PROBLEM — R20.2 TINGLING: Status: RESOLVED | Noted: 2019-05-26 | Resolved: 2019-05-27

## 2019-05-27 PROBLEM — E83.51 HYPOCALCEMIA: Status: RESOLVED | Noted: 2019-05-26 | Resolved: 2019-05-27

## 2019-05-27 LAB
ANION GAP SERPL CALC-SCNC: 7 MMOL/L (ref 8–16)
BASOPHILS # BLD AUTO: 0.03 K/UL (ref 0–0.2)
BASOPHILS NFR BLD: 0.5 % (ref 0–1.9)
BUN SERPL-MCNC: 8 MG/DL (ref 8–23)
CALCIUM SERPL-MCNC: 8.3 MG/DL (ref 8.7–10.5)
CALCIUM SERPL-MCNC: 9.6 MG/DL (ref 8.7–10.5)
CHLORIDE SERPL-SCNC: 111 MMOL/L (ref 95–110)
CO2 SERPL-SCNC: 25 MMOL/L (ref 23–29)
CREAT SERPL-MCNC: 0.6 MG/DL (ref 0.5–1.4)
DIFFERENTIAL METHOD: NORMAL
EOSINOPHIL # BLD AUTO: 0.2 K/UL (ref 0–0.5)
EOSINOPHIL NFR BLD: 2.6 % (ref 0–8)
ERYTHROCYTE [DISTWIDTH] IN BLOOD BY AUTOMATED COUNT: 13.2 % (ref 11.5–14.5)
EST. GFR  (AFRICAN AMERICAN): >60 ML/MIN/1.73 M^2
EST. GFR  (NON AFRICAN AMERICAN): >60 ML/MIN/1.73 M^2
GLUCOSE SERPL-MCNC: 89 MG/DL (ref 70–110)
HCT VFR BLD AUTO: 39.8 % (ref 37–48.5)
HGB BLD-MCNC: 12.8 G/DL (ref 12–16)
LYMPHOCYTES # BLD AUTO: 1.5 K/UL (ref 1–4.8)
LYMPHOCYTES NFR BLD: 25.6 % (ref 18–48)
MCH RBC QN AUTO: 30.6 PG (ref 27–31)
MCHC RBC AUTO-ENTMCNC: 32.2 G/DL (ref 32–36)
MCV RBC AUTO: 95 FL (ref 82–98)
MONOCYTES # BLD AUTO: 0.4 K/UL (ref 0.3–1)
MONOCYTES NFR BLD: 7.2 % (ref 4–15)
NEUTROPHILS # BLD AUTO: 3.6 K/UL (ref 1.8–7.7)
NEUTROPHILS NFR BLD: 63.9 % (ref 38–73)
PLATELET # BLD AUTO: 208 K/UL (ref 150–350)
PMV BLD AUTO: 10.3 FL (ref 9.2–12.9)
POTASSIUM SERPL-SCNC: 4.6 MMOL/L (ref 3.5–5.1)
RBC # BLD AUTO: 4.18 M/UL (ref 4–5.4)
SODIUM SERPL-SCNC: 143 MMOL/L (ref 136–145)
WBC # BLD AUTO: 5.67 K/UL (ref 3.9–12.7)

## 2019-05-27 PROCEDURE — G0378 HOSPITAL OBSERVATION PER HR: HCPCS

## 2019-05-27 PROCEDURE — 63600175 PHARM REV CODE 636 W HCPCS: Performed by: FAMILY MEDICINE

## 2019-05-27 PROCEDURE — 36415 COLL VENOUS BLD VENIPUNCTURE: CPT

## 2019-05-27 PROCEDURE — 96376 TX/PRO/DX INJ SAME DRUG ADON: CPT

## 2019-05-27 PROCEDURE — 94761 N-INVAS EAR/PLS OXIMETRY MLT: CPT

## 2019-05-27 PROCEDURE — 85025 COMPLETE CBC W/AUTO DIFF WBC: CPT

## 2019-05-27 PROCEDURE — 82310 ASSAY OF CALCIUM: CPT

## 2019-05-27 PROCEDURE — 80048 BASIC METABOLIC PNL TOTAL CA: CPT

## 2019-05-27 PROCEDURE — 25000003 PHARM REV CODE 250: Performed by: FAMILY MEDICINE

## 2019-05-27 RX ORDER — CALCIUM CARBONATE 200(500)MG
1000 TABLET,CHEWABLE ORAL 4 TIMES DAILY
Qty: 240 TABLET | Refills: 2 | COMMUNITY
Start: 2019-05-27 | End: 2022-03-13 | Stop reason: ALTCHOICE

## 2019-05-27 RX ORDER — ENOXAPARIN SODIUM 100 MG/ML
40 INJECTION SUBCUTANEOUS EVERY 24 HOURS
Status: DISCONTINUED | OUTPATIENT
Start: 2019-05-27 | End: 2019-05-27 | Stop reason: HOSPADM

## 2019-05-27 RX ADMIN — VITAMIN D, TAB 1000IU (100/BT) 1000 UNITS: 25 TAB at 08:05

## 2019-05-27 RX ADMIN — POLYETHYLENE GLYCOL 3350 17 G: 17 POWDER, FOR SOLUTION ORAL at 08:05

## 2019-05-27 RX ADMIN — HYDROCHLOROTHIAZIDE 12.5 MG: 12.5 TABLET ORAL at 08:05

## 2019-05-27 RX ADMIN — CALCIUM GLUCONATE 1 G: 98 INJECTION, SOLUTION INTRAVENOUS at 08:05

## 2019-05-27 RX ADMIN — CALCIUM CARBONATE (ANTACID) CHEW TAB 500 MG 1000 MG: 500 CHEW TAB at 08:05

## 2019-05-27 NOTE — ASSESSMENT & PLAN NOTE
Anxiety  -TSH 0.079 on admission  -Likely transient 2/2 parathyroidectomy  -Patient has f/u appointment with surgeon on 5/30  -Will place referral to Endocrinology

## 2019-05-27 NOTE — NURSING
Patient complaining of extreme pain at infusion site. Potassium IV infusing. Infusion stopped and site flushed win Normal Saline. Site patent. Patient has no pain when site flushed only when Potassium is running. NP Chairs notified. Order to hold Potassium and wait for latest Potassium levels from next lab draw.

## 2019-05-27 NOTE — H&P
[facility] Medicine  History & Physical    Patient Name: Carlene Fong  MRN: [id number]  Admission Date: 5/26/2019  Attending Physician: Leidy Verma MD  Primary Care Provider: Ashli Stubbs MD         Patient information was obtained from patient and ER records.     Subjective:     Principal Problem:<principal problem not specified>    Chief Complaint:   Chief Complaint   Patient presents with    Dizziness     68 year old female presents to ed cc of lightheadedness with tingling to general body x 3 days patient reports parathyroid surgery at [facility] on 5/22 had 3 parathryoids removed at that time spoke with surgeon this morning about symptoms was advised to take tums for possible low calcium has had  slight improvement in symptoms after tums        HPI: Hetal Concepcion is a 67 y/o F with PMH osteoporosis (dx 1/15/2019), hyperparathyroidism and hypercalcemia - s/p parathyroidectomy on 5/22, present with 3 days history of progressive generalized body of tingling and numbness with associated hands swelling, clawing of fingers, feeling of malaise, cramps, and light-headedness. she denies, fever, nausea, headache, diarrhea or confusion.     Past Medical History:   Diagnosis Date    Abnormal mammogram 8/10/2016    Followed up with diagnostic mammogram and ultrasound [facility]  that showed no concerning findings with recommendation to continue yearly screening.     Anxiety 8/8/2017    will need to readdress this if work up is negative and symptoms persist    Arthritis of knee 1/7/2015    Diverticulosis     Meniere's disease 8/15/2017    Osteopenia 12/1/2015    next bone density 4/2016, seeing endocrine for hyperparathyroidism     Positive anti-CCP test 4/7/2014    Primary hyperparathyroidism 8/26/2013    will be following up with Dr. Archuleta End[facility] in 2016, blood work today. stoped taking calcitonin due to nausea     Rheumatoid factor positive  4/7/2014    Vertigo 7/16/2017    Vitamin D deficiency 12/1/2015       Past Surgical History:   Procedure Laterality Date    APPENDECTOMY      HYSTERECTOMY      PARATHYROIDECTOMY N/A 5/22/2019    Performed by [facility] at [facility] OR 2ND FLR    REPAIR ROTATOR CUFF ARTHROSCOPIC Right 12/5/2014    [facility] Jr[facility] OR    ROTATOR CUFF REPAIR Right 12/2014    TUBAL LIGATION         Review of patient's allergies indicates:   Allergen Reactions    Requip [ropinirole] Swelling      palpitations, swelling of tongue       No current facility-administered medications on file prior to encounter.      Current Outpatient Medications on File Prior to Encounter   Medication Sig    calcium carbonate (OS-ABDOULAYE) 500 mg calcium (1,250 mg) chewable tablet Take 2 tablets (1,000 mg total) by mouth 3 (three) times daily. for 14 days    co-enzyme Q-10 30 mg capsule Take 30 mg by mouth once daily.     diclofenac sodium 1 % Gel Apply 2 g topically 3 (three) times daily. (Patient taking differently: Apply 2 g topically 3 (three) times daily as needed. )    docusate sodium (COLACE) 100 MG capsule Take 1 capsule (100 mg total) by mouth 2 (two) times daily.    FLAXSEED ORAL Take by mouth once daily.     fluticasone (FLONASE) 50 mcg/actuation nasal spray 2 sprays (100 mcg total) by Each Nare route once daily. (Patient taking differently: 2 sprays by Each Nare route nightly. )    gabapentin (NEURONTIN) 300 MG capsule Take 1 capsule (300 mg total) by mouth 2 (two) times daily. (Patient taking differently: Take 300 mg by mouth every evening. )    hydroCHLOROthiazide (HYDRODIURIL) 12.5 MG Tab Take 12.5 mg by mouth every other day.     omeprazole (PRILOSEC) 20 MG capsule Take 1 capsule (20 mg total) by mouth once daily.    ondansetron (ZOFRAN) 8 MG tablet Take 1 tablet (8 mg total) by mouth every 8 (eight) hours as needed for Nausea.    oxyCODONE-acetaminophen (PERCOCET) 5-325 mg per tablet Take  1-2 tablets by mouth every 4 (four) hours as needed.    vitamin D 1000 units Tab Take 185 mg by mouth once daily.     Family History     Problem Relation (Age of Onset)    Dementia Father    Diabetes Father        Tobacco Use    Smoking status: Never Smoker    Smokeless tobacco: Never Used   Substance and Sexual Activity    Alcohol use: No     Frequency: Never     Binge frequency: Never    Drug use: No    Sexual activity: Yes     Partners: Male     Comment: ; 2 children     Review of Systems   Constitutional: Positive for fatigue. Negative for chills and fever.   HENT: Negative for ear discharge and postnasal drip.    Eyes: Positive for visual disturbance.   Respiratory: Positive for apnea. Negative for chest tightness and shortness of breath.    Cardiovascular: Positive for leg swelling. Negative for chest pain.   Gastrointestinal: Negative for diarrhea and nausea.   Neurological: Positive for weakness, light-headedness, numbness and headaches. Negative for speech difficulty.        Tingling sensation   Psychiatric/Behavioral: Negative for sleep disturbance. The patient is not hyperactive.      Objective:     Vital Signs (Most Recent):  Temp: 98 °F (36.7 °C) (05/26/19 1540)  Pulse: 89 (05/26/19 1804)  Resp: 18 (05/26/19 1732)  BP: 116/62 (05/26/19 1804)  SpO2: 98 % (05/26/19 1804) Vital Signs (24h Range):  Temp:  [98 °F (36.7 °C)-98.4 °F (36.9 °C)] 98 °F (36.7 °C)  Pulse:  [] 89  Resp:  [17-25] 18  SpO2:  [97 %-100 %] 98 %  BP: (105-174)/(60-86) 116/62     Weight: 83.9 kg (185 lb)  Body mass index is 29.86 kg/m².    Physical Exam   Constitutional: She is oriented to person, place, and time. She appears well-developed and well-nourished.   HENT:   Head: Normocephalic.   Eyes: Pupils are equal, round, and reactive to light.   Neck: Neck supple.   Cardiovascular: Normal rate, regular rhythm and normal heart sounds. Exam reveals no gallop and no friction rub.   No murmur heard.  Pulmonary/Chest:  Breath sounds normal.   Abdominal: There is no tenderness.   Musculoskeletal: She exhibits tenderness.   Neurological: She is alert and oriented to person, place, and time.   Skin: No rash noted.         CRANIAL NERVES     CN III, IV, VI   Pupils are equal, round, and reactive to light.       Significant Labs:   A1C:   Recent Labs   Lab 01/15/19  0900   HGBA1C 4.9     CBC:   Recent Labs   Lab 05/26/19  1239   WBC 7.17   HGB 13.8   HCT 41.4        CMP:   Recent Labs   Lab 05/26/19  1259 05/26/19  1635    146*   K 3.7 2.8*    112*   CO2 26 26   * 148*   BUN 12 11   CREATININE 0.7 0.6   CALCIUM 10.2 8.4*   PROT 6.8  --    ALBUMIN 3.6  --    BILITOT 0.4  --    ALKPHOS 101  --    AST 11  --    ALT 13  --    ANIONGAP 13 8   EGFRNONAA >60 >60     Cardiac Markers: No results for input(s): CKMB, MYOGLOBIN, BNP, TROPISTAT in the last 48 hours.  Coagulation: No results for input(s): PT, INR, APTT in the last 48 hours.  Lactic Acid: No results for input(s): LACTATE in the last 48 hours.  Magnesium:   Recent Labs   Lab 05/26/19  1259   MG 1.8     Troponin: No results for input(s): TROPONINI in the last 48 hours.  TSH:   Recent Labs   Lab 05/26/19  1259   TSH 0.079*     Imaging Results          X-Ray Chest AP Portable (Final result)  Result time 05/26/19 13:21:27    Final result by Francisco Fernandes MD (05/26/19 13:21:27)                 Impression:      No acute findings.      Electronically signed by: Francisco Fernandes MD  Date:    05/26/2019  Time:    13:21             Narrative:    EXAMINATION:  XR CHEST AP PORTABLE    CLINICAL HISTORY:  dyspnea;    TECHNIQUE:  Single frontal view of the chest was performed.    COMPARISON:  04/10/2019    FINDINGS:  Heart size is normal.  Lungs are well expanded and show no acute consolidation.  No effusion or pneumothorax is seen monitor wires are present overlying the chest.                                  Significant Imaging: I have reviewed all pertinent imaging  results/findings within the past 24 hours.    Assessment/Plan:     Low serum thyroid stimulating hormone (TSH)  Anxiety  TSH 0.079 on admission  S/p parathyroidectomy on 5/22  Consult endocrinology  monitor      Hypokalemia  Replace       Hypocalcemia  Tingling  S/p parathyroidectomy on 5/22  Consult endocrinology  Continue calcium replacement  monitor        VTE Risk Mitigation (From admission, onward)        Ordered     enoxaparin injection 30 mg  Daily      05/26/19 1926     IP VTE HIGH RISK PATIENT  Once      05/26/19 1926             Leidy Verma MD  Department of Hospital Medicine   [facility]

## 2019-05-27 NOTE — PLAN OF CARE
Bedside nurse to discuss d/c medications.  Discussed importance to attend all f/u appts and take medications as prescribed.  Verbalized understanding.    Future Appointments   Date Time Provider Department Center   5/30/2019  9:15 AM Andie Mcfarlane MD [facility]          05/27/19 2188   Final Note   Assessment Type Final Discharge Note   Anticipated Discharge Disposition Home   Hospital Follow Up  Appt(s) scheduled? Yes   Discharge plans and expectations educations in teach back method with documentation complete? Yes   Right Care Referral Info   Post Acute Recommendation No Care

## 2019-05-27 NOTE — SUBJECTIVE & OBJECTIVE
Interval History: Patient examined at bedside, her  is present. She reports overall improvement in symptoms. Still with mild intermittent tingling at the tips of her fingers and around her mouth. Contractures resolved. No other acute complaints.    Review of Systems   Constitutional: Positive for fatigue. Negative for chills and fever.   HENT: Negative for ear discharge and postnasal drip.    Eyes: Negative for visual disturbance.   Respiratory: Negative for apnea, chest tightness and shortness of breath.    Cardiovascular: Negative for chest pain and leg swelling.   Gastrointestinal: Negative for diarrhea and nausea.   Neurological: Positive for numbness. Negative for speech difficulty, weakness, light-headedness and headaches.        Tingling sensation (improving)   Psychiatric/Behavioral: Negative for sleep disturbance. The patient is not hyperactive.      Objective:     Vital Signs (Most Recent):  Temp: 98.8 °F (37.1 °C) (05/27/19 0728)  Pulse: 80 (05/27/19 0728)  Resp: 18 (05/27/19 0728)  BP: 125/78 (05/27/19 0728)  SpO2: (!) 94 % (05/27/19 0728) Vital Signs (24h Range):  Temp:  [97.8 °F (36.6 °C)-98.9 °F (37.2 °C)] 98.8 °F (37.1 °C)  Pulse:  [] 80  Resp:  [14-25] 18  SpO2:  [94 %-100 %] 94 %  BP: (105-174)/(60-86) 125/78     Weight: 89 kg (196 lb 3.4 oz)  Body mass index is 31.67 kg/m².    Intake/Output Summary (Last 24 hours) at 5/27/2019 1039  Last data filed at 5/27/2019 0900  Gross per 24 hour   Intake 2480 ml   Output 200 ml   Net 2280 ml      Physical Exam   Constitutional: She is oriented to person, place, and time. No distress.   Eyes: Pupils are equal, round, and reactive to light. Conjunctivae are normal.   Neck: Neck supple.   Cardiovascular: Normal rate, regular rhythm and normal heart sounds. Exam reveals no gallop and no friction rub.   No murmur heard.  Pulmonary/Chest: Effort normal and breath sounds normal.   Abdominal: Soft. Bowel sounds are normal. There is no tenderness.    Musculoskeletal: She exhibits no tenderness.   Neurological: She is alert and oriented to person, place, and time. No sensory deficit.   Skin: Skin is warm and dry. Capillary refill takes less than 2 seconds. No rash noted. She is not diaphoretic.   Psychiatric: She has a normal mood and affect. Her behavior is normal.   Nursing note and vitals reviewed.      Significant Labs:   BMP:   Recent Labs   Lab 05/26/19  1259  05/27/19  0603   *   < > 89      < > 143   K 3.7   < > 4.6      < > 111*   CO2 26   < > 25   BUN 12   < > 8   CREATININE 0.7   < > 0.6   CALCIUM 10.2   < > 8.3*   MG 1.8  --   --     < > = values in this interval not displayed.     All pertinent labs within the past 24 hours have been reviewed.    Significant Imaging: I have reviewed all pertinent imaging results/findings within the past 24 hours.

## 2019-05-27 NOTE — PROGRESS NOTES
Pharmacist Renal Dose Adjustment Note    Carlene Fong is a 68 y.o. female being treated with the medication enoxaparin    Patient Data:    Vital Signs (Most Recent):  Temp: 98.8 °F (37.1 °C) (05/27/19 0728)  Pulse: 80 (05/27/19 0728)  Resp: 18 (05/27/19 0728)  BP: 125/78 (05/27/19 0728)  SpO2: (!) 94 % (05/27/19 0728)   Vital Signs (72h Range):  Temp:  [97.8 °F (36.6 °C)-98.9 °F (37.2 °C)]   Pulse:  []   Resp:  [14-25]   BP: (105-174)/(60-86)   SpO2:  [94 %-100 %]      Recent Labs   Lab 05/26/19  1259 05/26/19  1635 05/27/19  0603   CREATININE 0.7 0.6 0.6     Serum creatinine: 0.6 mg/dL 05/27/19 0603  Estimated creatinine clearance: 103.7 mL/min    Medication:enoxaparin dose: 30mg frequency daily will be changed to medication:enoxaparin dose:40mg frequency:daily    Pharmacist's Name: Jam Horner  Pharmacist's Extension: [phone]

## 2019-05-27 NOTE — ASSESSMENT & PLAN NOTE
Tingling  -S/p parathyroidectomy on 5/22  -Symptoms markedly improved  -Calcium 8.3. Continue replacement  -Repeat calcium level this afternoon. If stable, anticipate discharge home later today with PO supplement

## 2019-05-27 NOTE — PROGRESS NOTES
[facility] Hospital Medicine  Progress Note    Patient Name: Carlene Fong  MRN: [id number]  Patient Class: OP- Observation   Admission Date: 5/26/2019  Length of Stay: 1 days  Attending Physician: Leidy Verma*  Primary Care Provider: Ashli Stubbs MD      Subjective:     Principal Problem:Low serum thyroid stimulating hormone (TSH)    HPI:  Hetal Concepcion is a 69 y/o F with PMH osteoporosis (dx 1/15/2019), hyperparathyroidism and hypercalcemia - s/p parathyroidectomy on 5/22, present with 3 days history of progressive generalized body of tingling and numbness with associated hands swelling, clawing of fingers, feeling of malaise, cramps, and light-headedness. she denies, fever, nausea, headache, diarrhea or confusion.     Hospital Course:  Potassium was replaced by IV. Calcium was replaced orally and by IV. Repeat calcium at 8.3, given additional IV replacement. Patient reported significant improvement in symptoms, contractures resolved.    Interval History: Patient examined at bedside, her  is present. She reports overall improvement in symptoms. Still with mild intermittent tingling at the tips of her fingers and around her mouth. Contractures resolved. No other acute complaints.    Review of Systems   Constitutional: Positive for fatigue. Negative for chills and fever.   HENT: Negative for ear discharge and postnasal drip.    Eyes: Negative for visual disturbance.   Respiratory: Negative for apnea, chest tightness and shortness of breath.    Cardiovascular: Negative for chest pain and leg swelling.   Gastrointestinal: Negative for diarrhea and nausea.   Neurological: Positive for numbness. Negative for speech difficulty, weakness, light-headedness and headaches.        Tingling sensation (improving)   Psychiatric/Behavioral: Negative for sleep disturbance. The patient is not hyperactive.      Objective:     Vital Signs (Most Recent):  Temp: 98.8 °F (37.1 °C)  (05/27/19 0728)  Pulse: 80 (05/27/19 0728)  Resp: 18 (05/27/19 0728)  BP: 125/78 (05/27/19 0728)  SpO2: (!) 94 % (05/27/19 0728) Vital Signs (24h Range):  Temp:  [97.8 °F (36.6 °C)-98.9 °F (37.2 °C)] 98.8 °F (37.1 °C)  Pulse:  [] 80  Resp:  [14-25] 18  SpO2:  [94 %-100 %] 94 %  BP: (105-174)/(60-86) 125/78     Weight: 89 kg (196 lb 3.4 oz)  Body mass index is 31.67 kg/m².    Intake/Output Summary (Last 24 hours) at 5/27/2019 1039  Last data filed at 5/27/2019 0900  Gross per 24 hour   Intake 2480 ml   Output 200 ml   Net 2280 ml      Physical Exam   Constitutional: She is oriented to person, place, and time. No distress.   Eyes: Pupils are equal, round, and reactive to light. Conjunctivae are normal.   Neck: Neck supple.   Cardiovascular: Normal rate, regular rhythm and normal heart sounds. Exam reveals no gallop and no friction rub.   No murmur heard.  Pulmonary/Chest: Effort normal and breath sounds normal.   Abdominal: Soft. Bowel sounds are normal. There is no tenderness.   Musculoskeletal: She exhibits no tenderness.   Neurological: She is alert and oriented to person, place, and time. No sensory deficit.   Skin: Skin is warm and dry. Capillary refill takes less than 2 seconds. No rash noted. She is not diaphoretic.   Psychiatric: She has a normal mood and affect. Her behavior is normal.   Nursing note and vitals reviewed.      Significant Labs:   BMP:   Recent Labs   Lab 05/26/19  1259  05/27/19  0603   *   < > 89      < > 143   K 3.7   < > 4.6      < > 111*   CO2 26   < > 25   BUN 12   < > 8   CREATININE 0.7   < > 0.6   CALCIUM 10.2   < > 8.3*   MG 1.8  --   --     < > = values in this interval not displayed.     All pertinent labs within the past 24 hours have been reviewed.    Significant Imaging: I have reviewed all pertinent imaging results/findings within the past 24 hours.    Assessment/Plan:      * Low serum thyroid stimulating hormone (TSH)  Anxiety  -TSH 0.079 on  admission  -Likely transient 2/2 parathyroidectomy  -Patient has f/u appointment with surgeon on 5/30  -Will also need f/u with Dr. Balderas, Endocrinology      Hypokalemia  -Resolved with IV replacement      Hypocalcemia  Tingling  -S/p parathyroidectomy on 5/22  -Symptoms markedly improved  -Calcium 8.3. Continue replacement  -Repeat calcium level this afternoon. If stable, anticipate discharge home later today with PO supplement        VTE Risk Mitigation (From admission, onward)        Ordered     enoxaparin injection 40 mg  Daily      05/27/19 0845     IP VTE HIGH RISK PATIENT  Once      05/26/19 1926          Imani Sheppard PA-C  Department of Hospital Medicine   [facility]

## 2019-05-27 NOTE — HOSPITAL COURSE
Patient was admitted for observation to the CDU. Potassium was replaced by IV. Calcium was replaced orally and by IV. Repeat calcium at 8.3, given additional IV replacement. Repeat calcium stable at 9.6. Patient reported significant improvement in symptoms, contractures resolved. She was discharged to home in good condition with increase in calcium carbonate to 1,000 mg QID. Patient will need repeat TSH in the next 2 weeks. She will follow-up with her surgeon on 5/30/19.

## 2019-05-27 NOTE — ANESTHESIA POSTPROCEDURE EVALUATION
Anesthesia Post Evaluation    Patient: Carlene Fong    Procedure(s) Performed: Procedure(s) (LRB):  PARATHYROIDECTOMY (N/A)    Final Anesthesia Type: general  Patient location during evaluation: PACU  Patient participation: Yes- Able to Participate  Level of consciousness: awake and alert  Post-procedure vital signs: reviewed and stable  Pain management: adequate  Airway patency: patent  PONV status at discharge: No PONV  Anesthetic complications: no      Cardiovascular status: stable  Respiratory status: unassisted and spontaneous ventilation  Hydration status: euvolemic  Follow-up not needed.          Vitals Value Taken Time   /74 5/27/2019  5:16 AM   Temp 36.6 °C (97.8 °F) 5/27/2019  5:16 AM   Pulse 83 5/27/2019  5:16 AM   Resp 18 5/27/2019  5:16 AM   SpO2 95 % 5/27/2019  5:16 AM         Event Time     Out of Recovery 13:00:54          Pain/Oumar Score: No data recorded

## 2019-05-27 NOTE — NURSING
Patient discharged to home. Discharge instruction given to the patient, patient verbalized complete understanding. Education given, refer to clinical reference for education. IV removed, tip intact. Telemetry monitor removed. Waiting on transportation.

## 2019-05-27 NOTE — PLAN OF CARE
Problem: Adult Inpatient Plan of Care  Goal: Plan of Care Review  Pt on RA with documented sats.  Will continue to monitor.

## 2019-05-27 NOTE — PLAN OF CARE
Pt states she had parathyroidectomy a few days ago.  She has a scheduled f/u appt this Thursday with surgeon.  I also called Dr. Balderas's office and they will [facility] [employer] [facility] and [facility].  Pt has her spouse at bedside to transport her home.  No other needs identified.  White board updated with CM name and contact information.  Pt encouraged to call with any questions or concerns.  Cm will continue to follow pt through transitions of care and assist with any discharge needs.       05/27/19 1235   Discharge Assessment   Assessment Type Discharge Planning Assessment   Confirmed/corrected address and phone number on facesheet? Yes   Assessment information obtained from? Patient   Communicated expected length of stay with patient/caregiver yes   Prior to hospitilization cognitive status: Alert/Oriented   Prior to hospitalization functional status: Independent   Current cognitive status: Alert/Oriented   Current Functional Status: Independent   Lives With spouse   Able to Return to Prior Arrangements yes   Is patient able to care for self after discharge? Yes   Patient's perception of discharge disposition home or selfcare   Readmission Within the Last 30 Days no previous admission in last 30 days   Patient currently being followed by outpatient case management? No   Patient currently receives any other outside agency services? No   Do you have any problems affording any of your prescribed medications? No   Is the patient taking medications as prescribed? yes   Does the patient have transportation home? Yes   Transportation Anticipated family or friend will provide   Discharge Plan A Home;Home with family   Discharge Plan B Home;Home with family   Patient/Family in Agreement with Plan yes     Wang Farris RN,   [phone]

## 2019-05-28 ENCOUNTER — TELEPHONE (OUTPATIENT)
Dept: SURGERY | Facility: CLINIC | Age: 69
End: 2019-05-28

## 2019-05-28 ENCOUNTER — HOSPITAL ENCOUNTER (EMERGENCY)
Facility: HOSPITAL | Age: 69
Discharge: HOME | End: 2019-05-28
Attending: EMERGENCY MEDICINE
Payer: COMMERCIAL

## 2019-05-28 VITALS
SYSTOLIC BLOOD PRESSURE: 110 MMHG | HEART RATE: 81 BPM | TEMPERATURE: 98 F | DIASTOLIC BLOOD PRESSURE: 66 MMHG | HEIGHT: 66 IN | OXYGEN SATURATION: 100 % | BODY MASS INDEX: 31.5 KG/M2 | WEIGHT: 196 LBS | RESPIRATION RATE: 18 BRPM

## 2019-05-28 DIAGNOSIS — R53.1 WEAKNESS: ICD-10-CM

## 2019-05-28 DIAGNOSIS — R55 SYNCOPE, UNSPECIFIED SYNCOPE TYPE: ICD-10-CM

## 2019-05-28 DIAGNOSIS — R25.2 CRAMPS, MUSCLE, GENERAL: Primary | ICD-10-CM

## 2019-05-28 LAB
ALBUMIN SERPL BCP-MCNC: 3.5 G/DL (ref 3.5–5.2)
ALP SERPL-CCNC: 103 U/L (ref 55–135)
ALT SERPL W/O P-5'-P-CCNC: 10 U/L (ref 10–44)
ANION GAP SERPL CALC-SCNC: 13 MMOL/L (ref 8–16)
AST SERPL-CCNC: 9 U/L (ref 10–40)
BACTERIA #/AREA URNS HPF: ABNORMAL /HPF
BILIRUB SERPL-MCNC: 0.9 MG/DL (ref 0.1–1)
BILIRUB UR QL STRIP: NEGATIVE
BNP SERPL-MCNC: 57 PG/ML (ref 0–99)
BUN SERPL-MCNC: 14 MG/DL (ref 8–23)
CALCIUM SERPL-MCNC: 9.3 MG/DL (ref 8.7–10.5)
CHLORIDE SERPL-SCNC: 105 MMOL/L (ref 95–110)
CLARITY UR: ABNORMAL
CO2 SERPL-SCNC: 23 MMOL/L (ref 23–29)
COLOR UR: YELLOW
CREAT SERPL-MCNC: 0.7 MG/DL (ref 0.5–1.4)
ERYTHROCYTE [DISTWIDTH] IN BLOOD BY AUTOMATED COUNT: 12.7 % (ref 11.5–14.5)
EST. GFR  (AFRICAN AMERICAN): >60 ML/MIN/1.73 M^2
EST. GFR  (NON AFRICAN AMERICAN): >60 ML/MIN/1.73 M^2
GLUCOSE SERPL-MCNC: 141 MG/DL (ref 70–110)
GLUCOSE UR QL STRIP: NEGATIVE
HCT VFR BLD AUTO: 41.5 % (ref 37–48.5)
HGB BLD-MCNC: 14 G/DL (ref 12–16)
HGB UR QL STRIP: ABNORMAL
KETONES UR QL STRIP: NEGATIVE
LEUKOCYTE ESTERASE UR QL STRIP: ABNORMAL
MAGNESIUM SERPL-MCNC: 1.6 MG/DL (ref 1.6–2.6)
MCH RBC QN AUTO: 31 PG (ref 27–31)
MCHC RBC AUTO-ENTMCNC: 33.7 G/DL (ref 32–36)
MCV RBC AUTO: 92 FL (ref 82–98)
MICROSCOPIC COMMENT: ABNORMAL
NITRITE UR QL STRIP: NEGATIVE
NON-SQ EPI CELLS #/AREA URNS HPF: 2 /HPF
PH UR STRIP: 8 [PH] (ref 5–8)
PHOSPHATE SERPL-MCNC: 2.8 MG/DL (ref 2.7–4.5)
PLATELET # BLD AUTO: 282 K/UL (ref 150–350)
PLATELET BLD QL SMEAR: NORMAL
PMV BLD AUTO: 8.9 FL (ref 9.2–12.9)
POTASSIUM SERPL-SCNC: 3.6 MMOL/L (ref 3.5–5.1)
PROT SERPL-MCNC: 6.7 G/DL (ref 6–8.4)
PROT UR QL STRIP: NEGATIVE
RBC # BLD AUTO: 4.51 M/UL (ref 4–5.4)
RBC #/AREA URNS HPF: 2 /HPF (ref 0–4)
SODIUM SERPL-SCNC: 141 MMOL/L (ref 136–145)
SP GR UR STRIP: 1.01 (ref 1–1.03)
SQUAMOUS #/AREA URNS HPF: 1 /HPF
T4 FREE SERPL-MCNC: 1.75 NG/DL (ref 0.71–1.51)
TROPONIN I SERPL DL<=0.01 NG/ML-MCNC: <0.006 NG/ML (ref 0–0.03)
TSH SERPL DL<=0.005 MIU/L-ACNC: 0.07 UIU/ML (ref 0.4–4)
URN SPEC COLLECT METH UR: ABNORMAL
UROBILINOGEN UR STRIP-ACNC: NEGATIVE EU/DL
WBC # BLD AUTO: 11.38 K/UL (ref 3.9–12.7)
WBC #/AREA URNS HPF: 14 /HPF (ref 0–5)

## 2019-05-28 PROCEDURE — 96374 THER/PROPH/DIAG INJ IV PUSH: CPT

## 2019-05-28 PROCEDURE — 85027 COMPLETE CBC AUTOMATED: CPT

## 2019-05-28 PROCEDURE — 25000003 PHARM REV CODE 250: Performed by: EMERGENCY MEDICINE

## 2019-05-28 PROCEDURE — 93005 ELECTROCARDIOGRAM TRACING: CPT

## 2019-05-28 PROCEDURE — 63600175 PHARM REV CODE 636 W HCPCS: Performed by: EMERGENCY MEDICINE

## 2019-05-28 PROCEDURE — 84443 ASSAY THYROID STIM HORMONE: CPT

## 2019-05-28 PROCEDURE — 83735 ASSAY OF MAGNESIUM: CPT

## 2019-05-28 PROCEDURE — 84100 ASSAY OF PHOSPHORUS: CPT

## 2019-05-28 PROCEDURE — 84484 ASSAY OF TROPONIN QUANT: CPT

## 2019-05-28 PROCEDURE — 84439 ASSAY OF FREE THYROXINE: CPT

## 2019-05-28 PROCEDURE — 87086 URINE CULTURE/COLONY COUNT: CPT

## 2019-05-28 PROCEDURE — 83880 ASSAY OF NATRIURETIC PEPTIDE: CPT

## 2019-05-28 PROCEDURE — 81000 URINALYSIS NONAUTO W/SCOPE: CPT

## 2019-05-28 PROCEDURE — 93010 ELECTROCARDIOGRAM REPORT: CPT | Mod: ,,, | Performed by: INTERNAL MEDICINE

## 2019-05-28 PROCEDURE — 93010 EKG 12-LEAD: ICD-10-PCS | Mod: ,,, | Performed by: INTERNAL MEDICINE

## 2019-05-28 PROCEDURE — 80053 COMPREHEN METABOLIC PANEL: CPT

## 2019-05-28 PROCEDURE — 99284 EMERGENCY DEPT VISIT MOD MDM: CPT

## 2019-05-28 RX ORDER — ONDANSETRON 2 MG/ML
4 INJECTION INTRAMUSCULAR; INTRAVENOUS
Status: COMPLETED | OUTPATIENT
Start: 2019-05-28 | End: 2019-05-28

## 2019-05-28 RX ORDER — CEPHALEXIN 500 MG/1
500 CAPSULE ORAL 4 TIMES DAILY
Qty: 20 CAPSULE | Refills: 0 | Status: SHIPPED | OUTPATIENT
Start: 2019-05-28 | End: 2019-06-02

## 2019-05-28 RX ORDER — SODIUM CHLORIDE 9 MG/ML
500 INJECTION, SOLUTION INTRAVENOUS
Status: COMPLETED | OUTPATIENT
Start: 2019-05-28 | End: 2019-05-28

## 2019-05-28 RX ORDER — CALCITRIOL 0.25 UG/1
0.25 CAPSULE ORAL DAILY
Qty: 30 CAPSULE | Refills: 0 | Status: SHIPPED | OUTPATIENT
Start: 2019-05-28 | End: 2019-07-12

## 2019-05-28 RX ADMIN — SODIUM CHLORIDE 500 ML: 0.9 INJECTION, SOLUTION INTRAVENOUS at 06:05

## 2019-05-28 RX ADMIN — ONDANSETRON 4 MG: 2 INJECTION INTRAMUSCULAR; INTRAVENOUS at 07:05

## 2019-05-28 NOTE — TELEPHONE ENCOUNTER
Called to confirm the pt is not having any S/S of low calcium now. She confirmed she's fine right now.

## 2019-05-28 NOTE — ED PROVIDER NOTES
"Encounter Date: 5/28/2019    SCRIBE #1 NOTE: I, Michelle Connolly, am scribing for, and in the presence of,  Dr. Sanchez. I have scribed the entire note.       History     Chief Complaint   Patient presents with    Weakness     pt reports "I was weak this morning and passed out." pt reports having parathryorid surgery on wednesday. pt with c/o hand cramping at this time. pt denies LOC     Carlene Fong is a 68 y.o. female who  has a past medical history of Abnormal mammogram (8/10/2016), Anxiety (8/8/2017), Arthritis of knee (1/7/2015), Diverticulosis, Meniere's disease (8/15/2017), Osteopenia (12/1/2015), Positive anti-CCP test (4/7/2014), Primary hyperparathyroidism (8/26/2013), Rheumatoid factor positive (4/7/2014), Vertigo (7/16/2017), and Vitamin D deficiency (12/1/2015).    The patient presents to the ED due to generalized weakness and diffuse muscle cramping that worsened this morning.   She reports recent parathyroid surgery 5/22, and was seen in the ED on 5/26 for similar symptoms; she was found to have low calcium and was admitted overnight for repletion. She was discharged yesterday. She presents concerned her calcium may be low, as her symptoms returned when she woke up this morning. She took 3 TUMS tablets this morning prior to evaluation. She denies any associated CP, SOB, headache, nausea/vomiting, dizziness, changes in vision or focal weakness.    Family member notes she also seemed to feel weak and passed out while sitting in a chair this morning. Spouse reports the passes out easily, especially when anxious or in pain. He denies any fall or injury.     The history is provided by the patient.     Review of patient's allergies indicates:   Allergen Reactions    Requip [ropinirole] Swelling      palpitations, swelling of tongue     Past Medical History:   Diagnosis Date    Abnormal mammogram 8/10/2016    Followed up with diagnostic mammogram and ultrasound 10/22/2015at [facility]  that showed no " concerning findings with recommendation to continue yearly screening.     Anxiety 8/8/2017    will need to readdress this if work up is negative and symptoms persist    Arthritis of knee 1/7/2015    Diverticulosis     Meniere's disease 8/15/2017    Osteopenia 12/1/2015    next bone density 4/2016, seeing endocrine for hyperparathyroidism     Positive anti-CCP test 4/7/2014    Primary hyperparathyroidism 8/26/2013    will be following up with Dr. Archuleta [facility] in 2016, blood work today. stoped taking calcitonin due to nausea     Rheumatoid factor positive 4/7/2014    Vertigo 7/16/2017    Vitamin D deficiency 12/1/2015     Past Surgical History:   Procedure Laterality Date    APPENDECTOMY      HYSTERECTOMY      PARATHYROIDECTOMY N/A 5/22/2019    [facility] 2ND [facility]    REPAIR ROTATOR CUFF ARTHROSCOPIC Right 12/5/2014    Per[facility] Jr[facility] at [facility]    ROTATOR CUFF REPAIR Right 12/2014    TUBAL LIGATION       Family History   Problem Relation Age of Onset    Diabetes Father     Dementia Father     Rheum arthritis Neg Hx     Lupus Neg Hx     Inflammatory bowel disease Neg Hx      Social History     Tobacco Use    Smoking status: Never Smoker    Smokeless tobacco: Never Used   Substance Use Topics    Alcohol use: No     Frequency: Never     Binge frequency: Never    Drug use: No     Review of Systems   Constitutional: Negative for chills and fever.   HENT: Negative for sore throat.    Respiratory: Negative for cough and shortness of breath.    Cardiovascular: Negative for chest pain.   Gastrointestinal: Negative for nausea and vomiting.   Genitourinary: Negative for dysuria, frequency and urgency.   Musculoskeletal: Positive for myalgias. Negative for back pain.   Skin: Negative for rash and wound.   Neurological: Positive for weakness. Negative for dizziness, syncope, numbness and headaches.   Hematological: Does not bruise/bleed  easily.   Psychiatric/Behavioral: Negative for agitation, behavioral problems and confusion.       Physical Exam     Initial Vitals [05/28/19 0635]   BP Pulse Resp Temp SpO2   112/66 83 18 97.6 °F (36.4 °C) 100 %      MAP       --         Physical Exam    Nursing note and vitals reviewed.  Constitutional: She appears well-developed and well-nourished. She is not diaphoretic. No distress.   Well-appearing, NAD.   HENT:   Head: Normocephalic and atraumatic.   Mouth/Throat: Oropharynx is clear and moist.   Eyes: EOM are normal. Pupils are equal, round, and reactive to light.   Neck: No tracheal deviation present.   Cardiovascular: Normal rate, regular rhythm, normal heart sounds and intact distal pulses.   Pulmonary/Chest: Breath sounds normal. No stridor. No respiratory distress. She has no wheezes.   Abdominal: Soft. Bowel sounds are normal. She exhibits no distension and no mass. There is no tenderness.   Musculoskeletal: Normal range of motion. She exhibits no edema.   Neurological: She is alert and oriented to person, place, and time. She has normal strength. No cranial nerve deficit or sensory deficit.   Skin: Skin is warm and dry. Capillary refill takes less than 2 seconds. No pallor.   Psychiatric: She has a normal mood and affect. Her behavior is normal. Thought content normal.         ED Course   Procedures  Labs Reviewed   CBC WITHOUT DIFFERENTIAL - Abnormal; Notable for the following components:       Result Value    MPV 8.9 (*)     All other components within normal limits   COMPREHENSIVE METABOLIC PANEL - Abnormal; Notable for the following components:    Glucose 141 (*)     AST 9 (*)     All other components within normal limits   URINALYSIS, REFLEX TO URINE CULTURE - Abnormal; Notable for the following components:    Appearance, UA Hazy (*)     Occult Blood UA Trace (*)     Leukocytes, UA 2+ (*)     All other components within normal limits    Narrative:     Preferred Collection Type->Urine, Clean Catch    T4, FREE - Abnormal; Notable for the following components:    Free T4 1.75 (*)     All other components within normal limits   TSH - Abnormal; Notable for the following components:    TSH 0.070 (*)     All other components within normal limits   URINALYSIS MICROSCOPIC - Abnormal; Notable for the following components:    WBC, UA 14 (*)     Bacteria Few (*)     Non-Squam Epith 2 (*)     All other components within normal limits    Narrative:     Preferred Collection Type->Urine, Clean Catch   CULTURE, URINE    Narrative:     Preferred Collection Type->Urine, Clean Catch   MAGNESIUM   PHOSPHORUS   PLATELET REVIEW   TROPONIN I   B-TYPE NATRIURETIC PEPTIDE   TSH   T4   TROPONIN I   B-TYPE NATRIURETIC PEPTIDE     EKG Readings: (Independently Interpreted)   Normal sinus rhythm with a rate of  77. No ST changes or ischemia. Normal intervals. No STEMI. Stable from prior EKG on 5/26/2019.       Imaging Results    None          Medical Decision Making:   History:   Old Medical Records: I decided to obtain old medical records.  Old Records Summarized: records from previous admission(s).       <> Summary of Records: Patient underwent parathyroidectomy on 5/22 was seen in ED on 5/26 for tingling. Found to have hypokalemia and hypocalcemia which improved with IV and PO replacement. Scheduled for follow up later this week on  5/30.  Initial Assessment:   68 y.o with recent parathyroidectomy and subsequent hypokalemia/hypocalcemia requiring admission for replacement presents to ED due to persistent generalized tingling and cramping since discharge from hospital yesterday. Will obtain labs, give IV fluids, and reassess.   Differential Diagnosis:   Differential Diagnosis includes, but is not limited to:  CVA/TIA, vertigo, anemia/blood loss, cardiogenic shock, arrhythmia, orthostatic hypotension, dehydration, medication side effect, vitamin deficiency, liver disease, hypothyroidism, drug intoxication/withdrawal, metabolic  derangement.    Independently Interpreted Test(s):   I have ordered and independently interpreted EKG Reading(s) - see prior notes  Clinical Tests:   Lab Tests: Ordered and Reviewed  Medical Tests: Ordered and Reviewed  ED Management:  9:12 AM   Labs unremarkable; Ca and K WNL.  Patient took multiple Tums tablets prior to arrival with improvement in symptoms. Symptoms possibly due to recent parathyroidectomy, which resolved with patient's treatment at home.   UA revealed mild UTI. Will give Rocephin and prescribe Keflex.   Patient is comfortable with plan and stable for discharge. Recommend f/u with [facility] as scheduled, or return to ED for worsening symptoms, N/V, or any other concerns.  Upon re-evaluation, the patient's status has improved.  After complete ED evaluation, clinical impression is most consistent with muscle cramping.  Surgery clinic follow-up within 2-3 days was recommended.    After taking into careful account the patient's history, physical exam findings, as well as empirical and objective data obtained throughout ED workup, I feel no emergent medical condition has been identified. No further evaluation or admission was felt to be required, and the patient is stable for discharge from the ED. The patient and any additional family present were updated with test results, overall clinical impression, and recommended further plan of care, including discharge instructions as provided and outpatient follow-up for continued evaluation and management as needed. All questions were answered. The patient expressed understanding and agreed with current plan for discharge and follow-up plan of care. Strict ED return precautions were provided, including return/worsening of current symptoms, new symptoms, or any other concerns.                        Clinical Impression:     1. Cramps, muscle, general    2. Weakness    3. Syncope, unspecified syncope type      Disposition:   Disposition: Discharged  Condition:  Stable        I, Dr. Adelso Sanchez, personally performed the services described in this documentation. All medical record entries made by the scribe were at my direction and in my presence.  I have reviewed the chart and agree that the record reflects my personal performance and is accurate and complete.     Adelso Sanchez MD.                 Adelso Sanchez MD  06/05/19 6558

## 2019-05-28 NOTE — TELEPHONE ENCOUNTER
Spoke with pt. She stated that she's been in and out of the ED due to issues with her calcium levels. She went back to the ED because of the tingling and cramping all over her body. She had lab work done in the ED and was also dx with a UTI and being treated with Keflex, and nausea being treated with Zofran. She said she spoke with Dr. Abdalla and was advised to hold calcium in the AM and have lab work done in the morning but she's concerned because she took calcium last night at 9p and work up with cramps this AM at 6a. She's not sure what else to do. Advised she's done everything she could do at this point, she's been seen in the ED, treated and received advice from Dr. Abdalla and have an upcoming appt. I will let Dr. Abdalla know of her call and concerns and will follow-up. Dr. Abdalla is currently in surgery but will ensure she gets the message and follow-up as soon as possible. Assured her she will hear from me or Dr. Abdalla.

## 2019-05-28 NOTE — TELEPHONE ENCOUNTER
"Called pt and advised Dr. Abdalla recommended that she does not have to have lab work done tomorrow. She does not have to "hold" her calcium as previously stated when she was going to have blood work. She will keep her post-op appt on Thursday.     In addition, Dr. Abdalla advised:   She should continue Calcium 2000mg four times a day (every 6 hours). I'm also going to send in a RX for Vitamin D to the pharmacy listed for her to take along with the calcium.  That will help with the absorption.     No labs.  We will wait until at least next week to have them checked. She should take a stool softener if possible as the calcium can make her prone to constipation.     Pt expressed an understanding.   "

## 2019-05-28 NOTE — ED TRIAGE NOTES
Pt presents to ED complaining of weakness, tremors, muscle cramping and pain to the right arm, and nausea. Pt states she passed out earlier and fell out of a chair- pt denies LOC. Pt denies cp, sob, or any other complaints at this time. Pt states she had parathyroid surgery on this past wednesday

## 2019-05-29 LAB
BACTERIA UR CULT: NORMAL
BACTERIA UR CULT: NORMAL

## 2019-05-30 ENCOUNTER — OFFICE VISIT (OUTPATIENT)
Dept: SURGERY | Facility: CLINIC | Age: 69
End: 2019-05-30
Payer: COMMERCIAL

## 2019-05-30 VITALS
SYSTOLIC BLOOD PRESSURE: 113 MMHG | RESPIRATION RATE: 20 BRPM | HEART RATE: 77 BPM | TEMPERATURE: 98 F | HEIGHT: 66 IN | WEIGHT: 191 LBS | DIASTOLIC BLOOD PRESSURE: 67 MMHG | BODY MASS INDEX: 30.7 KG/M2

## 2019-05-30 DIAGNOSIS — E83.51 HYPOCALCEMIA: Primary | ICD-10-CM

## 2019-05-30 PROCEDURE — 99999 PR PBB SHADOW E&M-EST. PATIENT-LVL V: ICD-10-PCS | Mod: PBBFAC,,, | Performed by: SURGERY

## 2019-05-30 PROCEDURE — 99024 POSTOP FOLLOW-UP VISIT: CPT | Mod: S$GLB,,, | Performed by: SURGERY

## 2019-05-30 PROCEDURE — 99999 PR PBB SHADOW E&M-EST. PATIENT-LVL V: CPT | Mod: PBBFAC,,, | Performed by: SURGERY

## 2019-05-30 PROCEDURE — 99024 PR POST-OP FOLLOW-UP VISIT: ICD-10-PCS | Mod: S$GLB,,, | Performed by: SURGERY

## 2019-05-30 RX ORDER — SULFAMETHOXAZOLE AND TRIMETHOPRIM 800; 160 MG/1; MG/1
1 TABLET ORAL 2 TIMES DAILY
Qty: 10 TABLET | Refills: 0 | Status: SHIPPED | OUTPATIENT
Start: 2019-05-30 | End: 2019-06-04

## 2019-05-30 NOTE — PATIENT INSTRUCTIONS
"Questions About Your Scar    1. Why is my scar red?    You will notice during the first few weeks after surgery, the scar area will become red, firm, and hard. Scars often seem to become worse before they get better. This is normal. After about 6 weeks, the scar will begin to "mature." This means that the scar will soften and become less red. Over the next 4 months, the scar will slowly soft and will not be as red. It may take up to a year for the scar to mature.    2. What will the scar look like?    Most scars will become soft, flat, white lines over time.    3. When will the scars go away?    A scar is usually permanent. As the scar becomes softer and less red, it will begin to blend into the skin around the scar. It will become less visible.    4. Does putting Vitamin E oil on the scar help?    It is not known for sure if Vitamin E oil helps scars heal faster or makes them less visible. If you choose to use Vitamin E oil on the scar, it won't hurt. Applying any oil or lotion that can add moisture to the skin will help. Two weeks after surgery, it is a good idea to massage a scar gently but firmly for 5 minutes, two to four times each day. Use the oil or lotion of your choice when doing the massage.    5. What about sunlight and scars?    It is important to keep out of direct sunlight for up to one year after surgery. Too much direct sunlight makes a scar darker in color than the skin around the scar. You should use sunscreen with at least SPF #15 when outdoors.    6. What can I do to make the scar look better?    IT simply takes time for a scar to heal. If the scar is less than a year old, the scar may need to mature more. But some scars may get worse. If the scar line has become wide, more surgery can remove the wide area of the scar and make a more narrow line. If the scar has become higher, thicker, and redder, this may be a "keloidal" or "hypertrophic" scar.      Instructions for Scar Tissue Massage    The " "purpose of scar tissue massage is to soften the skin over scars and incision lines in order for the area to heal optimally. This exercise may also help prevent hypertrophic scarring (an area of excessive scar tissue).    A scar begins "maturing" at about three weeks after injury or surgery, and continues for 9 - 12 months.    You may begin performing scar tissue massage two to three weeks after your surgery. The pressure you apply initially should not be painful, rather a light massage to promote circulation. As you are able to tolerate more pressure you may press more firmly on any little bumps or lumps along your incision.    Scar tissue massage is done by using your index and middle fingers. Begin by firmly and systematically applying the lotion in a circular motion across the scar/incision line. The doctor or nurse will demonstrate this method. Imagine your massage breaking up the little cells that have begun to collect at the incision site so that your body may reabsorb those cells.    This exercise should be done consistently for five minutes at a time; once in the morning and once in the evening.    You may use any skin moisturizer or cocoa butter and/or vitamin E cream to perform scar tissue massage.    It is important to remember that the effectiveness of scar tissue massage is obtained from the method in which it is done rather than the type of moisturizer used.    Scar tissue massage should be continued throughout the scar or incision's maturing process (9 - 12 months) as instructed.    It is necessary to protect your scar/incision from the sun's damaging rays for 9 - 12 months after surgery. A SPF (Sun Protection Factor) of 15 or 30 is adequate for most skin types.      After Parathyroidectomy    Parathyroidectomy is to correct primary hyperparathyroidism, which leads to normal calcium levels in the body. Normally bones have an ongoing process of bone breakdown and new bone growth. The terms osteoporosis " and osteopenia describe bones that are thin and at risk for fractures or breaks.    Calcium and vitamin D are the keys to promoting healthy bones. 99% of the calcium in the body is stored in the bones and teeth. Most people do not receive enough calcium with their daily food.    Vitamin D is needed to absorb calcium from food and maintain normal levels. When tested, many people will have low vitamin D levels in the blood.    It is healthy practice to maintain a good intake of both calcium and vitamin D.    Your vitamin D level is:    Lab Results   Component Value Date    LICIYXLH26GO 42 05/08/2019       A normal vitamin D level is greater than 32.    The recommended daily intake is:    Calcium 2280-0876 mg  Vitamin D 800-1000 IU    A multivitamin pill may provide 162 mg of calcium and 400 IU of vitamin D.    It is recommended that an additional vitamin D with calcium supplement be used. A healthy goal is to aim for 1000 mg of calcium and 800 IU of vitamin D daily.    Blood tests after parathyroid surgery will recheck your calcium and PTH or parathyroid hormone levels. Today at your visit after surgery, your blood tests will be completed. In addition you should have a calcium and PTH blood test at 6 and 12 months after your surgery. You can have these tests at your regular doctor's office, or we can order these tests.    After parathyroid surgery your calcium should be in the normal range. A normal calcium level indicates a curative operation. For unclear reasons, the PTH may be elevated in up to 40% of patients with normal calcium.

## 2019-05-30 NOTE — LETTER
Endocrine/General Surgery  [address] [place] [address]  Phone: [phone]  Fax: [phone] June 2, 2019         Natasha Burnett MD  [address]  [facility] [address]    Patient: Carlene Fong   YOB: 1950   Date of Visit: 5/30/2019     Dear Dr. Burnett:    I wanted to let you know that I operated on Carlene Fong. She underwent a subtotal gland parathyroidectomy one week ago. You will find a copy of the operative and pathology reports in the [email or web address] system. She did well overall after the surgery though she was noted to have some hypocalcemia.  I saw her back in the clinic. She continues to do very well clinically and I expect her recovery will be uneventful. She should have her calcium level checked at 6 months and 1 year and perhaps She could see you to have that done.    If you have any questions or concerns, please contact me. Again, thank you for allowing me to participate in the care of this patient.     With many thanks,      Andie Mcfarlane MD      CC  Ashli Stubbs MD  [address]  [address]  [facility] [address]  VIA In Basket

## 2019-05-31 NOTE — DISCHARGE SUMMARY
[facility] Hospital Medicine  Discharge Summary      Patient Name: Carlene Fong  MRN: [id number]  Admission Date: 5/26/2019  Hospital Length of Stay: 0 days  Discharge Date and Time: 5/27/2019  1:51 PM  Attending Physician: Leidy Verma MD   Discharging Provider: LOUIE NeilC  Primary Care Provider: Ashli Stubbs MD      HPI:   Hetal Concepcion is a 69 y/o F with PMH osteoporosis (dx 1/15/2019), hyperparathyroidism and hypercalcemia - s/p parathyroidectomy on 5/22, present with 3 days history of progressive generalized body of tingling and numbness with associated hands swelling, clawing of fingers, feeling of malaise, cramps, and light-headedness. she denies, fever, nausea, headache, diarrhea or confusion.     * No surgery found *      Hospital Course:   Patient was admitted for observation to the CDU. Potassium was replaced by IV. Calcium was replaced orally and by IV. Repeat calcium at 8.3, given additional IV replacement. Repeat calcium stable at 9.6. Patient reported significant improvement in symptoms, contractures resolved. She was discharged to home in good condition with increase in calcium carbonate to 1,000 mg QID. Patient will need repeat TSH in the next 2 weeks. She will follow-up with her surgeon on 5/30/19.     Consults: N/A    No new Assessment & Plan notes have been filed under this hospital service since the last note was generated.  Service: Hospital Medicine    Final Active Diagnoses:    Diagnosis Date Noted POA    PRINCIPAL PROBLEM:  Low serum thyroid stimulating hormone (TSH) [R79.89] 05/26/2019 Yes    Anxiety [F41.9] 08/08/2017 Yes    Primary hyperparathyroidism [E21.0] 08/26/2013 Yes      Problems Resolved During this Admission:    Diagnosis Date Noted Date Resolved POA    Tingling [R20.2] 05/26/2019 05/27/2019 Yes    Hypocalcemia [E83.51] 05/26/2019 05/27/2019 Yes    Hypokalemia [E87.6] 05/26/2019 05/27/2019 Yes       Discharged  Condition: good    Disposition: Home or Self Care    Follow Up:  Follow-up Information     Mesfin Balderas MD.    Specialty:  Endocrinology  Why:  The office will call with appointment date and time.  Contact information:  [address]  [facility] [address]  [phone]             Andie Mcfarlane MD On 5/30/2019.    Why:  9:15  Contact information:  [facility]  [place]               Patient Instructions:      Diet Adult Regular     Notify your health care provider if you experience any of the following:  increased confusion or weakness     Notify your health care provider if you experience any of the following:  persistent dizziness, light-headedness, or visual disturbances     Activity as tolerated       Significant Diagnostic Studies: Labs: All labs within the past 24 hours have been reviewed    Pending Diagnostic Studies:     None         Medications:  Reconciled Home Medications:      Medication List      CHANGE how you take these medications    calcium carbonate 200 mg calcium (500 mg) chewable tablet  Commonly known as:  TUMS  Take 2 tablets (1,000 mg total) by mouth 4 (four) times daily.  What changed:    · medication strength  · when to take this     diclofenac sodium 1 % Gel  Commonly known as:  VOLTAREN  Apply 2 g topically 3 (three) times daily.  What changed:    · when to take this  · reasons to take this     fluticasone propionate 50 mcg/actuation nasal spray  Commonly known as:  FLONASE  2 sprays (100 mcg total) by Each Nare route once daily.  What changed:  when to take this     gabapentin 300 MG capsule  Commonly known as:  NEURONTIN  Take 1 capsule (300 mg total) by mouth 2 (two) times daily.  What changed:  when to take this        CONTINUE taking these medications    co-enzyme Q-10 30 mg capsule  Take 30 mg by mouth once daily.     docusate sodium 100 MG capsule  Commonly known as:  COLACE  Take 1 capsule (100 mg total) by mouth 2 (two) times daily.     FLAXSEED ORAL  Take by  mouth once daily.     hydroCHLOROthiazide 12.5 MG Tab  Commonly known as:  HYDRODIURIL  Take 12.5 mg by mouth every other day.     omeprazole 20 MG capsule  Commonly known as:  PRILOSEC  Take 1 capsule (20 mg total) by mouth once daily.     ondansetron 8 MG tablet  Commonly known as:  ZOFRAN  Take 1 tablet (8 mg total) by mouth every 8 (eight) hours as needed for Nausea.     oxyCODONE-acetaminophen 5-325 mg per tablet  Commonly known as:  PERCOCET  Take 1-2 tablets by mouth every 4 (four) hours as needed.     vitamin D 1000 units Tab  Commonly known as:  VITAMIN D3  Take 185 mg by mouth once daily.            Indwelling Lines/Drains at time of discharge:   Lines/Drains/Airways     Arterial Line                 Arterial Line 05/22/19 0826 9 days                Time spent on the discharge of patient: 35 minutes  Patient was seen and examined on the date of discharge and determined to be suitable for discharge.      SREE Neil MD  [facility]  MD Franklin Alvarez MD Ijeoma Innocent-Ituah, MD Fadi Hawawini, MD Elizabeth Knipp, PA-C Brittany Chatman, NP Kristin Stein, PA-C Arekeva Selmon, NP-C  [address] [place] [address]  Office Phone: [phone]  Office Fax: [phone]

## 2019-06-02 NOTE — PROGRESS NOTES
"Subjective:       Carlene Fong presents to the clinic 1 weeks following parathyroid surgery (subtotal parathyroidectomy).  The patient presented to the ED with symptoms of hypocalcemia.  She was admitted and treated for those and subsequently discharged.  During that admission patient was also noted to have abnormal TFTs.  She continues to note intermittent perioral and digital numbness and tingling. Eating a regular diet without difficulty though eating a decreased amount related to decreased appetite. Bowel movements are Normal.  The patient is not having any pain. Patient denies trouble swallowing and trouble speaking. She is currently on Calcium 2000 mg QID. She is taking Rocaltrol (0.25 mcg daily).          Objective:      /67   Pulse 77   Temp 97.9 °F (36.6 °C)   Resp 20   Ht 5' 6" (1.676 m)   Wt 86.6 kg (191 lb)   LMP  (LMP Unknown)   BMI 30.83 kg/m²     General:   alert, appears stated age and cooperative. Chovstek's sign absent.   Incision:   well approximated, healing well, no signs of drainage, no erythema, no dehiscence, no hematoma, no ecchymosis, no seroma and swelling(moderate)   Voice:  normal         FINAL PATHOLOGIC DIAGNOSIS  1. PARATHYROID, RIGHT SUPERIOR, EXCISION:  Hypercellular parathyroid tissue (83 mg).  Negative for malignancy.  2. PARATHYROID, RIGHT INFERIOR, EXCISION:  Hypercellular parathyroid tissue (13 mg).  Negative for malignancy.  3. PARATHYROID, LEFT SUPERIOR, EXCISION:  Hypercellular parathyroid tissue (52 mg).  Negative for malignancy.  4. PARATHYROID QUERY, LEFT INFERIOR, EXCISION:  Lymphoid tissue with features compatible with thymic tissue.  Negative for malignancy.  5. PARATHYROID, LEFT INFERIOR #2, EXCISION:  Hypercellular parathyroid tissue (14 mg).  6. PARATHYROID, RIGHT SUPERIOR, EXCISION:  Hypercellular parathyroid tissue (48 mg).  Negative for malignancy.  7. PARATHYROID, RIGHT INFERIOR, EXCISION:  Hypercellular parathyroid tissue (60 mg).  Benign " lymphoid tissue.  Negative for malignancy.  8. PARATHYROID, LEFT SUPERIOR, EXCISION:  Hypercellular parathyroid tissue (406 mg).  Negative for malignancy.      Labs:  Lab Results   Component Value Date    CALCIUM 9.3 05/28/2019    CALCIUM 9.6 05/27/2019    CALCIUM 8.3 (L) 05/27/2019    TSH 0.070 (L) 05/28/2019    TSH 0.079 (L) 05/26/2019    TSH 1.461 01/15/2019    FREET4 1.75 (H) 05/28/2019    FREET4 1.87 (H) 05/26/2019    JEIIHQAA32PP 42 05/08/2019    SHAAFDYO33GJ 47 01/15/2019    TVDHETHL80BA 28 (L) 05/04/2018    .6 (H) 05/08/2019    .4 (H) 01/15/2019    .5 (H) 08/07/2018    PHOS 2.8 05/28/2019    PHOS 3.1 05/26/2019    PHOS 2.7 01/11/2018    CAION 1.53 (H) 01/15/2019    CAION 1.46 (H) 08/07/2018    CAION 1.52 (H) 05/04/2018            Assessment:     Carlene Fong is doing well post-operatively after subtotal parathyroidectomy with postoperative hypocalcemia.        Plan:        1. Continue any current medications. Will check calcium and parathyroid levels(in 2-3 weeks) to in order to wean supplementation.  The patient currently has follow-up scheduled with endocrine in regards to abnormal TFTs.  2. Wound care and scar massage discussed.  3. Pt is to increase activities as tolerated.  4.   Serum calcium and PTH testing is recommended at 6 and 12 months following           parathyroidectomy for long term follow-up. Curative parathyroidectomy is defined as normocalcemia for 6 months after operation. In up to 40% of patients, an elevated PTH level has been observed post-operatively despite a normal calcium level. Carlene Fong would like to obtain calcium laboratory testing in conjunction with routine bloodwork. She will continue to see Ashli Stubbs MD for long term health care follow-up.

## 2019-06-03 ENCOUNTER — PATIENT MESSAGE (OUTPATIENT)
Dept: SURGERY | Facility: CLINIC | Age: 69
End: 2019-06-03

## 2019-06-10 ENCOUNTER — LAB VISIT (OUTPATIENT)
Dept: LAB | Facility: HOSPITAL | Age: 69
End: 2019-06-10
Attending: FAMILY MEDICINE
Payer: COMMERCIAL

## 2019-06-10 ENCOUNTER — PATIENT MESSAGE (OUTPATIENT)
Dept: SURGERY | Facility: CLINIC | Age: 69
End: 2019-06-10

## 2019-06-10 DIAGNOSIS — E83.51 HYPOCALCEMIA: ICD-10-CM

## 2019-06-10 DIAGNOSIS — E21.3 HYPERPARATHYROIDISM: ICD-10-CM

## 2019-06-10 LAB
25(OH)D3+25(OH)D2 SERPL-MCNC: 43 NG/ML (ref 30–96)
ALBUMIN SERPL BCP-MCNC: 3.4 G/DL (ref 3.5–5.2)
ANION GAP SERPL CALC-SCNC: 11 MMOL/L (ref 8–16)
BUN SERPL-MCNC: 9 MG/DL (ref 8–23)
CALCIUM SERPL-MCNC: 10.6 MG/DL (ref 8.7–10.5)
CALCIUM SERPL-MCNC: 10.6 MG/DL (ref 8.7–10.5)
CHLORIDE SERPL-SCNC: 101 MMOL/L (ref 95–110)
CO2 SERPL-SCNC: 30 MMOL/L (ref 23–29)
CREAT SERPL-MCNC: 0.7 MG/DL (ref 0.5–1.4)
EST. GFR  (AFRICAN AMERICAN): >60 ML/MIN/1.73 M^2
EST. GFR  (NON AFRICAN AMERICAN): >60 ML/MIN/1.73 M^2
GLUCOSE SERPL-MCNC: 86 MG/DL (ref 70–110)
PHOSPHATE SERPL-MCNC: 4.3 MG/DL (ref 2.7–4.5)
POTASSIUM SERPL-SCNC: 4.4 MMOL/L (ref 3.5–5.1)
PTH-INTACT SERPL-MCNC: 18.3 PG/ML (ref 9–77)
SODIUM SERPL-SCNC: 142 MMOL/L (ref 136–145)

## 2019-06-10 PROCEDURE — 36415 COLL VENOUS BLD VENIPUNCTURE: CPT

## 2019-06-10 PROCEDURE — 83970 ASSAY OF PARATHORMONE: CPT

## 2019-06-10 PROCEDURE — 80069 RENAL FUNCTION PANEL: CPT

## 2019-06-10 PROCEDURE — 82306 VITAMIN D 25 HYDROXY: CPT

## 2019-06-18 ENCOUNTER — TELEPHONE (OUTPATIENT)
Dept: PODIATRY | Facility: CLINIC | Age: 69
End: 2019-06-18

## 2019-06-18 NOTE — TELEPHONE ENCOUNTER
----- Message from Angela Boss sent at 6/18/2019  7:06 AM CDT -----  Contact: Self [phone] or [phone]  New Patient would like to speak with you about being seen as soon as possible for foot pain. She states she can not wait till her 7/3/19 appointment. Please advise

## 2019-06-19 ENCOUNTER — OFFICE VISIT (OUTPATIENT)
Dept: URGENT CARE | Facility: CLINIC | Age: 69
End: 2019-06-19
Payer: COMMERCIAL

## 2019-06-19 VITALS
TEMPERATURE: 98 F | WEIGHT: 191 LBS | OXYGEN SATURATION: 98 % | HEART RATE: 80 BPM | HEIGHT: 66 IN | BODY MASS INDEX: 30.7 KG/M2 | SYSTOLIC BLOOD PRESSURE: 118 MMHG | RESPIRATION RATE: 16 BRPM | DIASTOLIC BLOOD PRESSURE: 78 MMHG

## 2019-06-19 DIAGNOSIS — M25.572 PAIN AND SWELLING OF LEFT ANKLE: Primary | ICD-10-CM

## 2019-06-19 DIAGNOSIS — M25.472 PAIN AND SWELLING OF LEFT ANKLE: Primary | ICD-10-CM

## 2019-06-19 DIAGNOSIS — S93.402A SPRAIN OF LEFT ANKLE, UNSPECIFIED LIGAMENT, INITIAL ENCOUNTER: ICD-10-CM

## 2019-06-19 PROCEDURE — 1101F PR PT FALLS ASSESS DOC 0-1 FALLS W/OUT INJ PAST YR: ICD-10-PCS | Mod: CPTII,S$GLB,, | Performed by: FAMILY MEDICINE

## 2019-06-19 PROCEDURE — 73610 XR ANKLE COMPLETE 3 VIEW LEFT: ICD-10-PCS | Mod: FY,LT,S$GLB, | Performed by: RADIOLOGY

## 2019-06-19 PROCEDURE — 99214 OFFICE O/P EST MOD 30 MIN: CPT | Mod: S$GLB,,, | Performed by: FAMILY MEDICINE

## 2019-06-19 PROCEDURE — 1101F PT FALLS ASSESS-DOCD LE1/YR: CPT | Mod: CPTII,S$GLB,, | Performed by: FAMILY MEDICINE

## 2019-06-19 PROCEDURE — 73610 X-RAY EXAM OF ANKLE: CPT | Mod: FY,LT,S$GLB, | Performed by: RADIOLOGY

## 2019-06-19 PROCEDURE — 99214 PR OFFICE/OUTPT VISIT, EST, LEVL IV, 30-39 MIN: ICD-10-PCS | Mod: S$GLB,,, | Performed by: FAMILY MEDICINE

## 2019-06-19 NOTE — PATIENT INSTRUCTIONS
Ankle Sprain, with X-Ray   A sprain is an injury to the ligaments that hold the ankle joint together. There are no broken bones. Most sprains take about 4 to 6 weeks to heal. A severe sprain, where the ligament is completely torn, can take several months to recover.  Mild to moderate sprains may be treated with an elastic wrap or an in-shoe splint. This will provide support and prevent re-injury. A mild sprain may not need any additional support. A severe sprain may require surgery to repair. In some cases a cast or boot may be needed.  Home care  · Stay off your injured ankle as much as possible until you can walk on it without pain. If you have a lot of pain with walking, then crutches or a walker may be prescribed. These can be rented or purchased at many pharmacies and surgical or orthopedic supply stores. Follow your healthcare providers advice about when to begin bearing weight on that leg.  · Keep your leg elevated to reduce pain and swelling. When sleeping, place a pillow under your injured ankle. When sitting, support your injured ankle and leg so they are level with your waist. This is very important during the first 48 hours.  · Place an ice pack over the injured area for no more than 20 minutes. Do this every 3 to 6 hours for the first 24 to 48 hours, or as instructed. To make an ice pack, put ice cubes in a plastic bag that seals at the top. Wrap the bag in a clean, thin towel or cloth. You can place the ice pack directly over the wrap, splint, or cast. Never place an ice pack directly on your skin. As the ice melts, be careful not to get any wrap, splint, or cast wet. Keep using ice packs as needed to ease pain and swelling.    · If you have a boot, open it to apply an ice pack, unless told otherwise by your provider. Wrap the ice pack in a clean, thin towel or cloth. Never put an ice pack directly on your skin.  · After 48 hours, it may also be helpful to apply heat for no more than 20 minutes,  several times a day. You can do this with a heating pad or warm compress. Or you may want to go back and forth between using ice and heat. Never apply heat directly to the skin. Always wrap the heating pad or warm compress in a clean, thin towel or cloth.  · You may use over-the-counter pain medicine to ease pain, unless another pain medicine was prescribed. Talk with your provider before using these medicines if you have chronic liver or kidney disease, or have ever had a stomach ulcer or GI (gastrointestinal) bleeding.  · You may return to sports after your ankle heals, when you can run without pain.  · You are at risk of getting injured again for the first 6 weeks. During that time, protect your ankle with an in-shoe splint that prevents your ankle from tilting side to side. This is very important if you do active work or play sports during that time.  Follow-up care  Any X-rays you had today dont show any broken bones, breaks, or fractures. Bruises and sprains can sometimes hurt as much as a fracture. These injuries can take time to heal completely. If your symptoms dont improve or they get worse, talk with your healthcare provider. You may need a repeat X-ray.  When to seek medical advice  Call your healthcare provider right away if any of these occur:  · The plaster cast or splint gets wet or soft  · The fiberglass cast or splint gets wet and does not dry for 24 hours  · The pain or swelling increases, or redness appears  · The cast has a bad smell  · Fever of 100.4 F (38 C) or higher, or as directed  · Your toes become cold, blue, numb, or tingly  · You re-injure your ankle  Date Last Reviewed: 11/20/2015  © 4472-3920 [employer]. [address] [place] [address], [place] [address]. All rights reserved. This information is not intended as a substitute for professional medical care. Always follow your healthcare professional's instructions.        Understanding Ankle Sprain    The ankle is the joint  where the leg and foot meet. Bones are held in place by connective tissue called ligaments. When ankle ligaments are stretched to the point of pain and injury, it is called an ankle sprain. A sprain can tear the ligaments. These tears can be very small but still cause pain. Ankle sprains can be mild or severe.  What causes an ankle sprain?  A sprain may occur when you twist your ankle or bend it too far. This can happen when you stumble or fall. Things that can make an ankle sprain more likely include:  · Having had an ankle sprain before  · Playing sports that involve running and jumping. Or playing contact sports such as football or hockey.  · Wearing shoes that dont support your feet and ankles well  · Having ankles with poor strength and flexibility  Symptoms of an ankle sprain  Symptoms may include:  · Pain or soreness in the ankle  · Swelling  · Redness or bruising  · Not being able to walk or put weight on the affected foot  · Reduced range of motion in the ankle  · A popping or tearing feeling at the time the sprain occurs  · An abnormal or dislocated look to the ankle  · Instability or too much range of motion in the ankle  Treatment for an ankle sprain  Treatment focuses on reducing pain and swelling, and avoiding further injury. Treatments may include:  · Resting the ankle. Avoid putting weight on it. This may mean using crutches until the sprain heals.  · Prescription or over-the-counter pain medicines. These help reduce swelling and pain.  · Cold packs. These help reduce pain and swelling.  · Raising your ankle above your heart. This helps reduce swelling.  · Wrapping the ankle with an elastic bandage or ankle brace. This helps reduce swelling and gives some support to the ankle. In rare cases, you may need a cast or boot.  · Stretching and other exercises. These improve flexibility and strength.  · Heat packs. These may be recommended before doing ankle exercises.  Possible complications of an ankle  sprain  An ankle that has been weakened by a sprain can be more likely to have repeated sprains afterward. Doing exercises to strengthen your ankle and improve balance can reduce your risk for repeated sprains. Other possible complications are long-term (chronic) pain or an ankle that remains unstable.  When to call your healthcare provider  Call your healthcare provider right away if you have any of these:  · Fever of 100.4°F (38°C) or higher, or as directed  · Pain, numbness, discoloration, or coldness in the foot or toes  · Pain that gets worse  · Symptoms that dont get better, or get worse  · New symptoms   Date Last Reviewed: 3/10/2016  © 7715-8094 [employer]. [address] [place] [address], [place] [address]. All rights reserved. This information is not intended as a substitute for professional medical care. Always follow your healthcare professional's instructions.

## 2019-06-19 NOTE — PROGRESS NOTES
"Subjective:       Patient ID: Carlene Fong is a 68 y.o. female.    Vitals:  height is 5' 6" (1.676 m) and weight is 86.6 kg (191 lb). Her oral temperature is 98.4 °F (36.9 °C). Her blood pressure is 118/78 and her pulse is 80. Her respiration is 16 and oxygen saturation is 98%.     Chief Complaint: Foot Swelling    L foot and ankle pain/swelling for 3 days, denies trauma. Pain better with ice, elevation, and ibuprofen.started 2 days ago, recalls some itching , also recalls going up and down stairs, going to a restaurant few days ago, no other injury or trauma    Edema   This is a new problem. The current episode started in the past 7 days. The problem occurs constantly. The problem has been gradually improving. Associated symptoms include joint swelling. Pertinent negatives include no arthralgias, chest pain, chills, congestion, coughing, fatigue, fever, headaches, myalgias, nausea, rash, sore throat, vertigo, vomiting or weakness. The symptoms are aggravated by walking. She has tried NSAIDs, rest and ice for the symptoms. The treatment provided mild relief.       Constitution: Negative for chills, fatigue and fever.   HENT: Negative for congestion and sore throat.    Neck: Negative for painful lymph nodes.   Cardiovascular: Negative for chest pain and leg swelling.   Eyes: Negative for double vision and blurred vision.   Respiratory: Negative for cough and shortness of breath.    Gastrointestinal: Negative for nausea, vomiting and diarrhea.   Genitourinary: Negative for dysuria, frequency, urgency and history of kidney stones.   Musculoskeletal: Positive for joint swelling. Negative for joint pain, muscle cramps and muscle ache.   Skin: Negative for color change, pale, rash and bruising.   Allergic/Immunologic: Negative for seasonal allergies.   Neurological: Negative for dizziness, history of vertigo, light-headedness, passing out and headaches.   Hematologic/Lymphatic: Negative for swollen lymph nodes. "   Psychiatric/Behavioral: Negative for nervous/anxious, sleep disturbance and depression. The patient is not nervous/anxious.        Objective:      Physical Exam   Constitutional: She is oriented to person, place, and time. She appears well-developed and well-nourished. She is cooperative.  Non-toxic appearance. She does not appear ill.   HENT:   Head: Normocephalic and atraumatic.   Right Ear: Hearing, tympanic membrane, external ear and ear canal normal.   Left Ear: Hearing, tympanic membrane, external ear and ear canal normal.   Nose: Nose normal. No mucosal edema, rhinorrhea or nasal deformity. No epistaxis. Right sinus exhibits no maxillary sinus tenderness and no frontal sinus tenderness. Left sinus exhibits no maxillary sinus tenderness and no frontal sinus tenderness.   Mouth/Throat: Uvula is midline, oropharynx is clear and moist and mucous membranes are normal. No trismus in the jaw. Normal dentition. No uvula swelling. No oropharyngeal exudate or posterior oropharyngeal erythema.   Eyes: Conjunctivae and lids are normal. Right eye exhibits no discharge. Left eye exhibits no discharge. No scleral icterus.   Sclera clear bilat   Neck: Trachea normal, normal range of motion, full passive range of motion without pain and phonation normal. Neck supple. No tracheal deviation present.   Cardiovascular: Normal rate, regular rhythm, normal heart sounds, intact distal pulses and normal pulses.   No murmur heard.  Pulmonary/Chest: Effort normal and breath sounds normal. No stridor. No respiratory distress. She has no wheezes.   Abdominal: Soft. Normal appearance and bowel sounds are normal. She exhibits no distension, no pulsatile midline mass and no mass. There is no tenderness.   Musculoskeletal: Normal range of motion. She exhibits no edema or deformity.   Left ankle with mild swelling and tenderness bilateral maleolar area, able to bear full weight on   Lymphadenopathy:     She has no cervical adenopathy.    Neurological: She is alert and oriented to person, place, and time. She exhibits normal muscle tone. Coordination normal.   Skin: Skin is warm, dry and intact. She is not diaphoretic. No pallor.   Psychiatric: She has a normal mood and affect. Her speech is normal and behavior is normal. Judgment and thought content normal. Cognition and memory are normal.   Nursing note and vitals reviewed.      Assessment:       1. Pain and swelling of left ankle    2. Sprain of left ankle, unspecified ligament, initial encounter        Plan:         Pain and swelling of left ankle  -     X-Ray Ankle Complete Left; Future; Expected date: 06/19/2019    Sprain of left ankle, unspecified ligament, initial encounter  -     X-Ray Ankle Complete Left; Future; Expected date: 06/19/2019        Apply ice  Elevate  Ibuprofen 2 po q 6 hours prn pain  RTC if  Pain prsists

## 2019-06-27 ENCOUNTER — TELEPHONE (OUTPATIENT)
Dept: ORTHOPEDICS | Facility: CLINIC | Age: 69
End: 2019-06-27

## 2019-06-27 NOTE — TELEPHONE ENCOUNTER
----- Message from Beulah Todd sent at 6/27/2019  7:01 AM CDT -----  Contact: Self [phone]  Patient is calling to see if she can be seen today due to severe shoulder pain.

## 2019-06-28 ENCOUNTER — HOSPITAL ENCOUNTER (EMERGENCY)
Facility: HOSPITAL | Age: 69
Discharge: HOME | End: 2019-06-28
Attending: EMERGENCY MEDICINE
Payer: COMMERCIAL

## 2019-06-28 ENCOUNTER — OFFICE VISIT (OUTPATIENT)
Dept: URGENT CARE | Facility: CLINIC | Age: 69
End: 2019-06-28
Payer: COMMERCIAL

## 2019-06-28 VITALS
HEIGHT: 66 IN | RESPIRATION RATE: 18 BRPM | OXYGEN SATURATION: 98 % | WEIGHT: 191 LBS | SYSTOLIC BLOOD PRESSURE: 130 MMHG | HEART RATE: 95 BPM | DIASTOLIC BLOOD PRESSURE: 62 MMHG | BODY MASS INDEX: 30.7 KG/M2 | TEMPERATURE: 99 F

## 2019-06-28 VITALS
RESPIRATION RATE: 18 BRPM | HEART RATE: 108 BPM | BODY MASS INDEX: 30.7 KG/M2 | WEIGHT: 191 LBS | OXYGEN SATURATION: 98 % | DIASTOLIC BLOOD PRESSURE: 77 MMHG | HEIGHT: 66 IN | TEMPERATURE: 100 F | SYSTOLIC BLOOD PRESSURE: 128 MMHG

## 2019-06-28 DIAGNOSIS — M71.21 BAKER CYST, RIGHT: Primary | ICD-10-CM

## 2019-06-28 DIAGNOSIS — M79.661 PAIN IN RIGHT LOWER LEG: Primary | ICD-10-CM

## 2019-06-28 DIAGNOSIS — M79.89 LEG SWELLING: ICD-10-CM

## 2019-06-28 DIAGNOSIS — M79.606 LEG PAIN: ICD-10-CM

## 2019-06-28 PROCEDURE — 99214 OFFICE O/P EST MOD 30 MIN: CPT | Mod: S$GLB,,, | Performed by: PHYSICIAN ASSISTANT

## 2019-06-28 PROCEDURE — 99284 EMERGENCY DEPT VISIT MOD MDM: CPT | Mod: 25

## 2019-06-28 PROCEDURE — 99214 PR OFFICE/OUTPT VISIT, EST, LEVL IV, 30-39 MIN: ICD-10-PCS | Mod: S$GLB,,, | Performed by: PHYSICIAN ASSISTANT

## 2019-06-28 PROCEDURE — 1101F PT FALLS ASSESS-DOCD LE1/YR: CPT | Mod: CPTII,S$GLB,, | Performed by: PHYSICIAN ASSISTANT

## 2019-06-28 PROCEDURE — 1101F PR PT FALLS ASSESS DOC 0-1 FALLS W/OUT INJ PAST YR: ICD-10-PCS | Mod: CPTII,S$GLB,, | Performed by: PHYSICIAN ASSISTANT

## 2019-06-28 NOTE — ED TRIAGE NOTES
Pt presents to ED with complaints of pain to her right calf. Pt states that a pain began in the back of her calf yesterday. Pt states today the pain has gotten worse and the pain had not moved to the front of her calf and she feel a pain in the front of her thigh. Pt states she does not have a history of blood clots, but that she did have surgery about a month ago. Pt states that she was sent to ER by urgent care to rule out blood clot. Pt states pain is the worst when she is walking and/or trying to lift her leg. Pt states pain is a 5 out of 10.  Pt denies chest pain & SOB.

## 2019-06-28 NOTE — PROGRESS NOTES
"Subjective:       Patient ID: Carlene Fong is a 68 y.o. female.    Vitals:  height is 5' 6" (1.676 m) and weight is 86.6 kg (191 lb). Her temperature is 99.6 °F (37.6 °C). Her blood pressure is 128/77 and her pulse is 108. Her respiration is 18 and oxygen saturation is 98%.     Chief Complaint: Leg Pain    Patient presents to urgent care for RLE pain x 1 day. Patient reports right calf to feel "extremely heavy and weak" without trauma or injury. Patient reports post-op x 3-4 weeks of parathyroid surgery and is worried about a possible DVT/PE. Patient currently denies fever, chills, body aches, CP, SOB, wheezing, abdominal pain, N/V/D/C, headache, blurry vision, dizziness, syncope, numbness or tingling.    Leg Pain    The incident occurred 12 to 24 hours ago. The injury mechanism is unknown. The pain is present in the right leg. The quality of the pain is described as aching. The pain is at a severity of 10/10. The pain is severe. The pain has been constant since onset. Associated symptoms include an inability to bear weight and muscle weakness. Pertinent negatives include no loss of motion, loss of sensation, numbness or tingling. The symptoms are aggravated by movement and weight bearing. She has tried ice, heat and NSAIDs for the symptoms. The treatment provided no relief.       Constitution: Positive for generalized weakness. Negative for chills, sweating, fatigue, fever and unexpected weight change.   HENT: Negative for ear pain, congestion, sore throat and trouble swallowing.    Neck: Negative for neck pain, neck stiffness, painful lymph nodes and neck swelling.   Cardiovascular: Negative for chest pain, leg swelling, palpitations, sob on exertion and passing out.   Eyes: Negative for eye pain, eye redness, photophobia, vision loss, double vision, blurred vision and eyelid swelling.   Respiratory: Negative for chest tightness, cough, sputum production, bloody sputum, shortness of breath, stridor and " wheezing.    Gastrointestinal: Negative for abdominal pain, abdominal bloating, nausea, vomiting, constipation, diarrhea and heartburn.   Genitourinary: Negative for dysuria, frequency, urgency, flank pain and hematuria.   Musculoskeletal: Positive for muscle ache. Negative for joint pain, joint swelling, abnormal ROM of joint, back pain and muscle cramps.   Skin: Negative for color change, pale, rash and bruising.   Allergic/Immunologic: Negative for seasonal allergies.   Neurological: Negative for dizziness, history of vertigo, light-headedness, passing out, loss of balance, headaches, altered mental status, loss of consciousness, numbness, tingling and seizures.   Hematologic/Lymphatic: Negative for swollen lymph nodes.   Psychiatric/Behavioral: Negative for altered mental status, nervous/anxious, sleep disturbance and depression. The patient is not nervous/anxious.        Objective:      Physical Exam   Constitutional: She is oriented to person, place, and time. Vital signs are normal. She appears well-developed and well-nourished. She is active and cooperative.  Non-toxic appearance. She does not appear ill. No distress.   HENT:   Head: Normocephalic and atraumatic.   Nose: Nose normal.   Mouth/Throat: Oropharynx is clear and moist and mucous membranes are normal.   Eyes: Pupils are equal, round, and reactive to light. Conjunctivae, EOM and lids are normal.   Neck: Trachea normal, normal range of motion, full passive range of motion without pain and phonation normal. Neck supple.   Cardiovascular: Normal rate, regular rhythm, normal heart sounds, intact distal pulses and normal pulses.   Pulmonary/Chest: Effort normal and breath sounds normal.   Abdominal: Soft. Normal appearance and bowel sounds are normal. She exhibits no abdominal bruit, no pulsatile midline mass and no mass.   Musculoskeletal: She exhibits no edema or deformity.        Right lower leg: She exhibits tenderness and swelling. She exhibits no  bony tenderness, no edema, no deformity and no laceration.        Left lower leg: Normal.        Right foot: Normal.        Left foot: Normal.   FROM to upper and lower extremities bilateral. 5/5 strength and full sensation bilateral. Mild amount of swelling and TTP over R calf. Pain with movement/walking, better with elevation/rest. R calf diameter 16.5 vs. L calf diameter 15. No palpable cord or erythema to R calf. Negative Homans sign. 2+ DP  pulses bilateral. No numbness or tingling. Negative straight leg raise. Able to ambulate without difficulty; patient reports very painful.   Lymphadenopathy:     She has no cervical adenopathy.   Neurological: She is alert and oriented to person, place, and time. She has normal strength and normal reflexes. No cranial nerve deficit or sensory deficit. She displays a negative Romberg sign. Coordination normal.   Skin: Skin is warm, dry and intact. Capillary refill takes less than 2 seconds. No rash noted. She is not diaphoretic.   Psychiatric: She has a normal mood and affect. Her speech is normal and behavior is normal. Judgment and thought content normal. Cognition and memory are normal.   Nursing note and vitals reviewed.      Assessment:       1. Pain in right lower leg        Plan:         Pain in right lower leg    Recommended patient go to the nearest ER for further evaluation due to recent history of surgery and pain out of proportion of RLE. Patient is stable, seated comfortably in NAD upon discharge. Patient aware, verbalized understanding and agreed with plan of care.    Patient Instructions   If you were prescribed a narcotic or controlled medication, do not drive or operate heavy equipment or machinery while taking these medications.  You must understand that you've received an Urgent Care treatment only and that you may be released before all your medical problems are known or treated. You, the patient, will arrange for follow up care as instructed.  Follow up  with your PCP or specialty clinic as directed if not improved or as needed. You can call [phone] to schedule an appointment with the appropriate provider.  If your condition worsens we recommend that you receive another evaluation at the Emergency Department for any concerns or worsening of condition.  Patient aware and verbalized understanding.    Recommended patient go to [facility] ER for further evaluation as discussed.  Patient aware, verbalized understanding and agreed with plan of care.

## 2019-06-28 NOTE — PATIENT INSTRUCTIONS
If you were prescribed a narcotic or controlled medication, do not drive or operate heavy equipment or machinery while taking these medications.  You must understand that you've received an Urgent Care treatment only and that you may be released before all your medical problems are known or treated. You, the patient, will arrange for follow up care as instructed.  Follow up with your PCP or specialty clinic as directed if not improved or as needed. You can call [phone] to schedule an appointment with the appropriate provider.  If your condition worsens we recommend that you receive another evaluation at the Emergency Department for any concerns or worsening of condition.  Patient aware and verbalized understanding.    Recommended patient go to [facility] ER for further evaluation as discussed.  Patient aware, verbalized understanding and agreed with plan of care.

## 2019-06-29 NOTE — ED PROVIDER NOTES
"Encounter Date: 6/28/2019    SCRIBE #1 NOTE: I, Florentin Jones, am scribing for, and in the presence of,  . I have scribed the entire note.       History     Chief Complaint   Patient presents with    Leg Pain     pt began having pain and swelling to her right calf yesterday, and today pain spread to her right shin. Went to urgent care and was told to come to the ER to rule out a blood clot. States right calf was 0.5" larger than her left.      Time seen by provider: 7:04 PM    This is a 67 yo WF with pmhx of hyperparthyroidism who presents with complaint of right leg pain x 1 day. She noticed the right calf was hurting yesterday and today the pain has spread to her shin as well. She went to urgent care and was told to come to the ER to rule out/rule in a possible DVT. She denies trauma to the leg, but states that she started walking again over the past few days. Pt denies hx of DVT when asked. Pt able to ambulate, but with some pain. Additionally, the patient denies any CP/SOB/N/V/fever/chills/easy bruising/muscle cramps/facial or oral numbness/paresthesias when asked.         Review of patient's allergies indicates:   Allergen Reactions    Requip [ropinirole] Swelling      palpitations, swelling of tongue     Past Medical History:   Diagnosis Date    Abnormal mammogram 8/10/2016    Followed up with diagnostic mammogram and ultrasound 10/22/2015at [facility]  that showed no concerning findings with recommendation to continue yearly screening.     Anxiety 8/8/2017    will need to readdress this if work up is negative and symptoms persist    Arthritis of knee 1/7/2015    Diverticulosis     Meniere's disease 8/15/2017    Osteopenia 12/1/2015    next bone density 4/2016, seeing endocrine for hyperparathyroidism     Positive anti-CCP test 4/7/2014    Primary hyperparathyroidism 8/26/2013    will be following up with Dr. [facility] in 2016, blood work today. stoped taking calcitonin due to " nausea     Rheumatoid factor positive 4/7/2014    Vertigo 7/16/2017    Vitamin D deficiency 12/1/2015     Past Surgical History:   Procedure Laterality Date    APPENDECTOMY      HYSTERECTOMY      PARATHYROIDECTOMY N/A 5/22/2019    Performed [facility] at [facility] 2ND [facility]    REPAIR ROTATOR CUFF ARTHROSCOPIC Right 12/5/2014    [facility] Jr[facility]    ROTATOR CUFF REPAIR Right 12/2014    TUBAL LIGATION       Family History   Problem Relation Age of Onset    Diabetes Father     Dementia Father     No Known Problems Mother     Rheum arthritis Neg Hx     Lupus Neg Hx     Inflammatory bowel disease Neg Hx      Social History     Tobacco Use    Smoking status: Never Smoker    Smokeless tobacco: Never Used   Substance Use Topics    Alcohol use: No     Frequency: Never     Binge frequency: Never    Drug use: No     Review of Systems   Constitutional: Negative for chills and fever.   HENT: Negative for congestion, rhinorrhea and sore throat.    Eyes: Negative for redness and visual disturbance.   Respiratory: Negative for cough, shortness of breath and wheezing.    Cardiovascular: Negative for chest pain and palpitations.   Gastrointestinal: Negative for abdominal pain, diarrhea, nausea and vomiting.   Genitourinary: Negative for dysuria and hematuria.   Musculoskeletal: Positive for arthralgias. Negative for back pain, gait problem, myalgias and neck pain.   Skin: Negative for rash.   Neurological: Negative for dizziness, weakness and light-headedness.   Psychiatric/Behavioral: Negative for confusion.       Physical Exam     Initial Vitals [06/28/19 1846]   BP Pulse Resp Temp SpO2   131/68 99 20 99.4 °F (37.4 °C) 100 %      MAP       --         Physical Exam    Nursing note and vitals reviewed.  Constitutional: She appears well-developed and well-nourished. She is not diaphoretic. No distress.   HENT:   Head: Normocephalic and atraumatic.   Mouth/Throat: Oropharynx  is clear and moist.   Eyes: Conjunctivae and EOM are normal. Pupils are equal, round, and reactive to light.   Neck: Neck supple.   Cardiovascular: Normal rate, regular rhythm, normal heart sounds and intact distal pulses. Exam reveals no gallop and no friction rub.    No murmur heard.  Pulmonary/Chest: Breath sounds normal. She has no wheezes. She has no rhonchi. She has no rales.   Abdominal: Soft. She exhibits no distension. There is no tenderness.   Musculoskeletal: Normal range of motion.        Right knee: She exhibits normal range of motion, no swelling, no effusion and no ecchymosis.   Mild tenderness noted to R calf  No erythema to R calf  Negative Homans sign  No palpable cords to R calf  R calf diameter>L calf diameter  No pain on passive stretch  Leg compartments compressible and without pain    Neurological: She is alert and oriented to person, place, and time.   Skin: No rash noted. No erythema.   Psychiatric: She has a normal mood and affect.         ED Course   Procedures  Labs Reviewed - No data to display       Imaging Results          US Lower Extremity Veins Right (Final result)  Result time 06/28/19 19:44:14    Final result by Zack Green MD (06/28/19 19:44:14)                 Impression:      No evidence of deep venous thrombosis in the right lower extremity.    4.7 x 2.5 x 1.3 cm right-sided Baker's cyst.      Electronically signed by: Zack Green MD  Date:    06/28/2019  Time:    19:44             Narrative:    EXAMINATION:  US LOWER EXTREMITY VEINS RIGHT    CLINICAL HISTORY:  Pain in leg, unspecified    TECHNIQUE:  Duplex and color flow Doppler evaluation and graded compression of the right lower extremity veins was performed.    COMPARISON:  12/29/2014.    FINDINGS:  Right thigh veins: The common femoral, femoral, popliteal, upper greater saphenous, and deep femoral veins are patent and free of thrombus. The veins are normally compressible and have normal phasic flow and augmentation  response.    Right calf veins: The visualized calf veins are patent.    Contralateral CFV: The contralateral (left) common femoral vein is patent and free of thrombus.    Miscellaneous: There is an anechoic 4.7 x 2.5 x 1.3 cm avascular lesion in the right popliteal fossa.                                 Medical Decision Making:   Clinical Tests:   Radiological Study: Ordered and Reviewed  ED Management:  - DVT ultrasound of RLE negative for DVT; findings suggestive of Baker's cyst  - Will recommend symptomatic care for Baker's cyst  - Pt to f/u with her PCP in next 2-3 days for recheck of today's complaints  - No further intervention is indicated at this time after having taken into account the patient's history, physical exam findings, and empirical and objective data obtained during the patient's emergency department workup.   - The patient is at low risk for an emergent medical condition at this time, and I am of the belief that that it is safe to discharge the patient from the emergency department.   - The patient is instructed to follow up as outpatient as indicated on the discharge paperwork.    - I have discussed the specifics of the workup with the patient and the patient has verbalized understanding of the details of the workup, the diagnosis, the treatment plan, and the need for outpatient follow-up.    - Although the patient has no emergent etiology today this does not preclude the development of an emergent condition so, in addition, I have advised the patient that they can return to the ED and/or activate EMS at any time with worsening of their symptoms, change of their symptoms, or with any other medical complaint.    - The patient remained comfortable and stable during their visit in the ED.    - Discharge and follow-up instructions discussed with the patient who expressed understanding and willingness to comply with my recommendations.  - Results of all emergency department tests  discussed  thoroughly with patient; all patient questions answered; pt in agreement with plan  - Pt instructed to follow up with PCP in 2-3 days for recheck of today's complaints  - Pt given strict emergency department return precautions for any new or worsening of symptoms  - Pt discharged from the emergency department in stable condition, in no acute distress                        Clinical Impression:       ICD-10-CM ICD-9-CM   1. Baker cyst, right M71.21 727.51   2. Leg pain M79.606 729.5   3. Leg swelling M79.89 729.81              I, Ralph Snyder,  personally performed the services described in this documentation. All medical record entries made by the scribe were at my direction and in my presence.  I have reviewed the chart and agree that the record reflects my personal performance and is accurate and complete. Ralph Snyder M.D. 9:17 PM06/28/2019               Ralph Snyder MD  06/28/19 [id number]

## 2019-06-29 NOTE — ED NOTES
Pt laying in bed, requested ice chips. Per Dr. Snyder, ok for pt to have ice chips. Ice chips provided, call light within reach.

## 2019-07-02 ENCOUNTER — PATIENT MESSAGE (OUTPATIENT)
Dept: ORTHOPEDICS | Facility: CLINIC | Age: 69
End: 2019-07-02

## 2019-07-08 ENCOUNTER — OFFICE VISIT (OUTPATIENT)
Dept: ENDOCRINOLOGY | Facility: CLINIC | Age: 69
End: 2019-07-08
Attending: INTERNAL MEDICINE
Payer: COMMERCIAL

## 2019-07-08 ENCOUNTER — OFFICE VISIT (OUTPATIENT)
Dept: ORTHOPEDICS | Facility: CLINIC | Age: 69
End: 2019-07-08
Payer: COMMERCIAL

## 2019-07-08 VITALS
RESPIRATION RATE: 16 BRPM | DIASTOLIC BLOOD PRESSURE: 79 MMHG | SYSTOLIC BLOOD PRESSURE: 117 MMHG | HEART RATE: 81 BPM | WEIGHT: 198.63 LBS | HEIGHT: 66 IN | BODY MASS INDEX: 31.92 KG/M2

## 2019-07-08 VITALS — BODY MASS INDEX: 30.7 KG/M2 | WEIGHT: 191 LBS | HEIGHT: 66 IN

## 2019-07-08 DIAGNOSIS — E21.0 PRIMARY HYPERPARATHYROIDISM: Primary | ICD-10-CM

## 2019-07-08 DIAGNOSIS — M25.569 KNEE PAIN, UNSPECIFIED CHRONICITY, UNSPECIFIED LATERALITY: ICD-10-CM

## 2019-07-08 DIAGNOSIS — M75.42 IMPINGEMENT SYNDROME OF LEFT SHOULDER: Primary | ICD-10-CM

## 2019-07-08 DIAGNOSIS — G89.29 CHRONIC BILATERAL LOW BACK PAIN WITHOUT SCIATICA: ICD-10-CM

## 2019-07-08 DIAGNOSIS — M81.0 POSTMENOPAUSAL OSTEOPOROSIS: ICD-10-CM

## 2019-07-08 DIAGNOSIS — R79.89 LOW SERUM THYROID STIMULATING HORMONE (TSH): ICD-10-CM

## 2019-07-08 DIAGNOSIS — E66.9 OBESITY (BMI 30.0-34.9): ICD-10-CM

## 2019-07-08 DIAGNOSIS — M54.50 CHRONIC BILATERAL LOW BACK PAIN WITHOUT SCIATICA: ICD-10-CM

## 2019-07-08 PROBLEM — E83.52 HYPERCALCEMIA: Status: RESOLVED | Noted: 2018-05-03 | Resolved: 2019-07-08

## 2019-07-08 PROBLEM — M81.6 LOCALIZED OSTEOPOROSIS WITHOUT CURRENT PATHOLOGICAL FRACTURE: Status: RESOLVED | Noted: 2019-01-21 | Resolved: 2019-07-08

## 2019-07-08 PROCEDURE — 1101F PR PT FALLS ASSESS DOC 0-1 FALLS W/OUT INJ PAST YR: ICD-10-PCS | Mod: CPTII,S$GLB,, | Performed by: ORTHOPAEDIC SURGERY

## 2019-07-08 PROCEDURE — 1101F PT FALLS ASSESS-DOCD LE1/YR: CPT | Mod: CPTII,S$GLB,, | Performed by: ORTHOPAEDIC SURGERY

## 2019-07-08 PROCEDURE — 20610 PR DRAIN/INJECT LARGE JOINT/BURSA: ICD-10-PCS | Mod: LT,S$GLB,, | Performed by: ORTHOPAEDIC SURGERY

## 2019-07-08 PROCEDURE — 99999 PR PBB SHADOW E&M-EST. PATIENT-LVL III: CPT | Mod: PBBFAC,,, | Performed by: ORTHOPAEDIC SURGERY

## 2019-07-08 PROCEDURE — 99214 OFFICE O/P EST MOD 30 MIN: CPT | Mod: S$GLB,,, | Performed by: INTERNAL MEDICINE

## 2019-07-08 PROCEDURE — 99214 PR OFFICE/OUTPT VISIT, EST, LEVL IV, 30-39 MIN: ICD-10-PCS | Mod: S$GLB,,, | Performed by: INTERNAL MEDICINE

## 2019-07-08 PROCEDURE — 1101F PT FALLS ASSESS-DOCD LE1/YR: CPT | Mod: CPTII,S$GLB,, | Performed by: INTERNAL MEDICINE

## 2019-07-08 PROCEDURE — 99213 PR OFFICE/OUTPT VISIT, EST, LEVL III, 20-29 MIN: ICD-10-PCS | Mod: 25,S$GLB,, | Performed by: ORTHOPAEDIC SURGERY

## 2019-07-08 PROCEDURE — 1101F PR PT FALLS ASSESS DOC 0-1 FALLS W/OUT INJ PAST YR: ICD-10-PCS | Mod: CPTII,S$GLB,, | Performed by: INTERNAL MEDICINE

## 2019-07-08 PROCEDURE — 99999 PR PBB SHADOW E&M-EST. PATIENT-LVL III: ICD-10-PCS | Mod: PBBFAC,,, | Performed by: ORTHOPAEDIC SURGERY

## 2019-07-08 PROCEDURE — 99213 OFFICE O/P EST LOW 20 MIN: CPT | Mod: 25,S$GLB,, | Performed by: ORTHOPAEDIC SURGERY

## 2019-07-08 PROCEDURE — 20610 DRAIN/INJ JOINT/BURSA W/O US: CPT | Mod: LT,S$GLB,, | Performed by: ORTHOPAEDIC SURGERY

## 2019-07-08 RX ORDER — TRIAMCINOLONE ACETONIDE 40 MG/ML
40 INJECTION, SUSPENSION INTRA-ARTICULAR; INTRAMUSCULAR
Status: COMPLETED | OUTPATIENT
Start: 2019-07-08 | End: 2019-07-08

## 2019-07-08 RX ORDER — DICLOFENAC SODIUM 10 MG/G
2 GEL TOPICAL 3 TIMES DAILY
Qty: 100 G | Refills: 5 | Status: SHIPPED | OUTPATIENT
Start: 2019-07-08 | End: 2023-03-17 | Stop reason: ALTCHOICE

## 2019-07-08 RX ADMIN — TRIAMCINOLONE ACETONIDE 40 MG: 40 INJECTION, SUSPENSION INTRA-ARTICULAR; INTRAMUSCULAR at 09:07

## 2019-07-08 NOTE — PROGRESS NOTES
Subjective:      Patient ID: Carlene Fong is a 68 y.o. female.  Chief Complaint: Pain and Follow-up of the Left Shoulder      HPI  Carlene Fong is a  68 y.o. female presenting today for follow up of left shoulder impingement.  She reports that she is having a flare-up for the past month or so  She received injection about a year ago with good results.    Review of patient's allergies indicates:   Allergen Reactions    Requip [ropinirole] Swelling      palpitations, swelling of tongue         Current Outpatient Medications   Medication Sig Dispense Refill    calcitRIOL (ROCALTROL) 0.25 MCG Cap Take 1 capsule (0.25 mcg total) by mouth once daily. 30 capsule 0    calcium carbonate (TUMS) 200 mg calcium (500 mg) chewable tablet Take 2 tablets (1,000 mg total) by mouth 4 (four) times daily. 240 tablet 2    co-enzyme Q-10 30 mg capsule Take 30 mg by mouth once daily.       diclofenac sodium 1 % Gel Apply 2 g topically 3 (three) times daily. (Patient taking differently: Apply 2 g topically 3 (three) times daily as needed. ) 100 g 5    docusate sodium (COLACE) 100 MG capsule Take 1 capsule (100 mg total) by mouth 2 (two) times daily.  0    FLAXSEED ORAL Take by mouth once daily.       fluticasone (FLONASE) 50 mcg/actuation nasal spray 2 sprays (100 mcg total) by Each Nare route once daily. (Patient taking differently: 2 sprays by Each Nare route nightly. ) 16 g 11    gabapentin (NEURONTIN) 300 MG capsule Take 1 capsule (300 mg total) by mouth 2 (two) times daily. (Patient taking differently: Take 300 mg by mouth every evening. ) 180 capsule 3    hydroCHLOROthiazide (HYDRODIURIL) 12.5 MG Tab Take 12.5 mg by mouth every other day.       ondansetron (ZOFRAN) 8 MG tablet Take 1 tablet (8 mg total) by mouth every 8 (eight) hours as needed for Nausea. 15 tablet 1    vitamin D 1000 units Tab Take 185 mg by mouth once daily.      omeprazole (PRILOSEC) 20 MG capsule Take 1 capsule (20 mg total) by mouth  "once daily. 90 capsule 3     No current facility-administered medications for this visit.        Past Medical History:   Diagnosis Date    Abnormal mammogram 8/10/2016    Followed up with diagnostic mammogram and ultrasound 10[facility]  that showed no concerning findings with recommendation to continue yearly screening.     Anxiety 8/8/2017    will need to readdress this if work up is negative and symptoms persist    Arthritis of knee 1/7/2015    Diverticulosis     Meniere's disease 8/15/2017    Osteopenia 12/1/2015    next bone density 4/2016, seeing endocrine for hyperparathyroidism     Positive anti-CCP test 4/7/2014    Primary hyperparathyroidism 8/26/2013    will be following up with Dr. [facility]ule[facility] in 2016, blood work today. stoped taking calcitonin due to nausea     Rheumatoid factor positive 4/7/2014    Vertigo 7/16/2017    Vitamin D deficiency 12/1/2015       Past Surgical History:   Procedure Laterality Date    APPENDECTOMY      HYSTERECTOMY      PARATHYROIDECTOMY N/A 5/22/2019    Performed by [facility] at [facility] 2ND [facility]    REPAIR ROTATOR CUFF ARTHROSCOPIC Right 12/5/2014    Performed by Yaw Horner Jr., MD at [facility] OR    ROTATOR CUFF REPAIR Right 12/2014    TUBAL LIGATION         OBJECTIVE:   PHYSICAL EXAM:  Height: 5' 6" (167.6 cm) Weight: 86.6 kg (191 lb)  Vitals:    07/08/19 0911   Weight: 86.6 kg (191 lb)   Height: 5' 6" (1.676 m)   PainSc:   2   PainLoc: Shoulder     Ortho/SPM Exam  Examination left shoulder no tenderness no swelling full range of motion left shoulder  Positive impingement sign  No instability  Rotator cuff strength intact    RADIOGRAPHS:  None  Comments: I have personally reviewed the imaging and I agree with the above radiologist's report.    ASSESSMENT/PLAN:     IMPRESSION:  Left shoulder impingement    PLAN:  After pause for time-out patient identified the left shoulder injected with Kenalog 40 mg 2 cc xylocaine sterile " technique  She tolerated the procedure well without complication  Continue Advil or Motrin by mouth  Follow-up if symptoms persist    FOLLOW UP:  As needed    Disclaimer: This note has been generated using voice-recognition software. There may be typographical errors that have been missed during proof-reading.

## 2019-07-08 NOTE — PROGRESS NOTES
Subjective:      Patient ID: Carlene Fong is a 68 y.o. female.    Chief Complaint:  Hyperparathyroidism      History of Present Illness  Ms. Fong presents for follow up s/p 3 gland parathyroidectomy for primary HPT. Last visit 1/21/2019.    With regards to the hypercalcemia/ primary hyperparathyroidism:    S/p 3 gland parathyroidectomy in 5/22/2019:  6. PARATHYROID, RIGHT SUPERIOR, EXCISION:  Hypercellular parathyroid tissue (48 mg).  Negative for malignancy.  7. PARATHYROID, RIGHT INFERIOR, EXCISION:  Hypercellular parathyroid tissue (60 mg).  Benign lymphoid tissue.  Negative for malignancy.  8. PARATHYROID, LEFT SUPERIOR, EXCISION:  Hypercellular parathyroid tissue (406 mg).  Negative for malignancy.     Thyroid ultrasound 1/2019:  The thyroid is normal in size .  The right lobe measures 4.7 cm in length and 2.1 x 1.6 cm in transverse dimensions.  The left lobe measures 4.5 cm in length and 5 1.6 x 1.6 cm in transverse dimension.    There is a 6 mm cystic area in the right mid pole.  There is a small 7 mm heterogeneous area in the right lower pole.  There is a mixed cystic/solid 8 mm area in the left midpole, TR 2 lesion.  There is a 5 mm hypoechoic nodule in the left lower pole, TR 4 lesion.  No concerning nodules per ACR criteria.  No definitive microcalcifications identified.    Developed transient hypoparathyroidism and hypocalcemia postoperatively requiring admission for IV calcium. Now off of calcitriol and only taking calcium supplemenation. Denies any muscle cramping or digital numbness/tingling. Still getting some intermittent perioral numbness and tingling in the morning prior to taking her calcium dose.      With regards to osteoporosis:   Hysterectomy      Took premarin, stopped >2-3y ago. Low dose.   During her 50s for about 10y.      current medication:  None.     Vit D intake?  1000 units daily  Weight bearing exercise?   Walking - 3x week, 20-30min  Recent falls? No   Fell one year ago.   Hit wall and cracked ribbed. Hadn't eaten and fainted.     They have made fall precautions in house      No recent fractures   No significant height loss (>2 inches).     Tob use?  None   EtOH use? none     No current diarrhea or h/o malabsorption  Only takes prilosec as needed.      Denies chronic exposure to steroid therapy, anticoagulants, or antiseizure medications.   Denies history of thyroid disease, anemia, kidney stones or kidney disease.      Denies active malignancy, history of malignancy the involved the bone, prior radiation treatment, or unexplained elevations of alk phos on labs.     Review of Systems   Constitutional: Negative for chills and fever.   Gastrointestinal: Negative for nausea.       Objective:   Physical Exam   Nursing note and vitals reviewed.      Lab Review:   Results for SPARKLE VALDIVIA (MRN [id number]) as of 7/8/2019 09:06   Ref. Range 5/28/2019 06:52 5/28/2019 07:13 6/10/2019 07:23 6/10/2019 07:23   Sodium Latest Ref Range: 136 - 145 mmol/L 141   142   Potassium Latest Ref Range: 3.5 - 5.1 mmol/L 3.6   4.4   Chloride Latest Ref Range: 95 - 110 mmol/L 105   101   CO2 Latest Ref Range: 23 - 29 mmol/L 23   30 (H)   Anion Gap Latest Ref Range: 8 - 16 mmol/L 13   11   BUN, Bld Latest Ref Range: 8 - 23 mg/dL 14   9   Creatinine Latest Ref Range: 0.5 - 1.4 mg/dL 0.7   0.7   eGFR if non African American Latest Ref Range: >60 mL/min/1.73 m^2 >60   >60   eGFR if  Latest Ref Range: >60 mL/min/1.73 m^2 >60   >60   Glucose Latest Ref Range: 70 - 110 mg/dL 141 (H)   86   Calcium Latest Ref Range: 8.7 - 10.5 mg/dL 9.3  10.6 (H) 10.6 (H)   Phosphorus Latest Ref Range: 2.7 - 4.5 mg/dL 2.8   4.3   Magnesium Latest Ref Range: 1.6 - 2.6 mg/dL 1.6      Alkaline Phosphatase Latest Ref Range: 55 - 135 U/L 103      PROTEIN TOTAL Latest Ref Range: 6.0 - 8.4 g/dL 6.7      Albumin Latest Ref Range: 3.5 - 5.2 g/dL 3.5   3.4 (L)   BILIRUBIN TOTAL Latest Ref Range: 0.1 - 1.0 mg/dL 0.9      AST  Latest Ref Range: 10 - 40 U/L 9 (L)      ALT Latest Ref Range: 10 - 44 U/L 10      Troponin I Latest Ref Range: 0.000 - 0.026 ng/mL  <0.006     BNP Latest Ref Range: 0 - 99 pg/mL  57     Vit D, 25-Hydroxy Latest Ref Range: 30 - 96 ng/mL   43    TSH Latest Ref Range: 0.400 - 4.000 uIU/mL  0.070 (L)     Free T4 Latest Ref Range: 0.71 - 1.51 ng/dL  1.75 (H)     PTH Latest Ref Range: 9.0 - 77.0 pg/mL   18.3        Assessment:     1. Primary hyperparathyroidism    2. Postmenopausal osteoporosis    3. Obesity (BMI 30.0-34.9)    4. Low serum thyroid stimulating hormone (TSH)      Plan:     1.  Primary hyperparathyroidism  --S/p 3 gland parathyroidectomy 5/22/2019  --Developed post op hypoparathyroidism that seems to be resolving now  --Will check PTH and renal panel now  --I have advised her to change her calcium intake to 500 mg PO BID with breakfast and dinner     2. Osteoporosis  --Previously discussed treatment options - bisphosphonate v prolia.   --Since she has had parathyroidectomy will repeat bone density in one year before deciding if medical therapy indicated       3. Overweight (BMI 32)  Recommend continued walking and weight bearing exercises.  Follow healthy lifestyle diet.  On low sodium diet for Meniere's.    4. Low serum thyroid stimulating hormone   --Was done shortly after surgery and likely a result of thyroid manipulation  --Will repeat TSH now to ensure thyroid function is normal    Mesfin Balderas M.D. Staff Endocrinology

## 2019-07-09 ENCOUNTER — LAB VISIT (OUTPATIENT)
Dept: LAB | Facility: HOSPITAL | Age: 69
End: 2019-07-09
Attending: INTERNAL MEDICINE
Payer: COMMERCIAL

## 2019-07-09 DIAGNOSIS — R79.89 LOW SERUM THYROID STIMULATING HORMONE (TSH): ICD-10-CM

## 2019-07-09 DIAGNOSIS — M81.0 POSTMENOPAUSAL OSTEOPOROSIS: ICD-10-CM

## 2019-07-09 DIAGNOSIS — E66.9 OBESITY (BMI 30.0-34.9): ICD-10-CM

## 2019-07-09 DIAGNOSIS — E21.0 PRIMARY HYPERPARATHYROIDISM: ICD-10-CM

## 2019-07-09 LAB
ALBUMIN SERPL BCP-MCNC: 3.4 G/DL (ref 3.5–5.2)
ANION GAP SERPL CALC-SCNC: 8 MMOL/L (ref 8–16)
BUN SERPL-MCNC: 15 MG/DL (ref 8–23)
CALCIUM SERPL-MCNC: 9.5 MG/DL (ref 8.7–10.5)
CHLORIDE SERPL-SCNC: 106 MMOL/L (ref 95–110)
CO2 SERPL-SCNC: 27 MMOL/L (ref 23–29)
CREAT SERPL-MCNC: 0.6 MG/DL (ref 0.5–1.4)
EST. GFR  (AFRICAN AMERICAN): >60 ML/MIN/1.73 M^2
EST. GFR  (NON AFRICAN AMERICAN): >60 ML/MIN/1.73 M^2
GLUCOSE SERPL-MCNC: 81 MG/DL (ref 70–110)
PHOSPHATE SERPL-MCNC: 3.1 MG/DL (ref 2.7–4.5)
POTASSIUM SERPL-SCNC: 4.1 MMOL/L (ref 3.5–5.1)
PTH-INTACT SERPL-MCNC: 61 PG/ML (ref 9–77)
SODIUM SERPL-SCNC: 141 MMOL/L (ref 136–145)
T4 FREE SERPL-MCNC: 0.95 NG/DL (ref 0.71–1.51)
TSH SERPL DL<=0.005 MIU/L-ACNC: 2 UIU/ML (ref 0.4–4)

## 2019-07-09 PROCEDURE — 84439 ASSAY OF FREE THYROXINE: CPT

## 2019-07-09 PROCEDURE — 83970 ASSAY OF PARATHORMONE: CPT

## 2019-07-09 PROCEDURE — 80069 RENAL FUNCTION PANEL: CPT

## 2019-07-09 PROCEDURE — 84443 ASSAY THYROID STIM HORMONE: CPT

## 2019-07-09 PROCEDURE — 36415 COLL VENOUS BLD VENIPUNCTURE: CPT | Mod: PO

## 2019-07-10 ENCOUNTER — TELEPHONE (OUTPATIENT)
Dept: ORTHOPEDICS | Facility: CLINIC | Age: 69
End: 2019-07-10

## 2019-07-10 DIAGNOSIS — M25.561 PAIN IN BOTH KNEES, UNSPECIFIED CHRONICITY: Primary | ICD-10-CM

## 2019-07-10 DIAGNOSIS — M25.562 PAIN IN BOTH KNEES, UNSPECIFIED CHRONICITY: Primary | ICD-10-CM

## 2019-07-12 ENCOUNTER — PATIENT MESSAGE (OUTPATIENT)
Dept: ENDOCRINOLOGY | Facility: CLINIC | Age: 69
End: 2019-07-12

## 2019-08-01 ENCOUNTER — PATIENT MESSAGE (OUTPATIENT)
Dept: ENDOCRINOLOGY | Facility: CLINIC | Age: 69
End: 2019-08-01

## 2019-08-01 ENCOUNTER — TELEPHONE (OUTPATIENT)
Dept: ENDOCRINOLOGY | Facility: CLINIC | Age: 69
End: 2019-08-01

## 2019-08-01 DIAGNOSIS — E21.0 PRIMARY HYPERPARATHYROIDISM: Primary | ICD-10-CM

## 2019-08-01 DIAGNOSIS — M81.0 POSTMENOPAUSAL OSTEOPOROSIS: ICD-10-CM

## 2019-08-01 NOTE — TELEPHONE ENCOUNTER
Left message, regarding 6 month follow up.  Per Dr Burnett, she would like you to follow up with a provider in December and blood work a week prior. I did schedule you for blood work in [place] on 11/25 and Dr Escobar on 12/02 at 8:00am. Appointment reminder weill be mailed to your house.

## 2019-09-16 DIAGNOSIS — B02.9 HERPES ZOSTER WITHOUT COMPLICATION: ICD-10-CM

## 2019-09-16 RX ORDER — GABAPENTIN 300 MG/1
CAPSULE ORAL
Qty: 180 CAPSULE | Refills: 3 | Status: SHIPPED | OUTPATIENT
Start: 2019-09-16 | End: 2020-12-01 | Stop reason: SDUPTHER

## 2019-09-17 ENCOUNTER — PATIENT OUTREACH (OUTPATIENT)
Dept: ADMINISTRATIVE | Facility: OTHER | Age: 69
End: 2019-09-17

## 2019-09-17 ENCOUNTER — OFFICE VISIT (OUTPATIENT)
Dept: ORTHOPEDICS | Facility: CLINIC | Age: 69
End: 2019-09-17
Payer: COMMERCIAL

## 2019-09-17 VITALS — HEIGHT: 66 IN | WEIGHT: 198 LBS | BODY MASS INDEX: 31.82 KG/M2

## 2019-09-17 DIAGNOSIS — Z12.11 ENCOUNTER FOR FIT (FECAL IMMUNOCHEMICAL TEST) SCREENING: Primary | ICD-10-CM

## 2019-09-17 DIAGNOSIS — G56.02 CARPAL TUNNEL SYNDROME OF LEFT WRIST: Primary | ICD-10-CM

## 2019-09-17 PROCEDURE — 1101F PT FALLS ASSESS-DOCD LE1/YR: CPT | Mod: CPTII,S$GLB,, | Performed by: ORTHOPAEDIC SURGERY

## 2019-09-17 PROCEDURE — 20526 PR INJECT CARPAL TUNNEL: ICD-10-PCS | Mod: 50,S$GLB,, | Performed by: ORTHOPAEDIC SURGERY

## 2019-09-17 PROCEDURE — 99213 OFFICE O/P EST LOW 20 MIN: CPT | Mod: 25,S$GLB,, | Performed by: ORTHOPAEDIC SURGERY

## 2019-09-17 PROCEDURE — 99213 PR OFFICE/OUTPT VISIT, EST, LEVL III, 20-29 MIN: ICD-10-PCS | Mod: 25,S$GLB,, | Performed by: ORTHOPAEDIC SURGERY

## 2019-09-17 PROCEDURE — 99999 PR PBB SHADOW E&M-EST. PATIENT-LVL III: ICD-10-PCS | Mod: PBBFAC,,, | Performed by: ORTHOPAEDIC SURGERY

## 2019-09-17 PROCEDURE — 1101F PR PT FALLS ASSESS DOC 0-1 FALLS W/OUT INJ PAST YR: ICD-10-PCS | Mod: CPTII,S$GLB,, | Performed by: ORTHOPAEDIC SURGERY

## 2019-09-17 PROCEDURE — 20526 THER INJECTION CARP TUNNEL: CPT | Mod: 50,S$GLB,, | Performed by: ORTHOPAEDIC SURGERY

## 2019-09-17 PROCEDURE — 99999 PR PBB SHADOW E&M-EST. PATIENT-LVL III: CPT | Mod: PBBFAC,,, | Performed by: ORTHOPAEDIC SURGERY

## 2019-09-17 RX ORDER — TRIAMCINOLONE ACETONIDE 40 MG/ML
40 INJECTION, SUSPENSION INTRA-ARTICULAR; INTRAMUSCULAR
Status: COMPLETED | OUTPATIENT
Start: 2019-09-17 | End: 2019-09-17

## 2019-09-17 RX ADMIN — TRIAMCINOLONE ACETONIDE 40 MG: 40 INJECTION, SUSPENSION INTRA-ARTICULAR; INTRAMUSCULAR at 09:09

## 2019-09-17 NOTE — PROGRESS NOTES
Subjective:      Patient ID: Carlene Fong is a 68 y.o. female.  Chief Complaint: Pain of the Left Hand and Pain of the Right Hand      HPI  Carlene Fong is a  68 y.o. female presenting today for follow up of bilateral carpal tunnel syndrome.  She reports that she is having a flare-up again in both hands  She would like injection performed.    Review of patient's allergies indicates:   Allergen Reactions    Requip [ropinirole] Swelling      palpitations, swelling of tongue         Current Outpatient Medications   Medication Sig Dispense Refill    calcium carbonate (TUMS) 200 mg calcium (500 mg) chewable tablet Take 2 tablets (1,000 mg total) by mouth 4 (four) times daily. 240 tablet 2    co-enzyme Q-10 30 mg capsule Take 30 mg by mouth once daily.       diclofenac sodium (VOLTAREN) 1 % Gel Apply 2 g topically 3 (three) times daily. 100 g 5    docusate sodium (COLACE) 100 MG capsule Take 1 capsule (100 mg total) by mouth 2 (two) times daily.  0    FLAXSEED ORAL Take by mouth once daily.       fluticasone (FLONASE) 50 mcg/actuation nasal spray 2 sprays (100 mcg total) by Each Nare route once daily. (Patient taking differently: 2 sprays by Each Nare route nightly. ) 16 g 11    gabapentin (NEURONTIN) 300 MG capsule TAKE 1 CAPSULE BY MOUTH TWICE A  capsule 3    hydroCHLOROthiazide (HYDRODIURIL) 12.5 MG Tab Take 12.5 mg by mouth every other day.       ondansetron (ZOFRAN) 8 MG tablet Take 1 tablet (8 mg total) by mouth every 8 (eight) hours as needed for Nausea. 15 tablet 1    vitamin D 1000 units Tab Take 185 mg by mouth once daily.      omeprazole (PRILOSEC) 20 MG capsule Take 1 capsule (20 mg total) by mouth once daily. 90 capsule 3     No current facility-administered medications for this visit.        Past Medical History:   Diagnosis Date    Abnormal mammogram 8/10/2016    Followed up with diagnostic mammogram and ultrasound 10[facility]22/[facility]at [facility]  that showed no concerning findings with  "recommendation to continue yearly screening.     Anxiety 8/8/2017    will need to readdress this if work up is negative and symptoms persist    Arthritis of knee 1/7/2015    Diverticulosis     Meniere's disease 8/15/2017    Osteopenia 12/1/2015    next bone density 4/2016, seeing endocrine for hyperparathyroidism     Positive anti-CCP test 4/7/2014    Primary hyperparathyroidism 8/26/2013    will be following up with Dr. Archuleta [facility] in 2016, blood work today. stoped taking calcitonin due to nausea     Rheumatoid factor positive 4/7/2014    Vertigo 7/16/2017    Vitamin D deficiency 12/1/2015       Past Surgical History:   Procedure Laterality Date    APPENDECTOMY      HYSTERECTOMY      PARATHYROIDECTOMY N/A 5/22/2019    [facility] 2ND [facility]    REPAIR ROTATOR CUFF ARTHROSCOPIC Right 12/5/2014    Performed by Yaw Horner Jr., MD at [facility] OR    ROTATOR CUFF REPAIR Right 12/2014    TUBAL LIGATION         OBJECTIVE:   PHYSICAL EXAM:  Height: 5' 6" (167.6 cm) Weight: 89.8 kg (198 lb)  Vitals:    09/17/19 0859   Weight: 89.8 kg (198 lb)   Height: 5' 6" (1.676 m)   PainSc:   5     Ortho/SPM Exam  Exact examination hands demonstrates mildly positive Tinel sign bilaterally slight swelling range of motion fingers full  strength decreased both hands    RADIOGRAPHS:  None  Comments: I have personally reviewed the imaging and I agree with the above radiologist's report.    ASSESSMENT/PLAN:     IMPRESSION:  Bilateral carpal tunnel syndrome    PLAN:  After pause for time-out identified each wrist injected carpal tunnel bilateral with combination Kenalog 20 mg 0.5 cc xylocaine sterile technique  Tolerated the procedure well without complication  Continue splints at night      FOLLOW UP:  4-6 weeks if symptoms fail to improve we may consider surgical treatment    Disclaimer: This note has been generated using voice-recognition software. There may be typographical " errors that have been missed during proof-reading.

## 2019-09-20 ENCOUNTER — HOSPITAL ENCOUNTER (OUTPATIENT)
Dept: RADIOLOGY | Facility: HOSPITAL | Age: 69
Discharge: HOME | End: 2019-09-20
Attending: ORTHOPAEDIC SURGERY
Payer: COMMERCIAL

## 2019-09-20 ENCOUNTER — OFFICE VISIT (OUTPATIENT)
Dept: ORTHOPEDICS | Facility: CLINIC | Age: 69
End: 2019-09-20
Payer: COMMERCIAL

## 2019-09-20 VITALS — BODY MASS INDEX: 31.82 KG/M2 | HEIGHT: 66 IN | WEIGHT: 198 LBS

## 2019-09-20 DIAGNOSIS — M25.562 PAIN IN BOTH KNEES, UNSPECIFIED CHRONICITY: ICD-10-CM

## 2019-09-20 DIAGNOSIS — M22.40 CHONDROMALACIA OF PATELLA, UNSPECIFIED LATERALITY: Primary | ICD-10-CM

## 2019-09-20 DIAGNOSIS — M25.561 PAIN IN BOTH KNEES, UNSPECIFIED CHRONICITY: ICD-10-CM

## 2019-09-20 DIAGNOSIS — M71.20 BAKER CYST, UNSPECIFIED LATERALITY: ICD-10-CM

## 2019-09-20 PROCEDURE — 73564 XR KNEE COMP 4 OR MORE VIEWS BILAT: ICD-10-PCS | Mod: 26,50,, | Performed by: RADIOLOGY

## 2019-09-20 PROCEDURE — 99213 OFFICE O/P EST LOW 20 MIN: CPT | Mod: S$GLB,,, | Performed by: ORTHOPAEDIC SURGERY

## 2019-09-20 PROCEDURE — 1101F PR PT FALLS ASSESS DOC 0-1 FALLS W/OUT INJ PAST YR: ICD-10-PCS | Mod: CPTII,S$GLB,, | Performed by: ORTHOPAEDIC SURGERY

## 2019-09-20 PROCEDURE — 73564 X-RAY EXAM KNEE 4 OR MORE: CPT | Mod: 50,TC,PN

## 2019-09-20 PROCEDURE — 99213 PR OFFICE/OUTPT VISIT, EST, LEVL III, 20-29 MIN: ICD-10-PCS | Mod: S$GLB,,, | Performed by: ORTHOPAEDIC SURGERY

## 2019-09-20 PROCEDURE — 73564 X-RAY EXAM KNEE 4 OR MORE: CPT | Mod: 26,50,, | Performed by: RADIOLOGY

## 2019-09-20 PROCEDURE — 99999 PR PBB SHADOW E&M-EST. PATIENT-LVL III: CPT | Mod: PBBFAC,,, | Performed by: ORTHOPAEDIC SURGERY

## 2019-09-20 PROCEDURE — 1101F PT FALLS ASSESS-DOCD LE1/YR: CPT | Mod: CPTII,S$GLB,, | Performed by: ORTHOPAEDIC SURGERY

## 2019-09-20 PROCEDURE — 99999 PR PBB SHADOW E&M-EST. PATIENT-LVL III: ICD-10-PCS | Mod: PBBFAC,,, | Performed by: ORTHOPAEDIC SURGERY

## 2019-09-20 NOTE — PROGRESS NOTES
Subjective:      Patient ID: Carlene Fong is a 68 y.o. female.    Chief Complaint: Pain of the Left Knee and Pain and Swelling of the Right Knee    HPI     They have experienced problems with their bilateral knee over the past 3 months. The patient denies relevant history of injury/aggravation.  She feels that she may have strained it using a stationary bike.  Pain is located medially and laterally Associated symptoms include gelling.  Symptoms are aggravated by no particular activity. They have been treated with over the counter analgesics.   Symptoms have recently improved. Ambulation reportedly has not been impaired. Self care ADLs are not painful.     Review of Systems   Constitution: Negative for fever and weight loss.   HENT: Negative for congestion.    Eyes: Negative for visual disturbance.   Cardiovascular: Negative for chest pain.   Respiratory: Negative for shortness of breath.    Hematologic/Lymphatic: Negative for bleeding problem. Does not bruise/bleed easily.   Skin: Negative for poor wound healing.   Gastrointestinal: Negative for abdominal pain.   Genitourinary: Negative for dysuria.   Neurological: Negative for seizures.   Psychiatric/Behavioral: Negative for altered mental status.   Allergic/Immunologic: Negative for persistent infections.         Objective:      Ortho/SPM Exam        Right knee  The patient is not in acute distress.   Body habitus is normal.   Sclera appear normal  No respiratory distress  The patient walks without a limp.  Resisted SLR negative.   The skin over the knee is intact.  Knee effusion 0  Tendernes is located absent.  Range of motion- Flexion full, Extension full.   Ligament exam:   MCL intact   Lachman intact              Post sag intact    LCL intact  Patellar apprehension negative.  Popliteal cyst present  Patellar crepitation absent.  Flexion/pinch negative.  Pulses DP present, PT present.  Motor normal 5/5 strength in all tested muscle groups.   Sensory  normal.    The left knee is identical    I reviewed the relevant radiographic images for the patient's condition:  Both knees have good preservation of alignment in joint space. No acute findings.    Assessment:       Encounter Diagnoses   Name Primary?    Chondromalacia of patella, unspecified laterality Yes    Baker cyst, unspecified laterality     the Baker cyst is secondary.  I explained this      Plan:       Carlene was seen today for pain, pain and swelling.    Diagnoses and all orders for this visit:    Chondromalacia of patella, unspecified laterality    Baker cyst, unspecified laterality     I explained my diagnostic impression and the reasoning behind it in detail, using layman's terms.  Models and/or pictures were used to help in the explanation.    Modification and exercise regimen explained    Short arc quad program given    Over-the-counter intermittent use of NSAIDs recommended an explained.

## 2019-10-08 ENCOUNTER — CLINICAL SUPPORT (OUTPATIENT)
Dept: FAMILY MEDICINE | Facility: CLINIC | Age: 69
End: 2019-10-08
Payer: COMMERCIAL

## 2019-10-10 ENCOUNTER — LAB VISIT (OUTPATIENT)
Dept: LAB | Facility: HOSPITAL | Age: 69
End: 2019-10-10
Attending: FAMILY MEDICINE
Payer: COMMERCIAL

## 2019-10-10 DIAGNOSIS — Z12.11 ENCOUNTER FOR FIT (FECAL IMMUNOCHEMICAL TEST) SCREENING: ICD-10-CM

## 2019-10-10 PROCEDURE — 82274 ASSAY TEST FOR BLOOD FECAL: CPT

## 2019-10-17 LAB — HEMOCCULT STL QL IA: NEGATIVE

## 2019-11-26 ENCOUNTER — TELEPHONE (OUTPATIENT)
Dept: NEUROSURGERY | Facility: CLINIC | Age: 69
End: 2019-11-26

## 2019-11-26 NOTE — TELEPHONE ENCOUNTER
----- Message from Robin Tucker sent at 11/26/2019  7:03 AM CST -----  Contact: self/[phone]  She has severe knee pain and would like to come in today for an appt.

## 2019-12-02 ENCOUNTER — OFFICE VISIT (OUTPATIENT)
Dept: ORTHOPEDICS | Facility: CLINIC | Age: 69
End: 2019-12-02
Payer: COMMERCIAL

## 2019-12-02 VITALS — WEIGHT: 198 LBS | BODY MASS INDEX: 31.82 KG/M2 | HEIGHT: 66 IN

## 2019-12-02 DIAGNOSIS — G56.02 CARPAL TUNNEL SYNDROME OF LEFT WRIST: Primary | ICD-10-CM

## 2019-12-02 PROCEDURE — 20526 THER INJECTION CARP TUNNEL: CPT | Mod: 50,S$GLB,, | Performed by: ORTHOPAEDIC SURGERY

## 2019-12-02 PROCEDURE — 99213 PR OFFICE/OUTPT VISIT, EST, LEVL III, 20-29 MIN: ICD-10-PCS | Mod: 25,S$GLB,, | Performed by: ORTHOPAEDIC SURGERY

## 2019-12-02 PROCEDURE — 1159F PR MEDICATION LIST DOCUMENTED IN MEDICAL RECORD: ICD-10-PCS | Mod: S$GLB,,, | Performed by: ORTHOPAEDIC SURGERY

## 2019-12-02 PROCEDURE — 1125F PR PAIN SEVERITY QUANTIFIED, PAIN PRESENT: ICD-10-PCS | Mod: S$GLB,,, | Performed by: ORTHOPAEDIC SURGERY

## 2019-12-02 PROCEDURE — 1101F PR PT FALLS ASSESS DOC 0-1 FALLS W/OUT INJ PAST YR: ICD-10-PCS | Mod: CPTII,S$GLB,, | Performed by: ORTHOPAEDIC SURGERY

## 2019-12-02 PROCEDURE — 99999 PR PBB SHADOW E&M-EST. PATIENT-LVL III: ICD-10-PCS | Mod: PBBFAC,,, | Performed by: ORTHOPAEDIC SURGERY

## 2019-12-02 PROCEDURE — 1159F MED LIST DOCD IN RCRD: CPT | Mod: S$GLB,,, | Performed by: ORTHOPAEDIC SURGERY

## 2019-12-02 PROCEDURE — 99999 PR PBB SHADOW E&M-EST. PATIENT-LVL III: CPT | Mod: PBBFAC,,, | Performed by: ORTHOPAEDIC SURGERY

## 2019-12-02 PROCEDURE — 99213 OFFICE O/P EST LOW 20 MIN: CPT | Mod: 25,S$GLB,, | Performed by: ORTHOPAEDIC SURGERY

## 2019-12-02 PROCEDURE — 1125F AMNT PAIN NOTED PAIN PRSNT: CPT | Mod: S$GLB,,, | Performed by: ORTHOPAEDIC SURGERY

## 2019-12-02 PROCEDURE — 20526 PR INJECT CARPAL TUNNEL: ICD-10-PCS | Mod: 50,S$GLB,, | Performed by: ORTHOPAEDIC SURGERY

## 2019-12-02 PROCEDURE — 1101F PT FALLS ASSESS-DOCD LE1/YR: CPT | Mod: CPTII,S$GLB,, | Performed by: ORTHOPAEDIC SURGERY

## 2019-12-02 RX ORDER — TRIAMCINOLONE ACETONIDE 40 MG/ML
40 INJECTION, SUSPENSION INTRA-ARTICULAR; INTRAMUSCULAR
Status: COMPLETED | OUTPATIENT
Start: 2019-12-02 | End: 2019-12-02

## 2019-12-02 RX ADMIN — TRIAMCINOLONE ACETONIDE 40 MG: 40 INJECTION, SUSPENSION INTRA-ARTICULAR; INTRAMUSCULAR at 11:12

## 2019-12-02 NOTE — PROGRESS NOTES
Subjective:      Patient ID: Carlene Fong is a 69 y.o. female.  Chief Complaint: Hand Pain (bilateral hand pain); Follow-up; and Carpal Tunnel      HPI  Carlene Fong is a  69 y.o. female presenting today for follow up of bilateral carpal tunnel syndrome.  She reports that she is having a flare-up in both hands right worse than left  She does have some swelling and numbness in both hands.    Review of patient's allergies indicates:   Allergen Reactions    Requip [ropinirole] Swelling      palpitations, swelling of tongue         Current Outpatient Medications   Medication Sig Dispense Refill    calcium carbonate (TUMS) 200 mg calcium (500 mg) chewable tablet Take 2 tablets (1,000 mg total) by mouth 4 (four) times daily. 240 tablet 2    co-enzyme Q-10 30 mg capsule Take 30 mg by mouth once daily.       diclofenac sodium (VOLTAREN) 1 % Gel Apply 2 g topically 3 (three) times daily. 100 g 5    FLAXSEED ORAL Take by mouth once daily.       fluticasone (FLONASE) 50 mcg/actuation nasal spray 2 sprays (100 mcg total) by Each Nare route once daily. (Patient taking differently: 2 sprays by Each Nare route nightly. ) 16 g 11    gabapentin (NEURONTIN) 300 MG capsule TAKE 1 CAPSULE BY MOUTH TWICE A  capsule 3    hydroCHLOROthiazide (HYDRODIURIL) 12.5 MG Tab Take 12.5 mg by mouth every other day.       vitamin D 1000 units Tab Take 185 mg by mouth once daily.      docusate sodium (COLACE) 100 MG capsule Take 1 capsule (100 mg total) by mouth 2 (two) times daily. (Patient not taking: Reported on 12/2/2019)  0    omeprazole (PRILOSEC) 20 MG capsule Take 1 capsule (20 mg total) by mouth once daily. 90 capsule 3    ondansetron (ZOFRAN) 8 MG tablet Take 1 tablet (8 mg total) by mouth every 8 (eight) hours as needed for Nausea. (Patient not taking: Reported on 12/2/2019) 15 tablet 1     No current facility-administered medications for this visit.        Past Medical History:   Diagnosis Date     "Abnormal mammogram 8/10/2016    Followed up with diagnostic mammogram and ultrasound 10[facility]22/[facility]at [facility]  that showed no concerning findings with recommendation to continue yearly screening.     Anxiety 8/8/2017    will need to readdress this if work up is negative and symptoms persist    Arthritis of knee 1/7/2015    Diverticulosis     Meniere's disease 8/15/2017    Osteopenia 12/1/2015    next bone density 4/2016, seeing endocrine for hyperparathyroidism     Positive anti-CCP test 4/7/2014    Primary hyperparathyroidism 8/26/2013    will be following up with Dr. [facility]uleta [facility] in 2016, blood work today. stoped taking calcitonin due to nausea     Rheumatoid factor positive 4/7/2014    Vertigo 7/16/2017    Vitamin D deficiency 12/1/2015       Past Surgical History:   Procedure Laterality Date    APPENDECTOMY      HYSTERECTOMY      PARATHYROIDECTOMY N/A 5/22/2019    Procedure: PARATHYROIDECTOMY;  Surgeon: Andie Mcfarlane MD;  Location: [facility] OR [address] [place] [address];  Service: General;  Laterality: N/A;  NIMS / Intraop PTH Monitoring    ROTATOR CUFF REPAIR Right 12/2014    TUBAL LIGATION         OBJECTIVE:   PHYSICAL EXAM:  Height: 5' 6" (167.6 cm) Weight: 89.8 kg (198 lb)  Vitals:    12/02/19 1102   Weight: 89.8 kg (198 lb)   Height: 5' 6" (1.676 m)   PainSc:   6     Ortho/SPM Exam  Examination hands mild swelling positive Tinel sign bilaterally  Phalen's test positive on the right negative on the left  Range of motion fingers full    RADIOGRAPHS:  None  Comments: I have personally reviewed the imaging and I agree with the above radiologist's report.    ASSESSMENT/PLAN:     IMPRESSION:  Bilateral carpal tunnel syndrome    PLAN:  We discussed options for treatment including injection versus surgery  Patient would like try injection today  After pause for time-out identified each wrist injected carpal tunnel bilateral with combination Kenalog 20 mg 0.5 cc xylocaine sterile technique  Continue splints at " night      FOLLOW UP:  3 months    Disclaimer: This note has been generated using voice-recognition software. There may be typographical errors that have been missed during proof-reading.

## 2019-12-12 RX ORDER — FLUTICASONE PROPIONATE 50 MCG
2 SPRAY, SUSPENSION (ML) NASAL NIGHTLY
Qty: 48 ML | Refills: 4 | Status: SHIPPED | OUTPATIENT
Start: 2019-12-12 | End: 2021-03-10 | Stop reason: SDUPTHER

## 2019-12-25 ENCOUNTER — HOSPITAL ENCOUNTER (EMERGENCY)
Facility: HOSPITAL | Age: 69
Discharge: HOME | End: 2019-12-25
Attending: EMERGENCY MEDICINE
Payer: COMMERCIAL

## 2019-12-25 VITALS
TEMPERATURE: 99 F | HEART RATE: 87 BPM | OXYGEN SATURATION: 99 % | SYSTOLIC BLOOD PRESSURE: 138 MMHG | DIASTOLIC BLOOD PRESSURE: 74 MMHG | RESPIRATION RATE: 20 BRPM | BODY MASS INDEX: 30.67 KG/M2 | WEIGHT: 190 LBS

## 2019-12-25 DIAGNOSIS — S99.919A ANKLE INJURY: ICD-10-CM

## 2019-12-25 DIAGNOSIS — S93.401A SPRAIN OF RIGHT ANKLE, UNSPECIFIED LIGAMENT, INITIAL ENCOUNTER: Primary | ICD-10-CM

## 2019-12-25 PROCEDURE — 99283 EMERGENCY DEPT VISIT LOW MDM: CPT | Mod: 25

## 2019-12-26 NOTE — DISCHARGE INSTRUCTIONS
Use ice, tylenol, and motrin.  Return to the ED if your condition changes, progresses, or if you have any concerns.

## 2019-12-26 NOTE — ED PROVIDER NOTES
"Encounter Date: 12/25/2019    SCRIBE #1 NOTE: I, Arjunsean Kelly, am scribing for, and in the presence of, WILSON Jackson.       History     Chief Complaint   Patient presents with    Ankle Pain     reports right ankel pain and swelling that started today. pt states she "thinks she stepped wrong while handing out presents". last dose of ibuprofen taken at 1600. pt reports she has been icing ankle as well.      Patient is a 69 year-old female who presents to the emergency department with c/o right ankle pain and swelling that started today. The patient thinks she may have planted her foot wrong while handing out presents to the grandchildren. She states it hurts to bear weight. She last took Ibuprofen at 16:00 today and has been icing the area.    The history is provided by the patient.     Review of patient's allergies indicates:   Allergen Reactions    Requip [ropinirole] Swelling      palpitations, swelling of tongue     Past Medical History:   Diagnosis Date    Abnormal mammogram 8/10/2016    Followed up with diagnostic mammogram and ultrasound 10/22/2015at [facility]  that showed no concerning findings with recommendation to continue yearly screening.     Anxiety 8/8/2017    will need to readdress this if work up is negative and symptoms persist    Arthritis of knee 1/7/2015    Diverticulosis     Meniere's disease 8/15/2017    Osteopenia 12/1/2015    next bone density 4/2016, seeing endocrine for hyperparathyroidism     Positive anti-CCP test 4/7/2014    Primary hyperparathyroidism 8/26/2013    will be following up with Dr. [facility]ule[facility] in 2016, blood work today. stoped taking calcitonin due to nausea     Rheumatoid factor positive 4/7/2014    Vertigo 7/16/2017    Vitamin D deficiency 12/1/2015     Past Surgical History:   Procedure Laterality Date    APPENDECTOMY      HYSTERECTOMY      PARATHYROIDECTOMY N/A 5/22/2019    Procedure: PARATHYROIDECTOMY;  Surgeon: Andie Mcfarlane MD;  " Location: [facility] OR [address] [place] [address];  Service: General;  Laterality: N/A;  NIMS / Intraop PTH Monitoring    ROTATOR CUFF REPAIR Right 12/2014    TUBAL LIGATION       Family History   Problem Relation Age of Onset    Diabetes Father     Dementia Father     No Known Problems Mother     Rheum arthritis Neg Hx     Lupus Neg Hx     Inflammatory bowel disease Neg Hx      Social History     Tobacco Use    Smoking status: Never Smoker    Smokeless tobacco: Never Used   Substance Use Topics    Alcohol use: No     Frequency: Never     Binge frequency: Never    Drug use: No     Review of Systems   Constitutional: Negative for fever.   HENT: Negative for sore throat.    Respiratory: Negative for shortness of breath.    Cardiovascular: Negative for chest pain.   Gastrointestinal: Negative for nausea.   Genitourinary: Negative for dysuria.   Musculoskeletal: Positive for arthralgias and joint swelling. Negative for back pain.   Skin: Negative for rash.   Neurological: Negative for weakness.   Hematological: Does not bruise/bleed easily.   All other systems reviewed and are negative.      Physical Exam     Initial Vitals [12/25/19 1815]   BP Pulse Resp Temp SpO2   138/74 87 20 98.5 °F (36.9 °C) 99 %      MAP       --         Physical Exam    Nursing note and vitals reviewed.  Constitutional: Vital signs are normal. She appears well-developed and well-nourished. She is Obese . She is active and cooperative. She is easily aroused.  Non-toxic appearance. She does not have a sickly appearance. She does not appear ill. No distress.   HENT:   Head: Normocephalic and atraumatic.   Right Ear: External ear normal.   Left Ear: External ear normal.   Nose: Nose normal.   Eyes: Conjunctivae are normal.   Neck: Normal range of motion. Neck supple.   Cardiovascular: Normal rate.   Pulses:       Dorsalis pedis pulses are 2+ on the right side, and 2+ on the left side.   Pulmonary/Chest: Effort normal.   Abdominal: Normal appearance.    Musculoskeletal:        Right knee: Normal.        Right ankle: She exhibits decreased range of motion. She exhibits no swelling, no ecchymosis, no deformity, no laceration and normal pulse. Tenderness. Lateral malleolus and medial malleolus tenderness found. No AITFL, no CF ligament, no posterior TFL, no head of 5th metatarsal and no proximal fibula tenderness found. Achilles tendon normal.        Right lower leg: Normal.        Right foot: Normal.   Painful flexion and extension of right ankle   Neurological: She is alert, oriented to person, place, and time and easily aroused. She has normal strength. No sensory deficit.   Sensation of right ankle intact   Skin: Skin is warm, dry and intact. Capillary refill takes less than 2 seconds. No bruising noted. No erythema. No pallor.         ED Course   Procedures  Labs Reviewed - No data to display       Imaging Results          X-Ray Ankle Complete Right (Final result)  Result time 12/25/19 18:42:20    Final result by Musa Lopez MD (12/25/19 18:42:20)                 Impression:      No acute displaced fracture-dislocation identified.      Electronically signed by: Musa Lopez MD  Date:    12/25/2019  Time:    18:42             Narrative:    EXAMINATION:  XR ANKLE COMPLETE 3 VIEW RIGHT    CLINICAL HISTORY:  Unspecified injury of unspecified ankle, initial encounter    TECHNIQUE:  AP, lateral, and oblique images of the right ankle were performed.    COMPARISON:  None    FINDINGS:  Bones are well mineralized. Overall alignment is within normal limits.  Ankle mortise is intact.  No displaced fracture, dislocation or destructive osseous process. Joint spaces appear relatively maintained.  Prominent plantar calcaneal spur.  No subcutaneous emphysema or radiodense retained foreign body.                                 Medical Decision Making:   Initial Assessment:   68yo female here for right ankle pain after possibly twisting her ankle today.    Differential  Diagnosis:   Strain, sprain, spasm, fracture  Clinical Tests:   Radiological Study: Ordered and Reviewed  ED Management:  Xray  Pt declined pain medication  X-rays negative for acute changes.  The patient has no sign of septic joint or compartment syndrome.  The patient has an ankle sprain.  She was given an Ace wrap.  Encouraged RICE therapy.  Pt to follow up with PCP within 7 days.  I reviewed strict return precautions. In addition, pt is to return to the ED if condition changes, progresses, or if there are any concerns.  Pt verbalized understanding, compliance, and agreement with the treatment plan.                                     Clinical Impression:       ICD-10-CM ICD-9-CM   1. Sprain of right ankle, unspecified ligament, initial encounter S93.401A 845.00   2. Ankle injury S99.919A 959.7           Disposition:   Disposition: Discharged  Condition: Stable      I, Sonia RACHEL, personally performed the services described in this documentation. All medical record entries made by the scribe were at my direction and in my presence.  I have reviewed the chart and agree that the record reflects my personal performance and is accurate and complete.     PARTH Jackson  6:53 PM 12/25/2019                 WILSON De Leon  12/25/19 [id number]

## 2020-01-16 ENCOUNTER — PATIENT MESSAGE (OUTPATIENT)
Dept: ENDOCRINOLOGY | Facility: CLINIC | Age: 70
End: 2020-01-16

## 2020-01-23 ENCOUNTER — PATIENT OUTREACH (OUTPATIENT)
Dept: ADMINISTRATIVE | Facility: OTHER | Age: 70
End: 2020-01-23

## 2020-01-23 DIAGNOSIS — Z12.31 ENCOUNTER FOR SCREENING MAMMOGRAM FOR MALIGNANT NEOPLASM OF BREAST: Primary | ICD-10-CM

## 2020-01-24 ENCOUNTER — OFFICE VISIT (OUTPATIENT)
Dept: ORTHOPEDICS | Facility: CLINIC | Age: 70
End: 2020-01-24
Payer: COMMERCIAL

## 2020-01-24 ENCOUNTER — PATIENT OUTREACH (OUTPATIENT)
Dept: ADMINISTRATIVE | Facility: HOSPITAL | Age: 70
End: 2020-01-24

## 2020-01-24 ENCOUNTER — TELEPHONE (OUTPATIENT)
Dept: FAMILY MEDICINE | Facility: CLINIC | Age: 70
End: 2020-01-24

## 2020-01-24 VITALS — HEIGHT: 66 IN | BODY MASS INDEX: 30.53 KG/M2 | WEIGHT: 190 LBS

## 2020-01-24 DIAGNOSIS — M65.9 SYNOVITIS OF KNEE: Primary | ICD-10-CM

## 2020-01-24 PROCEDURE — 20610 PR DRAIN/INJECT LARGE JOINT/BURSA: ICD-10-PCS | Mod: LT,S$GLB,, | Performed by: ORTHOPAEDIC SURGERY

## 2020-01-24 PROCEDURE — 99213 OFFICE O/P EST LOW 20 MIN: CPT | Mod: 25,S$GLB,, | Performed by: ORTHOPAEDIC SURGERY

## 2020-01-24 PROCEDURE — 99999 PR PBB SHADOW E&M-EST. PATIENT-LVL III: ICD-10-PCS | Mod: PBBFAC,,, | Performed by: ORTHOPAEDIC SURGERY

## 2020-01-24 PROCEDURE — 1159F MED LIST DOCD IN RCRD: CPT | Mod: S$GLB,,, | Performed by: ORTHOPAEDIC SURGERY

## 2020-01-24 PROCEDURE — 1125F AMNT PAIN NOTED PAIN PRSNT: CPT | Mod: S$GLB,,, | Performed by: ORTHOPAEDIC SURGERY

## 2020-01-24 PROCEDURE — 99213 PR OFFICE/OUTPT VISIT, EST, LEVL III, 20-29 MIN: ICD-10-PCS | Mod: 25,S$GLB,, | Performed by: ORTHOPAEDIC SURGERY

## 2020-01-24 PROCEDURE — 1101F PT FALLS ASSESS-DOCD LE1/YR: CPT | Mod: CPTII,S$GLB,, | Performed by: ORTHOPAEDIC SURGERY

## 2020-01-24 PROCEDURE — 1159F PR MEDICATION LIST DOCUMENTED IN MEDICAL RECORD: ICD-10-PCS | Mod: S$GLB,,, | Performed by: ORTHOPAEDIC SURGERY

## 2020-01-24 PROCEDURE — 1125F PR PAIN SEVERITY QUANTIFIED, PAIN PRESENT: ICD-10-PCS | Mod: S$GLB,,, | Performed by: ORTHOPAEDIC SURGERY

## 2020-01-24 PROCEDURE — 99999 PR PBB SHADOW E&M-EST. PATIENT-LVL III: CPT | Mod: PBBFAC,,, | Performed by: ORTHOPAEDIC SURGERY

## 2020-01-24 PROCEDURE — 20610 DRAIN/INJ JOINT/BURSA W/O US: CPT | Mod: LT,S$GLB,, | Performed by: ORTHOPAEDIC SURGERY

## 2020-01-24 PROCEDURE — 1101F PR PT FALLS ASSESS DOC 0-1 FALLS W/OUT INJ PAST YR: ICD-10-PCS | Mod: CPTII,S$GLB,, | Performed by: ORTHOPAEDIC SURGERY

## 2020-01-24 RX ORDER — TRIAMCINOLONE ACETONIDE 40 MG/ML
40 INJECTION, SUSPENSION INTRA-ARTICULAR; INTRAMUSCULAR
Status: COMPLETED | OUTPATIENT
Start: 2020-01-24 | End: 2020-01-24

## 2020-01-24 RX ADMIN — TRIAMCINOLONE ACETONIDE 40 MG: 40 INJECTION, SUSPENSION INTRA-ARTICULAR; INTRAMUSCULAR at 09:01

## 2020-01-24 NOTE — PROCEDURES
Procedures .    After obtaining verbal informed consent the patient's left knee was prepped aseptically and injected through an inferior lateral approach using 40 mg of triamcinolone and 1 cc of 1% plain Xylocaine.  The patient was warned about postinjection flare and how to manage it with ice, rest and over-the-counter analgesics.  They're advised to contact me for any severe, uncontrolled pain.

## 2020-01-24 NOTE — PROGRESS NOTES
Subjective:      Patient ID: Carlene Fong is a 69 y.o. female.    Chief Complaint: Pain and Follow-up of the Left Knee and Follow-up of the Right Knee    HPI  The patient complains of increasing pain in the left knee for the past week or so.  It started to swell when she was traveling.  Her pain is generalized and aggravated by any prolonged walking.  Ibuprofen has been moderately beneficial.            Review of Systems   Constitution: Negative for fever and weight loss.   HENT: Negative for congestion.    Eyes: Negative for visual disturbance.   Cardiovascular: Negative for chest pain.   Respiratory: Negative for shortness of breath.    Hematologic/Lymphatic: Negative for bleeding problem. Does not bruise/bleed easily.   Skin: Negative for poor wound healing.   Musculoskeletal: Positive for joint pain and joint swelling.   Gastrointestinal: Negative for abdominal pain.   Genitourinary: Negative for dysuria.   Neurological: Negative for seizures.   Psychiatric/Behavioral: Negative for altered mental status.   Allergic/Immunologic: Negative for persistent infections.         Objective:      Ortho/SPM Exam      Left knee    [unfilled]    The patient is not in acute distress.   Sclerae normal  Body habitus is normal.  Respiratory distress:  none   The patient walks with a limp.  Hip irritability  negative.   The skin over the knee is intact.  Knee effusion 1+  Tendernes is located none  Range of motion- Flexion 110 deg, Extension 0 deg,   Ligament laxity exam:   MCL 0   Lachman 0   Post sag  0    LCL 0  Patellar apprehension negative.  Popliteal cyst positive  Patellar crepitation absent.  Flexion/pinch negative  Pulses DP present, PT present.  Motor normal 5/5 strength in all tested muscle groups.   Sensory normal.          Assessment:       No diagnosis found.     This may be secondary to progression of osteoarthritis.          Plan:       There are no diagnoses linked to this encounter.      I explained my  diagnostic impression and the reasoning behind it in detail, using layman's terms.  Models and/or pictures were used to help in the explanation.    Injection recommended and consent given    Continue ibuprofen    Icing recommended an explained    Activity modification explained    X-ray in 1 month

## 2020-01-24 NOTE — TELEPHONE ENCOUNTER
Called pt and pt stated that she had her mammo already on November 11th at [facility]. I updated her file.

## 2020-01-24 NOTE — TELEPHONE ENCOUNTER
----- Message from Sol Jaquez sent at 1/24/2020 11:31 AM CST -----  Contact: [phone]/self   Patient is returning your call regarding scheduling a mammogram, but states she already had it done. She does not need a call back. Thank you.

## 2020-02-07 ENCOUNTER — PATIENT OUTREACH (OUTPATIENT)
Dept: ADMINISTRATIVE | Facility: HOSPITAL | Age: 70
End: 2020-02-07

## 2020-02-07 ENCOUNTER — LAB VISIT (OUTPATIENT)
Dept: LAB | Facility: HOSPITAL | Age: 70
End: 2020-02-07
Attending: FAMILY MEDICINE
Payer: COMMERCIAL

## 2020-02-07 ENCOUNTER — OFFICE VISIT (OUTPATIENT)
Dept: FAMILY MEDICINE | Facility: CLINIC | Age: 70
End: 2020-02-07
Payer: COMMERCIAL

## 2020-02-07 VITALS
HEART RATE: 83 BPM | SYSTOLIC BLOOD PRESSURE: 106 MMHG | OXYGEN SATURATION: 95 % | TEMPERATURE: 98 F | BODY MASS INDEX: 32.42 KG/M2 | DIASTOLIC BLOOD PRESSURE: 66 MMHG | WEIGHT: 201.75 LBS | HEIGHT: 66 IN

## 2020-02-07 DIAGNOSIS — Z12.11 COLON CANCER SCREENING: ICD-10-CM

## 2020-02-07 DIAGNOSIS — Z79.899 ENCOUNTER FOR MONITORING LONG-TERM PROTON PUMP INHIBITOR THERAPY: ICD-10-CM

## 2020-02-07 DIAGNOSIS — H81.09 MENIERE'S DISEASE, UNSPECIFIED LATERALITY: ICD-10-CM

## 2020-02-07 DIAGNOSIS — M17.10 ARTHRITIS OF KNEE: ICD-10-CM

## 2020-02-07 DIAGNOSIS — Z98.890 H/O PARATHYROIDECTOMY: ICD-10-CM

## 2020-02-07 DIAGNOSIS — E21.0 PRIMARY HYPERPARATHYROIDISM: ICD-10-CM

## 2020-02-07 DIAGNOSIS — Z90.89 H/O PARATHYROIDECTOMY: ICD-10-CM

## 2020-02-07 DIAGNOSIS — G56.02 CARPAL TUNNEL SYNDROME OF LEFT WRIST: ICD-10-CM

## 2020-02-07 DIAGNOSIS — Z51.81 ENCOUNTER FOR MONITORING LONG-TERM PROTON PUMP INHIBITOR THERAPY: ICD-10-CM

## 2020-02-07 DIAGNOSIS — E55.9 VITAMIN D DEFICIENCY: ICD-10-CM

## 2020-02-07 DIAGNOSIS — Z90.710 H/O HYSTERECTOMY FOR BENIGN DISEASE: ICD-10-CM

## 2020-02-07 DIAGNOSIS — M81.0 POSTMENOPAUSAL OSTEOPOROSIS: ICD-10-CM

## 2020-02-07 DIAGNOSIS — E66.9 OBESITY (BMI 30.0-34.9): ICD-10-CM

## 2020-02-07 DIAGNOSIS — K21.9 GASTROESOPHAGEAL REFLUX DISEASE, ESOPHAGITIS PRESENCE NOT SPECIFIED: ICD-10-CM

## 2020-02-07 DIAGNOSIS — Z00.00 ROUTINE GENERAL MEDICAL EXAMINATION AT A HEALTH CARE FACILITY: ICD-10-CM

## 2020-02-07 DIAGNOSIS — Z79.899 MEDICATION MANAGEMENT: ICD-10-CM

## 2020-02-07 DIAGNOSIS — Z00.00 ROUTINE GENERAL MEDICAL EXAMINATION AT A HEALTH CARE FACILITY: Primary | ICD-10-CM

## 2020-02-07 PROBLEM — R79.89 LOW SERUM THYROID STIMULATING HORMONE (TSH): Status: RESOLVED | Noted: 2019-05-26 | Resolved: 2020-02-07

## 2020-02-07 LAB
25(OH)D3+25(OH)D2 SERPL-MCNC: 56 NG/ML (ref 30–96)
ALBUMIN SERPL BCP-MCNC: 3.9 G/DL (ref 3.5–5.2)
ALP SERPL-CCNC: 76 U/L (ref 55–135)
ALT SERPL W/O P-5'-P-CCNC: 15 U/L (ref 10–44)
ANION GAP SERPL CALC-SCNC: 9 MMOL/L (ref 8–16)
AST SERPL-CCNC: 14 U/L (ref 10–40)
BASOPHILS # BLD AUTO: 0.04 K/UL (ref 0–0.2)
BASOPHILS NFR BLD: 0.5 % (ref 0–1.9)
BILIRUB SERPL-MCNC: 0.5 MG/DL (ref 0.1–1)
BUN SERPL-MCNC: 15 MG/DL (ref 8–23)
CALCIUM SERPL-MCNC: 9.8 MG/DL (ref 8.7–10.5)
CHLORIDE SERPL-SCNC: 104 MMOL/L (ref 95–110)
CHOLEST SERPL-MCNC: 228 MG/DL (ref 120–199)
CHOLEST/HDLC SERPL: 3.7 {RATIO} (ref 2–5)
CO2 SERPL-SCNC: 30 MMOL/L (ref 23–29)
CREAT SERPL-MCNC: 0.7 MG/DL (ref 0.5–1.4)
DIFFERENTIAL METHOD: ABNORMAL
EOSINOPHIL # BLD AUTO: 0.2 K/UL (ref 0–0.5)
EOSINOPHIL NFR BLD: 2 % (ref 0–8)
ERYTHROCYTE [DISTWIDTH] IN BLOOD BY AUTOMATED COUNT: 13.4 % (ref 11.5–14.5)
EST. GFR  (AFRICAN AMERICAN): >60 ML/MIN/1.73 M^2
EST. GFR  (NON AFRICAN AMERICAN): >60 ML/MIN/1.73 M^2
ESTIMATED AVG GLUCOSE: 97 MG/DL (ref 68–131)
GLUCOSE SERPL-MCNC: 85 MG/DL (ref 70–110)
HBA1C MFR BLD HPLC: 5 % (ref 4–5.6)
HCT VFR BLD AUTO: 45.4 % (ref 37–48.5)
HDLC SERPL-MCNC: 61 MG/DL (ref 40–75)
HDLC SERPL: 26.8 % (ref 20–50)
HGB BLD-MCNC: 14.6 G/DL (ref 12–16)
IMM GRANULOCYTES # BLD AUTO: 0.03 K/UL (ref 0–0.04)
IMM GRANULOCYTES NFR BLD AUTO: 0.4 % (ref 0–0.5)
LDLC SERPL CALC-MCNC: 147.4 MG/DL (ref 63–159)
LYMPHOCYTES # BLD AUTO: 1.2 K/UL (ref 1–4.8)
LYMPHOCYTES NFR BLD: 15.5 % (ref 18–48)
MAGNESIUM SERPL-MCNC: 2.1 MG/DL (ref 1.6–2.6)
MCH RBC QN AUTO: 30 PG (ref 27–31)
MCHC RBC AUTO-ENTMCNC: 32.2 G/DL (ref 32–36)
MCV RBC AUTO: 93 FL (ref 82–98)
MONOCYTES # BLD AUTO: 0.5 K/UL (ref 0.3–1)
MONOCYTES NFR BLD: 5.9 % (ref 4–15)
NEUTROPHILS # BLD AUTO: 5.9 K/UL (ref 1.8–7.7)
NEUTROPHILS NFR BLD: 75.7 % (ref 38–73)
NONHDLC SERPL-MCNC: 167 MG/DL
NRBC BLD-RTO: 0 /100 WBC
PLATELET # BLD AUTO: 296 K/UL (ref 150–350)
PMV BLD AUTO: 8.9 FL (ref 9.2–12.9)
POTASSIUM SERPL-SCNC: 4.1 MMOL/L (ref 3.5–5.1)
PROT SERPL-MCNC: 7.5 G/DL (ref 6–8.4)
PTH-INTACT SERPL-MCNC: 39.2 PG/ML (ref 9–77)
RBC # BLD AUTO: 4.86 M/UL (ref 4–5.4)
SODIUM SERPL-SCNC: 143 MMOL/L (ref 136–145)
TRIGL SERPL-MCNC: 98 MG/DL (ref 30–150)
TSH SERPL DL<=0.005 MIU/L-ACNC: 1.38 UIU/ML (ref 0.4–4)
VIT B12 SERPL-MCNC: 492 PG/ML (ref 210–950)
WBC # BLD AUTO: 7.81 K/UL (ref 3.9–12.7)

## 2020-02-07 PROCEDURE — 83735 ASSAY OF MAGNESIUM: CPT

## 2020-02-07 PROCEDURE — 99999 PR PBB SHADOW E&M-EST. PATIENT-LVL IV: CPT | Mod: PBBFAC,,, | Performed by: FAMILY MEDICINE

## 2020-02-07 PROCEDURE — 99397 PER PM REEVAL EST PAT 65+ YR: CPT | Mod: S$GLB,,, | Performed by: FAMILY MEDICINE

## 2020-02-07 PROCEDURE — 85025 COMPLETE CBC W/AUTO DIFF WBC: CPT

## 2020-02-07 PROCEDURE — 36415 COLL VENOUS BLD VENIPUNCTURE: CPT

## 2020-02-07 PROCEDURE — 84443 ASSAY THYROID STIM HORMONE: CPT

## 2020-02-07 PROCEDURE — 80053 COMPREHEN METABOLIC PANEL: CPT

## 2020-02-07 PROCEDURE — 83970 ASSAY OF PARATHORMONE: CPT

## 2020-02-07 PROCEDURE — 80061 LIPID PANEL: CPT

## 2020-02-07 PROCEDURE — 99397 PR PREVENTIVE VISIT,EST,65 & OVER: ICD-10-PCS | Mod: S$GLB,,, | Performed by: FAMILY MEDICINE

## 2020-02-07 PROCEDURE — 99999 PR PBB SHADOW E&M-EST. PATIENT-LVL IV: ICD-10-PCS | Mod: PBBFAC,,, | Performed by: FAMILY MEDICINE

## 2020-02-07 PROCEDURE — 83036 HEMOGLOBIN GLYCOSYLATED A1C: CPT

## 2020-02-07 PROCEDURE — 82306 VITAMIN D 25 HYDROXY: CPT

## 2020-02-07 PROCEDURE — 82607 VITAMIN B-12: CPT

## 2020-02-07 RX ORDER — HYDROCHLOROTHIAZIDE 12.5 MG/1
12.5 TABLET ORAL EVERY OTHER DAY
Qty: 90 TABLET | Refills: 3 | Status: SHIPPED | OUTPATIENT
Start: 2020-02-07 | End: 2021-03-10 | Stop reason: SDUPTHER

## 2020-02-07 NOTE — PROGRESS NOTES
Office Visit    Patient Name: Carlene Fong    : 1950  MRN: [id number]    Subjective:  Carlene is a 69 y.o. female who presents today for:    Annual Exam    Carlene presents today for annual wellness exam and monitoring of chronic conditions-- primary hyperparathyroidism associated with vitamin D deficiency & Osteoporosis now s/p 3 gland parathyroidectomy on 2019, overweight BMI, history of positive rheumatoid factor without clinical evidence of rheumatoid arthritis, left wrist carpal tunnel(s/p injection 10/2018), mild anxiety, GERD with PRN OTC PPI Use, bilateral knee pain followed by Ortho Dr Dobbs and s/p L knee aspiration on 20.      As far as her Hyperparathyroidism with elevated Calcium: I referred her to Dash Balderas/ Rm secondary to osteoporosis dx 1/15/2019 and hypercalcemia at 12. She underwent 3 gland parathyroidectomy and most recently followed up with Endo Dr Balderas on 2019. Her TSH/PTH/ and Renal function panel all normal 19. She is taking Calcium 500 BID and Vitamin D 1000 IU daily      Sees gynecology for yearly physicals-- Dr De Dios. She has had a hysterectomy with removal of one ovary.      She has been feeling overall well.      General lifestyle habits are as follows: Diet is described as healthy- eating consistent healthy meals-- watching salt, exercise is described as good with cardio--walking 3x/week at the mall 20-30 minutes, sleep is described as good-- sleeps 7-8 hours nightly and does not snore. Weight is up about 10 lbs in the last year-- BMI 32.      Immunizations: ShingRIX completed 2018, TDaP 2015, PREVNAR 13 1/10/2018, Pneumovax 23 1/15/2019, yearly FLUSHOT UTD 9/10/2019     Screening tests: colonoscopy 2009-- normal-->repeat in 10 years-- DECLINES AND WISHES TO CONTINUE FIT TESTING(FIT - 10/10/19), DEXA 1/15/19-- Osteoporosis 2/2 hyperparathyroidism-- will repeat this summer per EZEKIEL, mammograms-- up to date per Dr. De Dios as of 2019  GYN every November (had a biopsy 12/12/2018 thru [facility]- now back on yearly schedule) &  No longer needs PAPS, Hep C (-)  5/27/14     Eye/Dental: up to date, monitoring left eye cataract-- Ophthalmology Dr Ojeda       Past Medical History  Past Medical History:   Diagnosis Date    Abnormal mammogram 8/10/2016    Followed up with diagnostic mammogram and ultrasound 10/22/2015at [facility]  that showed no concerning findings with recommendation to continue yearly screening.     Anxiety 8/8/2017    will need to readdress this if work up is negative and symptoms persist    Arthritis of knee 1/7/2015    Diverticulosis     Meniere's disease 8/15/2017    Osteopenia 12/1/2015    next bone density 4/2016, seeing endocrine for hyperparathyroidism     Positive anti-CCP test 4/7/2014    Primary hyperparathyroidism 8/26/2013    will be following up with Dr. [facility] in 2016, blood work today. stoped taking calcitonin due to nausea     Rheumatoid factor positive 4/7/2014    Vertigo 7/16/2017    Vitamin D deficiency 12/1/2015       Past Surgical History  Past Surgical History:   Procedure Laterality Date    APPENDECTOMY      HYSTERECTOMY      PARATHYROIDECTOMY N/A 5/22/2019    Procedure: PARATHYROIDECTOMY;  Surgeon: Andie Mcfarlane MD;  Location: [facility] OR [address] [place] [address];  Service: General;  Laterality: N/A;  NIMS / Intraop PTH Monitoring    ROTATOR CUFF REPAIR Right 12/2014    TUBAL LIGATION         Family History  Family History   Problem Relation Age of Onset    Diabetes Father     Dementia Father     No Known Problems Mother     Rheum arthritis Neg Hx     Lupus Neg Hx     Inflammatory bowel disease Neg Hx        Social History  Social History     Socioeconomic History    Marital status:      Spouse name: Not on file    Number of children: Not on file    Years of education: Not on file    Highest education level: Not on file   Occupational History    Not on file   Social Needs    Financial  resource strain: Not hard at all    Food insecurity:     Worry: Never true     Inability: Never true    Transportation needs:     Medical: No     Non-medical: No   Tobacco Use    Smoking status: Never Smoker    Smokeless tobacco: Never Used   Substance and Sexual Activity    Alcohol use: No     Frequency: Never     Binge frequency: Never    Drug use: No    Sexual activity: Yes     Partners: Male     Comment: ; 2 children   Lifestyle    Physical activity:     Days per week: 5 days     Minutes per session: 20 min    Stress: To some extent   Relationships    Social connections:     Talks on phone: More than three times a week     Gets together: Three times a week     Attends [employer] service: Not on file     Active member of club or organization: Yes     Attends meetings of clubs or organizations: More than 4 times per year     Relationship status:    Other Topics Concern    Not on file   Social History Narrative    Not on file       Current Medications  Medications reviewed and updated.     Allergies   Review of patient's allergies indicates:   Allergen Reactions    Requip [ropinirole] Swelling      palpitations, swelling of tongue       Review of Systems (Pertinent positives)  Review of Systems   Constitutional: Negative for activity change and unexpected weight change.   HENT: Negative for hearing loss, rhinorrhea and trouble swallowing.    Eyes: Negative for discharge and visual disturbance.   Respiratory: Negative for chest tightness and wheezing.    Cardiovascular: Negative for chest pain and palpitations.   Gastrointestinal: Negative for constipation, diarrhea and vomiting.   Endocrine: Negative for polydipsia and polyuria.   Genitourinary: Negative for difficulty urinating, dysuria, hematuria and menstrual problem.   Musculoskeletal: Positive for arthralgias. Negative for joint swelling and neck pain.   Neurological: Negative for weakness and headaches.   Psychiatric/Behavioral:  "Negative for confusion and dysphoric mood.       /66   Pulse 83   Temp 97.9 °F (36.6 °C)   Ht 5' 6" (1.676 m)   Wt 91.5 kg (201 lb 11.5 oz)   LMP  (LMP Unknown)   SpO2 95%   BMI 32.56 kg/m²     Physical Exam   Constitutional: She is oriented to person, place, and time. She appears well-developed and well-nourished. No distress.   HENT:   Head: Normocephalic and atraumatic.   Right Ear: Ear canal normal. Tympanic membrane is not erythematous and not bulging.   Left Ear: Ear canal normal. Tympanic membrane is not erythematous and not bulging.   Mouth/Throat: No oropharyngeal exudate.   Eyes: Conjunctivae are normal.   Neck: Carotid bruit is not present. No thyroid mass and no thyromegaly present.   Cardiovascular: Normal rate, regular rhythm and normal heart sounds.   No murmur heard.  Pulses:       Dorsalis pedis pulses are 2+ on the right side, and 2+ on the left side.   Pulmonary/Chest: Effort normal and breath sounds normal. No respiratory distress.   Abdominal: Soft. Bowel sounds are normal. She exhibits no distension and no mass. There is no hepatosplenomegaly. There is no tenderness.   Musculoskeletal: Normal range of motion.   Lymphadenopathy:     She has no cervical adenopathy.   Neurological: She is alert and oriented to person, place, and time.   Skin: Skin is warm and dry. No rash noted.   Psychiatric: She has a normal mood and affect.   Vitals reviewed.        Assessment/Plan:  Carlene Fong is a 69 y.o. female who presents today for :    Carlene was seen today for annual exam.    Diagnoses and all orders for this visit:    Routine general medical examination at a health care facility  Comments:  HEALTH MAINTENANCE REVIEWED: MAMMO PER OBGYN, IMMUNIZATIONS UTD, COLONOSCOPY ORDERED. ADVISED ON DIET/EXERCISE/SLEEP, EYE/DENTAL EXAMS   Orders:  -     Hemoglobin A1c; Future  -     Comprehensive metabolic panel; Future  -     Lipid panel; Future  -     CBC auto differential; Future  -     " Vitamin D; Future  -     TSH; Future  -     Magnesium; Future  -     PTH, intact; Future  -     Cancel: Case request GI: COLONOSCOPY  -     Vitamin B12; Future    Obesity (BMI 30.0-34.9)  Comments:  DISCUSSED ADDING SOME INTERVAL TRAINING, WEIGHT TRAINING/CORE STRENGTHENING TO IMPROVE METABOLIC RATE.  CONTINUE HEALTHY DIET    H/O hysterectomy for benign disease    Primary hyperparathyroidism  Comments:  RESOLVED S/P PARATHYROIDECTOMY, PTH 61 7/9/20   Orders:  -     Comprehensive metabolic panel; Future  -     Vitamin D; Future  -     TSH; Future  -     PTH, intact; Future    H/O parathyroidectomy  Comments:   s/p 3 gland parathyroidectomy on 5/22/2019 per Dr Abdalla, following with Dr Balderas, next appiontment this summer     Vitamin D deficiency  Comments:  Continuing 1000 IU of vitamin-D daily, check level with labs.  Orders:  -     Vitamin D; Future    Postmenopausal osteoporosis  Comments:  PLANS RECHECK DEXA THIS AMINA 1 YR S/P PARATHYROID RESSECTION. HAS BEEN ON CALCIUM AND VITAMIN D  Orders:  -     Comprehensive metabolic panel; Future  -     Vitamin D; Future  -     Magnesium; Future  -     PTH, intact; Future    Arthritis of knee  Comments:  following with ortho, s/p injection  Orders:  -     Ambulatory referral/consult to Physical/Occupational Therapy; Future    Meniere's disease, unspecified laterality  Comments:  stable on HCTZ  Orders:  -     hydroCHLOROthiazide (HYDRODIURIL) 12.5 MG Tab; Take 1 tablet (12.5 mg total) by mouth every other day.    Gastroesophageal reflux disease, esophagitis presence not specified  Comments:  has not needed OTC Omeprazole    Encounter for monitoring long-term proton pump inhibitor therapy  -     Comprehensive metabolic panel; Future  -     Vitamin D; Future  -     Magnesium; Future  -     Vitamin B12; Future    Medication management  -     Hemoglobin A1c; Future  -     Comprehensive metabolic panel; Future  -     Lipid panel; Future  -     CBC auto differential; Future  -      Vitamin D; Future  -     TSH; Future  -     Magnesium; Future  -     PTH, intact; Future    Colon cancer screening  -     Cancel: Case request GI: COLONOSCOPY    Carpal tunnel syndrome of left wrist  Comments:  stable with PRN wrist splinting and injections as needed per Dr Horner            ICD-10-CM ICD-9-CM    1. Routine general medical examination at a health care facility Z00.00 V70.0 Hemoglobin A1c      Comprehensive metabolic panel      Lipid panel      CBC auto differential      Vitamin D      TSH      Magnesium      PTH, intact      Vitamin B12      CANCELED: Case request GI: COLONOSCOPY    HEALTH MAINTENANCE REVIEWED: MAMMO PER OBGYN, IMMUNIZATIONS UTD, COLONOSCOPY ORDERED. ADVISED ON DIET/EXERCISE/SLEEP, EYE/DENTAL EXAMS    2. Obesity (BMI 30.0-34.9) E66.9 278.00     DISCUSSED ADDING SOME INTERVAL TRAINING, WEIGHT TRAINING/CORE STRENGTHENING TO IMPROVE METABOLIC RATE.  CONTINUE HEALTHY DIET   3. H/O hysterectomy for benign disease Z90.710 V88.01    4. Primary hyperparathyroidism E21.0 252.01 Comprehensive metabolic panel      Vitamin D      TSH      PTH, intact    RESOLVED S/P PARATHYROIDECTOMY, PTH 61 7/9/20    5. H/O parathyroidectomy Z90.09 V15.29      s/p 3 gland parathyroidectomy on 5/22/2019 per Dr Abdalla, following with Dr Balderas, next appiontment this summer    6. Vitamin D deficiency E55.9 268.9 Vitamin D    Continuing 1000 IU of vitamin-D daily, check level with labs.   7. Postmenopausal osteoporosis M81.0 733.01 Comprehensive metabolic panel      Vitamin D      Magnesium      PTH, intact    PLANS RECHECK DEXA THIS AMINA 1 YR S/P PARATHYROID RESSECTION. HAS BEEN ON CALCIUM AND VITAMIN D   8. Arthritis of knee M17.10 716.96 Ambulatory referral/consult to Physical/Occupational Therapy    following with ortho, s/p injection   9. Meniere's disease, unspecified laterality H81.09 386.00 hydroCHLOROthiazide (HYDRODIURIL) 12.5 MG Tab    stable on HCTZ   10. Gastroesophageal reflux disease, esophagitis  presence not specified K21.9 530.81     has not needed OTC Omeprazole   11. Encounter for monitoring long-term proton pump inhibitor therapy Z51.81 V58.83 Comprehensive metabolic panel    Z79.899 V58.69 Vitamin D      Magnesium      Vitamin B12   12. Medication management Z79.899 V58.69 Hemoglobin A1c      Comprehensive metabolic panel      Lipid panel      CBC auto differential      Vitamin D      TSH      Magnesium      PTH, intact   13. Colon cancer screening Z12.11 V76.51 CANCELED: Case request GI: COLONOSCOPY   14. Carpal tunnel syndrome of left wrist G56.02 354.0     stable with PRN wrist splinting and injections as needed per Dr Horner       There are no Patient Instructions on file for this visit.      Follow up in about 1 year (around 2/7/2021) for to follow up on lab results, return as needed for new concerns.

## 2020-02-10 ENCOUNTER — PATIENT MESSAGE (OUTPATIENT)
Dept: FAMILY MEDICINE | Facility: CLINIC | Age: 70
End: 2020-02-10

## 2020-02-17 ENCOUNTER — OFFICE VISIT (OUTPATIENT)
Dept: ORTHOPEDICS | Facility: CLINIC | Age: 70
End: 2020-02-17
Payer: COMMERCIAL

## 2020-02-17 VITALS — WEIGHT: 201 LBS | HEIGHT: 66 IN | BODY MASS INDEX: 32.3 KG/M2

## 2020-02-17 DIAGNOSIS — G56.02 CARPAL TUNNEL SYNDROME OF LEFT WRIST: Primary | ICD-10-CM

## 2020-02-17 PROCEDURE — 20526 PR INJECT CARPAL TUNNEL: ICD-10-PCS | Mod: 50,S$GLB,, | Performed by: ORTHOPAEDIC SURGERY

## 2020-02-17 PROCEDURE — 99213 PR OFFICE/OUTPT VISIT, EST, LEVL III, 20-29 MIN: ICD-10-PCS | Mod: 25,S$GLB,, | Performed by: ORTHOPAEDIC SURGERY

## 2020-02-17 PROCEDURE — 99999 PR PBB SHADOW E&M-EST. PATIENT-LVL III: ICD-10-PCS | Mod: PBBFAC,,, | Performed by: ORTHOPAEDIC SURGERY

## 2020-02-17 PROCEDURE — 1101F PT FALLS ASSESS-DOCD LE1/YR: CPT | Mod: CPTII,S$GLB,, | Performed by: ORTHOPAEDIC SURGERY

## 2020-02-17 PROCEDURE — 99213 OFFICE O/P EST LOW 20 MIN: CPT | Mod: 25,S$GLB,, | Performed by: ORTHOPAEDIC SURGERY

## 2020-02-17 PROCEDURE — 1101F PR PT FALLS ASSESS DOC 0-1 FALLS W/OUT INJ PAST YR: ICD-10-PCS | Mod: CPTII,S$GLB,, | Performed by: ORTHOPAEDIC SURGERY

## 2020-02-17 PROCEDURE — 99999 PR PBB SHADOW E&M-EST. PATIENT-LVL III: CPT | Mod: PBBFAC,,, | Performed by: ORTHOPAEDIC SURGERY

## 2020-02-17 PROCEDURE — 1159F MED LIST DOCD IN RCRD: CPT | Mod: S$GLB,,, | Performed by: ORTHOPAEDIC SURGERY

## 2020-02-17 PROCEDURE — 1159F PR MEDICATION LIST DOCUMENTED IN MEDICAL RECORD: ICD-10-PCS | Mod: S$GLB,,, | Performed by: ORTHOPAEDIC SURGERY

## 2020-02-17 PROCEDURE — 20526 THER INJECTION CARP TUNNEL: CPT | Mod: 50,S$GLB,, | Performed by: ORTHOPAEDIC SURGERY

## 2020-02-17 PROCEDURE — 1125F AMNT PAIN NOTED PAIN PRSNT: CPT | Mod: S$GLB,,, | Performed by: ORTHOPAEDIC SURGERY

## 2020-02-17 PROCEDURE — 1125F PR PAIN SEVERITY QUANTIFIED, PAIN PRESENT: ICD-10-PCS | Mod: S$GLB,,, | Performed by: ORTHOPAEDIC SURGERY

## 2020-02-17 RX ORDER — TRIAMCINOLONE ACETONIDE 40 MG/ML
40 INJECTION, SUSPENSION INTRA-ARTICULAR; INTRAMUSCULAR
Status: COMPLETED | OUTPATIENT
Start: 2020-02-17 | End: 2020-02-17

## 2020-02-17 RX ADMIN — TRIAMCINOLONE ACETONIDE 40 MG: 40 INJECTION, SUSPENSION INTRA-ARTICULAR; INTRAMUSCULAR at 11:02

## 2020-02-17 NOTE — PROGRESS NOTES
Subjective:      Patient ID: Carlene Fong is a 69 y.o. female.  Chief Complaint: Follow-up of the Right Hand and Follow-up of the Left Hand      HPI  Carlene Fong is a  69 y.o. female presenting today for follow up of bilateral carpal tunnel syndrome.  She reports that she is having some ongoing symptoms although she is improved since the last visit  She has occasional numbness in both hands she would like injections again.    Review of patient's allergies indicates:   Allergen Reactions    Requip [ropinirole] Swelling      palpitations, swelling of tongue         Current Outpatient Medications   Medication Sig Dispense Refill    calcium carbonate (TUMS) 200 mg calcium (500 mg) chewable tablet Take 2 tablets (1,000 mg total) by mouth 4 (four) times daily. 240 tablet 2    co-enzyme Q-10 30 mg capsule Take 30 mg by mouth once daily.       diclofenac sodium (VOLTAREN) 1 % Gel Apply 2 g topically 3 (three) times daily. 100 g 5    FLAXSEED ORAL Take by mouth once daily.       fluticasone propionate (FLONASE) 50 mcg/actuation nasal spray 2 sprays (100 mcg total) by Each Nostril route nightly. 48 mL 4    gabapentin (NEURONTIN) 300 MG capsule TAKE 1 CAPSULE BY MOUTH TWICE A  capsule 3    hydroCHLOROthiazide (HYDRODIURIL) 12.5 MG Tab Take 1 tablet (12.5 mg total) by mouth every other day. 90 tablet 3    ondansetron (ZOFRAN) 8 MG tablet Take 1 tablet (8 mg total) by mouth every 8 (eight) hours as needed for Nausea. 15 tablet 1    vitamin D 1000 units Tab Take 185 mg by mouth once daily.       No current facility-administered medications for this visit.        Past Medical History:   Diagnosis Date    Abnormal mammogram 8/10/2016    Followed up with diagnostic mammogram and ultrasound 10[facility]at [facility]  that showed no concerning findings with recommendation to continue yearly screening.     Anxiety 8/8/2017    will need to readdress this if work up is negative and symptoms persist     "Arthritis of knee 1/7/2015    Diverticulosis     Meniere's disease 8/15/2017    Osteopenia 12/1/2015    next bone density 4/2016, seeing endocrine for hyperparathyroidism     Positive anti-CCP test 4/7/2014    Primary hyperparathyroidism 8/26/2013    will be following up with Dr. [facility] in 2016, blood work today. stoped taking calcitonin due to nausea     Rheumatoid factor positive 4/7/2014    Vertigo 7/16/2017    Vitamin D deficiency 12/1/2015       Past Surgical History:   Procedure Laterality Date    APPENDECTOMY      HYSTERECTOMY      PARATHYROIDECTOMY N/A 5/22/2019    Procedure: PARATHYROIDECTOMY;  Surgeon: Andie Mcfarlane MD;  Location: [facility] OR [address] [place] [address];  Service: General;  Laterality: N/A;  NIMS / Intraop PTH Monitoring    ROTATOR CUFF REPAIR Right 12/2014    TUBAL LIGATION         OBJECTIVE:   PHYSICAL EXAM:  Height: 5' 6" (167.6 cm) Weight: 91.2 kg (201 lb)  Vitals:    02/17/20 1046   Weight: 91.2 kg (201 lb)   Height: 5' 6" (1.676 m)   PainSc:   2     Ortho/SPM Exam  Examination hands demonstrates a mildly positive Tinel sign over the median nerve at the wrist bilaterally Phalen's test negative  Range of motion wrist fingers full  No atrophy either hand noted      RADIOGRAPHS:  None  Comments: I have personally reviewed the imaging and I agree with the above radiologist's report.    ASSESSMENT/PLAN:     IMPRESSION:  Bilateral carpal tunnel syndrome    PLAN:  After pause for time-out identified each wrist injected carpal tunnel bilateral with combination Kenalog 20 mg 0.5 cc xylocaine sterile technique  Tolerated the procedure well without complication      FOLLOW UP:  2-3 months or as needed    Disclaimer: This note has been generated using voice-recognition software. There may be typographical errors that have been missed during proof-reading.  "

## 2020-05-20 ENCOUNTER — TELEPHONE (OUTPATIENT)
Dept: ORTHOPEDICS | Facility: CLINIC | Age: 70
End: 2020-05-20

## 2020-05-20 DIAGNOSIS — M25.561 PAIN IN BOTH KNEES, UNSPECIFIED CHRONICITY: Primary | ICD-10-CM

## 2020-05-20 DIAGNOSIS — M25.562 PAIN IN BOTH KNEES, UNSPECIFIED CHRONICITY: Primary | ICD-10-CM

## 2020-05-20 NOTE — TELEPHONE ENCOUNTER
----- Message from Robin Tucker sent at 5/20/2020  7:53 AM CDT -----  Type:  Needs Medical Advice    Who Called: patient  Symptoms (please be specific):  Both knees are hurting so she needs to have the xray done on both knees instead of just one.  Would the patient rather a call back or a response via [facility]? call  Best Call Back Number: [phone]  Additional Information: no

## 2020-05-21 ENCOUNTER — HOSPITAL ENCOUNTER (OUTPATIENT)
Dept: RADIOLOGY | Facility: HOSPITAL | Age: 70
Discharge: HOME | End: 2020-05-21
Attending: ORTHOPAEDIC SURGERY
Payer: COMMERCIAL

## 2020-05-21 ENCOUNTER — OFFICE VISIT (OUTPATIENT)
Dept: ORTHOPEDICS | Facility: CLINIC | Age: 70
End: 2020-05-21
Payer: COMMERCIAL

## 2020-05-21 VITALS — WEIGHT: 201 LBS | BODY MASS INDEX: 32.3 KG/M2 | HEIGHT: 66 IN

## 2020-05-21 DIAGNOSIS — M17.11 PRIMARY OSTEOARTHRITIS OF RIGHT KNEE: Primary | ICD-10-CM

## 2020-05-21 DIAGNOSIS — M25.561 PAIN IN BOTH KNEES, UNSPECIFIED CHRONICITY: ICD-10-CM

## 2020-05-21 DIAGNOSIS — M25.562 PAIN IN BOTH KNEES, UNSPECIFIED CHRONICITY: ICD-10-CM

## 2020-05-21 PROCEDURE — 1159F PR MEDICATION LIST DOCUMENTED IN MEDICAL RECORD: ICD-10-PCS | Mod: S$GLB,,, | Performed by: ORTHOPAEDIC SURGERY

## 2020-05-21 PROCEDURE — 99999 PR PBB SHADOW E&M-EST. PATIENT-LVL III: ICD-10-PCS | Mod: PBBFAC,,, | Performed by: ORTHOPAEDIC SURGERY

## 2020-05-21 PROCEDURE — 1159F MED LIST DOCD IN RCRD: CPT | Mod: S$GLB,,, | Performed by: ORTHOPAEDIC SURGERY

## 2020-05-21 PROCEDURE — 1125F AMNT PAIN NOTED PAIN PRSNT: CPT | Mod: S$GLB,,, | Performed by: ORTHOPAEDIC SURGERY

## 2020-05-21 PROCEDURE — 73564 X-RAY EXAM KNEE 4 OR MORE: CPT | Mod: 26,50,, | Performed by: RADIOLOGY

## 2020-05-21 PROCEDURE — 99999 PR PBB SHADOW E&M-EST. PATIENT-LVL III: CPT | Mod: PBBFAC,,, | Performed by: ORTHOPAEDIC SURGERY

## 2020-05-21 PROCEDURE — 99213 OFFICE O/P EST LOW 20 MIN: CPT | Mod: 25,S$GLB,, | Performed by: ORTHOPAEDIC SURGERY

## 2020-05-21 PROCEDURE — 20610 PR DRAIN/INJECT LARGE JOINT/BURSA: ICD-10-PCS | Mod: RT,S$GLB,, | Performed by: ORTHOPAEDIC SURGERY

## 2020-05-21 PROCEDURE — 1101F PT FALLS ASSESS-DOCD LE1/YR: CPT | Mod: CPTII,S$GLB,, | Performed by: ORTHOPAEDIC SURGERY

## 2020-05-21 PROCEDURE — 1125F PR PAIN SEVERITY QUANTIFIED, PAIN PRESENT: ICD-10-PCS | Mod: S$GLB,,, | Performed by: ORTHOPAEDIC SURGERY

## 2020-05-21 PROCEDURE — 99213 PR OFFICE/OUTPT VISIT, EST, LEVL III, 20-29 MIN: ICD-10-PCS | Mod: 25,S$GLB,, | Performed by: ORTHOPAEDIC SURGERY

## 2020-05-21 PROCEDURE — 20610 DRAIN/INJ JOINT/BURSA W/O US: CPT | Mod: RT,S$GLB,, | Performed by: ORTHOPAEDIC SURGERY

## 2020-05-21 PROCEDURE — 73564 X-RAY EXAM KNEE 4 OR MORE: CPT | Mod: TC,50,PN

## 2020-05-21 PROCEDURE — 1101F PR PT FALLS ASSESS DOC 0-1 FALLS W/OUT INJ PAST YR: ICD-10-PCS | Mod: CPTII,S$GLB,, | Performed by: ORTHOPAEDIC SURGERY

## 2020-05-21 PROCEDURE — 73564 XR KNEE COMP 4 OR MORE VIEWS BILAT: ICD-10-PCS | Mod: 26,50,, | Performed by: RADIOLOGY

## 2020-05-21 RX ORDER — TRIAMCINOLONE ACETONIDE 40 MG/ML
40 INJECTION, SUSPENSION INTRA-ARTICULAR; INTRAMUSCULAR
Status: DISCONTINUED | OUTPATIENT
Start: 2020-05-21 | End: 2020-05-21 | Stop reason: HOSPADM

## 2020-05-21 RX ADMIN — TRIAMCINOLONE ACETONIDE 40 MG: 40 INJECTION, SUSPENSION INTRA-ARTICULAR; INTRAMUSCULAR at 02:05

## 2020-05-21 NOTE — PROCEDURES
Large Joint Aspiration/Injection  Date/Time: 5/21/2020 2:30 PM  Performed by: Franklin Dobbs MD  Authorized by: Franklin Dobbs MD     Medications:  40 mg triamcinolone acetonide 40 mg/mL     After obtaining verbal informed consent the patient's right knee was prepped aseptically and injected through an inferior lateral approach using 40 mg of triamcinolone and 1 cc of 1% plain Xylocaine.  The patient was warned about postinjection flare and how to manage it with ice, rest and over-the-counter analgesics.  They're advised to contact me for any severe, uncontrolled pain.

## 2020-05-21 NOTE — PROGRESS NOTES
Subjective:      Patient ID: Carlene Fong is a 69 y.o. female.    Chief Complaint: Pain and Follow-up of the Left Knee and Pain and Follow-up of the Right Knee    HPI    The patient reports that her left knee feels better after the injection.  She complains of increasing right knee pain and request injection here.          Review of Systems   Constitution: Negative for fever and weight loss.   HENT: Negative for congestion.    Eyes: Negative for visual disturbance.   Cardiovascular: Negative for chest pain.   Respiratory: Negative for shortness of breath.    Hematologic/Lymphatic: Negative for bleeding problem. Does not bruise/bleed easily.   Skin: Negative for poor wound healing.   Musculoskeletal: Positive for joint pain.   Gastrointestinal: Negative for abdominal pain.   Genitourinary: Negative for dysuria.   Neurological: Negative for seizures.   Psychiatric/Behavioral: Negative for altered mental status.   Allergic/Immunologic: Negative for persistent infections.         Objective:      Ortho/SPM Exam      Right knee    The patient is not in acute distress.   Sclerae normal  Body habitus is normal.  Respiratory distress:  none   The patient walks with a limp.  Hip irritability  negative.   The skin over the knee is intact.  Knee effusion  trace  Tendernes is located none  Range of motion- Flexion 110 deg, Extension 0 deg,   Ligament laxity exam:   MCL 0   Lachman 0   Post sag  0    LCL 0  Patellar apprehension negative.  Popliteal cyst positive  Patellar crepitation absent.  Flexion/pinch negative  Pulses DP present, PT present.  Motor normal 5/5 strength in all tested muscle groups.   Sensory normal.    I reviewed the relevant imaging for the patient's condition:  Both knees have mild joint space narrowing.          Assessment:       No diagnosis found.     The condition is structurally mild.  As long as there is satisfactory response to nonsurgical care this should be continued as long as  possible.          Plan:       There are no diagnoses linked to this encounter.      I explained my diagnostic impression and the reasoning behind it in detail, using layman's terms.  Models and/or pictures were used to help in the explanation.    Consent given for right knee injection    Appropriate home exercise program reviewed    I explained the potential role of surgery in the treatment of this condition to the patient.  They understand that if nonsurgical measures do not adequately control symptoms, surgery will be considered in the future.

## 2020-06-09 ENCOUNTER — PATIENT MESSAGE (OUTPATIENT)
Dept: FAMILY MEDICINE | Facility: CLINIC | Age: 70
End: 2020-06-09

## 2020-06-09 DIAGNOSIS — K21.9 GASTROESOPHAGEAL REFLUX DISEASE, ESOPHAGITIS PRESENCE NOT SPECIFIED: Primary | ICD-10-CM

## 2020-06-09 RX ORDER — OMEPRAZOLE 40 MG/1
40 CAPSULE, DELAYED RELEASE ORAL
Qty: 90 CAPSULE | Refills: 3 | Status: SHIPPED | OUTPATIENT
Start: 2020-06-09 | End: 2023-02-23

## 2020-07-16 ENCOUNTER — OFFICE VISIT (OUTPATIENT)
Dept: ORTHOPEDICS | Facility: CLINIC | Age: 70
End: 2020-07-16
Payer: COMMERCIAL

## 2020-07-16 VITALS — TEMPERATURE: 98 F

## 2020-07-16 DIAGNOSIS — G56.02 CARPAL TUNNEL SYNDROME OF LEFT WRIST: Primary | ICD-10-CM

## 2020-07-16 PROCEDURE — 99999 PR PBB SHADOW E&M-EST. PATIENT-LVL III: CPT | Mod: PBBFAC,,, | Performed by: ORTHOPAEDIC SURGERY

## 2020-07-16 PROCEDURE — 20526 PR INJECT CARPAL TUNNEL: ICD-10-PCS | Mod: 50,S$GLB,, | Performed by: ORTHOPAEDIC SURGERY

## 2020-07-16 PROCEDURE — 1101F PR PT FALLS ASSESS DOC 0-1 FALLS W/OUT INJ PAST YR: ICD-10-PCS | Mod: CPTII,S$GLB,, | Performed by: ORTHOPAEDIC SURGERY

## 2020-07-16 PROCEDURE — 1159F PR MEDICATION LIST DOCUMENTED IN MEDICAL RECORD: ICD-10-PCS | Mod: S$GLB,,, | Performed by: ORTHOPAEDIC SURGERY

## 2020-07-16 PROCEDURE — 99213 OFFICE O/P EST LOW 20 MIN: CPT | Mod: 25,S$GLB,, | Performed by: ORTHOPAEDIC SURGERY

## 2020-07-16 PROCEDURE — 1125F PR PAIN SEVERITY QUANTIFIED, PAIN PRESENT: ICD-10-PCS | Mod: S$GLB,,, | Performed by: ORTHOPAEDIC SURGERY

## 2020-07-16 PROCEDURE — 20526 THER INJECTION CARP TUNNEL: CPT | Mod: 50,S$GLB,, | Performed by: ORTHOPAEDIC SURGERY

## 2020-07-16 PROCEDURE — 1101F PT FALLS ASSESS-DOCD LE1/YR: CPT | Mod: CPTII,S$GLB,, | Performed by: ORTHOPAEDIC SURGERY

## 2020-07-16 PROCEDURE — 99999 PR PBB SHADOW E&M-EST. PATIENT-LVL III: ICD-10-PCS | Mod: PBBFAC,,, | Performed by: ORTHOPAEDIC SURGERY

## 2020-07-16 PROCEDURE — 1125F AMNT PAIN NOTED PAIN PRSNT: CPT | Mod: S$GLB,,, | Performed by: ORTHOPAEDIC SURGERY

## 2020-07-16 PROCEDURE — 99213 PR OFFICE/OUTPT VISIT, EST, LEVL III, 20-29 MIN: ICD-10-PCS | Mod: 25,S$GLB,, | Performed by: ORTHOPAEDIC SURGERY

## 2020-07-16 PROCEDURE — 1159F MED LIST DOCD IN RCRD: CPT | Mod: S$GLB,,, | Performed by: ORTHOPAEDIC SURGERY

## 2020-07-16 RX ORDER — TRIAMCINOLONE ACETONIDE 40 MG/ML
40 INJECTION, SUSPENSION INTRA-ARTICULAR; INTRAMUSCULAR
Status: COMPLETED | OUTPATIENT
Start: 2020-07-16 | End: 2020-07-16

## 2020-07-16 RX ADMIN — TRIAMCINOLONE ACETONIDE 40 MG: 40 INJECTION, SUSPENSION INTRA-ARTICULAR; INTRAMUSCULAR at 11:07

## 2020-07-16 NOTE — PROGRESS NOTES
Subjective:      Patient ID: Carlene Fong is a 69 y.o. female.  Chief Complaint: Pain of the Right Hand and Pain of the Left Hand      HPI  Carlene Fong is a  69 y.o. female presenting today for follow up of all carpal tunnel syndrome.  She reports that she is having a flare-up in both hands  She is not interested in surgery but would like to try the injections again which have helped in the past  She does wear braces at night.    Review of patient's allergies indicates:   Allergen Reactions    Requip [ropinirole] Swelling      palpitations, swelling of tongue         Current Outpatient Medications   Medication Sig Dispense Refill    co-enzyme Q-10 30 mg capsule Take 30 mg by mouth once daily.       diclofenac sodium (VOLTAREN) 1 % Gel Apply 2 g topically 3 (three) times daily. 100 g 5    FLAXSEED ORAL Take by mouth once daily.       fluticasone propionate (FLONASE) 50 mcg/actuation nasal spray 2 sprays (100 mcg total) by Each Nostril route nightly. 48 mL 4    gabapentin (NEURONTIN) 300 MG capsule TAKE 1 CAPSULE BY MOUTH TWICE A  capsule 3    hydroCHLOROthiazide (HYDRODIURIL) 12.5 MG Tab Take 1 tablet (12.5 mg total) by mouth every other day. 90 tablet 3    omeprazole (PRILOSEC) 40 MG capsule Take 1 capsule (40 mg total) by mouth before breakfast. 90 capsule 3    ondansetron (ZOFRAN) 8 MG tablet Take 1 tablet (8 mg total) by mouth every 8 (eight) hours as needed for Nausea. 15 tablet 1    vitamin D 1000 units Tab Take 185 mg by mouth once daily.      calcium carbonate (TUMS) 200 mg calcium (500 mg) chewable tablet Take 2 tablets (1,000 mg total) by mouth 4 (four) times daily. (Patient not taking: Reported on 5/21/2020) 240 tablet 2     No current facility-administered medications for this visit.        Past Medical History:   Diagnosis Date    Abnormal mammogram 8/10/2016    Followed up with diagnostic mammogram and ultrasound 10[facility]at [facility]  that showed no concerning findings  with recommendation to continue yearly screening.     Anxiety 8/8/2017    will need to readdress this if work up is negative and symptoms persist    Arthritis of knee 1/7/2015    Diverticulosis     Meniere's disease 8/15/2017    Osteopenia 12/1/2015    next bone density 4/2016, seeing endocrine for hyperparathyroidism     Positive anti-CCP test 4/7/2014    Primary hyperparathyroidism 8/26/2013    will be following up with Dr. [facility] in 2016, blood work today. stoped taking calcitonin due to nausea     Rheumatoid factor positive 4/7/2014    Vertigo 7/16/2017    Vitamin D deficiency 12/1/2015       Past Surgical History:   Procedure Laterality Date    APPENDECTOMY      HYSTERECTOMY      PARATHYROIDECTOMY N/A 5/22/2019    Procedure: PARATHYROIDECTOMY;  Surgeon: Andie Mcfarlane MD;  [place] [facility] [place] [address] [place] [address];  Service: General;  Laterality: N/A;  NIMS / Intraop PTH Monitoring    ROTATOR CUFF REPAIR Right 12/2014    TUBAL LIGATION         OBJECTIVE:   PHYSICAL EXAM:       Vitals:    07/16/20 1136   Temp: 98.3 °F (36.8 °C)   PainSc:   7     Ortho/SPM Exam  Examination hands demonstrates mild swelling bilaterally  Positive Tinel sign kimi on the right negative on the left  Range of motion wrist fingers full  Sensation intact all digits  No atrophy noted aterally  Positive Phalen's test    RADIOGRAPHS:  None  Comments: I have personally reviewed the imaging and I agree with the above radiologist's report.    ASSESSMENT/PLAN:     IMPRESSION:  Bilateral carpal tunnel syndrome    PLAN:  After pause for time-out identified each wrist injected carpal tunnel bilateral with combination Kenalog 20 mg 0.5 cc xylocaine sterile technique  Tolerated the procedure well without complication  Continue splints at night    FOLLOW UP:  As needed consider surgery in the future    Disclaimer: This note has been generated using voice-recognition software. There may be typographical errors that have been missed  during proof-reading.

## 2020-08-20 ENCOUNTER — OFFICE VISIT (OUTPATIENT)
Dept: ORTHOPEDICS | Facility: CLINIC | Age: 70
End: 2020-08-20
Payer: COMMERCIAL

## 2020-08-20 VITALS
WEIGHT: 201.06 LBS | HEART RATE: 93 BPM | DIASTOLIC BLOOD PRESSURE: 86 MMHG | SYSTOLIC BLOOD PRESSURE: 134 MMHG | BODY MASS INDEX: 32.31 KG/M2 | HEIGHT: 66 IN

## 2020-08-20 DIAGNOSIS — M17.12 PRIMARY OSTEOARTHRITIS OF LEFT KNEE: Primary | ICD-10-CM

## 2020-08-20 PROCEDURE — 1125F AMNT PAIN NOTED PAIN PRSNT: CPT | Mod: S$GLB,,, | Performed by: ORTHOPAEDIC SURGERY

## 2020-08-20 PROCEDURE — 99213 OFFICE O/P EST LOW 20 MIN: CPT | Mod: 25,S$GLB,, | Performed by: ORTHOPAEDIC SURGERY

## 2020-08-20 PROCEDURE — 20610 DRAIN/INJ JOINT/BURSA W/O US: CPT | Mod: LT,S$GLB,, | Performed by: ORTHOPAEDIC SURGERY

## 2020-08-20 PROCEDURE — 1125F PR PAIN SEVERITY QUANTIFIED, PAIN PRESENT: ICD-10-PCS | Mod: S$GLB,,, | Performed by: ORTHOPAEDIC SURGERY

## 2020-08-20 PROCEDURE — 99213 PR OFFICE/OUTPT VISIT, EST, LEVL III, 20-29 MIN: ICD-10-PCS | Mod: 25,S$GLB,, | Performed by: ORTHOPAEDIC SURGERY

## 2020-08-20 PROCEDURE — 1159F PR MEDICATION LIST DOCUMENTED IN MEDICAL RECORD: ICD-10-PCS | Mod: S$GLB,,, | Performed by: ORTHOPAEDIC SURGERY

## 2020-08-20 PROCEDURE — 1101F PT FALLS ASSESS-DOCD LE1/YR: CPT | Mod: CPTII,S$GLB,, | Performed by: ORTHOPAEDIC SURGERY

## 2020-08-20 PROCEDURE — 99999 PR PBB SHADOW E&M-EST. PATIENT-LVL III: ICD-10-PCS | Mod: PBBFAC,,, | Performed by: ORTHOPAEDIC SURGERY

## 2020-08-20 PROCEDURE — 99999 PR PBB SHADOW E&M-EST. PATIENT-LVL III: CPT | Mod: PBBFAC,,, | Performed by: ORTHOPAEDIC SURGERY

## 2020-08-20 PROCEDURE — 1101F PR PT FALLS ASSESS DOC 0-1 FALLS W/OUT INJ PAST YR: ICD-10-PCS | Mod: CPTII,S$GLB,, | Performed by: ORTHOPAEDIC SURGERY

## 2020-08-20 PROCEDURE — 3008F PR BODY MASS INDEX (BMI) DOCUMENTED: ICD-10-PCS | Mod: CPTII,S$GLB,, | Performed by: ORTHOPAEDIC SURGERY

## 2020-08-20 PROCEDURE — 1159F MED LIST DOCD IN RCRD: CPT | Mod: S$GLB,,, | Performed by: ORTHOPAEDIC SURGERY

## 2020-08-20 PROCEDURE — 20610 PR DRAIN/INJECT LARGE JOINT/BURSA: ICD-10-PCS | Mod: LT,S$GLB,, | Performed by: ORTHOPAEDIC SURGERY

## 2020-08-20 PROCEDURE — 3008F BODY MASS INDEX DOCD: CPT | Mod: CPTII,S$GLB,, | Performed by: ORTHOPAEDIC SURGERY

## 2020-08-20 RX ORDER — TRIAMCINOLONE ACETONIDE 40 MG/ML
40 INJECTION, SUSPENSION INTRA-ARTICULAR; INTRAMUSCULAR
Status: DISCONTINUED | OUTPATIENT
Start: 2020-08-20 | End: 2020-08-20 | Stop reason: HOSPADM

## 2020-08-20 RX ADMIN — TRIAMCINOLONE ACETONIDE 40 MG: 40 INJECTION, SUSPENSION INTRA-ARTICULAR; INTRAMUSCULAR at 02:08

## 2020-08-20 NOTE — PROGRESS NOTES
Subjective:      Patient ID: Carlene Fong is a 69 y.o. female.    Chief Complaint: Knee Pain (left )    HPI    Follow-up for osteoarthritis.  The patient has been successfully managed with intermittent injections of both knees in the past.  She notes recent worsening of her left knee with popliteal swelling and request reinjection at this time.  She reports that she can walk without much limitation but has pain when she does so.          Review of Systems   Constitution: Negative for fever and weight loss.   HENT: Negative for congestion.    Eyes: Negative for visual disturbance.   Cardiovascular: Negative for chest pain.   Respiratory: Negative for shortness of breath.    Hematologic/Lymphatic: Negative for bleeding problem. Does not bruise/bleed easily.   Skin: Negative for poor wound healing.   Musculoskeletal: Positive for joint pain.   Gastrointestinal: Negative for abdominal pain.   Genitourinary: Negative for dysuria.   Neurological: Negative for seizures.   Psychiatric/Behavioral: Negative for altered mental status.   Allergic/Immunologic: Negative for persistent infections.         Objective:      Ortho/SPM Exam      Left knee    The patient is not in acute distress.   Body habitus is normal.   Sclera appear normal  No respiratory distress  The patient walks without a limp.  Resisted SLR negative.   The skin over the knee is intact.  Knee effusion 0  Tendernes is located absent.  Range of motion- Flexion full, Extension full.   Ligament exam:   MCL trace laxity   Lachman intact              Post sag intact    LCL intact  Patellar apprehension negative.  Popliteal cyst positive  Patellar crepitation absent.  Flexion/pinch negative.  Pulses DP present, PT present.  Motor normal 5/5 strength in all tested muscle groups.   Sensory normal.            Assessment:       Encounter Diagnosis   Name Primary?    Primary osteoarthritis of left knee Yes        Condition appears to be structurally mild based on  objective findings.          Plan:       Carlene was seen today for knee pain.    Diagnoses and all orders for this visit:    Primary osteoarthritis of left knee  -     Large Joint Aspiration/Injection          I explained my diagnostic impression and the reasoning behind it in detail, using layman's terms.  Models and/or pictures were used to help in the explanation.     I explained the potential role of surgery in the treatment of this condition to the patient.  They understand that if nonsurgical measures do not adequately control symptoms, surgery will be considered in the future.    Considering the overall situation I recommend the patient be managed with my palliative protocol.    Consent given for left knee injection today    Would probably reinject both knees in 3 months.

## 2020-08-20 NOTE — PROCEDURES
Large Joint Aspiration/Injection    Date/Time: 8/20/2020 2:15 PM  Performed by: Franklin Dobbs MD  Authorized by: Franklin Dobbs MD     Medications:  40 mg triamcinolone acetonide 40 mg/mL     After obtaining verbal informed consent the patient's left knee was prepped aseptically and injected through an inferior lateral approach using 40 mg of triamcinolone and 1 cc of 1% plain Xylocaine.  The patient was warned about postinjection flare and how to manage it with ice, rest and over-the-counter analgesics.  They're advised to contact me for any severe, uncontrolled pain.

## 2020-08-26 RX ORDER — MECLIZINE HYDROCHLORIDE 25 MG/1
25 TABLET ORAL 3 TIMES DAILY PRN
Qty: 30 TABLET | Refills: 3 | Status: SHIPPED | OUTPATIENT
Start: 2020-08-26 | End: 2022-03-13 | Stop reason: ALTCHOICE

## 2020-08-26 RX ORDER — MECLIZINE HYDROCHLORIDE 25 MG/1
25 TABLET ORAL 3 TIMES DAILY PRN
Qty: 30 TABLET | Refills: 3 | Status: CANCELLED | OUTPATIENT
Start: 2020-08-26

## 2020-09-13 ENCOUNTER — PATIENT OUTREACH (OUTPATIENT)
Dept: ADMINISTRATIVE | Facility: OTHER | Age: 70
End: 2020-09-13

## 2020-09-14 ENCOUNTER — OFFICE VISIT (OUTPATIENT)
Dept: ORTHOPEDICS | Facility: CLINIC | Age: 70
End: 2020-09-14
Payer: COMMERCIAL

## 2020-09-14 VITALS — HEIGHT: 66 IN | BODY MASS INDEX: 32.31 KG/M2 | WEIGHT: 201.06 LBS

## 2020-09-14 DIAGNOSIS — M17.11 PRIMARY OSTEOARTHRITIS OF RIGHT KNEE: Primary | ICD-10-CM

## 2020-09-14 PROCEDURE — 3008F BODY MASS INDEX DOCD: CPT | Mod: CPTII,S$GLB,, | Performed by: ORTHOPAEDIC SURGERY

## 2020-09-14 PROCEDURE — 1159F MED LIST DOCD IN RCRD: CPT | Mod: S$GLB,,, | Performed by: ORTHOPAEDIC SURGERY

## 2020-09-14 PROCEDURE — 99999 PR PBB SHADOW E&M-EST. PATIENT-LVL III: ICD-10-PCS | Mod: PBBFAC,,, | Performed by: ORTHOPAEDIC SURGERY

## 2020-09-14 PROCEDURE — 1159F PR MEDICATION LIST DOCUMENTED IN MEDICAL RECORD: ICD-10-PCS | Mod: S$GLB,,, | Performed by: ORTHOPAEDIC SURGERY

## 2020-09-14 PROCEDURE — 99213 PR OFFICE/OUTPT VISIT, EST, LEVL III, 20-29 MIN: ICD-10-PCS | Mod: 25,S$GLB,, | Performed by: ORTHOPAEDIC SURGERY

## 2020-09-14 PROCEDURE — 3008F PR BODY MASS INDEX (BMI) DOCUMENTED: ICD-10-PCS | Mod: CPTII,S$GLB,, | Performed by: ORTHOPAEDIC SURGERY

## 2020-09-14 PROCEDURE — 20610 DRAIN/INJ JOINT/BURSA W/O US: CPT | Mod: RT,S$GLB,, | Performed by: ORTHOPAEDIC SURGERY

## 2020-09-14 PROCEDURE — 1125F PR PAIN SEVERITY QUANTIFIED, PAIN PRESENT: ICD-10-PCS | Mod: S$GLB,,, | Performed by: ORTHOPAEDIC SURGERY

## 2020-09-14 PROCEDURE — 20610 PR DRAIN/INJECT LARGE JOINT/BURSA: ICD-10-PCS | Mod: RT,S$GLB,, | Performed by: ORTHOPAEDIC SURGERY

## 2020-09-14 PROCEDURE — 1101F PT FALLS ASSESS-DOCD LE1/YR: CPT | Mod: CPTII,S$GLB,, | Performed by: ORTHOPAEDIC SURGERY

## 2020-09-14 PROCEDURE — 99213 OFFICE O/P EST LOW 20 MIN: CPT | Mod: 25,S$GLB,, | Performed by: ORTHOPAEDIC SURGERY

## 2020-09-14 PROCEDURE — 1125F AMNT PAIN NOTED PAIN PRSNT: CPT | Mod: S$GLB,,, | Performed by: ORTHOPAEDIC SURGERY

## 2020-09-14 PROCEDURE — 1101F PR PT FALLS ASSESS DOC 0-1 FALLS W/OUT INJ PAST YR: ICD-10-PCS | Mod: CPTII,S$GLB,, | Performed by: ORTHOPAEDIC SURGERY

## 2020-09-14 PROCEDURE — 99999 PR PBB SHADOW E&M-EST. PATIENT-LVL III: CPT | Mod: PBBFAC,,, | Performed by: ORTHOPAEDIC SURGERY

## 2020-09-14 RX ORDER — TRIAMCINOLONE ACETONIDE 40 MG/ML
40 INJECTION, SUSPENSION INTRA-ARTICULAR; INTRAMUSCULAR
Status: DISCONTINUED | OUTPATIENT
Start: 2020-09-14 | End: 2020-09-14 | Stop reason: HOSPADM

## 2020-09-14 RX ADMIN — TRIAMCINOLONE ACETONIDE 40 MG: 40 INJECTION, SUSPENSION INTRA-ARTICULAR; INTRAMUSCULAR at 09:09

## 2020-09-14 NOTE — PROCEDURES
Large Joint Aspiration/Injection    Date/Time: 9/14/2020 9:30 AM  Performed by: Laly Jackson PA-C  Authorized by: Laly Jackson PA-C     Medications:  40 mg triamcinolone acetonide 40 mg/mL    PROCEDURE:  I have explained the risks, benefits, and alternatives of the procedure in detail.  The patient voices understanding and all questions have been answered.  The patient agrees to proceed as planned. So after I performed a sterile prep of the skin in the normal fashion the right knee is injected using a 21 gauge needle with a combination of 1cc 1% plain xylocaine and 40mg triamcinolone.  The patient is cautioned that immediate relief of pain is secondary to the local anesthetic and will be temporary. After the anesthetic wears off there may be a increase in pain that may last for a few hours or a few days and they should use ice to help alleviate this this pain. Patient tolerated the procedure well.

## 2020-09-14 NOTE — PROGRESS NOTES
Subjective:      Patient ID: Carlene Fong is a 69 y.o. female.    Chief Complaint: Knee Pain (right ) and Injections (right knee )      HPI: Carlene Fong  Is here with complaints of right knee pain.   She was last seen and treated approximately 1 month ago with a left knee corticosteroid injection by Dr. Dobbs. She reports the left knee is doing much better today.  She is here for right knee injection.  Her last injection was greater than 3 months ago provided significant pain relief.  Pain is  Located in the posterior medial aspect of the knee.  Pain is worse with weight-bearing, ambulation, and flexion loading.    Past Medical History:   Diagnosis Date    Abnormal mammogram 8/10/2016    Followed up with diagnostic mammogram and ultrasound [facility]at [facility]  that showed no concerning findings with recommendation to continue yearly screening.     Anxiety 8/8/2017    will need to readdress this if work up is negative and symptoms persist    Arthritis of knee 1/7/2015    Diverticulosis     Meniere's disease 8/15/2017    Osteopenia 12/1/2015    next bone density 4/2016, seeing endocrine for hyperparathyroidism     Positive anti-CCP test 4/7/2014    Primary hyperparathyroidism 8/26/2013    will be following up with Dr. [facility] in 2016, blood work today. stoped taking calcitonin due to nausea     Rheumatoid factor positive 4/7/2014    Vertigo 7/16/2017    Vitamin D deficiency 12/1/2015       Current Outpatient Medications:     co-enzyme Q-10 30 mg capsule, Take 30 mg by mouth once daily. , Disp: , Rfl:     diclofenac sodium (VOLTAREN) 1 % Gel, Apply 2 g topically 3 (three) times daily., Disp: 100 g, Rfl: 5    FLAXSEED ORAL, Take by mouth once daily. , Disp: , Rfl:     fluticasone propionate (FLONASE) 50 mcg/actuation nasal spray, 2 sprays (100 mcg total) by Each Nostril route nightly., Disp: 48 mL, Rfl: 4    gabapentin (NEURONTIN) 300 MG capsule, TAKE 1 CAPSULE BY MOUTH TWICE  "A DAY, Disp: 180 capsule, Rfl: 3    hydroCHLOROthiazide (HYDRODIURIL) 12.5 MG Tab, Take 1 tablet (12.5 mg total) by mouth every other day., Disp: 90 tablet, Rfl: 3    omeprazole (PRILOSEC) 40 MG capsule, Take 1 capsule (40 mg total) by mouth before breakfast., Disp: 90 capsule, Rfl: 3    vitamin D 1000 units Tab, Take 185 mg by mouth once daily., Disp: , Rfl:     calcium carbonate (TUMS) 200 mg calcium (500 mg) chewable tablet, Take 2 tablets (1,000 mg total) by mouth 4 (four) times daily. (Patient not taking: Reported on 5/21/2020), Disp: 240 tablet, Rfl: 2    meclizine (ANTIVERT) 25 mg tablet, Take 1 tablet (25 mg total) by mouth 3 (three) times daily as needed for Dizziness. (Patient not taking: Reported on 9/14/2020), Disp: 30 tablet, Rfl: 3    ondansetron (ZOFRAN) 8 MG tablet, Take 1 tablet (8 mg total) by mouth every 8 (eight) hours as needed for Nausea. (Patient not taking: Reported on 9/14/2020), Disp: 15 tablet, Rfl: 1  Review of patient's allergies indicates:   Allergen Reactions    Requip [ropinirole] Swelling      palpitations, swelling of tongue       Ht 5' 6" (1.676 m)   Wt 91.2 kg (201 lb 1 oz)   LMP  (LMP Unknown)   BMI 32.45 kg/m²     Review of Systems   Constitution: Negative for chills and fever.   Cardiovascular: Negative for chest pain and palpitations.   Respiratory: Negative for shortness of breath and wheezing.    Skin: Negative for poor wound healing and rash.   Musculoskeletal: Positive for joint pain, joint swelling and stiffness.   Gastrointestinal: Negative for nausea and vomiting.   Genitourinary: Negative for dysuria and hematuria.   Neurological: Negative for seizures and tremors.   Psychiatric/Behavioral: Negative for altered mental status.   Allergic/Immunologic: Negative for environmental allergies and persistent infections.         Objective:    Ortho Exam          right KNEE:  The patient walks with  Antalgic gait.  Resisted SLR  negative.  The skin over the knee is  " intact.  Knee effusion  none.  Tendernes is located  medial.  Range of motion- Flexion  130 deg, Extension  0 deg,   Ligament exam:              MCL  intact              Lachman intact              Post sag intact              LCL intact  Patellar crepitation present.  Pulses DP present, PT present  Motor normal 5/5  strength in all tested muscle groups.   Sensory  Normal.  GEN: Well developed, well nourished  female. AAOX3. No acute distress.   Normocephalic, atraumatic.   EMILIA  Breathing unlabored.  Mood and affect  appropriate.       Assessment:     Imaging:  No new imaging.  Previous imaging was reviewed significant for very minimal/  Early degenerative changes.  Most significantly noted in the patellofemoral compartment.        1. Primary osteoarthritis of right knee          Plan:       Orders Placed This Encounter    Large Joint Aspiration/Injection        She has done well with previous corticosteroid injection and would like this repeated today.   I recommended and performed corticosteroid injection that complication.  Patient tolerated well.   activity modifications.   OTC Tylenol as needed for pain     per previous discussion with Dr. Dobbs, patient is on our palliative injection protocol. Her next  Appointment is scheduled for November.    No follow-ups on file.

## 2020-11-17 ENCOUNTER — PATIENT OUTREACH (OUTPATIENT)
Dept: ADMINISTRATIVE | Facility: OTHER | Age: 70
End: 2020-11-17

## 2020-11-17 DIAGNOSIS — Z12.11 ENCOUNTER FOR FIT (FECAL IMMUNOCHEMICAL TEST) SCREENING: Primary | ICD-10-CM

## 2020-11-17 NOTE — PROGRESS NOTES
Health Maintenance Due   Topic Date Due    Colorectal Cancer Screening  10/11/2019    Influenza Vaccine (1) 08/01/2020    Mammogram  11/11/2020     Updates were requested from care everywhere.  Chart was reviewed for overdue [facility]active [facility] Encounters ([facility]) topics (CRS, Breast Cancer Screening, Eye exam)  Health Maintenance has been updated.  LINKS immunization registry triggered.  Immunizations were reconciled.  Order placed for FIT kit.

## 2020-11-20 ENCOUNTER — OFFICE VISIT (OUTPATIENT)
Dept: ORTHOPEDICS | Facility: CLINIC | Age: 70
End: 2020-11-20
Payer: COMMERCIAL

## 2020-11-20 VITALS — WEIGHT: 201.06 LBS | HEIGHT: 66 IN | BODY MASS INDEX: 32.31 KG/M2

## 2020-11-20 DIAGNOSIS — M17.11 PRIMARY OSTEOARTHRITIS OF RIGHT KNEE: Primary | ICD-10-CM

## 2020-11-20 DIAGNOSIS — M17.12 PRIMARY OSTEOARTHRITIS OF LEFT KNEE: ICD-10-CM

## 2020-11-20 PROCEDURE — 1159F MED LIST DOCD IN RCRD: CPT | Mod: S$GLB,,, | Performed by: ORTHOPAEDIC SURGERY

## 2020-11-20 PROCEDURE — 1159F PR MEDICATION LIST DOCUMENTED IN MEDICAL RECORD: ICD-10-PCS | Mod: S$GLB,,, | Performed by: ORTHOPAEDIC SURGERY

## 2020-11-20 PROCEDURE — 3008F PR BODY MASS INDEX (BMI) DOCUMENTED: ICD-10-PCS | Mod: CPTII,S$GLB,, | Performed by: ORTHOPAEDIC SURGERY

## 2020-11-20 PROCEDURE — 99213 OFFICE O/P EST LOW 20 MIN: CPT | Mod: S$GLB,,, | Performed by: ORTHOPAEDIC SURGERY

## 2020-11-20 PROCEDURE — 3008F BODY MASS INDEX DOCD: CPT | Mod: CPTII,S$GLB,, | Performed by: ORTHOPAEDIC SURGERY

## 2020-11-20 PROCEDURE — 1125F AMNT PAIN NOTED PAIN PRSNT: CPT | Mod: S$GLB,,, | Performed by: ORTHOPAEDIC SURGERY

## 2020-11-20 PROCEDURE — 3288F FALL RISK ASSESSMENT DOCD: CPT | Mod: CPTII,S$GLB,, | Performed by: ORTHOPAEDIC SURGERY

## 2020-11-20 PROCEDURE — 99999 PR PBB SHADOW E&M-EST. PATIENT-LVL III: CPT | Mod: PBBFAC,,, | Performed by: ORTHOPAEDIC SURGERY

## 2020-11-20 PROCEDURE — 99999 PR PBB SHADOW E&M-EST. PATIENT-LVL III: ICD-10-PCS | Mod: PBBFAC,,, | Performed by: ORTHOPAEDIC SURGERY

## 2020-11-20 PROCEDURE — 1125F PR PAIN SEVERITY QUANTIFIED, PAIN PRESENT: ICD-10-PCS | Mod: S$GLB,,, | Performed by: ORTHOPAEDIC SURGERY

## 2020-11-20 PROCEDURE — 3288F PR FALLS RISK ASSESSMENT DOCUMENTED: ICD-10-PCS | Mod: CPTII,S$GLB,, | Performed by: ORTHOPAEDIC SURGERY

## 2020-11-20 PROCEDURE — 1101F PR PT FALLS ASSESS DOC 0-1 FALLS W/OUT INJ PAST YR: ICD-10-PCS | Mod: CPTII,S$GLB,, | Performed by: ORTHOPAEDIC SURGERY

## 2020-11-20 PROCEDURE — 99213 PR OFFICE/OUTPT VISIT, EST, LEVL III, 20-29 MIN: ICD-10-PCS | Mod: S$GLB,,, | Performed by: ORTHOPAEDIC SURGERY

## 2020-11-20 PROCEDURE — 1101F PT FALLS ASSESS-DOCD LE1/YR: CPT | Mod: CPTII,S$GLB,, | Performed by: ORTHOPAEDIC SURGERY

## 2020-12-01 ENCOUNTER — PATIENT MESSAGE (OUTPATIENT)
Dept: FAMILY MEDICINE | Facility: CLINIC | Age: 70
End: 2020-12-01

## 2020-12-01 DIAGNOSIS — Z12.11 ENCOUNTER FOR FIT (FECAL IMMUNOCHEMICAL TEST) SCREENING: Primary | ICD-10-CM

## 2020-12-01 DIAGNOSIS — B02.9 HERPES ZOSTER WITHOUT COMPLICATION: ICD-10-CM

## 2020-12-01 RX ORDER — GABAPENTIN 300 MG/1
300 CAPSULE ORAL 2 TIMES DAILY
Qty: 180 CAPSULE | Refills: 3 | Status: CANCELLED | OUTPATIENT
Start: 2020-12-01

## 2020-12-01 RX ORDER — GABAPENTIN 300 MG/1
300 CAPSULE ORAL 2 TIMES DAILY
Qty: 180 CAPSULE | Refills: 3 | Status: SHIPPED | OUTPATIENT
Start: 2020-12-01 | End: 2022-06-11 | Stop reason: SDUPTHER

## 2020-12-02 ENCOUNTER — CLINICAL SUPPORT (OUTPATIENT)
Dept: FAMILY MEDICINE | Facility: CLINIC | Age: 70
End: 2020-12-02
Payer: COMMERCIAL

## 2020-12-03 ENCOUNTER — LAB VISIT (OUTPATIENT)
Dept: LAB | Facility: HOSPITAL | Age: 70
End: 2020-12-03
Attending: FAMILY MEDICINE
Payer: COMMERCIAL

## 2020-12-03 DIAGNOSIS — Z12.11 ENCOUNTER FOR FIT (FECAL IMMUNOCHEMICAL TEST) SCREENING: ICD-10-CM

## 2020-12-03 PROCEDURE — 82274 ASSAY TEST FOR BLOOD FECAL: CPT

## 2020-12-07 LAB — HEMOCCULT STL QL IA: NEGATIVE

## 2020-12-18 ENCOUNTER — PATIENT OUTREACH (OUTPATIENT)
Dept: ADMINISTRATIVE | Facility: HOSPITAL | Age: 70
End: 2020-12-18

## 2021-01-04 ENCOUNTER — PATIENT MESSAGE (OUTPATIENT)
Dept: ADMINISTRATIVE | Facility: HOSPITAL | Age: 71
End: 2021-01-04

## 2021-02-09 ENCOUNTER — PATIENT MESSAGE (OUTPATIENT)
Dept: ADMINISTRATIVE | Facility: CLINIC | Age: 71
End: 2021-02-09

## 2021-02-09 ENCOUNTER — IMMUNIZATION (OUTPATIENT)
Dept: FAMILY MEDICINE | Facility: CLINIC | Age: 71
End: 2021-02-09
Payer: COMMERCIAL

## 2021-02-09 DIAGNOSIS — Z23 NEED FOR VACCINATION: Primary | ICD-10-CM

## 2021-02-09 PROCEDURE — 91300 COVID-19, MRNA, LNP-S, PF, 30 MCG/0.3 ML DOSE VACCINE: CPT | Mod: PBBFAC | Performed by: FAMILY MEDICINE

## 2021-03-02 ENCOUNTER — IMMUNIZATION (OUTPATIENT)
Dept: FAMILY MEDICINE | Facility: CLINIC | Age: 71
End: 2021-03-02
Payer: COMMERCIAL

## 2021-03-02 DIAGNOSIS — Z23 NEED FOR VACCINATION: Primary | ICD-10-CM

## 2021-03-02 PROCEDURE — 0002A COVID-19, MRNA, LNP-S, PF, 30 MCG/0.3 ML DOSE VACCINE: CPT | Mod: PBBFAC | Performed by: INTERNAL MEDICINE

## 2021-03-02 PROCEDURE — 91300 COVID-19, MRNA, LNP-S, PF, 30 MCG/0.3 ML DOSE VACCINE: CPT | Mod: PBBFAC | Performed by: INTERNAL MEDICINE

## 2021-03-10 ENCOUNTER — OFFICE VISIT (OUTPATIENT)
Dept: FAMILY MEDICINE | Facility: CLINIC | Age: 71
End: 2021-03-10
Payer: COMMERCIAL

## 2021-03-10 ENCOUNTER — LAB VISIT (OUTPATIENT)
Dept: LAB | Facility: HOSPITAL | Age: 71
End: 2021-03-10
Attending: FAMILY MEDICINE
Payer: COMMERCIAL

## 2021-03-10 VITALS
BODY MASS INDEX: 33.17 KG/M2 | DIASTOLIC BLOOD PRESSURE: 71 MMHG | WEIGHT: 206.38 LBS | OXYGEN SATURATION: 99 % | HEIGHT: 66 IN | TEMPERATURE: 98 F | HEART RATE: 86 BPM | SYSTOLIC BLOOD PRESSURE: 119 MMHG

## 2021-03-10 DIAGNOSIS — Z79.899 ENCOUNTER FOR MONITORING LONG-TERM PROTON PUMP INHIBITOR THERAPY: ICD-10-CM

## 2021-03-10 DIAGNOSIS — E21.0 PRIMARY HYPERPARATHYROIDISM: ICD-10-CM

## 2021-03-10 DIAGNOSIS — Z90.89 H/O PARATHYROIDECTOMY: ICD-10-CM

## 2021-03-10 DIAGNOSIS — Z51.81 ENCOUNTER FOR MONITORING LONG-TERM PROTON PUMP INHIBITOR THERAPY: ICD-10-CM

## 2021-03-10 DIAGNOSIS — M81.0 POSTMENOPAUSAL OSTEOPOROSIS: ICD-10-CM

## 2021-03-10 DIAGNOSIS — J30.89 NON-SEASONAL ALLERGIC RHINITIS DUE TO OTHER ALLERGIC TRIGGER: ICD-10-CM

## 2021-03-10 DIAGNOSIS — Z00.00 ROUTINE GENERAL MEDICAL EXAMINATION AT A HEALTH CARE FACILITY: Primary | ICD-10-CM

## 2021-03-10 DIAGNOSIS — Z98.890 H/O PARATHYROIDECTOMY: ICD-10-CM

## 2021-03-10 DIAGNOSIS — M17.10 ARTHRITIS OF KNEE: ICD-10-CM

## 2021-03-10 DIAGNOSIS — Z79.899 MEDICATION MANAGEMENT: ICD-10-CM

## 2021-03-10 DIAGNOSIS — Z78.0 POSTMENOPAUSAL: ICD-10-CM

## 2021-03-10 DIAGNOSIS — H81.09 MENIERE'S DISEASE, UNSPECIFIED LATERALITY: ICD-10-CM

## 2021-03-10 DIAGNOSIS — Z90.710 H/O HYSTERECTOMY FOR BENIGN DISEASE: ICD-10-CM

## 2021-03-10 DIAGNOSIS — Z00.00 ROUTINE GENERAL MEDICAL EXAMINATION AT A HEALTH CARE FACILITY: ICD-10-CM

## 2021-03-10 DIAGNOSIS — E55.9 VITAMIN D DEFICIENCY: ICD-10-CM

## 2021-03-10 DIAGNOSIS — K21.9 GASTROESOPHAGEAL REFLUX DISEASE, UNSPECIFIED WHETHER ESOPHAGITIS PRESENT: ICD-10-CM

## 2021-03-10 DIAGNOSIS — E66.9 OBESITY (BMI 30.0-34.9): ICD-10-CM

## 2021-03-10 LAB
ALBUMIN SERPL BCP-MCNC: 3.9 G/DL (ref 3.5–5.2)
ALP SERPL-CCNC: 94 U/L (ref 55–135)
ALT SERPL W/O P-5'-P-CCNC: 13 U/L (ref 10–44)
ANION GAP SERPL CALC-SCNC: 10 MMOL/L (ref 8–16)
AST SERPL-CCNC: 14 U/L (ref 10–40)
BASOPHILS # BLD AUTO: 0.04 K/UL (ref 0–0.2)
BASOPHILS NFR BLD: 0.5 % (ref 0–1.9)
BILIRUB SERPL-MCNC: 0.6 MG/DL (ref 0.1–1)
BUN SERPL-MCNC: 17 MG/DL (ref 8–23)
CALCIUM SERPL-MCNC: 9.8 MG/DL (ref 8.7–10.5)
CHLORIDE SERPL-SCNC: 102 MMOL/L (ref 95–110)
CHOLEST SERPL-MCNC: 214 MG/DL (ref 120–199)
CHOLEST/HDLC SERPL: 3.8 {RATIO} (ref 2–5)
CO2 SERPL-SCNC: 30 MMOL/L (ref 23–29)
CREAT SERPL-MCNC: 0.7 MG/DL (ref 0.5–1.4)
DIFFERENTIAL METHOD: ABNORMAL
EOSINOPHIL # BLD AUTO: 0.2 K/UL (ref 0–0.5)
EOSINOPHIL NFR BLD: 2.6 % (ref 0–8)
ERYTHROCYTE [DISTWIDTH] IN BLOOD BY AUTOMATED COUNT: 13.1 % (ref 11.5–14.5)
EST. GFR  (AFRICAN AMERICAN): >60 ML/MIN/1.73 M^2
EST. GFR  (NON AFRICAN AMERICAN): >60 ML/MIN/1.73 M^2
ESTIMATED AVG GLUCOSE: 97 MG/DL (ref 68–131)
GLUCOSE SERPL-MCNC: 81 MG/DL (ref 70–110)
HBA1C MFR BLD: 5 % (ref 4–5.6)
HCT VFR BLD AUTO: 45 % (ref 37–48.5)
HDLC SERPL-MCNC: 56 MG/DL (ref 40–75)
HDLC SERPL: 26.2 % (ref 20–50)
HGB BLD-MCNC: 15 G/DL (ref 12–16)
IMM GRANULOCYTES # BLD AUTO: 0.03 K/UL (ref 0–0.04)
IMM GRANULOCYTES NFR BLD AUTO: 0.3 % (ref 0–0.5)
LDLC SERPL CALC-MCNC: 138.6 MG/DL (ref 63–159)
LYMPHOCYTES # BLD AUTO: 1.3 K/UL (ref 1–4.8)
LYMPHOCYTES NFR BLD: 14.9 % (ref 18–48)
MAGNESIUM SERPL-MCNC: 2.2 MG/DL (ref 1.6–2.6)
MCH RBC QN AUTO: 32.1 PG (ref 27–31)
MCHC RBC AUTO-ENTMCNC: 33.3 G/DL (ref 32–36)
MCV RBC AUTO: 96 FL (ref 82–98)
MONOCYTES # BLD AUTO: 0.5 K/UL (ref 0.3–1)
MONOCYTES NFR BLD: 5.2 % (ref 4–15)
NEUTROPHILS # BLD AUTO: 6.8 K/UL (ref 1.8–7.7)
NEUTROPHILS NFR BLD: 76.5 % (ref 38–73)
NONHDLC SERPL-MCNC: 158 MG/DL
NRBC BLD-RTO: 0 /100 WBC
PLATELET # BLD AUTO: 364 K/UL (ref 150–350)
PMV BLD AUTO: 9.3 FL (ref 9.2–12.9)
POTASSIUM SERPL-SCNC: 4.2 MMOL/L (ref 3.5–5.1)
PROT SERPL-MCNC: 7.6 G/DL (ref 6–8.4)
PTH-INTACT SERPL-MCNC: 39.6 PG/ML (ref 9–77)
RBC # BLD AUTO: 4.68 M/UL (ref 4–5.4)
SODIUM SERPL-SCNC: 142 MMOL/L (ref 136–145)
TRIGL SERPL-MCNC: 97 MG/DL (ref 30–150)
TSH SERPL DL<=0.005 MIU/L-ACNC: 1.42 UIU/ML (ref 0.4–4)
WBC # BLD AUTO: 8.84 K/UL (ref 3.9–12.7)

## 2021-03-10 PROCEDURE — 84443 ASSAY THYROID STIM HORMONE: CPT | Performed by: FAMILY MEDICINE

## 2021-03-10 PROCEDURE — 80061 LIPID PANEL: CPT | Performed by: FAMILY MEDICINE

## 2021-03-10 PROCEDURE — 1101F PR PT FALLS ASSESS DOC 0-1 FALLS W/OUT INJ PAST YR: ICD-10-PCS | Mod: CPTII,S$GLB,, | Performed by: FAMILY MEDICINE

## 2021-03-10 PROCEDURE — 83735 ASSAY OF MAGNESIUM: CPT | Performed by: FAMILY MEDICINE

## 2021-03-10 PROCEDURE — 3288F FALL RISK ASSESSMENT DOCD: CPT | Mod: CPTII,S$GLB,, | Performed by: FAMILY MEDICINE

## 2021-03-10 PROCEDURE — 3008F BODY MASS INDEX DOCD: CPT | Mod: CPTII,S$GLB,, | Performed by: FAMILY MEDICINE

## 2021-03-10 PROCEDURE — 1101F PT FALLS ASSESS-DOCD LE1/YR: CPT | Mod: CPTII,S$GLB,, | Performed by: FAMILY MEDICINE

## 2021-03-10 PROCEDURE — 1126F PR PAIN SEVERITY QUANTIFIED, NO PAIN PRESENT: ICD-10-PCS | Mod: S$GLB,,, | Performed by: FAMILY MEDICINE

## 2021-03-10 PROCEDURE — 3288F PR FALLS RISK ASSESSMENT DOCUMENTED: ICD-10-PCS | Mod: CPTII,S$GLB,, | Performed by: FAMILY MEDICINE

## 2021-03-10 PROCEDURE — 99999 PR PBB SHADOW E&M-EST. PATIENT-LVL IV: CPT | Mod: PBBFAC,,, | Performed by: FAMILY MEDICINE

## 2021-03-10 PROCEDURE — 99999 PR PBB SHADOW E&M-EST. PATIENT-LVL IV: ICD-10-PCS | Mod: PBBFAC,,, | Performed by: FAMILY MEDICINE

## 2021-03-10 PROCEDURE — 82306 VITAMIN D 25 HYDROXY: CPT | Performed by: FAMILY MEDICINE

## 2021-03-10 PROCEDURE — 1126F AMNT PAIN NOTED NONE PRSNT: CPT | Mod: S$GLB,,, | Performed by: FAMILY MEDICINE

## 2021-03-10 PROCEDURE — 85025 COMPLETE CBC W/AUTO DIFF WBC: CPT | Performed by: FAMILY MEDICINE

## 2021-03-10 PROCEDURE — 83970 ASSAY OF PARATHORMONE: CPT | Performed by: FAMILY MEDICINE

## 2021-03-10 PROCEDURE — 3008F PR BODY MASS INDEX (BMI) DOCUMENTED: ICD-10-PCS | Mod: CPTII,S$GLB,, | Performed by: FAMILY MEDICINE

## 2021-03-10 PROCEDURE — 82607 VITAMIN B-12: CPT | Performed by: FAMILY MEDICINE

## 2021-03-10 PROCEDURE — 83036 HEMOGLOBIN GLYCOSYLATED A1C: CPT | Performed by: FAMILY MEDICINE

## 2021-03-10 PROCEDURE — 99397 PER PM REEVAL EST PAT 65+ YR: CPT | Mod: S$GLB,,, | Performed by: FAMILY MEDICINE

## 2021-03-10 PROCEDURE — 36415 COLL VENOUS BLD VENIPUNCTURE: CPT | Performed by: FAMILY MEDICINE

## 2021-03-10 PROCEDURE — 99397 PR PREVENTIVE VISIT,EST,65 & OVER: ICD-10-PCS | Mod: S$GLB,,, | Performed by: FAMILY MEDICINE

## 2021-03-10 PROCEDURE — 80053 COMPREHEN METABOLIC PANEL: CPT | Performed by: FAMILY MEDICINE

## 2021-03-10 RX ORDER — FLUTICASONE PROPIONATE 50 MCG
2 SPRAY, SUSPENSION (ML) NASAL NIGHTLY
Qty: 48 ML | Refills: 4 | Status: SHIPPED | OUTPATIENT
Start: 2021-03-10 | End: 2022-03-13 | Stop reason: ALTCHOICE

## 2021-03-10 RX ORDER — HYDROCHLOROTHIAZIDE 12.5 MG/1
12.5 TABLET ORAL EVERY OTHER DAY
Qty: 90 TABLET | Refills: 3 | Status: SHIPPED | OUTPATIENT
Start: 2021-03-10 | End: 2022-03-14 | Stop reason: SDUPTHER

## 2021-03-11 ENCOUNTER — PATIENT MESSAGE (OUTPATIENT)
Dept: FAMILY MEDICINE | Facility: CLINIC | Age: 71
End: 2021-03-11

## 2021-03-11 LAB
25(OH)D3+25(OH)D2 SERPL-MCNC: 58 NG/ML (ref 30–96)
VIT B12 SERPL-MCNC: 601 PG/ML (ref 210–950)

## 2021-03-16 ENCOUNTER — HOSPITAL ENCOUNTER (OUTPATIENT)
Dept: RADIOLOGY | Facility: HOSPITAL | Age: 71
Discharge: HOME | End: 2021-03-16
Attending: FAMILY MEDICINE
Payer: COMMERCIAL

## 2021-03-16 DIAGNOSIS — Z00.00 ROUTINE GENERAL MEDICAL EXAMINATION AT A HEALTH CARE FACILITY: ICD-10-CM

## 2021-03-16 DIAGNOSIS — M81.0 POSTMENOPAUSAL OSTEOPOROSIS: ICD-10-CM

## 2021-03-16 DIAGNOSIS — Z78.0 POSTMENOPAUSAL: ICD-10-CM

## 2021-03-16 PROCEDURE — 77080 DXA BONE DENSITY AXIAL: CPT | Mod: 26,,, | Performed by: RADIOLOGY

## 2021-03-16 PROCEDURE — 77080 DEXA BONE DENSITY SPINE HIP: ICD-10-PCS | Mod: 26,,, | Performed by: RADIOLOGY

## 2021-03-16 PROCEDURE — 77080 DXA BONE DENSITY AXIAL: CPT | Mod: TC

## 2021-03-18 ENCOUNTER — PATIENT OUTREACH (OUTPATIENT)
Dept: ADMINISTRATIVE | Facility: OTHER | Age: 71
End: 2021-03-18

## 2021-03-21 ENCOUNTER — PATIENT MESSAGE (OUTPATIENT)
Dept: FAMILY MEDICINE | Facility: CLINIC | Age: 71
End: 2021-03-21

## 2021-03-22 ENCOUNTER — PATIENT MESSAGE (OUTPATIENT)
Dept: ENDOCRINOLOGY | Facility: CLINIC | Age: 71
End: 2021-03-22

## 2021-03-22 ENCOUNTER — OFFICE VISIT (OUTPATIENT)
Dept: ORTHOPEDICS | Facility: CLINIC | Age: 71
End: 2021-03-22
Payer: COMMERCIAL

## 2021-03-22 VITALS — WEIGHT: 206 LBS | BODY MASS INDEX: 33.11 KG/M2 | HEIGHT: 66 IN

## 2021-03-22 DIAGNOSIS — G56.02 CARPAL TUNNEL SYNDROME OF LEFT WRIST: Primary | ICD-10-CM

## 2021-03-22 PROCEDURE — 99213 OFFICE O/P EST LOW 20 MIN: CPT | Mod: 25,S$GLB,, | Performed by: ORTHOPAEDIC SURGERY

## 2021-03-22 PROCEDURE — 99999 PR PBB SHADOW E&M-EST. PATIENT-LVL III: CPT | Mod: PBBFAC,,, | Performed by: ORTHOPAEDIC SURGERY

## 2021-03-22 PROCEDURE — 99213 PR OFFICE/OUTPT VISIT, EST, LEVL III, 20-29 MIN: ICD-10-PCS | Mod: 25,S$GLB,, | Performed by: ORTHOPAEDIC SURGERY

## 2021-03-22 PROCEDURE — 1101F PT FALLS ASSESS-DOCD LE1/YR: CPT | Mod: CPTII,S$GLB,, | Performed by: ORTHOPAEDIC SURGERY

## 2021-03-22 PROCEDURE — 1159F PR MEDICATION LIST DOCUMENTED IN MEDICAL RECORD: ICD-10-PCS | Mod: S$GLB,,, | Performed by: ORTHOPAEDIC SURGERY

## 2021-03-22 PROCEDURE — 3288F PR FALLS RISK ASSESSMENT DOCUMENTED: ICD-10-PCS | Mod: CPTII,S$GLB,, | Performed by: ORTHOPAEDIC SURGERY

## 2021-03-22 PROCEDURE — 1101F PR PT FALLS ASSESS DOC 0-1 FALLS W/OUT INJ PAST YR: ICD-10-PCS | Mod: CPTII,S$GLB,, | Performed by: ORTHOPAEDIC SURGERY

## 2021-03-22 PROCEDURE — 20526 PR INJECT CARPAL TUNNEL: ICD-10-PCS | Mod: 50,S$GLB,, | Performed by: ORTHOPAEDIC SURGERY

## 2021-03-22 PROCEDURE — 3008F PR BODY MASS INDEX (BMI) DOCUMENTED: ICD-10-PCS | Mod: CPTII,S$GLB,, | Performed by: ORTHOPAEDIC SURGERY

## 2021-03-22 PROCEDURE — 1159F MED LIST DOCD IN RCRD: CPT | Mod: S$GLB,,, | Performed by: ORTHOPAEDIC SURGERY

## 2021-03-22 PROCEDURE — 1125F PR PAIN SEVERITY QUANTIFIED, PAIN PRESENT: ICD-10-PCS | Mod: S$GLB,,, | Performed by: ORTHOPAEDIC SURGERY

## 2021-03-22 PROCEDURE — 3008F BODY MASS INDEX DOCD: CPT | Mod: CPTII,S$GLB,, | Performed by: ORTHOPAEDIC SURGERY

## 2021-03-22 PROCEDURE — 20526 THER INJECTION CARP TUNNEL: CPT | Mod: 50,S$GLB,, | Performed by: ORTHOPAEDIC SURGERY

## 2021-03-22 PROCEDURE — 1125F AMNT PAIN NOTED PAIN PRSNT: CPT | Mod: S$GLB,,, | Performed by: ORTHOPAEDIC SURGERY

## 2021-03-22 PROCEDURE — 3288F FALL RISK ASSESSMENT DOCD: CPT | Mod: CPTII,S$GLB,, | Performed by: ORTHOPAEDIC SURGERY

## 2021-03-22 PROCEDURE — 99999 PR PBB SHADOW E&M-EST. PATIENT-LVL III: ICD-10-PCS | Mod: PBBFAC,,, | Performed by: ORTHOPAEDIC SURGERY

## 2021-03-22 RX ORDER — TRIAMCINOLONE ACETONIDE 40 MG/ML
40 INJECTION, SUSPENSION INTRA-ARTICULAR; INTRAMUSCULAR
Status: COMPLETED | OUTPATIENT
Start: 2021-03-22 | End: 2021-03-22

## 2021-03-22 RX ADMIN — TRIAMCINOLONE ACETONIDE 40 MG: 40 INJECTION, SUSPENSION INTRA-ARTICULAR; INTRAMUSCULAR at 09:03

## 2021-03-23 ENCOUNTER — PATIENT MESSAGE (OUTPATIENT)
Dept: FAMILY MEDICINE | Facility: CLINIC | Age: 71
End: 2021-03-23

## 2021-04-01 ENCOUNTER — PATIENT MESSAGE (OUTPATIENT)
Dept: ADMINISTRATIVE | Facility: HOSPITAL | Age: 71
End: 2021-04-01

## 2021-06-14 ENCOUNTER — PATIENT OUTREACH (OUTPATIENT)
Dept: ADMINISTRATIVE | Facility: OTHER | Age: 71
End: 2021-06-14

## 2021-06-15 ENCOUNTER — OFFICE VISIT (OUTPATIENT)
Dept: ORTHOPEDICS | Facility: CLINIC | Age: 71
End: 2021-06-15
Payer: COMMERCIAL

## 2021-06-15 VITALS
BODY MASS INDEX: 33.1 KG/M2 | WEIGHT: 205.94 LBS | HEIGHT: 66 IN | DIASTOLIC BLOOD PRESSURE: 64 MMHG | HEART RATE: 96 BPM | SYSTOLIC BLOOD PRESSURE: 124 MMHG

## 2021-06-15 DIAGNOSIS — G56.03 BILATERAL CARPAL TUNNEL SYNDROME: Primary | ICD-10-CM

## 2021-06-15 PROCEDURE — 20526 CARPAL TUNNEL: ICD-10-PCS | Mod: 50,S$GLB,, | Performed by: ORTHOPAEDIC SURGERY

## 2021-06-15 PROCEDURE — 1125F AMNT PAIN NOTED PAIN PRSNT: CPT | Mod: S$GLB,,, | Performed by: ORTHOPAEDIC SURGERY

## 2021-06-15 PROCEDURE — 3008F PR BODY MASS INDEX (BMI) DOCUMENTED: ICD-10-PCS | Mod: CPTII,S$GLB,, | Performed by: ORTHOPAEDIC SURGERY

## 2021-06-15 PROCEDURE — 1159F MED LIST DOCD IN RCRD: CPT | Mod: S$GLB,,, | Performed by: ORTHOPAEDIC SURGERY

## 2021-06-15 PROCEDURE — 3288F FALL RISK ASSESSMENT DOCD: CPT | Mod: CPTII,S$GLB,, | Performed by: ORTHOPAEDIC SURGERY

## 2021-06-15 PROCEDURE — 1101F PR PT FALLS ASSESS DOC 0-1 FALLS W/OUT INJ PAST YR: ICD-10-PCS | Mod: CPTII,S$GLB,, | Performed by: ORTHOPAEDIC SURGERY

## 2021-06-15 PROCEDURE — 99999 PR PBB SHADOW E&M-EST. PATIENT-LVL III: CPT | Mod: PBBFAC,,, | Performed by: ORTHOPAEDIC SURGERY

## 2021-06-15 PROCEDURE — 1101F PT FALLS ASSESS-DOCD LE1/YR: CPT | Mod: CPTII,S$GLB,, | Performed by: ORTHOPAEDIC SURGERY

## 2021-06-15 PROCEDURE — 99213 PR OFFICE/OUTPT VISIT, EST, LEVL III, 20-29 MIN: ICD-10-PCS | Mod: 25,S$GLB,, | Performed by: ORTHOPAEDIC SURGERY

## 2021-06-15 PROCEDURE — 99213 OFFICE O/P EST LOW 20 MIN: CPT | Mod: 25,S$GLB,, | Performed by: ORTHOPAEDIC SURGERY

## 2021-06-15 PROCEDURE — 1159F PR MEDICATION LIST DOCUMENTED IN MEDICAL RECORD: ICD-10-PCS | Mod: S$GLB,,, | Performed by: ORTHOPAEDIC SURGERY

## 2021-06-15 PROCEDURE — 1125F PR PAIN SEVERITY QUANTIFIED, PAIN PRESENT: ICD-10-PCS | Mod: S$GLB,,, | Performed by: ORTHOPAEDIC SURGERY

## 2021-06-15 PROCEDURE — 99999 PR PBB SHADOW E&M-EST. PATIENT-LVL III: ICD-10-PCS | Mod: PBBFAC,,, | Performed by: ORTHOPAEDIC SURGERY

## 2021-06-15 PROCEDURE — 3288F PR FALLS RISK ASSESSMENT DOCUMENTED: ICD-10-PCS | Mod: CPTII,S$GLB,, | Performed by: ORTHOPAEDIC SURGERY

## 2021-06-15 PROCEDURE — 3008F BODY MASS INDEX DOCD: CPT | Mod: CPTII,S$GLB,, | Performed by: ORTHOPAEDIC SURGERY

## 2021-06-15 PROCEDURE — 20526 THER INJECTION CARP TUNNEL: CPT | Mod: 50,S$GLB,, | Performed by: ORTHOPAEDIC SURGERY

## 2021-06-15 RX ORDER — TRIAMCINOLONE ACETONIDE 40 MG/ML
40 INJECTION, SUSPENSION INTRA-ARTICULAR; INTRAMUSCULAR
Status: DISCONTINUED | OUTPATIENT
Start: 2021-06-15 | End: 2021-06-15 | Stop reason: HOSPADM

## 2021-06-15 RX ADMIN — TRIAMCINOLONE ACETONIDE 40 MG: 40 INJECTION, SUSPENSION INTRA-ARTICULAR; INTRAMUSCULAR at 02:06

## 2021-06-21 ENCOUNTER — OFFICE VISIT (OUTPATIENT)
Dept: FAMILY MEDICINE | Facility: CLINIC | Age: 71
End: 2021-06-21
Payer: COMMERCIAL

## 2021-06-21 VITALS
SYSTOLIC BLOOD PRESSURE: 118 MMHG | WEIGHT: 212.75 LBS | HEIGHT: 66 IN | OXYGEN SATURATION: 98 % | BODY MASS INDEX: 34.19 KG/M2 | DIASTOLIC BLOOD PRESSURE: 66 MMHG | TEMPERATURE: 98 F | HEART RATE: 86 BPM

## 2021-06-21 DIAGNOSIS — H00.11 CHALAZION OF RIGHT UPPER EYELID: Primary | ICD-10-CM

## 2021-06-21 PROCEDURE — 1126F AMNT PAIN NOTED NONE PRSNT: CPT | Mod: S$GLB,,, | Performed by: FAMILY MEDICINE

## 2021-06-21 PROCEDURE — 1159F MED LIST DOCD IN RCRD: CPT | Mod: S$GLB,,, | Performed by: FAMILY MEDICINE

## 2021-06-21 PROCEDURE — 1126F PR PAIN SEVERITY QUANTIFIED, NO PAIN PRESENT: ICD-10-PCS | Mod: S$GLB,,, | Performed by: FAMILY MEDICINE

## 2021-06-21 PROCEDURE — 99213 OFFICE O/P EST LOW 20 MIN: CPT | Mod: S$GLB,,, | Performed by: FAMILY MEDICINE

## 2021-06-21 PROCEDURE — 99213 PR OFFICE/OUTPT VISIT, EST, LEVL III, 20-29 MIN: ICD-10-PCS | Mod: S$GLB,,, | Performed by: FAMILY MEDICINE

## 2021-06-21 PROCEDURE — 99999 PR PBB SHADOW E&M-EST. PATIENT-LVL V: CPT | Mod: PBBFAC,,, | Performed by: FAMILY MEDICINE

## 2021-06-21 PROCEDURE — 1101F PT FALLS ASSESS-DOCD LE1/YR: CPT | Mod: CPTII,S$GLB,, | Performed by: FAMILY MEDICINE

## 2021-06-21 PROCEDURE — 3288F PR FALLS RISK ASSESSMENT DOCUMENTED: ICD-10-PCS | Mod: CPTII,S$GLB,, | Performed by: FAMILY MEDICINE

## 2021-06-21 PROCEDURE — 1159F PR MEDICATION LIST DOCUMENTED IN MEDICAL RECORD: ICD-10-PCS | Mod: S$GLB,,, | Performed by: FAMILY MEDICINE

## 2021-06-21 PROCEDURE — 1101F PR PT FALLS ASSESS DOC 0-1 FALLS W/OUT INJ PAST YR: ICD-10-PCS | Mod: CPTII,S$GLB,, | Performed by: FAMILY MEDICINE

## 2021-06-21 PROCEDURE — 99999 PR PBB SHADOW E&M-EST. PATIENT-LVL V: ICD-10-PCS | Mod: PBBFAC,,, | Performed by: FAMILY MEDICINE

## 2021-06-21 PROCEDURE — 3008F PR BODY MASS INDEX (BMI) DOCUMENTED: ICD-10-PCS | Mod: CPTII,S$GLB,, | Performed by: FAMILY MEDICINE

## 2021-06-21 PROCEDURE — 3008F BODY MASS INDEX DOCD: CPT | Mod: CPTII,S$GLB,, | Performed by: FAMILY MEDICINE

## 2021-06-21 PROCEDURE — 3288F FALL RISK ASSESSMENT DOCD: CPT | Mod: CPTII,S$GLB,, | Performed by: FAMILY MEDICINE

## 2021-06-21 RX ORDER — ERYTHROMYCIN 5 MG/G
OINTMENT OPHTHALMIC NIGHTLY
Qty: 3.5 G | Refills: 0 | Status: SHIPPED | OUTPATIENT
Start: 2021-06-21 | End: 2022-03-13 | Stop reason: ALTCHOICE

## 2021-07-06 ENCOUNTER — PATIENT MESSAGE (OUTPATIENT)
Dept: ADMINISTRATIVE | Facility: HOSPITAL | Age: 71
End: 2021-07-06

## 2021-07-13 ENCOUNTER — HOSPITAL ENCOUNTER (OUTPATIENT)
Dept: RADIOLOGY | Facility: HOSPITAL | Age: 71
Discharge: HOME | End: 2021-07-13
Attending: ORTHOPAEDIC SURGERY
Payer: COMMERCIAL

## 2021-07-13 ENCOUNTER — OFFICE VISIT (OUTPATIENT)
Dept: ORTHOPEDICS | Facility: CLINIC | Age: 71
End: 2021-07-13
Payer: COMMERCIAL

## 2021-07-13 VITALS — HEIGHT: 66 IN | WEIGHT: 212.75 LBS | BODY MASS INDEX: 34.19 KG/M2

## 2021-07-13 DIAGNOSIS — M17.11 PRIMARY OSTEOARTHRITIS OF RIGHT KNEE: Primary | ICD-10-CM

## 2021-07-13 DIAGNOSIS — M17.11 PRIMARY OSTEOARTHRITIS OF RIGHT KNEE: ICD-10-CM

## 2021-07-13 DIAGNOSIS — M17.12 PRIMARY OSTEOARTHRITIS OF LEFT KNEE: ICD-10-CM

## 2021-07-13 PROCEDURE — 1101F PR PT FALLS ASSESS DOC 0-1 FALLS W/OUT INJ PAST YR: ICD-10-PCS | Mod: CPTII,S$GLB,, | Performed by: ORTHOPAEDIC SURGERY

## 2021-07-13 PROCEDURE — 1101F PT FALLS ASSESS-DOCD LE1/YR: CPT | Mod: CPTII,S$GLB,, | Performed by: ORTHOPAEDIC SURGERY

## 2021-07-13 PROCEDURE — 3008F PR BODY MASS INDEX (BMI) DOCUMENTED: ICD-10-PCS | Mod: CPTII,S$GLB,, | Performed by: ORTHOPAEDIC SURGERY

## 2021-07-13 PROCEDURE — 3288F FALL RISK ASSESSMENT DOCD: CPT | Mod: CPTII,S$GLB,, | Performed by: ORTHOPAEDIC SURGERY

## 2021-07-13 PROCEDURE — 20610 DRAIN/INJ JOINT/BURSA W/O US: CPT | Mod: RT,S$GLB,, | Performed by: ORTHOPAEDIC SURGERY

## 2021-07-13 PROCEDURE — 73564 XR KNEE ORTHO BILAT WITH FLEXION: ICD-10-PCS | Mod: 26,50,, | Performed by: RADIOLOGY

## 2021-07-13 PROCEDURE — 20610 LARGE JOINT ASPIRATION/INJECTION: R KNEE: ICD-10-PCS | Mod: RT,S$GLB,, | Performed by: ORTHOPAEDIC SURGERY

## 2021-07-13 PROCEDURE — 3008F BODY MASS INDEX DOCD: CPT | Mod: CPTII,S$GLB,, | Performed by: ORTHOPAEDIC SURGERY

## 2021-07-13 PROCEDURE — 99213 OFFICE O/P EST LOW 20 MIN: CPT | Mod: 25,S$GLB,, | Performed by: ORTHOPAEDIC SURGERY

## 2021-07-13 PROCEDURE — 3288F PR FALLS RISK ASSESSMENT DOCUMENTED: ICD-10-PCS | Mod: CPTII,S$GLB,, | Performed by: ORTHOPAEDIC SURGERY

## 2021-07-13 PROCEDURE — 73564 X-RAY EXAM KNEE 4 OR MORE: CPT | Mod: TC,50,PN

## 2021-07-13 PROCEDURE — 99999 PR PBB SHADOW E&M-EST. PATIENT-LVL III: ICD-10-PCS | Mod: PBBFAC,,, | Performed by: ORTHOPAEDIC SURGERY

## 2021-07-13 PROCEDURE — 73564 X-RAY EXAM KNEE 4 OR MORE: CPT | Mod: 26,50,, | Performed by: RADIOLOGY

## 2021-07-13 PROCEDURE — 1126F AMNT PAIN NOTED NONE PRSNT: CPT | Mod: S$GLB,,, | Performed by: ORTHOPAEDIC SURGERY

## 2021-07-13 PROCEDURE — 1126F PR PAIN SEVERITY QUANTIFIED, NO PAIN PRESENT: ICD-10-PCS | Mod: S$GLB,,, | Performed by: ORTHOPAEDIC SURGERY

## 2021-07-13 PROCEDURE — 1159F PR MEDICATION LIST DOCUMENTED IN MEDICAL RECORD: ICD-10-PCS | Mod: S$GLB,,, | Performed by: ORTHOPAEDIC SURGERY

## 2021-07-13 PROCEDURE — 99213 PR OFFICE/OUTPT VISIT, EST, LEVL III, 20-29 MIN: ICD-10-PCS | Mod: 25,S$GLB,, | Performed by: ORTHOPAEDIC SURGERY

## 2021-07-13 PROCEDURE — 99999 PR PBB SHADOW E&M-EST. PATIENT-LVL III: CPT | Mod: PBBFAC,,, | Performed by: ORTHOPAEDIC SURGERY

## 2021-07-13 PROCEDURE — 1159F MED LIST DOCD IN RCRD: CPT | Mod: S$GLB,,, | Performed by: ORTHOPAEDIC SURGERY

## 2021-07-13 RX ADMIN — TRIAMCINOLONE ACETONIDE 40 MG: 40 INJECTION, SUSPENSION INTRA-ARTICULAR; INTRAMUSCULAR at 02:07

## 2021-07-14 RX ORDER — TRIAMCINOLONE ACETONIDE 40 MG/ML
40 INJECTION, SUSPENSION INTRA-ARTICULAR; INTRAMUSCULAR
Status: DISCONTINUED | OUTPATIENT
Start: 2021-07-13 | End: 2021-07-14 | Stop reason: HOSPADM

## 2021-07-26 ENCOUNTER — OFFICE VISIT (OUTPATIENT)
Dept: FAMILY MEDICINE | Facility: CLINIC | Age: 71
End: 2021-07-26
Payer: COMMERCIAL

## 2021-07-26 VITALS
HEART RATE: 112 BPM | TEMPERATURE: 99 F | WEIGHT: 210.75 LBS | HEIGHT: 66 IN | OXYGEN SATURATION: 97 % | SYSTOLIC BLOOD PRESSURE: 124 MMHG | BODY MASS INDEX: 33.87 KG/M2 | DIASTOLIC BLOOD PRESSURE: 84 MMHG

## 2021-07-26 DIAGNOSIS — R05.9 COUGH: ICD-10-CM

## 2021-07-26 DIAGNOSIS — J06.9 UPPER RESPIRATORY TRACT INFECTION, UNSPECIFIED TYPE: Primary | ICD-10-CM

## 2021-07-26 PROCEDURE — 99999 PR PBB SHADOW E&M-EST. PATIENT-LVL IV: ICD-10-PCS | Mod: PBBFAC,,, | Performed by: FAMILY MEDICINE

## 2021-07-26 PROCEDURE — 3288F FALL RISK ASSESSMENT DOCD: CPT | Mod: CPTII,S$GLB,, | Performed by: FAMILY MEDICINE

## 2021-07-26 PROCEDURE — 1126F AMNT PAIN NOTED NONE PRSNT: CPT | Mod: CPTII,S$GLB,, | Performed by: FAMILY MEDICINE

## 2021-07-26 PROCEDURE — 99214 OFFICE O/P EST MOD 30 MIN: CPT | Mod: S$GLB,,, | Performed by: FAMILY MEDICINE

## 2021-07-26 PROCEDURE — 3008F PR BODY MASS INDEX (BMI) DOCUMENTED: ICD-10-PCS | Mod: CPTII,S$GLB,, | Performed by: FAMILY MEDICINE

## 2021-07-26 PROCEDURE — 1101F PR PT FALLS ASSESS DOC 0-1 FALLS W/OUT INJ PAST YR: ICD-10-PCS | Mod: CPTII,S$GLB,, | Performed by: FAMILY MEDICINE

## 2021-07-26 PROCEDURE — 1101F PT FALLS ASSESS-DOCD LE1/YR: CPT | Mod: CPTII,S$GLB,, | Performed by: FAMILY MEDICINE

## 2021-07-26 PROCEDURE — 3008F BODY MASS INDEX DOCD: CPT | Mod: CPTII,S$GLB,, | Performed by: FAMILY MEDICINE

## 2021-07-26 PROCEDURE — U0005 INFEC AGEN DETEC AMPLI PROBE: HCPCS | Performed by: FAMILY MEDICINE

## 2021-07-26 PROCEDURE — 99999 PR PBB SHADOW E&M-EST. PATIENT-LVL IV: CPT | Mod: PBBFAC,,, | Performed by: FAMILY MEDICINE

## 2021-07-26 PROCEDURE — 99214 PR OFFICE/OUTPT VISIT, EST, LEVL IV, 30-39 MIN: ICD-10-PCS | Mod: S$GLB,,, | Performed by: FAMILY MEDICINE

## 2021-07-26 PROCEDURE — 1159F PR MEDICATION LIST DOCUMENTED IN MEDICAL RECORD: ICD-10-PCS | Mod: CPTII,S$GLB,, | Performed by: FAMILY MEDICINE

## 2021-07-26 PROCEDURE — 1159F MED LIST DOCD IN RCRD: CPT | Mod: CPTII,S$GLB,, | Performed by: FAMILY MEDICINE

## 2021-07-26 PROCEDURE — 3288F PR FALLS RISK ASSESSMENT DOCUMENTED: ICD-10-PCS | Mod: CPTII,S$GLB,, | Performed by: FAMILY MEDICINE

## 2021-07-26 PROCEDURE — 1126F PR PAIN SEVERITY QUANTIFIED, NO PAIN PRESENT: ICD-10-PCS | Mod: CPTII,S$GLB,, | Performed by: FAMILY MEDICINE

## 2021-07-26 PROCEDURE — U0003 INFECTIOUS AGENT DETECTION BY NUCLEIC ACID (DNA OR RNA); SEVERE ACUTE RESPIRATORY SYNDROME CORONAVIRUS 2 (SARS-COV-2) (CORONAVIRUS DISEASE [COVID-19]), AMPLIFIED PROBE TECHNIQUE, MAKING USE OF HIGH THROUGHPUT TECHNOLOGIES AS DESCRIBED BY CMS-2020-01-R: HCPCS | Performed by: FAMILY MEDICINE

## 2021-07-27 LAB
SARS-COV-2 RNA RESP QL NAA+PROBE: NOT DETECTED
SARS-COV-2- CYCLE NUMBER: -1

## 2021-10-12 ENCOUNTER — PATIENT OUTREACH (OUTPATIENT)
Dept: ADMINISTRATIVE | Facility: OTHER | Age: 71
End: 2021-10-12

## 2021-10-14 ENCOUNTER — OFFICE VISIT (OUTPATIENT)
Dept: ORTHOPEDICS | Facility: CLINIC | Age: 71
End: 2021-10-14
Payer: COMMERCIAL

## 2021-10-14 VITALS — HEIGHT: 66 IN | WEIGHT: 210.75 LBS | BODY MASS INDEX: 33.87 KG/M2

## 2021-10-14 DIAGNOSIS — M17.12 PRIMARY OSTEOARTHRITIS OF LEFT KNEE: Primary | ICD-10-CM

## 2021-10-14 DIAGNOSIS — M17.11 PRIMARY OSTEOARTHRITIS OF RIGHT KNEE: ICD-10-CM

## 2021-10-14 PROCEDURE — 1101F PR PT FALLS ASSESS DOC 0-1 FALLS W/OUT INJ PAST YR: ICD-10-PCS | Mod: CPTII,S$GLB,, | Performed by: ORTHOPAEDIC SURGERY

## 2021-10-14 PROCEDURE — 99999 PR PBB SHADOW E&M-EST. PATIENT-LVL III: ICD-10-PCS | Mod: PBBFAC,,, | Performed by: ORTHOPAEDIC SURGERY

## 2021-10-14 PROCEDURE — 1159F MED LIST DOCD IN RCRD: CPT | Mod: CPTII,S$GLB,, | Performed by: ORTHOPAEDIC SURGERY

## 2021-10-14 PROCEDURE — 99499 NO LOS: ICD-10-PCS | Mod: S$GLB,,, | Performed by: ORTHOPAEDIC SURGERY

## 2021-10-14 PROCEDURE — 20610 LARGE JOINT ASPIRATION/INJECTION: BILATERAL KNEE: ICD-10-PCS | Mod: 50,S$GLB,, | Performed by: ORTHOPAEDIC SURGERY

## 2021-10-14 PROCEDURE — 3288F PR FALLS RISK ASSESSMENT DOCUMENTED: ICD-10-PCS | Mod: CPTII,S$GLB,, | Performed by: ORTHOPAEDIC SURGERY

## 2021-10-14 PROCEDURE — 3044F HG A1C LEVEL LT 7.0%: CPT | Mod: CPTII,S$GLB,, | Performed by: ORTHOPAEDIC SURGERY

## 2021-10-14 PROCEDURE — 20610 DRAIN/INJ JOINT/BURSA W/O US: CPT | Mod: 50,S$GLB,, | Performed by: ORTHOPAEDIC SURGERY

## 2021-10-14 PROCEDURE — 1159F PR MEDICATION LIST DOCUMENTED IN MEDICAL RECORD: ICD-10-PCS | Mod: CPTII,S$GLB,, | Performed by: ORTHOPAEDIC SURGERY

## 2021-10-14 PROCEDURE — 3008F PR BODY MASS INDEX (BMI) DOCUMENTED: ICD-10-PCS | Mod: CPTII,S$GLB,, | Performed by: ORTHOPAEDIC SURGERY

## 2021-10-14 PROCEDURE — 1125F AMNT PAIN NOTED PAIN PRSNT: CPT | Mod: CPTII,S$GLB,, | Performed by: ORTHOPAEDIC SURGERY

## 2021-10-14 PROCEDURE — 1160F RVW MEDS BY RX/DR IN RCRD: CPT | Mod: CPTII,S$GLB,, | Performed by: ORTHOPAEDIC SURGERY

## 2021-10-14 PROCEDURE — 1160F PR REVIEW ALL MEDS BY PRESCRIBER/CLIN PHARMACIST DOCUMENTED: ICD-10-PCS | Mod: CPTII,S$GLB,, | Performed by: ORTHOPAEDIC SURGERY

## 2021-10-14 PROCEDURE — 3044F PR MOST RECENT HEMOGLOBIN A1C LEVEL <7.0%: ICD-10-PCS | Mod: CPTII,S$GLB,, | Performed by: ORTHOPAEDIC SURGERY

## 2021-10-14 PROCEDURE — 99999 PR PBB SHADOW E&M-EST. PATIENT-LVL III: CPT | Mod: PBBFAC,,, | Performed by: ORTHOPAEDIC SURGERY

## 2021-10-14 PROCEDURE — 99499 UNLISTED E&M SERVICE: CPT | Mod: S$GLB,,, | Performed by: ORTHOPAEDIC SURGERY

## 2021-10-14 PROCEDURE — 1101F PT FALLS ASSESS-DOCD LE1/YR: CPT | Mod: CPTII,S$GLB,, | Performed by: ORTHOPAEDIC SURGERY

## 2021-10-14 PROCEDURE — 3288F FALL RISK ASSESSMENT DOCD: CPT | Mod: CPTII,S$GLB,, | Performed by: ORTHOPAEDIC SURGERY

## 2021-10-14 PROCEDURE — 1125F PR PAIN SEVERITY QUANTIFIED, PAIN PRESENT: ICD-10-PCS | Mod: CPTII,S$GLB,, | Performed by: ORTHOPAEDIC SURGERY

## 2021-10-14 PROCEDURE — 3008F BODY MASS INDEX DOCD: CPT | Mod: CPTII,S$GLB,, | Performed by: ORTHOPAEDIC SURGERY

## 2021-10-14 RX ORDER — TRIAMCINOLONE ACETONIDE 40 MG/ML
80 INJECTION, SUSPENSION INTRA-ARTICULAR; INTRAMUSCULAR
Status: DISCONTINUED | OUTPATIENT
Start: 2021-10-14 | End: 2021-10-14 | Stop reason: HOSPADM

## 2021-10-14 RX ADMIN — TRIAMCINOLONE ACETONIDE 80 MG: 40 INJECTION, SUSPENSION INTRA-ARTICULAR; INTRAMUSCULAR at 08:10

## 2021-11-15 LAB — BCS RECOMMENDATION EXT: NORMAL

## 2021-12-01 ENCOUNTER — PATIENT MESSAGE (OUTPATIENT)
Dept: FAMILY MEDICINE | Facility: CLINIC | Age: 71
End: 2021-12-01
Payer: COMMERCIAL

## 2021-12-01 DIAGNOSIS — Z12.31 OTHER SCREENING MAMMOGRAM: ICD-10-CM

## 2022-02-02 ENCOUNTER — PATIENT OUTREACH (OUTPATIENT)
Dept: ADMINISTRATIVE | Facility: OTHER | Age: 72
End: 2022-02-02
Payer: COMMERCIAL

## 2022-02-02 DIAGNOSIS — M25.562 PAIN IN BOTH KNEES, UNSPECIFIED CHRONICITY: Primary | ICD-10-CM

## 2022-02-02 DIAGNOSIS — Z12.11 ENCOUNTER FOR FIT (FECAL IMMUNOCHEMICAL TEST) SCREENING: Primary | ICD-10-CM

## 2022-02-02 DIAGNOSIS — M25.561 PAIN IN BOTH KNEES, UNSPECIFIED CHRONICITY: Primary | ICD-10-CM

## 2022-02-02 DIAGNOSIS — M17.0 PRIMARY OSTEOARTHRITIS OF BOTH KNEES: ICD-10-CM

## 2022-02-02 NOTE — PROGRESS NOTES
Health Maintenance Due   Topic Date Due    COVID-19 Vaccine (3 - Booster for Pfizer series) 09/02/2021    Mammogram  11/13/2021    Colorectal Cancer Screening  12/07/2021     Updates were requested from care everywhere.  Chart was reviewed for overdue [facility]active [facility] Encounters ([facility]) topics (CRS, Breast Cancer Screening, Eye exam)  Health Maintenance has been updated.  LINKS immunization registry triggered.  Immunizations were reconciled.

## 2022-02-03 ENCOUNTER — OFFICE VISIT (OUTPATIENT)
Dept: ORTHOPEDICS | Facility: CLINIC | Age: 72
End: 2022-02-03
Payer: COMMERCIAL

## 2022-02-03 ENCOUNTER — HOSPITAL ENCOUNTER (OUTPATIENT)
Dept: RADIOLOGY | Facility: HOSPITAL | Age: 72
Discharge: HOME | End: 2022-02-03
Attending: ORTHOPAEDIC SURGERY
Payer: COMMERCIAL

## 2022-02-03 VITALS — BODY MASS INDEX: 33.87 KG/M2 | HEIGHT: 66 IN | WEIGHT: 210.75 LBS

## 2022-02-03 DIAGNOSIS — M17.0 PRIMARY OSTEOARTHRITIS OF BOTH KNEES: Primary | ICD-10-CM

## 2022-02-03 DIAGNOSIS — M25.561 PAIN IN BOTH KNEES, UNSPECIFIED CHRONICITY: ICD-10-CM

## 2022-02-03 DIAGNOSIS — M17.0 PRIMARY OSTEOARTHRITIS OF BOTH KNEES: ICD-10-CM

## 2022-02-03 DIAGNOSIS — M25.562 PAIN IN BOTH KNEES, UNSPECIFIED CHRONICITY: ICD-10-CM

## 2022-02-03 PROCEDURE — 3008F PR BODY MASS INDEX (BMI) DOCUMENTED: ICD-10-PCS | Mod: CPTII,S$GLB,, | Performed by: ORTHOPAEDIC SURGERY

## 2022-02-03 PROCEDURE — 1159F PR MEDICATION LIST DOCUMENTED IN MEDICAL RECORD: ICD-10-PCS | Mod: CPTII,S$GLB,, | Performed by: ORTHOPAEDIC SURGERY

## 2022-02-03 PROCEDURE — 99213 PR OFFICE/OUTPT VISIT, EST, LEVL III, 20-29 MIN: ICD-10-PCS | Mod: 25,S$GLB,, | Performed by: ORTHOPAEDIC SURGERY

## 2022-02-03 PROCEDURE — 1159F MED LIST DOCD IN RCRD: CPT | Mod: CPTII,S$GLB,, | Performed by: ORTHOPAEDIC SURGERY

## 2022-02-03 PROCEDURE — 1101F PR PT FALLS ASSESS DOC 0-1 FALLS W/OUT INJ PAST YR: ICD-10-PCS | Mod: CPTII,S$GLB,, | Performed by: ORTHOPAEDIC SURGERY

## 2022-02-03 PROCEDURE — 1101F PT FALLS ASSESS-DOCD LE1/YR: CPT | Mod: CPTII,S$GLB,, | Performed by: ORTHOPAEDIC SURGERY

## 2022-02-03 PROCEDURE — 3008F BODY MASS INDEX DOCD: CPT | Mod: CPTII,S$GLB,, | Performed by: ORTHOPAEDIC SURGERY

## 2022-02-03 PROCEDURE — 3288F PR FALLS RISK ASSESSMENT DOCUMENTED: ICD-10-PCS | Mod: CPTII,S$GLB,, | Performed by: ORTHOPAEDIC SURGERY

## 2022-02-03 PROCEDURE — 1160F RVW MEDS BY RX/DR IN RCRD: CPT | Mod: CPTII,S$GLB,, | Performed by: ORTHOPAEDIC SURGERY

## 2022-02-03 PROCEDURE — 20610 LARGE JOINT ASPIRATION/INJECTION: BILATERAL KNEE: ICD-10-PCS | Mod: 50,S$GLB,, | Performed by: ORTHOPAEDIC SURGERY

## 2022-02-03 PROCEDURE — 1160F PR REVIEW ALL MEDS BY PRESCRIBER/CLIN PHARMACIST DOCUMENTED: ICD-10-PCS | Mod: CPTII,S$GLB,, | Performed by: ORTHOPAEDIC SURGERY

## 2022-02-03 PROCEDURE — 99999 PR PBB SHADOW E&M-EST. PATIENT-LVL III: CPT | Mod: PBBFAC,,, | Performed by: ORTHOPAEDIC SURGERY

## 2022-02-03 PROCEDURE — 99213 OFFICE O/P EST LOW 20 MIN: CPT | Mod: 25,S$GLB,, | Performed by: ORTHOPAEDIC SURGERY

## 2022-02-03 PROCEDURE — 20610 DRAIN/INJ JOINT/BURSA W/O US: CPT | Mod: 50,S$GLB,, | Performed by: ORTHOPAEDIC SURGERY

## 2022-02-03 PROCEDURE — 99999 PR PBB SHADOW E&M-EST. PATIENT-LVL III: ICD-10-PCS | Mod: PBBFAC,,, | Performed by: ORTHOPAEDIC SURGERY

## 2022-02-03 PROCEDURE — 1125F PR PAIN SEVERITY QUANTIFIED, PAIN PRESENT: ICD-10-PCS | Mod: CPTII,S$GLB,, | Performed by: ORTHOPAEDIC SURGERY

## 2022-02-03 PROCEDURE — 73564 XR KNEE ORTHO BILAT WITH FLEXION: ICD-10-PCS | Mod: 26,50,, | Performed by: RADIOLOGY

## 2022-02-03 PROCEDURE — 73564 X-RAY EXAM KNEE 4 OR MORE: CPT | Mod: TC,50,FY

## 2022-02-03 PROCEDURE — 73564 X-RAY EXAM KNEE 4 OR MORE: CPT | Mod: 26,50,, | Performed by: RADIOLOGY

## 2022-02-03 PROCEDURE — 3288F FALL RISK ASSESSMENT DOCD: CPT | Mod: CPTII,S$GLB,, | Performed by: ORTHOPAEDIC SURGERY

## 2022-02-03 PROCEDURE — 1125F AMNT PAIN NOTED PAIN PRSNT: CPT | Mod: CPTII,S$GLB,, | Performed by: ORTHOPAEDIC SURGERY

## 2022-02-03 RX ORDER — TRIAMCINOLONE ACETONIDE 40 MG/ML
80 INJECTION, SUSPENSION INTRA-ARTICULAR; INTRAMUSCULAR
Status: DISCONTINUED | OUTPATIENT
Start: 2022-02-03 | End: 2022-02-03 | Stop reason: HOSPADM

## 2022-02-03 RX ADMIN — TRIAMCINOLONE ACETONIDE 80 MG: 40 INJECTION, SUSPENSION INTRA-ARTICULAR; INTRAMUSCULAR at 08:02

## 2022-02-03 NOTE — PROCEDURES
Large Joint Aspiration/Injection: bilateral knee    Date/Time: 2/3/2022 8:30 AM  Performed by: Franklin Dobbs MD  Authorized by: Franklin Dobbs MD     Consent Done?:  Yes (Verbal)  Approach:  Anterolateral  Location:  Knee  Laterality:  Bilateral  Site:  Bilateral knee  Medications (Left):  80 mg triamcinolone acetonide 40 mg/mL     After obtaining verbal informed consent both of the patient's knees were prepped aseptically and injected through an inferior lateral approach using 40 mg of triamcinolone and 1 cc of 1% plain Xylocaine.  The patient was warned about postinjection flare and how to manage it with ice, rest and over-the-counter analgesics.  They're advised to contact me for any severe, uncontrolled pain.

## 2022-02-03 NOTE — PROGRESS NOTES
Subjective:      Patient ID: Carlene Fong is a 71 y.o. female.    Chief Complaint: Knee Pain (bilateral )    HPI    Follow-up for osteoarthritis.  The patient reports mild discomfort that is reasonably controlled by periodic injections.  She denies mechanical events.  She denies any notable functional limitation.  She notes occasional morning stiffness and swelling.          Review of Systems   Constitutional: Negative for fever and weight loss.   HENT: Negative for congestion.    Eyes: Negative for visual disturbance.   Cardiovascular: Negative for chest pain.   Respiratory: Negative for shortness of breath.    Hematologic/Lymphatic: Negative for bleeding problem. Does not bruise/bleed easily.   Skin: Negative for poor wound healing.   Musculoskeletal: Positive for joint pain and joint swelling.   Gastrointestinal: Negative for abdominal pain.   Genitourinary: Negative for dysuria.   Neurological: Negative for seizures.   Psychiatric/Behavioral: Negative for altered mental status.   Allergic/Immunologic: Negative for persistent infections.         Objective:      Ortho/SPM Exam    Right knee    [unfilled]    The patient is not in acute distress.   Sclerae normal  Body habitus is normal.  Respiratory distress:  none   The patient walks without a limp.  Hip irritability  negative.   The skin over the knee is intact.  Knee effusion trace  Palpation- nontender  Range of motion- Flexion 135 deg, Extension 0 deg,   Ligament laxity exam:  Trace valgus laxity   Patellar apprehension negative.  Popliteal cyst negative  Patellar crepitation absent.  Meniscal irritability not applicable  Pulses DP present, PT present.  Motor normal 5/5 strength in all tested muscle groups.   Sensory normal.    The left knee is identical    I reviewed the relevant imaging for the patient's condition:  Radiographs of both knees show moderate patellofemoral narrowing and mild medial tibial femoral narrowing.          Assessment:        Encounter Diagnosis   Name Primary?    Primary osteoarthritis of both knees Yes        The condition is structurally mild to moderate without severe pain or functional impairment.          Plan:       Carlene was seen today for knee pain.    Diagnoses and all orders for this visit:    Primary osteoarthritis of both knees          I explained my diagnostic impression and the reasoning behind it in detail, using layman's terms.  Models and/or pictures were used to help in the explanation.    Injection requested and consent given    I explained that progression of the condition may occur in further treatment will be rendered based on clinical presentation.

## 2022-03-14 ENCOUNTER — LAB VISIT (OUTPATIENT)
Dept: LAB | Facility: HOSPITAL | Age: 72
End: 2022-03-14
Attending: FAMILY MEDICINE
Payer: COMMERCIAL

## 2022-03-14 ENCOUNTER — OFFICE VISIT (OUTPATIENT)
Dept: FAMILY MEDICINE | Facility: CLINIC | Age: 72
End: 2022-03-14
Payer: COMMERCIAL

## 2022-03-14 VITALS
OXYGEN SATURATION: 97 % | HEART RATE: 75 BPM | HEIGHT: 66 IN | SYSTOLIC BLOOD PRESSURE: 120 MMHG | BODY MASS INDEX: 33.41 KG/M2 | DIASTOLIC BLOOD PRESSURE: 62 MMHG | WEIGHT: 207.88 LBS

## 2022-03-14 DIAGNOSIS — K21.9 GASTROESOPHAGEAL REFLUX DISEASE, UNSPECIFIED WHETHER ESOPHAGITIS PRESENT: ICD-10-CM

## 2022-03-14 DIAGNOSIS — Z79.899 MEDICATION MANAGEMENT: ICD-10-CM

## 2022-03-14 DIAGNOSIS — Z12.11 COLON CANCER SCREENING: ICD-10-CM

## 2022-03-14 DIAGNOSIS — J30.89 NON-SEASONAL ALLERGIC RHINITIS DUE TO OTHER ALLERGIC TRIGGER: ICD-10-CM

## 2022-03-14 DIAGNOSIS — Z90.710 H/O HYSTERECTOMY FOR BENIGN DISEASE: ICD-10-CM

## 2022-03-14 DIAGNOSIS — E55.9 VITAMIN D DEFICIENCY: ICD-10-CM

## 2022-03-14 DIAGNOSIS — E66.9 OBESITY (BMI 30.0-34.9): ICD-10-CM

## 2022-03-14 DIAGNOSIS — M17.0 BILATERAL PRIMARY OSTEOARTHRITIS OF KNEE: ICD-10-CM

## 2022-03-14 DIAGNOSIS — E21.0 PRIMARY HYPERPARATHYROIDISM: ICD-10-CM

## 2022-03-14 DIAGNOSIS — Z98.890 H/O PARATHYROIDECTOMY: ICD-10-CM

## 2022-03-14 DIAGNOSIS — M81.0 POSTMENOPAUSAL OSTEOPOROSIS: ICD-10-CM

## 2022-03-14 DIAGNOSIS — F41.9 ANXIETY: ICD-10-CM

## 2022-03-14 DIAGNOSIS — Z90.89 H/O PARATHYROIDECTOMY: ICD-10-CM

## 2022-03-14 DIAGNOSIS — Z79.899 ENCOUNTER FOR MONITORING LONG-TERM PROTON PUMP INHIBITOR THERAPY: ICD-10-CM

## 2022-03-14 DIAGNOSIS — Z51.81 ENCOUNTER FOR MONITORING LONG-TERM PROTON PUMP INHIBITOR THERAPY: ICD-10-CM

## 2022-03-14 DIAGNOSIS — H81.09 MENIERE'S DISEASE, UNSPECIFIED LATERALITY: ICD-10-CM

## 2022-03-14 DIAGNOSIS — Z00.00 ROUTINE GENERAL MEDICAL EXAMINATION AT A HEALTH CARE FACILITY: ICD-10-CM

## 2022-03-14 DIAGNOSIS — Z00.00 ROUTINE GENERAL MEDICAL EXAMINATION AT A HEALTH CARE FACILITY: Primary | ICD-10-CM

## 2022-03-14 LAB
25(OH)D3+25(OH)D2 SERPL-MCNC: 58 NG/ML (ref 30–96)
ALBUMIN SERPL BCP-MCNC: 3.6 G/DL (ref 3.5–5.2)
ALP SERPL-CCNC: 81 U/L (ref 55–135)
ALT SERPL W/O P-5'-P-CCNC: 14 U/L (ref 10–44)
ANION GAP SERPL CALC-SCNC: 9 MMOL/L (ref 8–16)
AST SERPL-CCNC: 13 U/L (ref 10–40)
BASOPHILS # BLD AUTO: 0.07 K/UL (ref 0–0.2)
BASOPHILS NFR BLD: 0.8 % (ref 0–1.9)
BILIRUB SERPL-MCNC: 0.6 MG/DL (ref 0.1–1)
BUN SERPL-MCNC: 15 MG/DL (ref 8–23)
CALCIUM SERPL-MCNC: 9.3 MG/DL (ref 8.7–10.5)
CHLORIDE SERPL-SCNC: 107 MMOL/L (ref 95–110)
CHOLEST SERPL-MCNC: 194 MG/DL (ref 120–199)
CHOLEST/HDLC SERPL: 3.3 {RATIO} (ref 2–5)
CO2 SERPL-SCNC: 26 MMOL/L (ref 23–29)
CREAT SERPL-MCNC: 0.7 MG/DL (ref 0.5–1.4)
DIFFERENTIAL METHOD: ABNORMAL
EOSINOPHIL # BLD AUTO: 0.1 K/UL (ref 0–0.5)
EOSINOPHIL NFR BLD: 1.5 % (ref 0–8)
ERYTHROCYTE [DISTWIDTH] IN BLOOD BY AUTOMATED COUNT: 14.2 % (ref 11.5–14.5)
EST. GFR  (AFRICAN AMERICAN): >60 ML/MIN/1.73 M^2
EST. GFR  (NON AFRICAN AMERICAN): >60 ML/MIN/1.73 M^2
ESTIMATED AVG GLUCOSE: 100 MG/DL (ref 68–131)
GLUCOSE SERPL-MCNC: 81 MG/DL (ref 70–110)
HBA1C MFR BLD: 5.1 % (ref 4–5.6)
HCT VFR BLD AUTO: 43.7 % (ref 37–48.5)
HDLC SERPL-MCNC: 58 MG/DL (ref 40–75)
HDLC SERPL: 29.9 % (ref 20–50)
HGB BLD-MCNC: 13.9 G/DL (ref 12–16)
IMM GRANULOCYTES # BLD AUTO: 0.03 K/UL (ref 0–0.04)
IMM GRANULOCYTES NFR BLD AUTO: 0.4 % (ref 0–0.5)
LDLC SERPL CALC-MCNC: 119.6 MG/DL (ref 63–159)
LYMPHOCYTES # BLD AUTO: 1.3 K/UL (ref 1–4.8)
LYMPHOCYTES NFR BLD: 16 % (ref 18–48)
MAGNESIUM SERPL-MCNC: 2.2 MG/DL (ref 1.6–2.6)
MCH RBC QN AUTO: 31 PG (ref 27–31)
MCHC RBC AUTO-ENTMCNC: 31.8 G/DL (ref 32–36)
MCV RBC AUTO: 97 FL (ref 82–98)
MONOCYTES # BLD AUTO: 0.5 K/UL (ref 0.3–1)
MONOCYTES NFR BLD: 6.1 % (ref 4–15)
NEUTROPHILS # BLD AUTO: 6.2 K/UL (ref 1.8–7.7)
NEUTROPHILS NFR BLD: 75.2 % (ref 38–73)
NONHDLC SERPL-MCNC: 136 MG/DL
NRBC BLD-RTO: 0 /100 WBC
PLATELET # BLD AUTO: 330 K/UL (ref 150–450)
PMV BLD AUTO: 9.1 FL (ref 9.2–12.9)
POTASSIUM SERPL-SCNC: 4.4 MMOL/L (ref 3.5–5.1)
PROT SERPL-MCNC: 7.1 G/DL (ref 6–8.4)
PTH-INTACT SERPL-MCNC: 55.6 PG/ML (ref 9–77)
RBC # BLD AUTO: 4.49 M/UL (ref 4–5.4)
SODIUM SERPL-SCNC: 142 MMOL/L (ref 136–145)
TRIGL SERPL-MCNC: 82 MG/DL (ref 30–150)
TSH SERPL DL<=0.005 MIU/L-ACNC: 1.32 UIU/ML (ref 0.4–4)
VIT B12 SERPL-MCNC: 324 PG/ML (ref 210–950)
WBC # BLD AUTO: 8.26 K/UL (ref 3.9–12.7)

## 2022-03-14 PROCEDURE — 99999 PR PBB SHADOW E&M-EST. PATIENT-LVL IV: ICD-10-PCS | Mod: PBBFAC,,, | Performed by: FAMILY MEDICINE

## 2022-03-14 PROCEDURE — 1159F PR MEDICATION LIST DOCUMENTED IN MEDICAL RECORD: ICD-10-PCS | Mod: CPTII,S$GLB,, | Performed by: FAMILY MEDICINE

## 2022-03-14 PROCEDURE — 80053 COMPREHEN METABOLIC PANEL: CPT | Performed by: FAMILY MEDICINE

## 2022-03-14 PROCEDURE — 1160F RVW MEDS BY RX/DR IN RCRD: CPT | Mod: CPTII,S$GLB,, | Performed by: FAMILY MEDICINE

## 2022-03-14 PROCEDURE — 82306 VITAMIN D 25 HYDROXY: CPT | Performed by: FAMILY MEDICINE

## 2022-03-14 PROCEDURE — 3008F PR BODY MASS INDEX (BMI) DOCUMENTED: ICD-10-PCS | Mod: CPTII,S$GLB,, | Performed by: FAMILY MEDICINE

## 2022-03-14 PROCEDURE — 3074F PR MOST RECENT SYSTOLIC BLOOD PRESSURE < 130 MM HG: ICD-10-PCS | Mod: CPTII,S$GLB,, | Performed by: FAMILY MEDICINE

## 2022-03-14 PROCEDURE — 1159F MED LIST DOCD IN RCRD: CPT | Mod: CPTII,S$GLB,, | Performed by: FAMILY MEDICINE

## 2022-03-14 PROCEDURE — 3078F DIAST BP <80 MM HG: CPT | Mod: CPTII,S$GLB,, | Performed by: FAMILY MEDICINE

## 2022-03-14 PROCEDURE — 3288F FALL RISK ASSESSMENT DOCD: CPT | Mod: CPTII,S$GLB,, | Performed by: FAMILY MEDICINE

## 2022-03-14 PROCEDURE — 83735 ASSAY OF MAGNESIUM: CPT | Performed by: FAMILY MEDICINE

## 2022-03-14 PROCEDURE — 80061 LIPID PANEL: CPT | Performed by: FAMILY MEDICINE

## 2022-03-14 PROCEDURE — 99397 PER PM REEVAL EST PAT 65+ YR: CPT | Mod: S$GLB,,, | Performed by: FAMILY MEDICINE

## 2022-03-14 PROCEDURE — 1126F AMNT PAIN NOTED NONE PRSNT: CPT | Mod: CPTII,S$GLB,, | Performed by: FAMILY MEDICINE

## 2022-03-14 PROCEDURE — 99999 PR PBB SHADOW E&M-EST. PATIENT-LVL IV: CPT | Mod: PBBFAC,,, | Performed by: FAMILY MEDICINE

## 2022-03-14 PROCEDURE — 83036 HEMOGLOBIN GLYCOSYLATED A1C: CPT | Performed by: FAMILY MEDICINE

## 2022-03-14 PROCEDURE — 99397 PR PREVENTIVE VISIT,EST,65 & OVER: ICD-10-PCS | Mod: S$GLB,,, | Performed by: FAMILY MEDICINE

## 2022-03-14 PROCEDURE — 1101F PT FALLS ASSESS-DOCD LE1/YR: CPT | Mod: CPTII,S$GLB,, | Performed by: FAMILY MEDICINE

## 2022-03-14 PROCEDURE — 3074F SYST BP LT 130 MM HG: CPT | Mod: CPTII,S$GLB,, | Performed by: FAMILY MEDICINE

## 2022-03-14 PROCEDURE — 36415 COLL VENOUS BLD VENIPUNCTURE: CPT | Performed by: FAMILY MEDICINE

## 2022-03-14 PROCEDURE — 3288F PR FALLS RISK ASSESSMENT DOCUMENTED: ICD-10-PCS | Mod: CPTII,S$GLB,, | Performed by: FAMILY MEDICINE

## 2022-03-14 PROCEDURE — 1126F PR PAIN SEVERITY QUANTIFIED, NO PAIN PRESENT: ICD-10-PCS | Mod: CPTII,S$GLB,, | Performed by: FAMILY MEDICINE

## 2022-03-14 PROCEDURE — 84443 ASSAY THYROID STIM HORMONE: CPT | Performed by: FAMILY MEDICINE

## 2022-03-14 PROCEDURE — 83970 ASSAY OF PARATHORMONE: CPT | Performed by: FAMILY MEDICINE

## 2022-03-14 PROCEDURE — 1101F PR PT FALLS ASSESS DOC 0-1 FALLS W/OUT INJ PAST YR: ICD-10-PCS | Mod: CPTII,S$GLB,, | Performed by: FAMILY MEDICINE

## 2022-03-14 PROCEDURE — 82607 VITAMIN B-12: CPT | Performed by: FAMILY MEDICINE

## 2022-03-14 PROCEDURE — 3008F BODY MASS INDEX DOCD: CPT | Mod: CPTII,S$GLB,, | Performed by: FAMILY MEDICINE

## 2022-03-14 PROCEDURE — 85025 COMPLETE CBC W/AUTO DIFF WBC: CPT | Performed by: FAMILY MEDICINE

## 2022-03-14 PROCEDURE — 3078F PR MOST RECENT DIASTOLIC BLOOD PRESSURE < 80 MM HG: ICD-10-PCS | Mod: CPTII,S$GLB,, | Performed by: FAMILY MEDICINE

## 2022-03-14 PROCEDURE — 1160F PR REVIEW ALL MEDS BY PRESCRIBER/CLIN PHARMACIST DOCUMENTED: ICD-10-PCS | Mod: CPTII,S$GLB,, | Performed by: FAMILY MEDICINE

## 2022-03-14 RX ORDER — FLUTICASONE PROPIONATE 50 MCG
2 SPRAY, SUSPENSION (ML) NASAL NIGHTLY
Qty: 48 ML | Refills: 4 | Status: SHIPPED | OUTPATIENT
Start: 2022-03-14 | End: 2023-06-07

## 2022-03-14 RX ORDER — HYDROCHLOROTHIAZIDE 12.5 MG/1
12.5 TABLET ORAL EVERY OTHER DAY
Qty: 90 TABLET | Refills: 4 | Status: SHIPPED | OUTPATIENT
Start: 2022-03-14 | End: 2023-04-11

## 2022-03-14 NOTE — PROGRESS NOTES
Office Visit    Patient Name: Carlene Fong    : 1950  MRN: [id number]    Subjective:  Carlene is a 71 y.o. female who presents today for:    Annual Exam    Carlene presents today for annual wellness exam (previous annual 3/10/21) and monitoring of chronic conditions-- primary hyperparathyroidism associated with vitamin D deficiency & Osteoporosis now s/p 3 gland parathyroidectomy on 2019, overweight BMI, history of positive rheumatoid factor without clinical evidence of rheumatoid arthritis, left wrist carpal tunnel(s/p injection 10/2018), mild anxiety, GERD with PRN OTC PPI Use, bilateral knee pain followed by Ortho Dr Dobbs and s/p B Cortisone injections 2/3/22.      As far as her Hyperparathyroidism with elevated Calcium: I referred her to Dash Balderas/ Rm secondary to osteoporosis dx 1/15/2019 and hypercalcemia at 14.   She underwent 3 gland parathyroidectomy 19 and most recently followed up with Endo Dr Balderas on 2019.   Her TSH/PTH/ and Renal function panel all normal 3/10/21.   She is taking Calcium 500 BID and Vitamin D 1000 IU daily      Sees gynecology for yearly physicals-- Dr De Dios. She has had a hysterectomy with removal of one ovary.      She has been feeling overall well.   OA of knees-- pain tolerable  Carpal tunnel-- injections pending per Dr Horner  Allergies-- stable with prn zyrtec     General lifestyle habits are as follows: Diet is described as healthy- eating consistent low carb  healthy meals-- watching salt and following weight watchers, exercise is described as good with cardio--walking 20-30 min every day, sleep is described as good-- sleeps 7-8 hours nightly and does not snore. Weight is stable in the last year but about 5  lbs up from prior baseline.      Immunizations: SHINGRIX completed 2018, TDaP 2015, PREVNAR 13 1/10/2018, Pneumovax 23 1/15/2019, yearly FLU SHOT UTD 10/1/21, COVID-19 COMPLETED 3/2/21 w/ PFIZER BOOSTER 21     Screening  tests: colonoscopy 8/2009-- normal-->repeat in 10 years-- DECLINES AND WISHES TO CONTINUE FIT TESTING(FIT - 12/7/20), DEXA 3/16/21-- Osteoporosis of the hips & PROLIA ADVISED (endocrine agrees) but she declines starting it mammograms-- up to date per Dr. De Dios GYN every November (had a biopsy 12/12/2018 thru [facility]- now back on yearly schedule) &  No longer needs PAPS, Hep C (-)  5/27/14     Eye/Dental: up to date, monitoring left eye cataract-- Ophthalmology Dr Ojeda          PAST MEDICAL HISTORY, SURGICAL/SOCIAL/FAMILY HISTORY REVIEWED AS PER CHART, WITH PERTINENT FINDINGS INCLUDED IN HISTORY SECTION OF NOTE.     Current Medications    Medication List with Changes/Refills   Current Medications    CALCIUM CARBONATE (CALCIUM 600 ORAL)    Take by mouth.    CO-ENZYME Q-10 30 MG CAPSULE    Take 30 mg by mouth once daily.     DICLOFENAC SODIUM (VOLTAREN) 1 % GEL    Apply 2 g topically 3 (three) times daily.    FLAXSEED ORAL    Take by mouth once daily.     GABAPENTIN (NEURONTIN) 300 MG CAPSULE    Take 1 capsule (300 mg total) by mouth 2 (two) times daily.    HYDROCHLOROTHIAZIDE (HYDRODIURIL) 12.5 MG TAB    Take 1 tablet (12.5 mg total) by mouth every other day.    OMEPRAZOLE (PRILOSEC) 40 MG CAPSULE    Take 1 capsule (40 mg total) by mouth before breakfast.    VITAMIN D 1000 UNITS TAB    Take 185 mg by mouth once daily.   Discontinued Medications    CALCIUM CARBONATE (TUMS) 200 MG CALCIUM (500 MG) CHEWABLE TABLET    Take 2 tablets (1,000 mg total) by mouth 4 (four) times daily.    ERYTHROMYCIN (ROMYCIN) OPHTHALMIC OINTMENT    Place into the right eye every evening.    FLUTICASONE PROPIONATE (FLONASE) 50 MCG/ACTUATION NASAL SPRAY    2 sprays (100 mcg total) by Each Nostril route nightly.    MECLIZINE (ANTIVERT) 25 MG TABLET    Take 1 tablet (25 mg total) by mouth 3 (three) times daily as needed for Dizziness.    ONDANSETRON (ZOFRAN) 8 MG TABLET    Take 1 tablet (8 mg total) by mouth every 8 (eight) hours as needed for  "Nausea.       Allergies   Review of patient's allergies indicates:   Allergen Reactions    Requip [ropinirole] Swelling      palpitations, swelling of tongue         Review of Systems (Pertinent positives)  Review of Systems   Constitutional: Negative for activity change and unexpected weight change.   HENT: Negative for hearing loss, rhinorrhea and trouble swallowing.    Eyes: Negative for discharge and visual disturbance.   Respiratory: Negative for chest tightness and wheezing.    Cardiovascular: Negative for chest pain and palpitations.   Gastrointestinal: Negative for blood in stool, constipation, diarrhea and vomiting.   Endocrine: Negative for polydipsia and polyuria.   Genitourinary: Negative for difficulty urinating, dysuria, hematuria and menstrual problem.   Musculoskeletal: Positive for arthralgias. Negative for joint swelling and neck pain.   Neurological: Negative for weakness and headaches.   Psychiatric/Behavioral: Negative for confusion and dysphoric mood.       /62 (BP Location: Right arm, Patient Position: Sitting)   Pulse 75   Ht 5' 6" (1.676 m)   Wt 94.3 kg (207 lb 14.3 oz)   LMP  (LMP Unknown)   SpO2 97%   BMI 33.55 kg/m²     Physical Exam  Vitals reviewed.   Constitutional:       General: She is not in acute distress.     Appearance: Normal appearance. She is well-developed.   HENT:      Head: Normocephalic and atraumatic.      Right Ear: Ear canal normal. Tympanic membrane is not erythematous or bulging.      Left Ear: Ear canal normal. Tympanic membrane is not erythematous or bulging.      Nose: Nose normal.      Mouth/Throat:      Mouth: Mucous membranes are moist.      Pharynx: No oropharyngeal exudate.   Eyes:      Extraocular Movements: Extraocular movements intact.      Conjunctiva/sclera: Conjunctivae normal.   Neck:      Thyroid: No thyroid mass or thyromegaly.      Vascular: No carotid bruit.   Cardiovascular:      Rate and Rhythm: Normal rate and regular rhythm.      " Pulses:           Dorsalis pedis pulses are 2+ on the right side and 2+ on the left side.      Heart sounds: Normal heart sounds. No murmur heard.  Pulmonary:      Effort: Pulmonary effort is normal. No respiratory distress.      Breath sounds: Normal breath sounds.   Abdominal:      General: Bowel sounds are normal. There is no distension.      Palpations: Abdomen is soft. There is no mass.      Tenderness: There is no abdominal tenderness.   Musculoskeletal:         General: Normal range of motion.      Right lower leg: No edema.      Left lower leg: No edema.   Lymphadenopathy:      Cervical: No cervical adenopathy.   Skin:     General: Skin is warm and dry.      Findings: No rash.   Neurological:      General: No focal deficit present.      Mental Status: She is alert and oriented to person, place, and time.   Psychiatric:         Mood and Affect: Mood normal.         Behavior: Behavior normal.           Assessment/Plan:  Carlene Fong is a 71 y.o. female who presents today for :        ICD-10-CM ICD-9-CM    1. Routine general medical examination at a health care facility  Z00.00 V70.0    2. Obesity (BMI 30.0-34.9)  E66.9 278.00    3. Postmenopausal osteoporosis  M81.0 733.01    4. Vitamin D deficiency  E55.9 268.9    5. H/O hysterectomy for benign disease  Z90.710 V88.01    6. Primary hyperparathyroidism  E21.0 252.01    7. H/O parathyroidectomy  E89.2 252.8    8. Bilateral primary osteoarthritis of knee  M17.0 715.16    9. Gastroesophageal reflux disease, unspecified whether esophagitis present  K21.9 530.81    10. Anxiety  F41.9 300.00      HEALTH MAINTENANCE: ADVISED ON DIET/EXERCISE/SLEEP, ROUTINE EYE/DENTAL EXAMS, AND THE IMPORTANCE OF KEEPING UP WITH APPROPRIATE SCREENING TESTS BASED ON AGE AND RISK FACTORS.  MAMMOGRAM UPDATED NOVEMBER 2020 PER GYN, IMMUNIZATIONS UP-TO-DATE INCLUDING COVID-19 VACCINE SERIES, SHE DECLINES FURTHER COLONOSCOPY BUT HER YEARLY FIT TESTS HAVE BEEN NEGATIVE     OBESITY:  FOLLOWING A HEALTHY, LOW CARB DIET, WEIGHT WATCHER'S COMPATIBLE, WALKING DAILY, ADVISED ADDING WEIGHTS AND INTERVAL TRAINING     H/O HYPERPARATHYROIDISM S/P 3 GLAND PARATHYROIDECTOMY, ASSOCIATED WITH OSTEOPOROSIS: NOW ON CALCIUM 500 BID, VIT D 1,000 IU DAILY, NEXT DEXA DUE 3/2023-- DECLINES PROLIA AT THIS TIME     MENIERE'S: NO RECENT ATTACKS, STABLE WITH PRN ANTIVERT AND  HCTZ 12.5 QOD. LABS WITH ELECTROLYTES, EGFR PENDING     GERD: STABLE ON PRN PPI, ON VIT D REPLACEMENT, DEXA ORDERED     AR: STABLE WITH PRN ZYRTEC AND FLONASE     KNEE ARTHRITIS, CARPAL TUNNEL: BOTH STABLE WITH PERIODIC INJECTIONS AND PT-- REQUESTS REPEAT PT ORDER       There are no Patient Instructions on file for this visit.      Follow up for to follow up on lab results, return as needed for new concerns.

## 2022-03-21 ENCOUNTER — LAB VISIT (OUTPATIENT)
Dept: LAB | Facility: HOSPITAL | Age: 72
End: 2022-03-21
Attending: FAMILY MEDICINE
Payer: COMMERCIAL

## 2022-03-21 DIAGNOSIS — Z12.11 ENCOUNTER FOR FIT (FECAL IMMUNOCHEMICAL TEST) SCREENING: ICD-10-CM

## 2022-03-21 PROCEDURE — 82274 ASSAY TEST FOR BLOOD FECAL: CPT | Performed by: FAMILY MEDICINE

## 2022-03-24 LAB — HEMOCCULT STL QL IA: NEGATIVE

## 2022-04-01 ENCOUNTER — PATIENT OUTREACH (OUTPATIENT)
Dept: ADMINISTRATIVE | Facility: HOSPITAL | Age: 72
End: 2022-04-01
Payer: COMMERCIAL

## 2022-04-01 ENCOUNTER — TELEPHONE (OUTPATIENT)
Dept: ADMINISTRATIVE | Facility: HOSPITAL | Age: 72
End: 2022-04-01
Payer: COMMERCIAL

## 2022-04-16 ENCOUNTER — OFFICE VISIT (OUTPATIENT)
Dept: URGENT CARE | Facility: CLINIC | Age: 72
End: 2022-04-16
Payer: COMMERCIAL

## 2022-04-16 ENCOUNTER — PATIENT MESSAGE (OUTPATIENT)
Dept: FAMILY MEDICINE | Facility: CLINIC | Age: 72
End: 2022-04-16
Payer: COMMERCIAL

## 2022-04-16 VITALS
BODY MASS INDEX: 33.27 KG/M2 | TEMPERATURE: 98 F | WEIGHT: 207 LBS | RESPIRATION RATE: 18 BRPM | SYSTOLIC BLOOD PRESSURE: 141 MMHG | HEART RATE: 94 BPM | HEIGHT: 66 IN | DIASTOLIC BLOOD PRESSURE: 87 MMHG | OXYGEN SATURATION: 96 %

## 2022-04-16 DIAGNOSIS — R30.0 DYSURIA: Primary | ICD-10-CM

## 2022-04-16 LAB
BILIRUB UR QL STRIP: POSITIVE
GLUCOSE UR QL STRIP: NEGATIVE
KETONES UR QL STRIP: NEGATIVE
LEUKOCYTE ESTERASE UR QL STRIP: NEGATIVE
PH, POC UA: 5.5
POC BLOOD, URINE: POSITIVE
POC NITRATES, URINE: POSITIVE
PROT UR QL STRIP: NEGATIVE
SP GR UR STRIP: 1 (ref 1–1.03)
UROBILINOGEN UR STRIP-ACNC: POSITIVE (ref 0.1–1.1)

## 2022-04-16 PROCEDURE — 3077F SYST BP >= 140 MM HG: CPT | Mod: CPTII,S$GLB,, | Performed by: FAMILY MEDICINE

## 2022-04-16 PROCEDURE — 3079F PR MOST RECENT DIASTOLIC BLOOD PRESSURE 80-89 MM HG: ICD-10-PCS | Mod: CPTII,S$GLB,, | Performed by: FAMILY MEDICINE

## 2022-04-16 PROCEDURE — 1159F PR MEDICATION LIST DOCUMENTED IN MEDICAL RECORD: ICD-10-PCS | Mod: CPTII,S$GLB,, | Performed by: FAMILY MEDICINE

## 2022-04-16 PROCEDURE — 1159F MED LIST DOCD IN RCRD: CPT | Mod: CPTII,S$GLB,, | Performed by: FAMILY MEDICINE

## 2022-04-16 PROCEDURE — 3008F PR BODY MASS INDEX (BMI) DOCUMENTED: ICD-10-PCS | Mod: CPTII,S$GLB,, | Performed by: FAMILY MEDICINE

## 2022-04-16 PROCEDURE — 81003 POCT URINALYSIS, DIPSTICK, AUTOMATED, W/O SCOPE: ICD-10-PCS | Mod: QW,S$GLB,, | Performed by: FAMILY MEDICINE

## 2022-04-16 PROCEDURE — 1126F PR PAIN SEVERITY QUANTIFIED, NO PAIN PRESENT: ICD-10-PCS | Mod: CPTII,S$GLB,, | Performed by: FAMILY MEDICINE

## 2022-04-16 PROCEDURE — 3077F PR MOST RECENT SYSTOLIC BLOOD PRESSURE >= 140 MM HG: ICD-10-PCS | Mod: CPTII,S$GLB,, | Performed by: FAMILY MEDICINE

## 2022-04-16 PROCEDURE — 99213 PR OFFICE/OUTPT VISIT, EST, LEVL III, 20-29 MIN: ICD-10-PCS | Mod: S$GLB,,, | Performed by: FAMILY MEDICINE

## 2022-04-16 PROCEDURE — 81003 URINALYSIS AUTO W/O SCOPE: CPT | Mod: QW,S$GLB,, | Performed by: FAMILY MEDICINE

## 2022-04-16 PROCEDURE — 99213 OFFICE O/P EST LOW 20 MIN: CPT | Mod: S$GLB,,, | Performed by: FAMILY MEDICINE

## 2022-04-16 PROCEDURE — 3079F DIAST BP 80-89 MM HG: CPT | Mod: CPTII,S$GLB,, | Performed by: FAMILY MEDICINE

## 2022-04-16 PROCEDURE — 3044F PR MOST RECENT HEMOGLOBIN A1C LEVEL <7.0%: ICD-10-PCS | Mod: CPTII,S$GLB,, | Performed by: FAMILY MEDICINE

## 2022-04-16 PROCEDURE — 3008F BODY MASS INDEX DOCD: CPT | Mod: CPTII,S$GLB,, | Performed by: FAMILY MEDICINE

## 2022-04-16 PROCEDURE — 1126F AMNT PAIN NOTED NONE PRSNT: CPT | Mod: CPTII,S$GLB,, | Performed by: FAMILY MEDICINE

## 2022-04-16 PROCEDURE — 3044F HG A1C LEVEL LT 7.0%: CPT | Mod: CPTII,S$GLB,, | Performed by: FAMILY MEDICINE

## 2022-04-16 RX ORDER — NITROFURANTOIN 25; 75 MG/1; MG/1
100 CAPSULE ORAL 2 TIMES DAILY
Qty: 14 CAPSULE | Refills: 0 | Status: SHIPPED | OUTPATIENT
Start: 2022-04-16 | End: 2022-04-23

## 2022-04-16 RX ORDER — PHENAZOPYRIDINE HYDROCHLORIDE 200 MG/1
200 TABLET, FILM COATED ORAL 3 TIMES DAILY PRN
Qty: 15 TABLET | Refills: 0 | Status: SHIPPED | OUTPATIENT
Start: 2022-04-16 | End: 2022-04-16 | Stop reason: SDUPTHER

## 2022-04-16 NOTE — PROGRESS NOTES
"Subjective:       Patient ID: Carlene Fong is a 71 y.o. female.    Vitals:  height is 5' 6" (1.676 m) and weight is 93.9 kg (207 lb). Her temperature is 98.3 °F (36.8 °C). Her blood pressure is 141/87 (abnormal) and her pulse is 94. Her respiration is 18 and oxygen saturation is 96%.     Chief Complaint: Urinary Tract Infection    Pt reports that three days ago she noticed pressure on her bladder after urinating. She says that today she noticed two drops of blood after urinating. Denies fever or chills      Urinary Tract Infection   This is a new problem. The current episode started in the past 7 days. The problem has been unchanged. The pain is at a severity of 0/10. The patient is experiencing no pain. There has been no fever. She is not sexually active. There is no history of pyelonephritis. Treatments tried: azo. The treatment provided no relief.     ROS    Objective:      Physical Exam   Constitutional: She is oriented to person, place, and time. She appears well-developed. She is cooperative.  Non-toxic appearance. She does not appear ill. No distress.   HENT:   Head: Normocephalic and atraumatic.   Ears:   Right Ear: Hearing, tympanic membrane, external ear and ear canal normal.   Left Ear: Hearing, tympanic membrane, external ear and ear canal normal.   Nose: Nose normal. No mucosal edema, rhinorrhea or nasal deformity. No epistaxis. Right sinus exhibits no maxillary sinus tenderness and no frontal sinus tenderness. Left sinus exhibits no maxillary sinus tenderness and no frontal sinus tenderness.   Mouth/Throat: Uvula is midline, oropharynx is clear and moist and mucous membranes are normal. Mucous membranes are moist. No trismus in the jaw. Normal dentition. No uvula swelling. No posterior oropharyngeal erythema. Oropharynx is clear.   Eyes: Conjunctivae and lids are normal. Pupils are equal, round, and reactive to light. Right eye exhibits no discharge. Left eye exhibits no discharge. No scleral " icterus.      extraocular movement intact   Neck: Trachea normal and phonation normal. Neck supple.   Cardiovascular: Normal rate, regular rhythm, normal heart sounds and normal pulses.   Pulmonary/Chest: Effort normal and breath sounds normal. No respiratory distress.   Abdominal: Normal appearance and bowel sounds are normal. She exhibits no distension, no pulsatile midline mass and no mass. Soft. There is no abdominal tenderness. There is no rebound and no guarding. No hernia.   Musculoskeletal: Normal range of motion.         General: No deformity. Normal range of motion.   Neurological: She is alert and oriented to person, place, and time. She exhibits normal muscle tone. Coordination normal.   Skin: Skin is warm, dry, intact, not diaphoretic and not pale.   Psychiatric: Her speech is normal and behavior is normal. Judgment and thought content normal.   Nursing note and vitals reviewed.        Assessment:       1. Dysuria          Plan:         Dysuria  -     POCT Urinalysis, Dipstick, Automated, W/O Scope    Other orders  -     nitrofurantoin, macrocrystal-monohydrate, (MACROBID) 100 MG capsule; Take 1 capsule (100 mg total) by mouth 2 (two) times daily. for 7 days  Dispense: 14 capsule; Refill: 0  -     Discontinue: phenazopyridine (PYRIDIUM) 200 MG tablet; Take 1 tablet (200 mg total) by mouth 3 (three) times daily as needed for Pain.  Dispense: 15 tablet; Refill: 0                  Results for orders placed or performed in visit on 04/16/22   POCT Urinalysis, Dipstick, Automated, W/O Scope   Result Value Ref Range    POC Blood, Urine Positive (A) Negative    POC Bilirubin, Urine Positive (A) Negative    POC Urobilinogen, Urine positive 0.1 - 1.1    POC Ketones, Urine Negative Negative    POC Protein, Urine Negative Negative    POC Nitrates, Urine Positive (A) Negative    POC Glucose, Urine Negative Negative    pH, UA 5.5     POC Specific Gravity, Urine 1.005 1.003 - 1.029    POC Leukocytes, Urine Negative  Negative

## 2022-04-18 ENCOUNTER — TELEPHONE (OUTPATIENT)
Dept: FAMILY MEDICINE | Facility: CLINIC | Age: 72
End: 2022-04-18
Payer: COMMERCIAL

## 2022-04-18 DIAGNOSIS — N39.0 URINARY TRACT INFECTION WITHOUT HEMATURIA, SITE UNSPECIFIED: Primary | ICD-10-CM

## 2022-04-18 NOTE — TELEPHONE ENCOUNTER
----- Message from Marta Jones sent at 4/16/2022 10:57 AM CDT -----  Type:  Sooner Apoointment Request    Caller is requesting a sooner appointment.  Caller declined first available appointment listed below.  Caller will not accept being placed on the waitlist and is requesting a message be sent to doctor.  Name of Caller:pt  When is the first available appointment?04/27/22  Symptoms:possible UTI  Would the patient rather a call back or a response via [facility]? Please call  Best Call Back Number:[phone]  Additional Information: pt said she doesn't have to be seen if something can be called in to the pharmacy [facility]/pharmacy #[id number] - [place], [place] - [address] AT [facility]   Phone: [phone]  Fax:  [phone]

## 2022-04-18 NOTE — TELEPHONE ENCOUNTER
Entered urine orders to get urine sample to ensure correct antibiotic is prescribed if necessary. No answer, unable to leave voicemail (phone rang once, then stopped without prompt to leave voicemail)

## 2022-05-16 ENCOUNTER — PATIENT MESSAGE (OUTPATIENT)
Dept: FAMILY MEDICINE | Facility: CLINIC | Age: 72
End: 2022-05-16
Payer: COMMERCIAL

## 2022-05-16 DIAGNOSIS — R42 VERTIGO: Primary | ICD-10-CM

## 2022-05-16 DIAGNOSIS — H81.09 MENIERE'S DISEASE, UNSPECIFIED LATERALITY: ICD-10-CM

## 2022-05-17 ENCOUNTER — PATIENT MESSAGE (OUTPATIENT)
Dept: FAMILY MEDICINE | Facility: CLINIC | Age: 72
End: 2022-05-17
Payer: COMMERCIAL

## 2022-05-17 RX ORDER — MECLIZINE HYDROCHLORIDE 25 MG/1
25 TABLET ORAL 3 TIMES DAILY PRN
Qty: 30 TABLET | Refills: 1 | Status: SHIPPED | OUTPATIENT
Start: 2022-05-17 | End: 2024-02-19 | Stop reason: SDUPTHER

## 2022-05-17 RX ORDER — ONDANSETRON HYDROCHLORIDE 8 MG/1
8 TABLET, FILM COATED ORAL EVERY 8 HOURS PRN
Qty: 30 TABLET | Refills: 1 | Status: SHIPPED | OUTPATIENT
Start: 2022-05-17 | End: 2024-02-19 | Stop reason: SDUPTHER

## 2022-05-26 ENCOUNTER — OFFICE VISIT (OUTPATIENT)
Dept: ORTHOPEDICS | Facility: CLINIC | Age: 72
End: 2022-05-26
Payer: COMMERCIAL

## 2022-05-26 VITALS — BODY MASS INDEX: 33.27 KG/M2 | HEIGHT: 66 IN | WEIGHT: 207 LBS

## 2022-05-26 DIAGNOSIS — M17.0 PRIMARY OSTEOARTHRITIS OF BOTH KNEES: Primary | ICD-10-CM

## 2022-05-26 PROCEDURE — 1160F RVW MEDS BY RX/DR IN RCRD: CPT | Mod: CPTII,S$GLB,, | Performed by: ORTHOPAEDIC SURGERY

## 2022-05-26 PROCEDURE — 3008F BODY MASS INDEX DOCD: CPT | Mod: CPTII,S$GLB,, | Performed by: ORTHOPAEDIC SURGERY

## 2022-05-26 PROCEDURE — 1101F PR PT FALLS ASSESS DOC 0-1 FALLS W/OUT INJ PAST YR: ICD-10-PCS | Mod: CPTII,S$GLB,, | Performed by: ORTHOPAEDIC SURGERY

## 2022-05-26 PROCEDURE — 20610 LARGE JOINT ASPIRATION/INJECTION: BILATERAL KNEE: ICD-10-PCS | Mod: 50,S$GLB,, | Performed by: ORTHOPAEDIC SURGERY

## 2022-05-26 PROCEDURE — 3044F PR MOST RECENT HEMOGLOBIN A1C LEVEL <7.0%: ICD-10-PCS | Mod: CPTII,S$GLB,, | Performed by: ORTHOPAEDIC SURGERY

## 2022-05-26 PROCEDURE — 99499 NO LOS: ICD-10-PCS | Mod: S$GLB,,, | Performed by: ORTHOPAEDIC SURGERY

## 2022-05-26 PROCEDURE — 3288F FALL RISK ASSESSMENT DOCD: CPT | Mod: CPTII,S$GLB,, | Performed by: ORTHOPAEDIC SURGERY

## 2022-05-26 PROCEDURE — 3288F PR FALLS RISK ASSESSMENT DOCUMENTED: ICD-10-PCS | Mod: CPTII,S$GLB,, | Performed by: ORTHOPAEDIC SURGERY

## 2022-05-26 PROCEDURE — 1101F PT FALLS ASSESS-DOCD LE1/YR: CPT | Mod: CPTII,S$GLB,, | Performed by: ORTHOPAEDIC SURGERY

## 2022-05-26 PROCEDURE — 3008F PR BODY MASS INDEX (BMI) DOCUMENTED: ICD-10-PCS | Mod: CPTII,S$GLB,, | Performed by: ORTHOPAEDIC SURGERY

## 2022-05-26 PROCEDURE — 3044F HG A1C LEVEL LT 7.0%: CPT | Mod: CPTII,S$GLB,, | Performed by: ORTHOPAEDIC SURGERY

## 2022-05-26 PROCEDURE — 99999 PR PBB SHADOW E&M-EST. PATIENT-LVL III: CPT | Mod: PBBFAC,,, | Performed by: ORTHOPAEDIC SURGERY

## 2022-05-26 PROCEDURE — 99999 PR PBB SHADOW E&M-EST. PATIENT-LVL III: ICD-10-PCS | Mod: PBBFAC,,, | Performed by: ORTHOPAEDIC SURGERY

## 2022-05-26 PROCEDURE — 99499 UNLISTED E&M SERVICE: CPT | Mod: S$GLB,,, | Performed by: ORTHOPAEDIC SURGERY

## 2022-05-26 PROCEDURE — 1160F PR REVIEW ALL MEDS BY PRESCRIBER/CLIN PHARMACIST DOCUMENTED: ICD-10-PCS | Mod: CPTII,S$GLB,, | Performed by: ORTHOPAEDIC SURGERY

## 2022-05-26 PROCEDURE — 20610 DRAIN/INJ JOINT/BURSA W/O US: CPT | Mod: 50,S$GLB,, | Performed by: ORTHOPAEDIC SURGERY

## 2022-05-26 PROCEDURE — 1159F MED LIST DOCD IN RCRD: CPT | Mod: CPTII,S$GLB,, | Performed by: ORTHOPAEDIC SURGERY

## 2022-05-26 PROCEDURE — 1159F PR MEDICATION LIST DOCUMENTED IN MEDICAL RECORD: ICD-10-PCS | Mod: CPTII,S$GLB,, | Performed by: ORTHOPAEDIC SURGERY

## 2022-05-26 RX ORDER — TRIAMCINOLONE ACETONIDE 40 MG/ML
80 INJECTION, SUSPENSION INTRA-ARTICULAR; INTRAMUSCULAR
Status: DISCONTINUED | OUTPATIENT
Start: 2022-05-26 | End: 2022-05-26 | Stop reason: HOSPADM

## 2022-05-26 RX ADMIN — TRIAMCINOLONE ACETONIDE 80 MG: 40 INJECTION, SUSPENSION INTRA-ARTICULAR; INTRAMUSCULAR at 08:05

## 2022-05-26 NOTE — PROCEDURES
Large Joint Aspiration/Injection: bilateral knee    Date/Time: 5/26/2022 8:15 AM  Performed by: Franklin Dobbs MD  Authorized by: Franklin Dobbs MD     Consent Done?:  Yes (Verbal)  Approach:  Anterolateral  Location:  Knee  Laterality:  Bilateral  Site:  Bilateral knee  Medications (Left):  80 mg triamcinolone acetonide 40 mg/mL     After obtaining verbal informed consent both of the patient's knees were prepped aseptically and injected through an inferior lateral approach using 40 mg of triamcinolone and 1 cc of 1% plain Xylocaine.  The patient was warned about postinjection flare and how to manage it with ice, rest and over-the-counter analgesics.  They're advised to contact me for any severe, uncontrolled pain.

## 2022-07-19 ENCOUNTER — PATIENT MESSAGE (OUTPATIENT)
Dept: RESEARCH | Facility: CLINIC | Age: 72
End: 2022-07-19
Payer: COMMERCIAL

## 2022-08-02 NOTE — PROGRESS NOTES
Wound Care Instructions     FOR SUPERFICIAL WOUNDS     [facility] [phone]    [facility] [phone]                       AFTER 24 HOURS YOU SHOULD REMOVE THE BANDAGE AND BEGIN DAILY DRESSING CHANGES AS FOLLOWS:     1) Remove Dressing.     2) Clean and dry the area with tap water using a Q-tip or sterile gauze pad.     3) Apply Vaseline, Aquaphor, Polysporin ointment or Bacitracin ointment over entire wound.  Do NOT use Neosporin ointment.     4) Cover the wound with a band-aid, or a sterile non-stick gauze pad and micropore paper tape      REPEAT THESE INSTRUCTIONS AT LEAST ONCE A DAY UNTIL THE WOUND HAS COMPLETELY HEALED.    It is an old wives tale that a wound heals better when it is exposed to air and allowed to dry out. The wound will heal faster with a better cosmetic result if it is kept moist with ointment and covered with a bandage.    **Do not let the wound dry out.**      Supplies Needed:      *Cotton tipped applicators (Q-tips)    *Polysporin Ointment or Bacitracin Ointment (NOT NEOSPORIN)    *Band-aids or non-stick gauze pads and micropore paper tape.      PATIENT INFORMATION:    During the healing process you will notice a number of changes. All wounds develop a small halo of redness surrounding the wound.  This means healing is occurring. Severe itching with extensive redness usually indicates sensitivity to the ointment or bandage tape used to dress the wound.  You should call our office if this develops.      Swelling  and/or discoloration around your surgical site is common, particularly when performed around the eye.    All wounds normally drain.  The larger the wound the more drainage there will be.  After 7-10 days, you will notice the wound beginning to shrink and new skin will begin to grow.  The wound is healed when you can see skin has formed over the entire area.  A healed wound has a healthy, shiny look to the surface and is red to dark pink in color   Office Visit    Patient Name: Carlene Fong    : 1950  MRN: [id number]    Subjective:  Carlene is a 68 y.o. female who presents today for:    Annual Exam    Carlene presents today for annual wellness exam and monitoring of chronic conditions-- primary hyperparathyroidism associated with vitamin D deficiency, osteopenia, overweight BMI, history of positive rheumatoid factor without clinical evidence of rheumatoid arthritis, left wrist carpal tunnel(s/p injection 10/2018), mild anxiety, GERD with PRN OTC PPI Use.     As far as her Hyperparathyroidism with elevated Calcium:  She has been following with Endocrine Dr Archuleta who performed a parathyroid uptake scan and based on those results decided that surgery is not indicated at that time. Most recent labs 18 show elevated ionized calcium of 1.46 (improved from 1.52), .5 (improved form 213) and vitamin D 28 2018. She denies symptoms of elevated calcium including no history of kidney stones, bone pain, GI disturbances, etc.     Sees gynecology for yearly physicals-- Dr De Dios. She has had a hysterectomy with removal of one ovary.      She has been feeling overall well.      General lifestyle habits are as follows: Diet is described as healthy- eating consistent healthy meals-- watching salt, exercise is described as fair-- good with cardio--walking 3x/weekat the mall, sleep is described as good-- sleeps 8 hours nightly and does not snore. Weight is up 17 lbs in the last year-- put back on some weight she had lost.      Immunizations: ShingRIX completed 2018, TDaP 2015, PREVNAR 13 1/10/2018, Pneumovax 23 today 1/15/2019, yearly FLUSHOT UTD      Screening tests: colonoscopy 2009-- normal-->repeat in 10 years and will order later this year, DEXA 2016 --osteopenia, repeat 2 years on ordered, mammograms-- up to date per Dr. De Dios GYN every November (had a biopsy 2018 thru [facility]- now back on yearly schedule),  No longer needs  PAPS, Hep C (-)  5/27/14     Eye/Dental: up to date, monitoring left eye cataract-- Ophthalmology Dr Ojeda         Past Medical History  Past Medical History:   Diagnosis Date    Abnormal mammogram 8/10/2016    Followed up with diagnostic mammogram and ultrasound 10/22/2015at [facility]  that showed no concerning findings with recommendation to continue yearly screening.     Anxiety 8/8/2017    will need to readdress this if work up is negative and symptoms persist    Arthritis of knee 1/7/2015    Diverticulosis     Meniere's disease 8/15/2017    Osteopenia 12/1/2015    next bone density 4/2016, seeing endocrine for hyperparathyroidism     Positive anti-CCP test 4/7/2014    Primary hyperparathyroidism 8/26/2013    will be following up with Dr. Archuleta Endocrinology in 2016, blood work today. stoped taking calcitonin due to nausea     Rheumatoid factor positive 4/7/2014    Vertigo 7/16/2017    Vitamin D deficiency 12/1/2015       Past Surgical History  Past Surgical History:   Procedure Laterality Date    APPENDECTOMY      HYSTERECTOMY      REPAIR ROTATOR CUFF ARTHROSCOPIC Right 12/5/2014    Per[facility] Jr[facility] at [facility]    ROTATOR CUFF REPAIR Right 12/2014    TUBAL LIGATION         Family History  Family History   Problem Relation Age of Onset    Diabetes Father     Dementia Father     Rheum arthritis Neg Hx     Lupus Neg Hx     Inflammatory bowel disease Neg Hx        Social History  Social History     Socioeconomic History    Marital status:      Spouse name: Not on file    Number of children: Not on file    Years of education: Not on file    Highest education level: Not on file   Social Needs    Financial resource strain: Not on file    Food insecurity - worry: Not on file    Food insecurity - inability: Not on file    Transportation needs - medical: Not on file    Transportation needs - non-medical: Not on file   Occupational History    Not on file   Tobacco Use  to normalize.  Wounds may take approximately 4-6 weeks to heal.  Larger wounds may take 6-8 weeks.  After the wound is healed you may discontinue dressing changes.    You may experience a sensation of tightness as your wound heals. This is normal and will gradually subside.    Your healed wound may be sensitive to temperature changes. This sensitivity improves with time, but if you re having a lot of discomfort, try to avoid temperature extremes.    Patients frequently experience itching after their wound appears to have healed because of the continue healing under the skin.  Plain Vaseline will help relieve the itching.        POSSIBLE COMPLICATIONS    BLEEDING:    Leave the bandage in place.  Use tightly rolled up gauze or a cloth to apply direct pressure over the bandage for 30  minutes.  Reapply pressure for an additional 30 minutes if necessary  Use additional gauze and tape to maintain pressure once the bleeding has stopped.    "   Smoking status: Never Smoker    Smokeless tobacco: Never Used   Substance and Sexual Activity    Alcohol use: No    Drug use: No    Sexual activity: Yes     Partners: Male     Comment: ; 2 children   Other Topics Concern    Not on file   Social History Narrative    Not on file       Current Medications  Medications reviewed and updated.     Allergies   Review of patient's allergies indicates:   Allergen Reactions    Requip [ropinirole] Swelling     Other reaction(s): palpitations, swelling of tongue       Review of Systems (Pertinent positives)  Review of Systems   Constitutional: Negative for activity change and unexpected weight change.   HENT: Negative for hearing loss, rhinorrhea and trouble swallowing.    Eyes: Negative for discharge and visual disturbance.   Respiratory: Negative for chest tightness and wheezing.    Cardiovascular: Negative for chest pain and palpitations.   Gastrointestinal: Negative for blood in stool, constipation, diarrhea and vomiting.   Endocrine: Negative for polydipsia and polyuria.   Genitourinary: Negative for difficulty urinating, dysuria, hematuria and menstrual problem.   Musculoskeletal: Negative for arthralgias, joint swelling and neck pain.   Neurological: Negative for weakness and headaches.   Psychiatric/Behavioral: Negative for confusion and dysphoric mood.       /69 (BP Location: Left arm, Patient Position: Sitting)   Pulse 77   Ht 5' 5" (1.651 m)   Wt 88 kg (194 lb 0.1 oz)   SpO2 97%   BMI 32.28 kg/m²     Physical Exam   Constitutional: She is oriented to person, place, and time. She appears well-developed and well-nourished. No distress.   HENT:   Head: Normocephalic and atraumatic.   Right Ear: Ear canal normal. Tympanic membrane is not erythematous and not bulging.   Left Ear: Ear canal normal. Tympanic membrane is not erythematous and not bulging.   Mouth/Throat: No oropharyngeal exudate.   Eyes: Conjunctivae are normal.   Neck: Carotid " bruit is not present. No thyroid mass and no thyromegaly present.   Cardiovascular: Normal rate, regular rhythm and normal heart sounds.   No murmur heard.  Pulses:       Dorsalis pedis pulses are 2+ on the right side, and 2+ on the left side.   Pulmonary/Chest: Effort normal and breath sounds normal. No respiratory distress.   Abdominal: Soft. Bowel sounds are normal. She exhibits no distension and no mass. There is no hepatosplenomegaly. There is no tenderness.   Musculoskeletal: Normal range of motion.   Lymphadenopathy:     She has no cervical adenopathy.   Neurological: She is alert and oriented to person, place, and time.   Skin: Skin is warm and dry. No rash noted.   Psychiatric: She has a normal mood and affect.   Vitals reviewed.        Assessment/Plan:  Carlene Fong is a 68 y.o. female who presents today for :    Carlene was seen today for annual exam.    Diagnoses and all orders for this visit:    Routine general medical examination at a health care facility  Comments:  health maintenance reviewed: labs, DEXA ordered, Pneumovax 23 given, colonoscopy due 8/2019. Advised on diet, exercise, eye,dental exams  Orders:  -     DXA Bone Density Spine And Hip; Future    Overweight (BMI 25.0-29.9)    Primary hyperparathyroidism  Comments:  PTH/calcium/ionized calcium/ vit D ordered, follows with endocrine Dr Archuleta, s/p parathyroid uptake scan    Vitamin D deficiency  Comments:  taking 2,000 IU daily and recehck level ordered    Hypercalcemia  Comments:  secondary to hyperparathyroidism, is on HCTZ very low dose which can contribute &would advise alternate therapy pending lab results-- ie losartan w/ lasix prn     Gastroesophageal reflux disease, esophagitis presence not specified    Encounter for monitoring long-term proton pump inhibitor therapy  Comments:  only taking Omeprazole very rarely, PPRI labs not indicated. does have known vit D deficiency that we are monitoring     Meniere's disease,  unspecified laterality    Vertigo  Comments:  doing well on HCTZ 12.5.  follows with ENT Dr Curry dolan. no recent recent vertigo episodes    Anxiety    Abnormal mammogram  Comments:  following up for additional imaging with OBGYN Dr Sheriff    Postmenopausal  -     DXA Bone Density Spine And Hip; Future    Osteopenia, unspecified location  -     DXA Bone Density Spine And Hip; Future    H/O hysterectomy for benign disease  Comments:  pap no longer indicated    Rheumatoid factor positive  Comments:  no clinical evidence of RA    Positive anti-CCP test    Need for 23-polyvalent pneumococcal polysaccharide vaccine  -     (In Office Administered) Pneumococcal Polysaccharide Vaccine (23 Valent) (SQ/IM)    Colon cancer screening  Comments:  due 8/2019    Carpal tunnel syndrome of left wrist  Comments:  doing well post injection, wearing brace    Other orders  -     fluticasone (FLONASE) 50 mcg/actuation nasal spray; 2 sprays (100 mcg total) by Each Nare route once daily.            ICD-10-CM ICD-9-CM    1. Routine general medical examination at a health care facility Z00.00 V70.0 DXA Bone Density Spine And Hip    health maintenance reviewed: labs, DEXA ordered, Pneumovax 23 given, colonoscopy due 8/2019. Advised on diet, exercise, eye,dental exams   2. Overweight (BMI 25.0-29.9) E66.3 278.02    3. Primary hyperparathyroidism E21.0 252.01     PTH/calcium/ionized calcium/ vit D ordered, follows with endocrine Dr Archuleta, s/p parathyroid uptake scan   4. Vitamin D deficiency E55.9 268.9     taking 2,000 IU daily and recehck level ordered   5. Hypercalcemia E83.52 275.42     secondary to hyperparathyroidism, is on HCTZ very low dose which can contribute &would advise alternate therapy pending lab results-- ie losartan w/ lasix prn    6. Gastroesophageal reflux disease, esophagitis presence not specified K21.9 530.81    7. Encounter for monitoring long-term proton pump inhibitor therapy Z51.81 V58.83     Z79.899 V58.69      only taking Omeprazole very rarely, PPRI labs not indicated. does have known vit D deficiency that we are monitoring    8. Meniere's disease, unspecified laterality H81.09 386.00    9. Vertigo R42 780.4     doing well on HCTZ 12.5.  follows with ENT Dr Curry dolan. no recent recent vertigo episodes   10. Anxiety F41.9 300.00    11. Abnormal mammogram R92.8 793.80     following up for additional imaging with OBGYN Dr Sheriff   12. Postmenopausal Z78.0 V49.81 DXA Bone Density Spine And Hip   13. Osteopenia, unspecified location M85.80 733.90 DXA Bone Density Spine And Hip   14. H/O hysterectomy for benign disease Z90.710 V88.01     pap no longer indicated   15. Rheumatoid factor positive R76.8 795.79     no clinical evidence of RA   16. Positive anti-CCP test R76.8 795.79    17. Need for 23-polyvalent pneumococcal polysaccharide vaccine Z23 V03.82 (In Office Administered) Pneumococcal Polysaccharide Vaccine (23 Valent) (SQ/IM)   18. Colon cancer screening Z12.11 V76.51     due 8/2019   19. Carpal tunnel syndrome of left wrist G56.02 354.0     doing well post injection, wearing brace       Patient Instructions   WILL ADVISE FURTHER BASED ON LABS.    CALL IN July OR AUGUST 2019 FOR COLONOSCOPY ORDERS        Follow-up in about 1 year (around 1/15/2020) for to follow up on lab results, return as needed for new concerns.

## 2022-08-22 ENCOUNTER — OFFICE VISIT (OUTPATIENT)
Dept: FAMILY MEDICINE | Facility: CLINIC | Age: 72
End: 2022-08-22
Payer: COMMERCIAL

## 2022-08-22 VITALS
TEMPERATURE: 98 F | HEIGHT: 66 IN | OXYGEN SATURATION: 98 % | BODY MASS INDEX: 34.37 KG/M2 | DIASTOLIC BLOOD PRESSURE: 64 MMHG | HEART RATE: 94 BPM | SYSTOLIC BLOOD PRESSURE: 118 MMHG | WEIGHT: 213.88 LBS

## 2022-08-22 DIAGNOSIS — H00.014 HORDEOLUM EXTERNUM OF LEFT UPPER EYELID: Primary | ICD-10-CM

## 2022-08-22 PROCEDURE — 3044F PR MOST RECENT HEMOGLOBIN A1C LEVEL <7.0%: ICD-10-PCS | Mod: CPTII,S$GLB,, | Performed by: FAMILY MEDICINE

## 2022-08-22 PROCEDURE — 3288F FALL RISK ASSESSMENT DOCD: CPT | Mod: CPTII,S$GLB,, | Performed by: FAMILY MEDICINE

## 2022-08-22 PROCEDURE — 3044F HG A1C LEVEL LT 7.0%: CPT | Mod: CPTII,S$GLB,, | Performed by: FAMILY MEDICINE

## 2022-08-22 PROCEDURE — 99999 PR PBB SHADOW E&M-EST. PATIENT-LVL IV: ICD-10-PCS | Mod: PBBFAC,,, | Performed by: FAMILY MEDICINE

## 2022-08-22 PROCEDURE — 1101F PT FALLS ASSESS-DOCD LE1/YR: CPT | Mod: CPTII,S$GLB,, | Performed by: FAMILY MEDICINE

## 2022-08-22 PROCEDURE — 99999 PR PBB SHADOW E&M-EST. PATIENT-LVL IV: CPT | Mod: PBBFAC,,, | Performed by: FAMILY MEDICINE

## 2022-08-22 PROCEDURE — 3288F PR FALLS RISK ASSESSMENT DOCUMENTED: ICD-10-PCS | Mod: CPTII,S$GLB,, | Performed by: FAMILY MEDICINE

## 2022-08-22 PROCEDURE — 3008F PR BODY MASS INDEX (BMI) DOCUMENTED: ICD-10-PCS | Mod: CPTII,S$GLB,, | Performed by: FAMILY MEDICINE

## 2022-08-22 PROCEDURE — 99213 OFFICE O/P EST LOW 20 MIN: CPT | Mod: S$GLB,,, | Performed by: FAMILY MEDICINE

## 2022-08-22 PROCEDURE — 3078F PR MOST RECENT DIASTOLIC BLOOD PRESSURE < 80 MM HG: ICD-10-PCS | Mod: CPTII,S$GLB,, | Performed by: FAMILY MEDICINE

## 2022-08-22 PROCEDURE — 3008F BODY MASS INDEX DOCD: CPT | Mod: CPTII,S$GLB,, | Performed by: FAMILY MEDICINE

## 2022-08-22 PROCEDURE — 3078F DIAST BP <80 MM HG: CPT | Mod: CPTII,S$GLB,, | Performed by: FAMILY MEDICINE

## 2022-08-22 PROCEDURE — 3074F PR MOST RECENT SYSTOLIC BLOOD PRESSURE < 130 MM HG: ICD-10-PCS | Mod: CPTII,S$GLB,, | Performed by: FAMILY MEDICINE

## 2022-08-22 PROCEDURE — 1125F PR PAIN SEVERITY QUANTIFIED, PAIN PRESENT: ICD-10-PCS | Mod: CPTII,S$GLB,, | Performed by: FAMILY MEDICINE

## 2022-08-22 PROCEDURE — 3074F SYST BP LT 130 MM HG: CPT | Mod: CPTII,S$GLB,, | Performed by: FAMILY MEDICINE

## 2022-08-22 PROCEDURE — 1101F PR PT FALLS ASSESS DOC 0-1 FALLS W/OUT INJ PAST YR: ICD-10-PCS | Mod: CPTII,S$GLB,, | Performed by: FAMILY MEDICINE

## 2022-08-22 PROCEDURE — 1125F AMNT PAIN NOTED PAIN PRSNT: CPT | Mod: CPTII,S$GLB,, | Performed by: FAMILY MEDICINE

## 2022-08-22 PROCEDURE — 99213 PR OFFICE/OUTPT VISIT, EST, LEVL III, 20-29 MIN: ICD-10-PCS | Mod: S$GLB,,, | Performed by: FAMILY MEDICINE

## 2022-08-22 PROCEDURE — 1159F PR MEDICATION LIST DOCUMENTED IN MEDICAL RECORD: ICD-10-PCS | Mod: CPTII,S$GLB,, | Performed by: FAMILY MEDICINE

## 2022-08-22 PROCEDURE — 1159F MED LIST DOCD IN RCRD: CPT | Mod: CPTII,S$GLB,, | Performed by: FAMILY MEDICINE

## 2022-08-22 RX ORDER — ERYTHROMYCIN 5 MG/G
OINTMENT OPHTHALMIC NIGHTLY
Qty: 3.5 G | Refills: 0 | Status: SHIPPED | OUTPATIENT
Start: 2022-08-22 | End: 2023-03-17

## 2022-08-22 NOTE — PROGRESS NOTES
(Portions of this note were dictated using voice recognition software and may contain dictation related errors in spelling/grammar/syntax not found on text review)    CC:   Chief Complaint   Patient presents with    Stye     Left upper lid; x1 week       HPI: 71 y.o. female pt of Ashli Stubbs MD  Urgent care visit stye upper left eyelid for 1 week .  Had some old erythromycin ointment she was using but did not really seem to help.  Was .  No systemic symptoms like fevers or chills.  Does get some allergies, will use Flonase which seems to help.  Had similar symptoms last year on the opposite eye.        Past Medical History:   Diagnosis Date    Abnormal mammogram 8/10/2016    Followed up with diagnostic mammogram and ultrasound 10[facility]/2015at [facility]  that showed no concerning findings with recommendation to continue yearly screening.     Anxiety 2017    will need to readdress this if work up is negative and symptoms persist    Arthritis of knee 2015    Diverticulosis     Meniere's disease 8/15/2017    Osteopenia 2015    next bone density 2016, seeing endocrine for hyperparathyroidism     Positive anti-CCP test 2014    Primary hyperparathyroidism 2013    will be following up with Dr. Archuleta Endocrinology in 2016, blood work today. stoped taking calcitonin due to nausea     Rheumatoid factor positive 2014    Vertigo 2017    Vitamin D deficiency 2015       Past Surgical History:   Procedure Laterality Date    APPENDECTOMY      HYSTERECTOMY      PARATHYROIDECTOMY N/A 2019    Procedure: PARATHYROIDECTOMY;  Surgeon: Andie Mcfarlane MD;  Location: [facility] OR [address] [place] [address];  Service: General;  Laterality: N/A;  NIMS / Intraop PTH Monitoring    ROTATOR CUFF REPAIR Right 2014    TUBAL LIGATION         Family History   Problem Relation Age of Onset    No Known Problems Mother     Diabetes Father     Dementia Father     Breast cancer Daughter     Rheum  arthritis Neg Hx     Lupus Neg Hx     Inflammatory bowel disease Neg Hx        Social History     Tobacco Use    Smoking status: Never Smoker    Smokeless tobacco: Never Used   Substance Use Topics    Alcohol use: No    Drug use: No       Lab Results   Component Value Date    WBC 8.26 03/14/2022    HGB 13.9 03/14/2022    HCT 43.7 03/14/2022    MCV 97 03/14/2022     03/14/2022    CHOL 194 03/14/2022    TRIG 82 03/14/2022    HDL 58 03/14/2022    ALT 14 03/14/2022    AST 13 03/14/2022    BILITOT 0.6 03/14/2022    ALKPHOS 81 03/14/2022     03/14/2022    K 4.4 03/14/2022     03/14/2022    CREATININE 0.7 03/14/2022    ESTGFRAFRICA >60 03/14/2022    EGFRNONAA >60 03/14/2022    CALCIUM 9.3 03/14/2022    ALBUMIN 3.6 03/14/2022    BUN 15 03/14/2022    CO2 26 03/14/2022    TSH 1.317 03/14/2022    HGBA1C 5.1 03/14/2022    LDLCALC 119.6 03/14/2022    GLU 81 03/14/2022    [id number] 58 03/14/2022             Vital signs reviewed  There were no vitals filed for this visit.    Wt Readings from Last 4 Encounters:   05/26/22 93.9 kg (207 lb 0.2 oz)   04/16/22 93.9 kg (207 lb)   03/14/22 94.3 kg (207 lb 14.3 oz)   02/03/22 95.6 kg (210 lb 12.2 oz)       PE:   APPEARANCE: Well nourished, well developed, in no acute distress.    HEAD: Normocephalic, atraumatic.  EYES: PERRL. EOMI.   Conjunctival injection left side.  Sheppard notice left upper eyelid  EARS: TM's intact. Light reflex normal. No retraction or perforation  NOSE: Mucosa pink. Airway clear.  MOUTH & THROAT: No tonsillar enlargement. No pharyngeal erythema or exudate.       IMPRESSION  1. Hordeolum externum of left upper eyelid            PLAN  Medications Ordered This Encounter   Medications    erythromycin (ROMYCIN) ophthalmic ointment     Sig: Place into the right eye every evening.     Dispense:  3.5 g     Refill:  0     Renew erythromycin ointment can use up to 4 times a day for the next week    Warm compresses to eyelid     Notify if redness is  spreading or pain is worsening                 Answers for HPI/ROS submitted by the patient on 8/21/2022  activity change: No  unexpected weight change: No  neck pain: No  hearing loss: No  rhinorrhea: No  trouble swallowing: No  eye discharge: No  visual disturbance: No  chest tightness: No  wheezing: No  chest pain: No  palpitations: No  blood in stool: No  constipation: No  vomiting: No  diarrhea: No  polydipsia: No  polyuria: No  difficulty urinating: No  hematuria: No  menstrual problem: No  dysuria: No  joint swelling: No  arthralgias: No  headaches: No  weakness: No  confusion: No  dysphoric mood: No

## 2022-08-25 ENCOUNTER — OFFICE VISIT (OUTPATIENT)
Dept: ORTHOPEDICS | Facility: CLINIC | Age: 72
End: 2022-08-25
Payer: COMMERCIAL

## 2022-08-25 VITALS — BODY MASS INDEX: 34.37 KG/M2 | WEIGHT: 213.88 LBS | HEIGHT: 66 IN

## 2022-08-25 DIAGNOSIS — M17.0 PRIMARY OSTEOARTHRITIS OF BOTH KNEES: Primary | ICD-10-CM

## 2022-08-25 PROCEDURE — 3044F PR MOST RECENT HEMOGLOBIN A1C LEVEL <7.0%: ICD-10-PCS | Mod: CPTII,S$GLB,, | Performed by: ORTHOPAEDIC SURGERY

## 2022-08-25 PROCEDURE — 20610 DRAIN/INJ JOINT/BURSA W/O US: CPT | Mod: 50,S$GLB,, | Performed by: ORTHOPAEDIC SURGERY

## 2022-08-25 PROCEDURE — 1160F RVW MEDS BY RX/DR IN RCRD: CPT | Mod: CPTII,S$GLB,, | Performed by: ORTHOPAEDIC SURGERY

## 2022-08-25 PROCEDURE — 99999 PR PBB SHADOW E&M-EST. PATIENT-LVL III: CPT | Mod: PBBFAC,,, | Performed by: ORTHOPAEDIC SURGERY

## 2022-08-25 PROCEDURE — 99999 PR PBB SHADOW E&M-EST. PATIENT-LVL III: ICD-10-PCS | Mod: PBBFAC,,, | Performed by: ORTHOPAEDIC SURGERY

## 2022-08-25 PROCEDURE — 1125F PR PAIN SEVERITY QUANTIFIED, PAIN PRESENT: ICD-10-PCS | Mod: CPTII,S$GLB,, | Performed by: ORTHOPAEDIC SURGERY

## 2022-08-25 PROCEDURE — 99499 NO LOS: ICD-10-PCS | Mod: S$GLB,,, | Performed by: ORTHOPAEDIC SURGERY

## 2022-08-25 PROCEDURE — 3008F PR BODY MASS INDEX (BMI) DOCUMENTED: ICD-10-PCS | Mod: CPTII,S$GLB,, | Performed by: ORTHOPAEDIC SURGERY

## 2022-08-25 PROCEDURE — 3008F BODY MASS INDEX DOCD: CPT | Mod: CPTII,S$GLB,, | Performed by: ORTHOPAEDIC SURGERY

## 2022-08-25 PROCEDURE — 20610 LARGE JOINT ASPIRATION/INJECTION: BILATERAL KNEE: ICD-10-PCS | Mod: 50,S$GLB,, | Performed by: ORTHOPAEDIC SURGERY

## 2022-08-25 PROCEDURE — 3288F FALL RISK ASSESSMENT DOCD: CPT | Mod: CPTII,S$GLB,, | Performed by: ORTHOPAEDIC SURGERY

## 2022-08-25 PROCEDURE — 1101F PR PT FALLS ASSESS DOC 0-1 FALLS W/OUT INJ PAST YR: ICD-10-PCS | Mod: CPTII,S$GLB,, | Performed by: ORTHOPAEDIC SURGERY

## 2022-08-25 PROCEDURE — 3044F HG A1C LEVEL LT 7.0%: CPT | Mod: CPTII,S$GLB,, | Performed by: ORTHOPAEDIC SURGERY

## 2022-08-25 PROCEDURE — 1125F AMNT PAIN NOTED PAIN PRSNT: CPT | Mod: CPTII,S$GLB,, | Performed by: ORTHOPAEDIC SURGERY

## 2022-08-25 PROCEDURE — 99499 UNLISTED E&M SERVICE: CPT | Mod: S$GLB,,, | Performed by: ORTHOPAEDIC SURGERY

## 2022-08-25 PROCEDURE — 3288F PR FALLS RISK ASSESSMENT DOCUMENTED: ICD-10-PCS | Mod: CPTII,S$GLB,, | Performed by: ORTHOPAEDIC SURGERY

## 2022-08-25 PROCEDURE — 1159F PR MEDICATION LIST DOCUMENTED IN MEDICAL RECORD: ICD-10-PCS | Mod: CPTII,S$GLB,, | Performed by: ORTHOPAEDIC SURGERY

## 2022-08-25 PROCEDURE — 1159F MED LIST DOCD IN RCRD: CPT | Mod: CPTII,S$GLB,, | Performed by: ORTHOPAEDIC SURGERY

## 2022-08-25 PROCEDURE — 1101F PT FALLS ASSESS-DOCD LE1/YR: CPT | Mod: CPTII,S$GLB,, | Performed by: ORTHOPAEDIC SURGERY

## 2022-08-25 PROCEDURE — 1160F PR REVIEW ALL MEDS BY PRESCRIBER/CLIN PHARMACIST DOCUMENTED: ICD-10-PCS | Mod: CPTII,S$GLB,, | Performed by: ORTHOPAEDIC SURGERY

## 2022-08-25 RX ORDER — TRIAMCINOLONE ACETONIDE 40 MG/ML
80 INJECTION, SUSPENSION INTRA-ARTICULAR; INTRAMUSCULAR
Status: DISCONTINUED | OUTPATIENT
Start: 2022-08-25 | End: 2022-08-25 | Stop reason: HOSPADM

## 2022-08-25 RX ORDER — CICLOPIROX OLAMINE 7.7 MG/G
CREAM TOPICAL 2 TIMES DAILY
COMMUNITY
Start: 2022-06-25 | End: 2024-02-13

## 2022-08-25 RX ADMIN — TRIAMCINOLONE ACETONIDE 80 MG: 40 INJECTION, SUSPENSION INTRA-ARTICULAR; INTRAMUSCULAR at 08:08

## 2022-08-25 NOTE — PROGRESS NOTES
The patient is seen today for palliative injection of both knees.  She gave consent for this.    She would like to be reinjected prior to planned travel early next year.

## 2022-08-25 NOTE — PROCEDURES
Large Joint Aspiration/Injection: bilateral knee    Date/Time: 8/25/2022 8:00 AM  Performed by: Franklin Dobbs MD  Authorized by: Franklin Dobbs MD     Consent Done?:  Yes (Verbal)  Approach:  Anterolateral  Location:  Knee  Laterality:  Bilateral  Site:  Bilateral knee  Medications (Left):  80 mg triamcinolone acetonide 40 mg/mL     After obtaining verbal informed consent both of the patient's knees were prepped aseptically and injected through an inferior lateral approach using 40 mg of triamcinolone and 1 cc of 1% plain Xylocaine.  The patient was warned about postinjection flare and how to manage it with ice, rest and over-the-counter analgesics.  They're advised to contact me for any severe, uncontrolled pain.

## 2022-10-25 NOTE — TELEPHONE ENCOUNTER
Called patient to discuss 4D CT scan of parathyroid.    +multi parathyroid adenoma.    Recommend surgery.  Will discuss with patient plan of care moving forward prior to ordering referral.    Will send message via portal.    Natasha Burnett MD  Endocrinology Fellow     
self-care/home management

## 2022-11-03 ENCOUNTER — OFFICE VISIT (OUTPATIENT)
Dept: ORTHOPEDICS | Facility: CLINIC | Age: 72
End: 2022-11-03
Payer: COMMERCIAL

## 2022-11-03 VITALS — HEIGHT: 66 IN | BODY MASS INDEX: 34.37 KG/M2 | WEIGHT: 213.88 LBS

## 2022-11-03 DIAGNOSIS — G56.02 CARPAL TUNNEL SYNDROME OF LEFT WRIST: Primary | ICD-10-CM

## 2022-11-03 PROCEDURE — 20526 THER INJECTION CARP TUNNEL: CPT | Mod: 50,S$GLB,, | Performed by: ORTHOPAEDIC SURGERY

## 2022-11-03 PROCEDURE — 1125F AMNT PAIN NOTED PAIN PRSNT: CPT | Mod: CPTII,S$GLB,, | Performed by: ORTHOPAEDIC SURGERY

## 2022-11-03 PROCEDURE — 3008F BODY MASS INDEX DOCD: CPT | Mod: CPTII,S$GLB,, | Performed by: ORTHOPAEDIC SURGERY

## 2022-11-03 PROCEDURE — 3288F FALL RISK ASSESSMENT DOCD: CPT | Mod: CPTII,S$GLB,, | Performed by: ORTHOPAEDIC SURGERY

## 2022-11-03 PROCEDURE — 3044F HG A1C LEVEL LT 7.0%: CPT | Mod: CPTII,S$GLB,, | Performed by: ORTHOPAEDIC SURGERY

## 2022-11-03 PROCEDURE — 1101F PT FALLS ASSESS-DOCD LE1/YR: CPT | Mod: CPTII,S$GLB,, | Performed by: ORTHOPAEDIC SURGERY

## 2022-11-03 PROCEDURE — 99999 PR PBB SHADOW E&M-EST. PATIENT-LVL III: ICD-10-PCS | Mod: PBBFAC,,, | Performed by: ORTHOPAEDIC SURGERY

## 2022-11-03 PROCEDURE — 99213 OFFICE O/P EST LOW 20 MIN: CPT | Mod: 25,S$GLB,, | Performed by: ORTHOPAEDIC SURGERY

## 2022-11-03 PROCEDURE — 3288F PR FALLS RISK ASSESSMENT DOCUMENTED: ICD-10-PCS | Mod: CPTII,S$GLB,, | Performed by: ORTHOPAEDIC SURGERY

## 2022-11-03 PROCEDURE — 3008F PR BODY MASS INDEX (BMI) DOCUMENTED: ICD-10-PCS | Mod: CPTII,S$GLB,, | Performed by: ORTHOPAEDIC SURGERY

## 2022-11-03 PROCEDURE — 1159F PR MEDICATION LIST DOCUMENTED IN MEDICAL RECORD: ICD-10-PCS | Mod: CPTII,S$GLB,, | Performed by: ORTHOPAEDIC SURGERY

## 2022-11-03 PROCEDURE — 1159F MED LIST DOCD IN RCRD: CPT | Mod: CPTII,S$GLB,, | Performed by: ORTHOPAEDIC SURGERY

## 2022-11-03 PROCEDURE — 3044F PR MOST RECENT HEMOGLOBIN A1C LEVEL <7.0%: ICD-10-PCS | Mod: CPTII,S$GLB,, | Performed by: ORTHOPAEDIC SURGERY

## 2022-11-03 PROCEDURE — 20526 PR INJECT CARPAL TUNNEL: ICD-10-PCS | Mod: 50,S$GLB,, | Performed by: ORTHOPAEDIC SURGERY

## 2022-11-03 PROCEDURE — 99213 PR OFFICE/OUTPT VISIT, EST, LEVL III, 20-29 MIN: ICD-10-PCS | Mod: 25,S$GLB,, | Performed by: ORTHOPAEDIC SURGERY

## 2022-11-03 PROCEDURE — 1101F PR PT FALLS ASSESS DOC 0-1 FALLS W/OUT INJ PAST YR: ICD-10-PCS | Mod: CPTII,S$GLB,, | Performed by: ORTHOPAEDIC SURGERY

## 2022-11-03 PROCEDURE — 99999 PR PBB SHADOW E&M-EST. PATIENT-LVL III: CPT | Mod: PBBFAC,,, | Performed by: ORTHOPAEDIC SURGERY

## 2022-11-03 PROCEDURE — 1125F PR PAIN SEVERITY QUANTIFIED, PAIN PRESENT: ICD-10-PCS | Mod: CPTII,S$GLB,, | Performed by: ORTHOPAEDIC SURGERY

## 2022-11-03 RX ORDER — TRIAMCINOLONE ACETONIDE 40 MG/ML
40 INJECTION, SUSPENSION INTRA-ARTICULAR; INTRAMUSCULAR
Status: COMPLETED | OUTPATIENT
Start: 2022-11-03 | End: 2022-11-03

## 2022-11-03 RX ORDER — DICLOFENAC SODIUM 10 MG/G
2 GEL TOPICAL 3 TIMES DAILY
Qty: 100 G | Refills: 2 | Status: SHIPPED | OUTPATIENT
Start: 2022-11-03

## 2022-11-03 RX ADMIN — TRIAMCINOLONE ACETONIDE 40 MG: 40 INJECTION, SUSPENSION INTRA-ARTICULAR; INTRAMUSCULAR at 08:11

## 2022-11-03 NOTE — PROGRESS NOTES
Subjective:      Patient ID: Carlene Fong is a 71 y.o. female.  Chief Complaint: Hand Pain (bilateral )      HPI  Carlene Fong is a  71 y.o. female presenting today for follow up of tunnel syndrome.  She reports that she is having a flare-up again   She received an injection over a year ago with great results would like another injection today  She does have some pain at night occasional numbness and stiffness in the morning   She also needs new wrist braces which she wears at night.    Review of patient's allergies indicates:   Allergen Reactions    Ropinirole Swelling and Rash      palpitations, swelling of tongue   palpitations, swelling of tongue         Current Outpatient Medications   Medication Sig Dispense Refill    calcium carbonate (CALCIUM 600 ORAL) Take by mouth.      ciclopirox (LOPROX) 0.77 % Crea Apply topically 2 (two) times daily.      co-enzyme Q-10 30 mg capsule Take 30 mg by mouth once daily.       erythromycin (ROMYCIN) ophthalmic ointment Place into the right eye every evening. 3.5 g 0    FLAXSEED ORAL Take by mouth once daily.       gabapentin (NEURONTIN) 300 MG capsule Take 1 capsule (300 mg total) by mouth 2 (two) times daily. 180 capsule 3    hydroCHLOROthiazide (HYDRODIURIL) 12.5 MG Tab Take 1 tablet (12.5 mg total) by mouth every other day. 90 tablet 4    vitamin D 1000 units Tab Take 185 mg by mouth once daily.      diclofenac sodium (VOLTAREN) 1 % Gel Apply 2 g topically 3 (three) times daily. (Patient not taking: Reported on 11/3/2022) 100 g 5    fluticasone propionate (FLONASE) 50 mcg/actuation nasal spray 2 sprays (100 mcg total) by Each Nostril route nightly. (Patient not taking: Reported on 8/25/2022) 48 mL 4    meclizine (ANTIVERT) 25 mg tablet Take 1 tablet (25 mg total) by mouth 3 (three) times daily as needed for Dizziness. (Patient not taking: Reported on 8/25/2022) 30 tablet 1    omeprazole (PRILOSEC) 40 MG capsule Take 1 capsule (40 mg total) by mouth before  "breakfast. (Patient not taking: Reported on 11/3/2022) 90 capsule 3    ondansetron (ZOFRAN) 8 MG tablet Take 1 tablet (8 mg total) by mouth every 8 (eight) hours as needed for Nausea. (Patient not taking: Reported on 8/25/2022) 30 tablet 1     No current facility-administered medications for this visit.       Past Medical History:   Diagnosis Date    Abnormal mammogram 8/10/2016    Followed up with diagnostic mammogram and ultrasound 10[facility]22/2015at [facility]  that showed no concerning findings with recommendation to continue yearly screening.     Anxiety 8/8/2017    will need to readdress this if work up is negative and symptoms persist    Arthritis of knee 1/7/2015    Diverticulosis     Meniere's disease 8/15/2017    Osteopenia 12/1/2015    next bone density 4/2016, seeing endocrine for hyperparathyroidism     Positive anti-CCP test 4/7/2014    Primary hyperparathyroidism 8/26/2013    will be following up with Dr. [facility] in 2016, blood work today. stoped taking calcitonin due to nausea     Rheumatoid factor positive 4/7/2014    Vertigo 7/16/2017    Vitamin D deficiency 12/1/2015       Past Surgical History:   Procedure Laterality Date    APPENDECTOMY      HYSTERECTOMY      PARATHYROIDECTOMY N/A 5/22/2019    Procedure: PARATHYROIDECTOMY;  Surgeon: Andie Mcfarlane MD;  Location: [facility] OR [address] [place] [address];  Service: General;  Laterality: N/A;  NIMS / Intraop PTH Monitoring    ROTATOR CUFF REPAIR Right 12/2014    TUBAL LIGATION         OBJECTIVE:   PHYSICAL EXAM:  Height: 5' 6" (167.6 cm) Weight: 97 kg (213 lb 13.5 oz)  Vitals:    11/03/22 0811   Weight: 97 kg (213 lb 13.5 oz)   Height: 5' 6" (1.676 m)   PainSc:   5   PainLoc: Hand     Ortho/SPM Exam  Examination hands demonstrates some mild swelling bilateral Tinel sign positive Phalen's test negative range of motion fingers full sensation intact no atrophy    RADIOGRAPHS:  None  Comments: I have personally reviewed the imaging and I agree with the above " radiologist's report.    ASSESSMENT/PLAN:     IMPRESSION:  Bilateral carpal tunnel syndrome    PLAN:  She was given new wrist splints for nighttime use  Also recommended injections today   After pause for time-out identified each wrist injected carpal tunnel bilateral with combination Kenalog 20 mg 0.5 cc xylocaine sterile technique  Tolerated the procedure well without complication       FOLLOW UP:  2-3 months or as needed    Disclaimer: This note has been generated using voice-recognition software. There may be typographical errors that have been missed during proof-reading.

## 2022-11-17 ENCOUNTER — HOSPITAL ENCOUNTER (OUTPATIENT)
Dept: RADIOLOGY | Facility: HOSPITAL | Age: 72
Discharge: HOME | End: 2022-11-17
Attending: FAMILY MEDICINE
Payer: COMMERCIAL

## 2022-11-17 DIAGNOSIS — Z12.31 OTHER SCREENING MAMMOGRAM: ICD-10-CM

## 2022-11-17 PROCEDURE — 77067 SCR MAMMO BI INCL CAD: CPT | Mod: TC

## 2022-11-17 PROCEDURE — 77067 MAMMO DIGITAL SCREENING BILAT WITH TOMO: ICD-10-PCS | Mod: 26,,, | Performed by: RADIOLOGY

## 2022-11-17 PROCEDURE — 77063 MAMMO DIGITAL SCREENING BILAT WITH TOMO: ICD-10-PCS | Mod: 26,,, | Performed by: RADIOLOGY

## 2022-11-17 PROCEDURE — 77067 SCR MAMMO BI INCL CAD: CPT | Mod: 26,,, | Performed by: RADIOLOGY

## 2022-11-17 PROCEDURE — 77063 BREAST TOMOSYNTHESIS BI: CPT | Mod: TC

## 2022-11-17 PROCEDURE — 77063 BREAST TOMOSYNTHESIS BI: CPT | Mod: 26,,, | Performed by: RADIOLOGY

## 2022-11-29 ENCOUNTER — PATIENT MESSAGE (OUTPATIENT)
Dept: FAMILY MEDICINE | Facility: CLINIC | Age: 72
End: 2022-11-29
Payer: COMMERCIAL

## 2022-11-30 NOTE — TELEPHONE ENCOUNTER
Looked in pt chart and she has mammogram screenings in her chart from previous years. Please advise

## 2022-12-22 ENCOUNTER — OFFICE VISIT (OUTPATIENT)
Dept: ORTHOPEDICS | Facility: CLINIC | Age: 72
End: 2022-12-22
Payer: COMMERCIAL

## 2022-12-22 VITALS — BODY MASS INDEX: 34.37 KG/M2 | WEIGHT: 213.88 LBS | HEIGHT: 66 IN

## 2022-12-22 DIAGNOSIS — M17.0 PRIMARY OSTEOARTHRITIS OF BOTH KNEES: Primary | ICD-10-CM

## 2022-12-22 PROCEDURE — 20610 LARGE JOINT ASPIRATION/INJECTION: BILATERAL KNEE: ICD-10-PCS | Mod: 50,S$GLB,, | Performed by: ORTHOPAEDIC SURGERY

## 2022-12-22 PROCEDURE — 3008F PR BODY MASS INDEX (BMI) DOCUMENTED: ICD-10-PCS | Mod: CPTII,S$GLB,, | Performed by: ORTHOPAEDIC SURGERY

## 2022-12-22 PROCEDURE — 99499 UNLISTED E&M SERVICE: CPT | Mod: S$GLB,,, | Performed by: ORTHOPAEDIC SURGERY

## 2022-12-22 PROCEDURE — 3008F BODY MASS INDEX DOCD: CPT | Mod: CPTII,S$GLB,, | Performed by: ORTHOPAEDIC SURGERY

## 2022-12-22 PROCEDURE — 1159F PR MEDICATION LIST DOCUMENTED IN MEDICAL RECORD: ICD-10-PCS | Mod: CPTII,S$GLB,, | Performed by: ORTHOPAEDIC SURGERY

## 2022-12-22 PROCEDURE — 3044F PR MOST RECENT HEMOGLOBIN A1C LEVEL <7.0%: ICD-10-PCS | Mod: CPTII,S$GLB,, | Performed by: ORTHOPAEDIC SURGERY

## 2022-12-22 PROCEDURE — 1126F PR PAIN SEVERITY QUANTIFIED, NO PAIN PRESENT: ICD-10-PCS | Mod: CPTII,S$GLB,, | Performed by: ORTHOPAEDIC SURGERY

## 2022-12-22 PROCEDURE — 1160F PR REVIEW ALL MEDS BY PRESCRIBER/CLIN PHARMACIST DOCUMENTED: ICD-10-PCS | Mod: CPTII,S$GLB,, | Performed by: ORTHOPAEDIC SURGERY

## 2022-12-22 PROCEDURE — 99999 PR PBB SHADOW E&M-EST. PATIENT-LVL IV: ICD-10-PCS | Mod: PBBFAC,,, | Performed by: ORTHOPAEDIC SURGERY

## 2022-12-22 PROCEDURE — 1159F MED LIST DOCD IN RCRD: CPT | Mod: CPTII,S$GLB,, | Performed by: ORTHOPAEDIC SURGERY

## 2022-12-22 PROCEDURE — 1126F AMNT PAIN NOTED NONE PRSNT: CPT | Mod: CPTII,S$GLB,, | Performed by: ORTHOPAEDIC SURGERY

## 2022-12-22 PROCEDURE — 99999 PR PBB SHADOW E&M-EST. PATIENT-LVL IV: CPT | Mod: PBBFAC,,, | Performed by: ORTHOPAEDIC SURGERY

## 2022-12-22 PROCEDURE — 99499 NO LOS: ICD-10-PCS | Mod: S$GLB,,, | Performed by: ORTHOPAEDIC SURGERY

## 2022-12-22 PROCEDURE — 3044F HG A1C LEVEL LT 7.0%: CPT | Mod: CPTII,S$GLB,, | Performed by: ORTHOPAEDIC SURGERY

## 2022-12-22 PROCEDURE — 1160F RVW MEDS BY RX/DR IN RCRD: CPT | Mod: CPTII,S$GLB,, | Performed by: ORTHOPAEDIC SURGERY

## 2022-12-22 PROCEDURE — 20610 DRAIN/INJ JOINT/BURSA W/O US: CPT | Mod: 50,S$GLB,, | Performed by: ORTHOPAEDIC SURGERY

## 2022-12-22 RX ORDER — TRIAMCINOLONE ACETONIDE 40 MG/ML
80 INJECTION, SUSPENSION INTRA-ARTICULAR; INTRAMUSCULAR
Status: DISCONTINUED | OUTPATIENT
Start: 2022-12-22 | End: 2022-12-22 | Stop reason: HOSPADM

## 2022-12-22 RX ADMIN — TRIAMCINOLONE ACETONIDE 80 MG: 40 INJECTION, SUSPENSION INTRA-ARTICULAR; INTRAMUSCULAR at 08:12

## 2022-12-22 NOTE — PROGRESS NOTES
In his seen specifically for palliative knee injections.  Informed consent was given.    X-ray next visit

## 2022-12-22 NOTE — PROCEDURES
Large Joint Aspiration/Injection: bilateral knee    Date/Time: 12/22/2022 8:00 AM  Performed by: Franklin Dobbs MD  Authorized by: Franklin Dobbs MD     Consent Done?:  Yes (Verbal)  Approach:  Anterolateral  Location:  Knee  Laterality:  Bilateral  Site:  Bilateral knee  Medications (Left):  80 mg triamcinolone acetonide 40 mg/mL     After obtaining verbal informed consent both of the patient's knees were prepped aseptically and injected through an inferior lateral approach using 40 mg of triamcinolone and 1 cc of 1% plain Xylocaine.  The patient was warned about postinjection flare and how to manage it with ice, rest and over-the-counter analgesics.  They're advised to contact me for any severe, uncontrolled pain.

## 2023-02-23 ENCOUNTER — OFFICE VISIT (OUTPATIENT)
Dept: FAMILY MEDICINE | Facility: CLINIC | Age: 73
End: 2023-02-23
Payer: COMMERCIAL

## 2023-02-23 VITALS
HEIGHT: 66 IN | BODY MASS INDEX: 32.73 KG/M2 | WEIGHT: 203.69 LBS | TEMPERATURE: 99 F | SYSTOLIC BLOOD PRESSURE: 120 MMHG | OXYGEN SATURATION: 99 % | DIASTOLIC BLOOD PRESSURE: 76 MMHG | HEART RATE: 109 BPM

## 2023-02-23 DIAGNOSIS — R07.9 CHEST PAIN, UNSPECIFIED TYPE: Primary | ICD-10-CM

## 2023-02-23 DIAGNOSIS — K20.90 ESOPHAGITIS: ICD-10-CM

## 2023-02-23 PROCEDURE — 93010 EKG 12-LEAD: ICD-10-PCS | Mod: S$GLB,,, | Performed by: INTERNAL MEDICINE

## 2023-02-23 PROCEDURE — 3074F SYST BP LT 130 MM HG: CPT | Mod: CPTII,S$GLB,, | Performed by: FAMILY MEDICINE

## 2023-02-23 PROCEDURE — 93010 ELECTROCARDIOGRAM REPORT: CPT | Mod: S$GLB,,, | Performed by: INTERNAL MEDICINE

## 2023-02-23 PROCEDURE — 1101F PR PT FALLS ASSESS DOC 0-1 FALLS W/OUT INJ PAST YR: ICD-10-PCS | Mod: CPTII,S$GLB,, | Performed by: FAMILY MEDICINE

## 2023-02-23 PROCEDURE — 93005 EKG 12-LEAD: ICD-10-PCS | Mod: S$GLB,,, | Performed by: FAMILY MEDICINE

## 2023-02-23 PROCEDURE — 1159F MED LIST DOCD IN RCRD: CPT | Mod: CPTII,S$GLB,, | Performed by: FAMILY MEDICINE

## 2023-02-23 PROCEDURE — 3288F FALL RISK ASSESSMENT DOCD: CPT | Mod: CPTII,S$GLB,, | Performed by: FAMILY MEDICINE

## 2023-02-23 PROCEDURE — 3288F PR FALLS RISK ASSESSMENT DOCUMENTED: ICD-10-PCS | Mod: CPTII,S$GLB,, | Performed by: FAMILY MEDICINE

## 2023-02-23 PROCEDURE — 3078F DIAST BP <80 MM HG: CPT | Mod: CPTII,S$GLB,, | Performed by: FAMILY MEDICINE

## 2023-02-23 PROCEDURE — 1159F PR MEDICATION LIST DOCUMENTED IN MEDICAL RECORD: ICD-10-PCS | Mod: CPTII,S$GLB,, | Performed by: FAMILY MEDICINE

## 2023-02-23 PROCEDURE — 93005 ELECTROCARDIOGRAM TRACING: CPT | Mod: S$GLB,,, | Performed by: FAMILY MEDICINE

## 2023-02-23 PROCEDURE — 99214 OFFICE O/P EST MOD 30 MIN: CPT | Mod: 25,S$GLB,, | Performed by: FAMILY MEDICINE

## 2023-02-23 PROCEDURE — 99999 PR PBB SHADOW E&M-EST. PATIENT-LVL IV: CPT | Mod: PBBFAC,,, | Performed by: FAMILY MEDICINE

## 2023-02-23 PROCEDURE — 1126F PR PAIN SEVERITY QUANTIFIED, NO PAIN PRESENT: ICD-10-PCS | Mod: CPTII,S$GLB,, | Performed by: FAMILY MEDICINE

## 2023-02-23 PROCEDURE — 3078F PR MOST RECENT DIASTOLIC BLOOD PRESSURE < 80 MM HG: ICD-10-PCS | Mod: CPTII,S$GLB,, | Performed by: FAMILY MEDICINE

## 2023-02-23 PROCEDURE — 1101F PT FALLS ASSESS-DOCD LE1/YR: CPT | Mod: CPTII,S$GLB,, | Performed by: FAMILY MEDICINE

## 2023-02-23 PROCEDURE — 1126F AMNT PAIN NOTED NONE PRSNT: CPT | Mod: CPTII,S$GLB,, | Performed by: FAMILY MEDICINE

## 2023-02-23 PROCEDURE — 99999 PR PBB SHADOW E&M-EST. PATIENT-LVL IV: ICD-10-PCS | Mod: PBBFAC,,, | Performed by: FAMILY MEDICINE

## 2023-02-23 PROCEDURE — 3008F PR BODY MASS INDEX (BMI) DOCUMENTED: ICD-10-PCS | Mod: CPTII,S$GLB,, | Performed by: FAMILY MEDICINE

## 2023-02-23 PROCEDURE — 3008F BODY MASS INDEX DOCD: CPT | Mod: CPTII,S$GLB,, | Performed by: FAMILY MEDICINE

## 2023-02-23 PROCEDURE — 3074F PR MOST RECENT SYSTOLIC BLOOD PRESSURE < 130 MM HG: ICD-10-PCS | Mod: CPTII,S$GLB,, | Performed by: FAMILY MEDICINE

## 2023-02-23 PROCEDURE — 99214 PR OFFICE/OUTPT VISIT, EST, LEVL IV, 30-39 MIN: ICD-10-PCS | Mod: 25,S$GLB,, | Performed by: FAMILY MEDICINE

## 2023-02-23 RX ORDER — PANTOPRAZOLE SODIUM 40 MG/1
40 TABLET, DELAYED RELEASE ORAL DAILY
Qty: 30 TABLET | Refills: 2 | Status: SHIPPED | OUTPATIENT
Start: 2023-02-23 | End: 2023-03-13 | Stop reason: ALTCHOICE

## 2023-02-23 NOTE — PROGRESS NOTES
(Portions of this note were dictated using voice recognition software and may contain dictation related errors in spelling/grammar/syntax not found on text review)    CC:   Chief Complaint   Patient presents with    Heartburn    Gastroesophageal Reflux       HPI: 72 y.o. female   Pt of Ashli Stubbs MD  Urgent care visit for GERD/ dyspepsia symptoms.     3 days ago had some fried shrimp and also onion rings.  Later that evening had some left flank pain, worse with reading.  Yesterday ate some popcorn by significant chest pain Biro omeprazole and to which did seem to help.  Does not routinely get heartburn symptoms.  Denies any fevers, chills, shortness of breath, nausea, vomiting, diarrhea she was given omeprazole for symptoms with epigastric discomfort but has no epigastric discomfort this time.  She does not have a family history of heart disease.  Does not smoke.  Does not drink alcohol.  Caffeine:  1 cup of coffee a day, no recent increase.    Chart review  No prior EGD on file  Colonoscopy 2009: diverticulosis / internal hemorrhoids  EKG 2019 NSR  Labs below    Answers submitted by the patient for this visit:  Review of Systems Questionnaire (Submitted on 2/23/2023)  activity change: No  unexpected weight change: No  neck pain: No  hearing loss: No  rhinorrhea: No  trouble swallowing: No  eye discharge: No  visual disturbance: No  chest tightness: Yes  wheezing: No  chest pain: Yes  palpitations: No  blood in stool: No  constipation: No  vomiting: No  diarrhea: No  polydipsia: No  polyuria: No  difficulty urinating: No  hematuria: No  menstrual problem: No  dysuria: No  joint swelling: No  arthralgias: No  headaches: No  weakness: No  confusion: No  dysphoric mood: No      Past Medical History:   Diagnosis Date    Abnormal mammogram 8/10/2016    Followed up with diagnostic mammogram and ultrasound 10/[facility]at [facility]  that showed no concerning findings with recommendation to continue yearly screening.      Anxiety 8/8/2017    will need to readdress this if work up is negative and symptoms persist    Arthritis of knee 1/7/2015    Diverticulosis     Meniere's disease 8/15/2017    Osteopenia 12/1/2015    next bone density 4/2016, seeing endocrine for hyperparathyroidism     Positive anti-CCP test 4/7/2014    Primary hyperparathyroidism 8/26/2013    will be following up with Dr. [facility] in 2016, blood work today. stoped taking calcitonin due to nausea     Rheumatoid factor positive 4/7/2014    Vertigo 7/16/2017    Vitamin D deficiency 12/1/2015       Past Surgical History:   Procedure Laterality Date    APPENDECTOMY      BREAST BIOPSY      HYSTERECTOMY      PARATHYROIDECTOMY N/A 05/22/2019    Procedure: PARATHYROIDECTOMY;  Surgeon: Andie Mcfarlane MD;  Location: [facility] OR [address] [place] [address];  Service: General;  Laterality: N/A;  NIMS / Intraop PTH Monitoring    ROTATOR CUFF REPAIR Right 12/2014    TUBAL LIGATION         Family History   Problem Relation Age of Onset    No Known Problems Mother     Diabetes Father     Dementia Father     Breast cancer Daughter     Rheum arthritis Neg Hx     Lupus Neg Hx     Inflammatory bowel disease Neg Hx        Social History     Tobacco Use    Smoking status: Never    Smokeless tobacco: Never   Substance Use Topics    Alcohol use: No    Drug use: No       Lab Results   Component Value Date    WBC 8.26 03/14/2022    HGB 13.9 03/14/2022    HCT 43.7 03/14/2022    MCV 97 03/14/2022     03/14/2022    CHOL 194 03/14/2022    TRIG 82 03/14/2022    HDL 58 03/14/2022    ALT 14 03/14/2022    AST 13 03/14/2022    BILITOT 0.6 03/14/2022    ALKPHOS 81 03/14/2022     03/14/2022    K 4.4 03/14/2022     03/14/2022    CREATININE 0.7 03/14/2022    ESTGFRAFRICA >60 03/14/2022    EGFRNONAA >60 03/14/2022    CALCIUM 9.3 03/14/2022    ALBUMIN 3.6 03/14/2022    BUN 15 03/14/2022    CO2 26 03/14/2022    TSH 1.317 03/14/2022    HGBA1C 5.1 03/14/2022    LDLCALC 119.6 03/14/2022    GLU 81  "2022    [id number] 58 2022                 Vital signs reviewed  Vitals:    23 0856   BP: 120/76   BP Location: Right arm   Patient Position: Sitting   BP Method: Medium (Manual)   Pulse: 109   Temp: 99 °F (37.2 °C)   TempSrc: Oral   SpO2: 99%   Weight: 92.4 kg (203 lb 11.3 oz)   Height: 5' 6" (1.676 m)       Wt Readings from Last 4 Encounters:   23 92.4 kg (203 lb 11.3 oz)   22 97 kg (213 lb 13.5 oz)   22 97 kg (213 lb 13.5 oz)   22 97 kg (213 lb 13.5 oz)       PE:   APPEARANCE: Well nourished, well developed, in no acute distress.    HEAD: Normocephalic, atraumatic.  EYES: PERRL. EOMI.   Conjunctivae noninjected.  NECK: Supple with no cervical lymphadenopathy.    CHEST: Good inspiratory effort. Lungs clear to auscultation with no wheezes or crackles.  No costochondral tenderness  CARDIOVASCULAR: Normal S1, S2. No rubs, murmurs, or gallops.  ABDOMEN: Bowel sounds normal. Not distended. Soft. No tenderness or masses. No organomegaly.  EXTREMITIES: No edema, cyanosis, or clubbing.      IMPRESSION  1. Chest pain, unspecified type    2. Esophagitis            PLAN  Orders Placed This Encounter   Procedures    EKG 12-lead     Medications Ordered This Encounter   Medications    pantoprazole (PROTONIX) 40 MG tablet     Sig: Take 1 tablet (40 mg total) by mouth once daily.     Dispense:  30 tablet     Refill:  2      Suspect esophagitis.  Did get relief from old  PPI and 2 tums.  EKG today was done to assess for any concern for cardiac etiology area this was personally reviewed showing :  NSR, no ischemic changes    Started on pantoprazole 40 mg daily for 2 weeks.  Tums or Maalox or Mylanta p.r.n.    Dietary precautions    Notify for any worsening despite the above.              "

## 2023-03-01 ENCOUNTER — HOSPITAL ENCOUNTER (EMERGENCY)
Facility: HOSPITAL | Age: 73
Discharge: HOME | End: 2023-03-01
Attending: STUDENT IN AN ORGANIZED HEALTH CARE EDUCATION/TRAINING PROGRAM
Payer: COMMERCIAL

## 2023-03-01 VITALS
WEIGHT: 203 LBS | OXYGEN SATURATION: 98 % | RESPIRATION RATE: 16 BRPM | DIASTOLIC BLOOD PRESSURE: 64 MMHG | BODY MASS INDEX: 32.77 KG/M2 | TEMPERATURE: 98 F | HEART RATE: 82 BPM | SYSTOLIC BLOOD PRESSURE: 116 MMHG

## 2023-03-01 DIAGNOSIS — R07.9 CHEST PAIN: ICD-10-CM

## 2023-03-01 LAB
ALBUMIN SERPL BCP-MCNC: 3.5 G/DL (ref 3.5–5.2)
ALP SERPL-CCNC: 79 U/L (ref 55–135)
ALT SERPL W/O P-5'-P-CCNC: 9 U/L (ref 10–44)
ANION GAP SERPL CALC-SCNC: 13 MMOL/L (ref 8–16)
AST SERPL-CCNC: 12 U/L (ref 10–40)
BASOPHILS # BLD AUTO: 0.03 K/UL (ref 0–0.2)
BASOPHILS NFR BLD: 0.2 % (ref 0–1.9)
BILIRUB SERPL-MCNC: 0.8 MG/DL (ref 0.1–1)
BNP SERPL-MCNC: 29 PG/ML (ref 0–99)
BUN SERPL-MCNC: 12 MG/DL (ref 8–23)
CALCIUM SERPL-MCNC: 9.4 MG/DL (ref 8.7–10.5)
CHLORIDE SERPL-SCNC: 101 MMOL/L (ref 95–110)
CO2 SERPL-SCNC: 24 MMOL/L (ref 23–29)
CREAT SERPL-MCNC: 0.7 MG/DL (ref 0.5–1.4)
DIFFERENTIAL METHOD: ABNORMAL
EOSINOPHIL # BLD AUTO: 0.1 K/UL (ref 0–0.5)
EOSINOPHIL NFR BLD: 0.7 % (ref 0–8)
ERYTHROCYTE [DISTWIDTH] IN BLOOD BY AUTOMATED COUNT: 13.5 % (ref 11.5–14.5)
EST. GFR  (NO RACE VARIABLE): >60 ML/MIN/1.73 M^2
GLUCOSE SERPL-MCNC: 112 MG/DL (ref 70–110)
HCT VFR BLD AUTO: 38.1 % (ref 37–48.5)
HGB BLD-MCNC: 12.7 G/DL (ref 12–16)
IMM GRANULOCYTES # BLD AUTO: 0.07 K/UL (ref 0–0.04)
IMM GRANULOCYTES NFR BLD AUTO: 0.5 % (ref 0–0.5)
LYMPHOCYTES # BLD AUTO: 1.3 K/UL (ref 1–4.8)
LYMPHOCYTES NFR BLD: 9.7 % (ref 18–48)
MCH RBC QN AUTO: 31.5 PG (ref 27–31)
MCHC RBC AUTO-ENTMCNC: 33.3 G/DL (ref 32–36)
MCV RBC AUTO: 95 FL (ref 82–98)
MONOCYTES # BLD AUTO: 0.8 K/UL (ref 0.3–1)
MONOCYTES NFR BLD: 5.7 % (ref 4–15)
NEUTROPHILS # BLD AUTO: 11 K/UL (ref 1.8–7.7)
NEUTROPHILS NFR BLD: 83.2 % (ref 38–73)
NRBC BLD-RTO: 0 /100 WBC
PLATELET # BLD AUTO: 402 K/UL (ref 150–450)
PMV BLD AUTO: 9.1 FL (ref 9.2–12.9)
POTASSIUM SERPL-SCNC: 4 MMOL/L (ref 3.5–5.1)
PROT SERPL-MCNC: 6.4 G/DL (ref 6–8.4)
RBC # BLD AUTO: 4.03 M/UL (ref 4–5.4)
SODIUM SERPL-SCNC: 138 MMOL/L (ref 136–145)
TROPONIN I SERPL DL<=0.01 NG/ML-MCNC: <0.006 NG/ML (ref 0–0.03)
WBC # BLD AUTO: 13.26 K/UL (ref 3.9–12.7)

## 2023-03-01 PROCEDURE — 93010 EKG 12-LEAD: ICD-10-PCS | Mod: ,,, | Performed by: INTERNAL MEDICINE

## 2023-03-01 PROCEDURE — 93005 ELECTROCARDIOGRAM TRACING: CPT

## 2023-03-01 PROCEDURE — 93010 ELECTROCARDIOGRAM REPORT: CPT | Mod: ,,, | Performed by: INTERNAL MEDICINE

## 2023-03-01 PROCEDURE — 84484 ASSAY OF TROPONIN QUANT: CPT | Performed by: STUDENT IN AN ORGANIZED HEALTH CARE EDUCATION/TRAINING PROGRAM

## 2023-03-01 PROCEDURE — 25000003 PHARM REV CODE 250: Performed by: STUDENT IN AN ORGANIZED HEALTH CARE EDUCATION/TRAINING PROGRAM

## 2023-03-01 PROCEDURE — 83880 ASSAY OF NATRIURETIC PEPTIDE: CPT | Performed by: STUDENT IN AN ORGANIZED HEALTH CARE EDUCATION/TRAINING PROGRAM

## 2023-03-01 PROCEDURE — 63600175 PHARM REV CODE 636 W HCPCS: Performed by: STUDENT IN AN ORGANIZED HEALTH CARE EDUCATION/TRAINING PROGRAM

## 2023-03-01 PROCEDURE — 99285 EMERGENCY DEPT VISIT HI MDM: CPT | Mod: 25

## 2023-03-01 PROCEDURE — 96374 THER/PROPH/DIAG INJ IV PUSH: CPT

## 2023-03-01 PROCEDURE — 80053 COMPREHEN METABOLIC PANEL: CPT | Performed by: STUDENT IN AN ORGANIZED HEALTH CARE EDUCATION/TRAINING PROGRAM

## 2023-03-01 PROCEDURE — 85025 COMPLETE CBC W/AUTO DIFF WBC: CPT | Performed by: STUDENT IN AN ORGANIZED HEALTH CARE EDUCATION/TRAINING PROGRAM

## 2023-03-01 RX ORDER — LIDOCAINE 50 MG/G
2 PATCH TOPICAL DAILY
Qty: 14 PATCH | Refills: 0 | Status: SHIPPED | OUTPATIENT
Start: 2023-03-01 | End: 2023-03-08

## 2023-03-01 RX ORDER — MAG HYDROX/ALUMINUM HYD/SIMETH 200-200-20
30 SUSPENSION, ORAL (FINAL DOSE FORM) ORAL ONCE
Status: DISCONTINUED | OUTPATIENT
Start: 2023-03-01 | End: 2023-03-01

## 2023-03-01 RX ORDER — LIDOCAINE HYDROCHLORIDE 20 MG/ML
15 SOLUTION OROPHARYNGEAL ONCE
Status: DISCONTINUED | OUTPATIENT
Start: 2023-03-01 | End: 2023-03-01

## 2023-03-01 RX ORDER — KETOROLAC TROMETHAMINE 30 MG/ML
15 INJECTION, SOLUTION INTRAMUSCULAR; INTRAVENOUS
Status: COMPLETED | OUTPATIENT
Start: 2023-03-01 | End: 2023-03-01

## 2023-03-01 RX ORDER — ASPIRIN 325 MG
325 TABLET ORAL
Status: COMPLETED | OUTPATIENT
Start: 2023-03-01 | End: 2023-03-01

## 2023-03-01 RX ORDER — LIDOCAINE 50 MG/G
2 PATCH TOPICAL
Status: DISCONTINUED | OUTPATIENT
Start: 2023-03-01 | End: 2023-03-01 | Stop reason: HOSPADM

## 2023-03-01 RX ADMIN — ASPIRIN 325 MG ORAL TABLET 325 MG: 325 PILL ORAL at 02:03

## 2023-03-01 RX ADMIN — KETOROLAC TROMETHAMINE 15 MG: 30 INJECTION, SOLUTION INTRAMUSCULAR; INTRAVENOUS at 04:03

## 2023-03-01 RX ADMIN — LIDOCAINE 2 PATCH: 50 PATCH CUTANEOUS at 04:03

## 2023-03-01 NOTE — ED PROVIDER NOTES
Encounter Date: 3/1/2023       History     Chief Complaint   Patient presents with    Chest Pain     Pt c/o mid sternal chest pain. Pt has been having pain since Mardi Gras. Pt saw her pcp and had a negative ekg and started on pantoprazole. Pt reports the pain has not improved. Movement makes the pain worse and she describes and pressure. Pt denies shortness of breath or nausea. Pt denies pain during triage and reports it was relieved by Tums.      72 year old female who presents with chest pain for the last week. It is substernal, non-radiating, described as a burning or pressure, it is exacerbated with movement and it is alleviated with rest. She says she first noticed it after eating fried, salty foods, especially at night. Saw her PCP who started her on protonix. It did improve however she said last night it returned when she leaned over to the right to pick something up. Asymptomatic at this time.     Review of patient's allergies indicates:   Allergen Reactions    Ropinirole Swelling and Rash      palpitations, swelling of tongue   palpitations, swelling of tongue     Past Medical History:   Diagnosis Date    Abnormal mammogram 8/10/2016    Followed up with diagnostic mammogram and ultrasound 10[facility]22/[facility]at [facility]  that showed no concerning findings with recommendation to continue yearly screening.     Anxiety 8/8/2017    will need to readdress this if work up is negative and symptoms persist    Arthritis of knee 1/7/2015    Diverticulosis     Meniere's disease 8/15/2017    Osteopenia 12/1/2015    next bone density 4/2016, seeing endocrine for hyperparathyroidism     Positive anti-CCP test 4/7/2014    Primary hyperparathyroidism 8/26/2013    will be following up with Dr. [facility] in 2016, blood work today. stoped taking calcitonin due to nausea     Rheumatoid factor positive 4/7/2014    Vertigo 7/16/2017    Vitamin D deficiency 12/1/2015     Past Surgical History:   Procedure Laterality Date     APPENDECTOMY      BREAST BIOPSY      HYSTERECTOMY      PARATHYROIDECTOMY N/A 05/22/2019    Procedure: PARATHYROIDECTOMY;  Surgeon: Andie Mcfarlane MD;  Location: [facility] OR [address] [place] [address];  Service: General;  Laterality: N/A;  NIMS / Intraop PTH Monitoring    ROTATOR CUFF REPAIR Right 12/2014    TUBAL LIGATION       Family History   Problem Relation Age of Onset    No Known Problems Mother     Diabetes Father     Dementia Father     Breast cancer Daughter     Rheum arthritis Neg Hx     Lupus Neg Hx     Inflammatory bowel disease Neg Hx      Social History     Tobacco Use    Smoking status: Never    Smokeless tobacco: Never   Substance Use Topics    Alcohol use: No    Drug use: No     Review of Systems   All other systems reviewed and are negative.    Physical Exam     Initial Vitals [03/01/23 1432]   BP Pulse Resp Temp SpO2   107/63 94 18 98.7 °F (37.1 °C) 97 %      MAP       --         Physical Exam    Nursing note and vitals reviewed.  Constitutional: She appears well-developed and well-nourished. She is not diaphoretic. No distress.   HENT:   Head: Normocephalic and atraumatic.   Eyes: EOM are normal. Pupils are equal, round, and reactive to light.   Neck: Trachea normal and phonation normal. Neck supple. Carotid bruit is not present. No JVD present.   Normal range of motion.  Cardiovascular:  Normal rate, regular rhythm, normal heart sounds and intact distal pulses.     Exam reveals no gallop and no friction rub.       No murmur heard.  Pulmonary/Chest: Breath sounds normal. No stridor. No respiratory distress. She has no wheezes. She has no rhonchi. She has no rales. She exhibits no tenderness.   Abdominal: Abdomen is soft. Bowel sounds are normal. She exhibits no distension and no mass. There is no abdominal tenderness. There is no rebound and no guarding.   Musculoskeletal:         General: Normal range of motion.      Cervical back: Normal range of motion and neck supple.      Right lower leg: No swelling. No  edema.      Left lower leg: No swelling. No edema.      Comments: Trace pitting edema bilaterally to the lower extremities, below the shin .     Neurological: She is alert and oriented to person, place, and time. GCS score is 15. GCS eye subscore is 4. GCS verbal subscore is 5. GCS motor subscore is 6.   Skin: Skin is warm and dry. Capillary refill takes less than 2 seconds.   Psychiatric: She has a normal mood and affect. Thought content normal.       ED Course   Procedures  Labs Reviewed   CBC W/ AUTO DIFFERENTIAL - Abnormal; Notable for the following components:       Result Value    WBC 13.26 (*)     MCH 31.5 (*)     MPV 9.1 (*)     Gran # (ANC) 11.0 (*)     Immature Grans (Abs) 0.07 (*)     Gran % 83.2 (*)     Lymph % 9.7 (*)     All other components within normal limits   COMPREHENSIVE METABOLIC PANEL - Abnormal; Notable for the following components:    Glucose 112 (*)     ALT 9 (*)     All other components within normal limits   TROPONIN I   B-TYPE NATRIURETIC PEPTIDE          Imaging Results              X-Ray Chest AP Portable (Final result)  Result time 03/01/23 15:33:47      Final result by Taras Campbell III, MD (03/01/23 15:33:47)                   Impression:      No acute process seen.      Electronically signed by: Taras Campbell MD  Date:    03/01/2023  Time:    15:33               Narrative:    EXAMINATION:  XR CHEST AP PORTABLE    CLINICAL HISTORY:  Chest pain, unspecified    FINDINGS:  Chest one view portable.    Heart size is normal.  There is aortic plaque.  Lungs are clear.  There is DJD.  There is postoperative change of the right shoulder.                                       Medications   aluminum-magnesium hydroxide-simethicone 200-200-20 mg/5 mL suspension 30 mL (has no administration in time range)     And   LIDOcaine HCl 2% oral solution 15 mL (has no administration in time range)   ketorolac injection 15 mg (has no administration in time range)   LIDOcaine 5 % patch 2 patch (has no  administration in time range)   aspirin tablet 325 mg (325 mg Oral Given 3/1/23 4730)     Medical Decision Making:   Initial Assessment:   Hemodynamically stable. Afebrile. Phonating and protecting the airway spontaneously. No clinical evidence for cardiovascular instability or impending airway compromise. Examination as above. Suspect possibly GI related however given mild edema, will pursue cardiac workup.            ED Course as of 03/01/23 1601   Wed Mar 01, 2023   1442 Twelve lead EKG per my independent interpretation reveals NSR at a rate of 100. No axis, no rhythm. No regional ST segment elevation. When compared to previous on 2/23/2023, no significant change.  [BG]   1545 Labs reviewed. Troponin negative. CXR reviewed. I suspect her pain may be related to GI or MSK, will try dose of toradol. Will refer to GI.     The patient was reassessed and on subsequent re-evaluation, they were subjectively feeling better. They were resting comfortably and in no acute distress. I discussed the laboratory and diagnostic findings with the patient. Education was provided and all questions were answered. As discussed, they were recommended to follow up with their primary care physician within the next few days and to return to the emergency department sooner for any new or worsening. They acknowledged and verbalized agreement to the treatment plan. The patient was discharged home in stable condition.     DISCLAIMER: This note was prepared with [employer] voice recognition transcription software. Garbled syntax, mangled pronouns, and other bizarre constructions may be attributed to that software system.     [BG]      ED Course User Index  [BG] Juan Carlos Robbins MD                 Clinical Impression:   Final diagnoses:  [R07.9] Chest pain        ED Disposition Condition    Discharge Stable          ED Prescriptions       Medication Sig Dispense Start Date End Date Auth. Provider    LIDOcaine (LIDODERM) 5 % Place 2 patches onto the  skin once daily. Remove & Discard patch within 12 hours or as directed by MD for 7 days 14 patch 3/1/2023 3/8/2023 Juan Carlos Robbins MD          Follow-up Information       Follow up With Specialties Details Why Contact Info    Ashli Stubbs MD Family Medicine Go to  As needed [address]  [address]  [place] [address]  [phone]               Juan Carlos Robbins MD  03/01/23 2792

## 2023-03-01 NOTE — DISCHARGE INSTRUCTIONS

## 2023-03-09 ENCOUNTER — PATIENT MESSAGE (OUTPATIENT)
Dept: FAMILY MEDICINE | Facility: CLINIC | Age: 73
End: 2023-03-09
Payer: COMMERCIAL

## 2023-03-10 NOTE — PROCEDURES
Procedures     PROCEDURE:  I have explained the risks, benefits, and alternatives of the procedure in detail.  The patient voices understanding and all questions have been answered.  The patient agrees to proceed as planned. So after I performed a sterile prep of the skin in the normal fashion the right carpal tunnel is injected from the volar approach using a 25 gauge needle with a combination of 0.5 cc 1% plain xylocaine and 20 mg of Kenolog. The patient is cautioned and immediate relief of pain is secondary to the local anesthetic and will be temporary.  After the anesthetic wears off there may be a increase in pain that may last for a few hours or a few days and they should use ice to help alleviate this flair up of pain. Patient tolerated the procedure well.      Negative

## 2023-03-10 NOTE — TELEPHONE ENCOUNTER
Please schedule her for next available appointment with me for re-evaluation of her symptoms since they did not improve with the Protonix, thank you

## 2023-03-12 NOTE — PROGRESS NOTES
" Office Visit    Patient Name: Carlene Fong    : 1950  MRN: [id number]    Subjective:  Carlene is a 72 y.o. female who presents today for:    Chest Pain (PT STATED THAT THIS SYMPTOM IS GONE.) and Extremity Weakness (PT STATED SHE IS AFRAID TO EAT AND NOW FEELS WEAK.)    PHYSICAL w/ ME 3/14/22    73 yo pt of mine who presents today for re-evaluation of pain under her breast area.   PMH:   primary hyperparathyroidism associated with vitamin D deficiency & Osteoporosis now s/p 3 gland parathyroidectomy on 2019, overweight BMI, history of positive rheumatoid factor without clinical evidence of rheumatoid arthritis, left wrist carpal tunnel(s/p injection 10/2018), mild anxiety, GERD with PRN OTC PPI Use, bilateral knee pain followed by Ortho Dr Dobbs and s/p B Cortisone injections   Does not smoke.  Does not drink alcohol.  Caffeine:  1 cup of coffee a day, no recent increase.    Today she reports that in the lat few days the Protonix has "kicked" and her pain totally subsided but then her prescription ran out and she is now afraid to eat due to fear the reflux may return.   She feels weak from not eating much.   Drinking plenty of water.     She was evaluated bye dr Thurston and in the ER for epigastric area chest pain with notes as follows:     Seen by Dr Thurston 23 for GERD dyspepsia symptoms.      Per review of his notes:   Suspect esophagitis.  Did get relief from old  PPI and 2 tums.  EKG today was done to assess for any concern for cardiac etiology area this was personally reviewed showing :  NSR, no ischemic changes  Started on pantoprazole 40 mg daily for 2 weeks.  Tums or Maalox or Mylanta p.r.n.  Per ER Notes 3/1/23:  Labs reviewed. Troponin negative. CXR reviewed. I suspect her pain may be related to GI or MSK, will try dose of toradol. Will refer to GI--Has appointment scheduled with Dr Jaimes on 23 and with Cardiology Dr Hill 3/15/23.     No prior EGD on " file  Colonoscopy 2009: diverticulosis / internal hemorrhoids, recent yearly FIT (-) 3/24/22  EKG 2/23/23 and 3/1/23 NSR and WNL, ER labs 3/1/23 overall unremarkable with normal H/H, CMP, Troponin and BNP. WBC slightly elevated.   CXR 3/1/23: Heart size is normal.  There is aortic plaque.  Lungs are clear.  There is DJD.  There is postoperative change of the right shoulder.       PAST MEDICAL HISTORY, SURGICAL/SOCIAL/FAMILY HISTORY REVIEWED AS PER CHART, WITH PERTINENT FINDINGS INCLUDED IN HISTORY SECTION OF NOTE.     Current Medications    Medication List with Changes/Refills   New Medications    OMEPRAZOLE (PRILOSEC) 40 MG CAPSULE    Take 1 capsule (40 mg total) by mouth before breakfast.   Current Medications    CALCIUM CARBONATE (CALCIUM 600 ORAL)    Take by mouth.    CICLOPIROX (LOPROX) 0.77 % CREA    Apply topically 2 (two) times daily.    CO-ENZYME Q-10 30 MG CAPSULE    Take 30 mg by mouth once daily.     DICLOFENAC SODIUM (VOLTAREN) 1 % GEL    Apply 2 g topically 3 (three) times daily.    DICLOFENAC SODIUM (VOLTAREN) 1 % GEL    Apply 2 g topically 3 (three) times daily.    ERYTHROMYCIN (ROMYCIN) OPHTHALMIC OINTMENT    Place into the right eye every evening.    FLAXSEED ORAL    Take by mouth once daily.     FLUTICASONE PROPIONATE (FLONASE) 50 MCG/ACTUATION NASAL SPRAY    2 sprays (100 mcg total) by Each Nostril route nightly.    GABAPENTIN (NEURONTIN) 300 MG CAPSULE    Take 1 capsule (300 mg total) by mouth 2 (two) times daily.    HYDROCHLOROTHIAZIDE (HYDRODIURIL) 12.5 MG TAB    Take 1 tablet (12.5 mg total) by mouth every other day.    MECLIZINE (ANTIVERT) 25 MG TABLET    Take 1 tablet (25 mg total) by mouth 3 (three) times daily as needed for Dizziness.    ONDANSETRON (ZOFRAN) 8 MG TABLET    Take 1 tablet (8 mg total) by mouth every 8 (eight) hours as needed for Nausea.    VITAMIN D 1000 UNITS TAB    Take 185 mg by mouth once daily.   Discontinued Medications    PANTOPRAZOLE (PROTONIX) 40 MG TABLET    Take  "1 tablet (40 mg total) by mouth once daily.       Allergies   Review of patient's allergies indicates:   Allergen Reactions    Ropinirole Swelling and Rash      palpitations, swelling of tongue   palpitations, swelling of tongue         Review of Systems (Pertinent positives)  Review of Systems   Constitutional:  Negative for fever.   Respiratory:  Negative for shortness of breath.    Cardiovascular:  Negative for chest pain.   Gastrointestinal:  Negative for abdominal pain (resolved), constipation, diarrhea, nausea and vomiting.   Genitourinary:  Negative for difficulty urinating.   Musculoskeletal:  Positive for back pain.   Neurological:  Positive for weakness. Negative for dizziness and light-headedness.     /70 (BP Location: Left arm, Patient Position: Sitting, BP Method: Large (Manual))   Pulse 105   Temp 99.1 °F (37.3 °C) (Oral)   Ht 5' 6" (1.676 m)   Wt 92.2 kg (203 lb 4.2 oz)   LMP  (LMP Unknown)   SpO2 98%   BMI 32.81 kg/m²     Physical Exam  Vitals reviewed.   Constitutional:       General: She is not in acute distress.     Appearance: Normal appearance. She is well-developed.   HENT:      Head: Normocephalic and atraumatic.   Eyes:      Conjunctiva/sclera: Conjunctivae normal.   Cardiovascular:      Rate and Rhythm: Regular rhythm. Tachycardia present.   Pulmonary:      Effort: Pulmonary effort is normal.      Breath sounds: Normal breath sounds.   Abdominal:      Tenderness: There is abdominal tenderness in the epigastric area.   Musculoskeletal:      Right lower leg: No edema.      Left lower leg: No edema.   Skin:     General: Skin is warm and dry.   Neurological:      General: No focal deficit present.      Mental Status: She is alert and oriented to person, place, and time.   Psychiatric:         Mood and Affect: Mood normal.         Behavior: Behavior normal.         Assessment/Plan:  Carlene Fong is a 72 y.o. female who presents today for :        ICD-10-CM ICD-9-CM    1. " Gastroesophageal reflux disease, unspecified whether esophagitis present  K21.9 530.81 omeprazole (PRILOSEC) 40 MG capsule      2. Encounter for monitoring long-term proton pump inhibitor therapy  Z51.81 V58.83     Z79.899 V58.69       3. Obesity (BMI 30.0-34.9)  E66.9 278.00       4. Vitamin D deficiency  E55.9 268.9 Vitamin D      5. Primary hyperparathyroidism  E21.0 252.01 Comprehensive Metabolic Panel      TSH      PTH, Intact      6. H/O parathyroidectomy  E89.2 252.8 Vitamin D      7. Postmenopausal osteoporosis  M81.0 733.01 Comprehensive Metabolic Panel      Vitamin D      Magnesium      PTH, Intact      8. Medication management  Z79.899 V58.69 Hemoglobin A1C      Comprehensive Metabolic Panel      Lipid Panel      CBC Auto Differential      TSH      Vitamin D      Vitamin B12      Magnesium      PTH, Intact      9. Weakness  R53.1 780.79         72-year-old female with severe epigastric abdominal pain status post unremarkable cardiac eval including chest x-ray, EKG, labs who presents today for follow-up.     Fortunately after about 2 weeks of remaining on Protonix 40 her epigastric pain has overall subsided, however, she still has a bit of a dry cough which developed with her acid reflux.      Would like to resume a PPI but she feels the omeprazole works better for her so I have sent a prescription for omeprazole 40.      Feels very weak today but she attributes this to not eating, check labs prior to annual physical this Friday.  Include PPI labs-vitamin-D, B12, magnesium the absorption of which could be negatively impacted by chronic PPI use.    Hopefully over the next few days she will get back to her regular diet, acid reflux symptoms will remain controlled on the omeprazole and she will be feeling stronger/improved by Friday.      Has upcoming GI appointment but will cancel cardiology appointment in light of unremarkable cardiac eval with minimal cardiac risk factors and no recent cardiac concerns.        Patient Instructions   START OMEPRAZOLE 40 MG DAILY PRIOR TO BREAKFAST BUT ON VERY FIRST DAY TAKE BEFORE DINNER AS WELL.    EASE BACK INTO NORMAL DIET WITH STARTING THE OMEPRAZOLE.    STOP HYDROCHLOROTHIAZIDE FOR NOW AND MONITOR PRESSURE WHICH IS LOW TODAY.     CHECK LABS TOMORROW WITH PHYSICAL Friday.           Follow up in about 4 days (around 3/17/2023) for to follow up on lab results, return as needed for new concerns.

## 2023-03-13 ENCOUNTER — OFFICE VISIT (OUTPATIENT)
Dept: FAMILY MEDICINE | Facility: CLINIC | Age: 73
End: 2023-03-13
Payer: COMMERCIAL

## 2023-03-13 VITALS
TEMPERATURE: 99 F | HEIGHT: 66 IN | HEART RATE: 105 BPM | WEIGHT: 203.25 LBS | OXYGEN SATURATION: 98 % | SYSTOLIC BLOOD PRESSURE: 100 MMHG | BODY MASS INDEX: 32.66 KG/M2 | DIASTOLIC BLOOD PRESSURE: 70 MMHG

## 2023-03-13 DIAGNOSIS — Z90.89 H/O PARATHYROIDECTOMY: ICD-10-CM

## 2023-03-13 DIAGNOSIS — E66.9 OBESITY (BMI 30.0-34.9): ICD-10-CM

## 2023-03-13 DIAGNOSIS — E21.0 PRIMARY HYPERPARATHYROIDISM: ICD-10-CM

## 2023-03-13 DIAGNOSIS — Z79.899 ENCOUNTER FOR MONITORING LONG-TERM PROTON PUMP INHIBITOR THERAPY: ICD-10-CM

## 2023-03-13 DIAGNOSIS — Z98.890 H/O PARATHYROIDECTOMY: ICD-10-CM

## 2023-03-13 DIAGNOSIS — Z51.81 ENCOUNTER FOR MONITORING LONG-TERM PROTON PUMP INHIBITOR THERAPY: ICD-10-CM

## 2023-03-13 DIAGNOSIS — R53.1 WEAKNESS: ICD-10-CM

## 2023-03-13 DIAGNOSIS — Z79.899 MEDICATION MANAGEMENT: ICD-10-CM

## 2023-03-13 DIAGNOSIS — E55.9 VITAMIN D DEFICIENCY: ICD-10-CM

## 2023-03-13 DIAGNOSIS — M81.0 POSTMENOPAUSAL OSTEOPOROSIS: ICD-10-CM

## 2023-03-13 DIAGNOSIS — K21.9 GASTROESOPHAGEAL REFLUX DISEASE, UNSPECIFIED WHETHER ESOPHAGITIS PRESENT: Primary | ICD-10-CM

## 2023-03-13 PROCEDURE — 1159F PR MEDICATION LIST DOCUMENTED IN MEDICAL RECORD: ICD-10-PCS | Mod: CPTII,S$GLB,, | Performed by: FAMILY MEDICINE

## 2023-03-13 PROCEDURE — 1160F RVW MEDS BY RX/DR IN RCRD: CPT | Mod: CPTII,S$GLB,, | Performed by: FAMILY MEDICINE

## 2023-03-13 PROCEDURE — 99214 OFFICE O/P EST MOD 30 MIN: CPT | Mod: S$GLB,,, | Performed by: FAMILY MEDICINE

## 2023-03-13 PROCEDURE — 3008F PR BODY MASS INDEX (BMI) DOCUMENTED: ICD-10-PCS | Mod: CPTII,S$GLB,, | Performed by: FAMILY MEDICINE

## 2023-03-13 PROCEDURE — 3078F DIAST BP <80 MM HG: CPT | Mod: CPTII,S$GLB,, | Performed by: FAMILY MEDICINE

## 2023-03-13 PROCEDURE — 3078F PR MOST RECENT DIASTOLIC BLOOD PRESSURE < 80 MM HG: ICD-10-PCS | Mod: CPTII,S$GLB,, | Performed by: FAMILY MEDICINE

## 2023-03-13 PROCEDURE — 1126F AMNT PAIN NOTED NONE PRSNT: CPT | Mod: CPTII,S$GLB,, | Performed by: FAMILY MEDICINE

## 2023-03-13 PROCEDURE — 1101F PR PT FALLS ASSESS DOC 0-1 FALLS W/OUT INJ PAST YR: ICD-10-PCS | Mod: CPTII,S$GLB,, | Performed by: FAMILY MEDICINE

## 2023-03-13 PROCEDURE — 99999 PR PBB SHADOW E&M-EST. PATIENT-LVL V: CPT | Mod: PBBFAC,,, | Performed by: FAMILY MEDICINE

## 2023-03-13 PROCEDURE — 99999 PR PBB SHADOW E&M-EST. PATIENT-LVL V: ICD-10-PCS | Mod: PBBFAC,,, | Performed by: FAMILY MEDICINE

## 2023-03-13 PROCEDURE — 1101F PT FALLS ASSESS-DOCD LE1/YR: CPT | Mod: CPTII,S$GLB,, | Performed by: FAMILY MEDICINE

## 2023-03-13 PROCEDURE — 3074F SYST BP LT 130 MM HG: CPT | Mod: CPTII,S$GLB,, | Performed by: FAMILY MEDICINE

## 2023-03-13 PROCEDURE — 1160F PR REVIEW ALL MEDS BY PRESCRIBER/CLIN PHARMACIST DOCUMENTED: ICD-10-PCS | Mod: CPTII,S$GLB,, | Performed by: FAMILY MEDICINE

## 2023-03-13 PROCEDURE — 99214 PR OFFICE/OUTPT VISIT, EST, LEVL IV, 30-39 MIN: ICD-10-PCS | Mod: S$GLB,,, | Performed by: FAMILY MEDICINE

## 2023-03-13 PROCEDURE — 3008F BODY MASS INDEX DOCD: CPT | Mod: CPTII,S$GLB,, | Performed by: FAMILY MEDICINE

## 2023-03-13 PROCEDURE — 1159F MED LIST DOCD IN RCRD: CPT | Mod: CPTII,S$GLB,, | Performed by: FAMILY MEDICINE

## 2023-03-13 PROCEDURE — 3288F FALL RISK ASSESSMENT DOCD: CPT | Mod: CPTII,S$GLB,, | Performed by: FAMILY MEDICINE

## 2023-03-13 PROCEDURE — 3074F PR MOST RECENT SYSTOLIC BLOOD PRESSURE < 130 MM HG: ICD-10-PCS | Mod: CPTII,S$GLB,, | Performed by: FAMILY MEDICINE

## 2023-03-13 PROCEDURE — 1126F PR PAIN SEVERITY QUANTIFIED, NO PAIN PRESENT: ICD-10-PCS | Mod: CPTII,S$GLB,, | Performed by: FAMILY MEDICINE

## 2023-03-13 PROCEDURE — 3288F PR FALLS RISK ASSESSMENT DOCUMENTED: ICD-10-PCS | Mod: CPTII,S$GLB,, | Performed by: FAMILY MEDICINE

## 2023-03-13 RX ORDER — OMEPRAZOLE 40 MG/1
40 CAPSULE, DELAYED RELEASE ORAL
Qty: 90 CAPSULE | Refills: 1 | Status: SHIPPED | OUTPATIENT
Start: 2023-03-13 | End: 2023-08-24

## 2023-03-13 NOTE — PATIENT INSTRUCTIONS
START OMEPRAZOLE 40 MG DAILY PRIOR TO BREAKFAST BUT ON VERY FIRST DAY TAKE BEFORE DINNER AS WELL.    EASE BACK INTO NORMAL DIET WITH STARTING THE OMEPRAZOLE.    STOP HYDROCHLOROTHIAZIDE FOR NOW AND MONITOR PRESSURE WHICH IS LOW TODAY.     CHECK LABS TOMORROW WITH PHYSICAL Friday.

## 2023-03-14 ENCOUNTER — LAB VISIT (OUTPATIENT)
Dept: LAB | Facility: HOSPITAL | Age: 73
End: 2023-03-14
Attending: FAMILY MEDICINE
Payer: COMMERCIAL

## 2023-03-14 DIAGNOSIS — Z79.899 MEDICATION MANAGEMENT: ICD-10-CM

## 2023-03-14 DIAGNOSIS — E21.0 PRIMARY HYPERPARATHYROIDISM: ICD-10-CM

## 2023-03-14 DIAGNOSIS — E55.9 VITAMIN D DEFICIENCY: ICD-10-CM

## 2023-03-14 DIAGNOSIS — M81.0 POSTMENOPAUSAL OSTEOPOROSIS: ICD-10-CM

## 2023-03-14 DIAGNOSIS — Z98.890 H/O PARATHYROIDECTOMY: ICD-10-CM

## 2023-03-14 DIAGNOSIS — Z90.89 H/O PARATHYROIDECTOMY: ICD-10-CM

## 2023-03-14 LAB
25(OH)D3+25(OH)D2 SERPL-MCNC: 62 NG/ML (ref 30–96)
ALBUMIN SERPL BCP-MCNC: 2.5 G/DL (ref 3.5–5.2)
ALP SERPL-CCNC: 72 U/L (ref 55–135)
ALT SERPL W/O P-5'-P-CCNC: 15 U/L (ref 10–44)
ANION GAP SERPL CALC-SCNC: 13 MMOL/L (ref 8–16)
AST SERPL-CCNC: 12 U/L (ref 10–40)
BASOPHILS # BLD AUTO: 0.07 K/UL (ref 0–0.2)
BASOPHILS NFR BLD: 0.6 % (ref 0–1.9)
BILIRUB SERPL-MCNC: 0.6 MG/DL (ref 0.1–1)
BUN SERPL-MCNC: 6 MG/DL (ref 8–23)
CALCIUM SERPL-MCNC: 8.7 MG/DL (ref 8.7–10.5)
CHLORIDE SERPL-SCNC: 101 MMOL/L (ref 95–110)
CHOLEST SERPL-MCNC: 148 MG/DL (ref 120–199)
CHOLEST/HDLC SERPL: 5.7 {RATIO} (ref 2–5)
CO2 SERPL-SCNC: 25 MMOL/L (ref 23–29)
CREAT SERPL-MCNC: 0.7 MG/DL (ref 0.5–1.4)
DIFFERENTIAL METHOD: ABNORMAL
EOSINOPHIL # BLD AUTO: 0.1 K/UL (ref 0–0.5)
EOSINOPHIL NFR BLD: 0.7 % (ref 0–8)
ERYTHROCYTE [DISTWIDTH] IN BLOOD BY AUTOMATED COUNT: 13.8 % (ref 11.5–14.5)
EST. GFR  (NO RACE VARIABLE): >60 ML/MIN/1.73 M^2
ESTIMATED AVG GLUCOSE: 97 MG/DL (ref 68–131)
GLUCOSE SERPL-MCNC: 88 MG/DL (ref 70–110)
HBA1C MFR BLD: 5 % (ref 4–5.6)
HCT VFR BLD AUTO: 38.5 % (ref 37–48.5)
HDLC SERPL-MCNC: 26 MG/DL (ref 40–75)
HDLC SERPL: 17.6 % (ref 20–50)
HGB BLD-MCNC: 11.8 G/DL (ref 12–16)
IMM GRANULOCYTES # BLD AUTO: 0.09 K/UL (ref 0–0.04)
IMM GRANULOCYTES NFR BLD AUTO: 0.8 % (ref 0–0.5)
LDLC SERPL CALC-MCNC: 103.2 MG/DL (ref 63–159)
LYMPHOCYTES # BLD AUTO: 1.2 K/UL (ref 1–4.8)
LYMPHOCYTES NFR BLD: 10 % (ref 18–48)
MAGNESIUM SERPL-MCNC: 2 MG/DL (ref 1.6–2.6)
MCH RBC QN AUTO: 30.4 PG (ref 27–31)
MCHC RBC AUTO-ENTMCNC: 30.6 G/DL (ref 32–36)
MCV RBC AUTO: 99 FL (ref 82–98)
MONOCYTES # BLD AUTO: 0.8 K/UL (ref 0.3–1)
MONOCYTES NFR BLD: 7.1 % (ref 4–15)
NEUTROPHILS # BLD AUTO: 9.5 K/UL (ref 1.8–7.7)
NEUTROPHILS NFR BLD: 80.8 % (ref 38–73)
NONHDLC SERPL-MCNC: 122 MG/DL
NRBC BLD-RTO: 0 /100 WBC
PLATELET # BLD AUTO: 695 K/UL (ref 150–450)
PMV BLD AUTO: 9.5 FL (ref 9.2–12.9)
POTASSIUM SERPL-SCNC: 4.4 MMOL/L (ref 3.5–5.1)
PROT SERPL-MCNC: 6.3 G/DL (ref 6–8.4)
PTH-INTACT SERPL-MCNC: 51.5 PG/ML (ref 9–77)
RBC # BLD AUTO: 3.88 M/UL (ref 4–5.4)
SODIUM SERPL-SCNC: 139 MMOL/L (ref 136–145)
TRIGL SERPL-MCNC: 94 MG/DL (ref 30–150)
TSH SERPL DL<=0.005 MIU/L-ACNC: 2.25 UIU/ML (ref 0.4–4)
VIT B12 SERPL-MCNC: 692 PG/ML (ref 210–950)
WBC # BLD AUTO: 11.7 K/UL (ref 3.9–12.7)

## 2023-03-14 PROCEDURE — 36415 COLL VENOUS BLD VENIPUNCTURE: CPT | Mod: PO | Performed by: FAMILY MEDICINE

## 2023-03-14 PROCEDURE — 80061 LIPID PANEL: CPT | Performed by: FAMILY MEDICINE

## 2023-03-14 PROCEDURE — 83735 ASSAY OF MAGNESIUM: CPT | Performed by: FAMILY MEDICINE

## 2023-03-14 PROCEDURE — 83036 HEMOGLOBIN GLYCOSYLATED A1C: CPT | Performed by: FAMILY MEDICINE

## 2023-03-14 PROCEDURE — 85025 COMPLETE CBC W/AUTO DIFF WBC: CPT | Performed by: FAMILY MEDICINE

## 2023-03-14 PROCEDURE — 82607 VITAMIN B-12: CPT | Performed by: FAMILY MEDICINE

## 2023-03-14 PROCEDURE — 83970 ASSAY OF PARATHORMONE: CPT | Performed by: FAMILY MEDICINE

## 2023-03-14 PROCEDURE — 82306 VITAMIN D 25 HYDROXY: CPT | Performed by: FAMILY MEDICINE

## 2023-03-14 PROCEDURE — 80053 COMPREHEN METABOLIC PANEL: CPT | Performed by: FAMILY MEDICINE

## 2023-03-14 PROCEDURE — 84443 ASSAY THYROID STIM HORMONE: CPT | Performed by: FAMILY MEDICINE

## 2023-03-17 ENCOUNTER — OFFICE VISIT (OUTPATIENT)
Dept: FAMILY MEDICINE | Facility: CLINIC | Age: 73
End: 2023-03-17
Payer: COMMERCIAL

## 2023-03-17 VITALS
SYSTOLIC BLOOD PRESSURE: 106 MMHG | OXYGEN SATURATION: 99 % | DIASTOLIC BLOOD PRESSURE: 68 MMHG | WEIGHT: 203.25 LBS | HEART RATE: 113 BPM | HEIGHT: 66 IN | BODY MASS INDEX: 32.66 KG/M2

## 2023-03-17 DIAGNOSIS — K21.9 GASTROESOPHAGEAL REFLUX DISEASE, UNSPECIFIED WHETHER ESOPHAGITIS PRESENT: ICD-10-CM

## 2023-03-17 DIAGNOSIS — Z51.81 ENCOUNTER FOR MONITORING LONG-TERM PROTON PUMP INHIBITOR THERAPY: ICD-10-CM

## 2023-03-17 DIAGNOSIS — D64.9 BORDERLINE ANEMIA: ICD-10-CM

## 2023-03-17 DIAGNOSIS — R77.0 LOW SERUM ALBUMIN: ICD-10-CM

## 2023-03-17 DIAGNOSIS — Z90.89 H/O PARATHYROIDECTOMY: ICD-10-CM

## 2023-03-17 DIAGNOSIS — Z79.899 MEDICATION MANAGEMENT: ICD-10-CM

## 2023-03-17 DIAGNOSIS — Z78.0 POSTMENOPAUSAL: ICD-10-CM

## 2023-03-17 DIAGNOSIS — E66.9 OBESITY (BMI 30.0-34.9): ICD-10-CM

## 2023-03-17 DIAGNOSIS — M81.0 POSTMENOPAUSAL OSTEOPOROSIS: ICD-10-CM

## 2023-03-17 DIAGNOSIS — Z00.00 ROUTINE GENERAL MEDICAL EXAMINATION AT A HEALTH CARE FACILITY: Primary | ICD-10-CM

## 2023-03-17 DIAGNOSIS — Z79.899 ENCOUNTER FOR MONITORING LONG-TERM PROTON PUMP INHIBITOR THERAPY: ICD-10-CM

## 2023-03-17 DIAGNOSIS — Z98.890 H/O PARATHYROIDECTOMY: ICD-10-CM

## 2023-03-17 DIAGNOSIS — M17.0 BILATERAL PRIMARY OSTEOARTHRITIS OF KNEE: ICD-10-CM

## 2023-03-17 DIAGNOSIS — E55.9 VITAMIN D DEFICIENCY: ICD-10-CM

## 2023-03-17 DIAGNOSIS — H81.09 MENIERE'S DISEASE, UNSPECIFIED LATERALITY: ICD-10-CM

## 2023-03-17 DIAGNOSIS — Z90.710 H/O HYSTERECTOMY FOR BENIGN DISEASE: ICD-10-CM

## 2023-03-17 DIAGNOSIS — R79.89 ELEVATED PLATELET COUNT: ICD-10-CM

## 2023-03-17 PROCEDURE — 1160F RVW MEDS BY RX/DR IN RCRD: CPT | Mod: CPTII,S$GLB,, | Performed by: FAMILY MEDICINE

## 2023-03-17 PROCEDURE — 99999 PR PBB SHADOW E&M-EST. PATIENT-LVL IV: ICD-10-PCS | Mod: PBBFAC,,, | Performed by: FAMILY MEDICINE

## 2023-03-17 PROCEDURE — 1101F PR PT FALLS ASSESS DOC 0-1 FALLS W/OUT INJ PAST YR: ICD-10-PCS | Mod: CPTII,S$GLB,, | Performed by: FAMILY MEDICINE

## 2023-03-17 PROCEDURE — 1101F PT FALLS ASSESS-DOCD LE1/YR: CPT | Mod: CPTII,S$GLB,, | Performed by: FAMILY MEDICINE

## 2023-03-17 PROCEDURE — 3288F PR FALLS RISK ASSESSMENT DOCUMENTED: ICD-10-PCS | Mod: CPTII,S$GLB,, | Performed by: FAMILY MEDICINE

## 2023-03-17 PROCEDURE — 3008F PR BODY MASS INDEX (BMI) DOCUMENTED: ICD-10-PCS | Mod: CPTII,S$GLB,, | Performed by: FAMILY MEDICINE

## 2023-03-17 PROCEDURE — 3078F DIAST BP <80 MM HG: CPT | Mod: CPTII,S$GLB,, | Performed by: FAMILY MEDICINE

## 2023-03-17 PROCEDURE — 99999 PR PBB SHADOW E&M-EST. PATIENT-LVL IV: CPT | Mod: PBBFAC,,, | Performed by: FAMILY MEDICINE

## 2023-03-17 PROCEDURE — 99397 PER PM REEVAL EST PAT 65+ YR: CPT | Mod: S$GLB,,, | Performed by: FAMILY MEDICINE

## 2023-03-17 PROCEDURE — 1126F AMNT PAIN NOTED NONE PRSNT: CPT | Mod: CPTII,S$GLB,, | Performed by: FAMILY MEDICINE

## 2023-03-17 PROCEDURE — 3074F PR MOST RECENT SYSTOLIC BLOOD PRESSURE < 130 MM HG: ICD-10-PCS | Mod: CPTII,S$GLB,, | Performed by: FAMILY MEDICINE

## 2023-03-17 PROCEDURE — 1126F PR PAIN SEVERITY QUANTIFIED, NO PAIN PRESENT: ICD-10-PCS | Mod: CPTII,S$GLB,, | Performed by: FAMILY MEDICINE

## 2023-03-17 PROCEDURE — 99397 PR PREVENTIVE VISIT,EST,65 & OVER: ICD-10-PCS | Mod: S$GLB,,, | Performed by: FAMILY MEDICINE

## 2023-03-17 PROCEDURE — 1159F MED LIST DOCD IN RCRD: CPT | Mod: CPTII,S$GLB,, | Performed by: FAMILY MEDICINE

## 2023-03-17 PROCEDURE — 1160F PR REVIEW ALL MEDS BY PRESCRIBER/CLIN PHARMACIST DOCUMENTED: ICD-10-PCS | Mod: CPTII,S$GLB,, | Performed by: FAMILY MEDICINE

## 2023-03-17 PROCEDURE — 3008F BODY MASS INDEX DOCD: CPT | Mod: CPTII,S$GLB,, | Performed by: FAMILY MEDICINE

## 2023-03-17 PROCEDURE — 3074F SYST BP LT 130 MM HG: CPT | Mod: CPTII,S$GLB,, | Performed by: FAMILY MEDICINE

## 2023-03-17 PROCEDURE — 3288F FALL RISK ASSESSMENT DOCD: CPT | Mod: CPTII,S$GLB,, | Performed by: FAMILY MEDICINE

## 2023-03-17 PROCEDURE — 3078F PR MOST RECENT DIASTOLIC BLOOD PRESSURE < 80 MM HG: ICD-10-PCS | Mod: CPTII,S$GLB,, | Performed by: FAMILY MEDICINE

## 2023-03-17 PROCEDURE — 3044F PR MOST RECENT HEMOGLOBIN A1C LEVEL <7.0%: ICD-10-PCS | Mod: CPTII,S$GLB,, | Performed by: FAMILY MEDICINE

## 2023-03-17 PROCEDURE — 3044F HG A1C LEVEL LT 7.0%: CPT | Mod: CPTII,S$GLB,, | Performed by: FAMILY MEDICINE

## 2023-03-17 PROCEDURE — 1159F PR MEDICATION LIST DOCUMENTED IN MEDICAL RECORD: ICD-10-PCS | Mod: CPTII,S$GLB,, | Performed by: FAMILY MEDICINE

## 2023-03-17 NOTE — PROGRESS NOTES
Office Visit    Patient Name: Carlene Fong    : 1950  MRN: [id number]    Subjective:  Carlene is a 72 y.o. female who presents today for:    Annual Exam    MOST RECENT OV W/ ME 3/13/23-- GERD-- now on Omeprazole 40 w/ significant improvement    Carlene presents today for annual wellness exam (previous annual 3/14/22) and monitoring of chronic conditions-- primary hyperparathyroidism associated with vitamin D deficiency & Osteoporosis now s/p 3 gland parathyroidectomy on 2019, obesity, history of positive rheumatoid factor without clinical evidence of rheumatoid arthritis, left wrist carpal tunnel(s/p injection 10/2018), mild anxiety, GERD with recent exacerbation, bilateral knee pain followed by Ortho Dr Dobbs and managed with periodic cortisone injections.     As far as her Hyperparathyroidism with elevated Calcium: I referred her to [facility]/ Rm secondary to osteoporosis dx 1/15/2019 and hypercalcemia at 14.   She underwent 3 gland parathyroidectomy 19 and most recently followed up with Endo Dr Balderas on 2019.   Her TSH/PTH/ and Renal function panel have normalized as of 3/10/21.   She is taking Calcium 500 BID and Vitamin D 1000 IU daily      Sees gynecology for yearly physicals-- Dr De Dios upcoming on . She has had a hysterectomy with removal of one ovary.      Today she is feeling overall well.  Her recent severe GERD symptoms are now improving on omeprazole 40 mg daily.  She is easing back into a normal diet and activity routine.  Has upcoming follow-up with GI Dr. Jaimes.    LABS 2023 OVERALL UNREMARKABLE IN TERMS OF LIVER/KIDNEY FUNCTION, PTH/VITAMIN-D/CALCIUM, HOWEVER, ALBUMIN IS LOW AT 2.5 AND PLATELETS ARE MILDLY ELEVATED ALONG WITH BORDERLINE ANEMIA.  hAS A PRIOR HISTORY ANEMIA IN THE DISTANT PAST.    OA of knees-- has upcoming injections 3/23/23  Carpal tunnel-- injections per Dr Evens Chaves-- stable with prn zyrtec, Flonase     General lifestyle  habits are as follows:   Diet : healthy- eating consistent low carb  healthy meals-- drinking plenty of water   Exercise: cardio--was walking 20-30 min every day-- was out of her routine with her severe GERD, but now easing back in  Sleep: good-- sleeps 7-8 hours nightly and does not snore.   Weight:  Down bout 5 lb over the last year, BMI 32     Immunizations: SHINGRIX 2/2/ 11/5/2018, TDaP 12/1/2015, PREVNAR 13 1/10/2018, Pneumovax 23 1/15/2019, FLU SHOT UTD 10/3/22, COVID-19 COMPLETED 3/2/21 w/ PFIZER BOOSTER 12/29/21, OMICRON BOOSTER DECLINED     Screening tests: colonoscopy 8/2009-- normal-->repeat in 10 years-- DECLINES AND WISHES TO CONTINUE FIT TESTING(FIT - 3/24/22), DEXA 3/16/21-- Osteoporosis of the hips & PROLIA ADVISED (endocrine agrees) but she declines starting it & will update DEXA 3/2023--ORDERED, mammograms-- up to date 11/17/22,  No longer needs PAPS-HYSTERECTOMY, , Hep C (-)  5/27/14     Eye/Dental: up to date with both          PAST MEDICAL HISTORY, SURGICAL/SOCIAL/FAMILY HISTORY REVIEWED AS PER CHART, WITH PERTINENT FINDINGS INCLUDED IN HISTORY SECTION OF NOTE.     Current Medications    Medication List with Changes/Refills   Current Medications    CALCIUM CARBONATE (CALCIUM 600 ORAL)    Take by mouth.    CICLOPIROX (LOPROX) 0.77 % CREA    Apply topically 2 (two) times daily.    CO-ENZYME Q-10 30 MG CAPSULE    Take 30 mg by mouth once daily.     DICLOFENAC SODIUM (VOLTAREN) 1 % GEL    Apply 2 g topically 3 (three) times daily.    FLAXSEED ORAL    Take by mouth once daily.     FLUTICASONE PROPIONATE (FLONASE) 50 MCG/ACTUATION NASAL SPRAY    2 sprays (100 mcg total) by Each Nostril route nightly.    GABAPENTIN (NEURONTIN) 300 MG CAPSULE    Take 1 capsule (300 mg total) by mouth 2 (two) times daily.    HYDROCHLOROTHIAZIDE (HYDRODIURIL) 12.5 MG TAB    Take 1 tablet (12.5 mg total) by mouth every other day.    MECLIZINE (ANTIVERT) 25 MG TABLET    Take 1 tablet (25 mg total) by mouth 3 (three) times  "daily as needed for Dizziness.    OMEPRAZOLE (PRILOSEC) 40 MG CAPSULE    Take 1 capsule (40 mg total) by mouth before breakfast.    ONDANSETRON (ZOFRAN) 8 MG TABLET    Take 1 tablet (8 mg total) by mouth every 8 (eight) hours as needed for Nausea.    VITAMIN D 1000 UNITS TAB    Take 185 mg by mouth once daily.   Discontinued Medications    DICLOFENAC SODIUM (VOLTAREN) 1 % GEL    Apply 2 g topically 3 (three) times daily.    ERYTHROMYCIN (ROMYCIN) OPHTHALMIC OINTMENT    Place into the right eye every evening.       Allergies   Review of patient's allergies indicates:   Allergen Reactions    Ropinirole Swelling and Rash      palpitations, swelling of tongue   palpitations, swelling of tongue         Review of Systems (Pertinent positives)  Review of Systems   Constitutional:  Negative for activity change and unexpected weight change.   HENT:  Negative for hearing loss, rhinorrhea and trouble swallowing.    Eyes:  Negative for discharge and visual disturbance.   Respiratory:  Negative for chest tightness and wheezing.    Cardiovascular:  Negative for chest pain and palpitations.   Gastrointestinal:  Negative for blood in stool, constipation, diarrhea and vomiting.   Endocrine: Negative for polydipsia and polyuria.   Genitourinary:  Negative for difficulty urinating, dysuria, hematuria and menstrual problem.   Musculoskeletal:  Positive for arthralgias. Negative for joint swelling and neck pain.   Neurological:  Negative for weakness and headaches.   Psychiatric/Behavioral:  Negative for confusion and dysphoric mood.      /68 (BP Location: Right arm, Patient Position: Sitting, BP Method: Medium (Manual))   Pulse (!) 113   Ht 5' 6" (1.676 m)   Wt 92.2 kg (203 lb 4.2 oz)   LMP  (LMP Unknown)   SpO2 99%   BMI 32.81 kg/m²     Physical Exam  Vitals reviewed.   Constitutional:       General: She is not in acute distress.     Appearance: Normal appearance. She is well-developed.   HENT:      Head: Normocephalic and " atraumatic.      Right Ear: Ear canal normal. Tympanic membrane is not erythematous or bulging.      Left Ear: Ear canal normal. Tympanic membrane is not erythematous or bulging.      Nose: Nose normal.      Mouth/Throat:      Mouth: Mucous membranes are moist.      Pharynx: No oropharyngeal exudate.   Eyes:      Extraocular Movements: Extraocular movements intact.      Conjunctiva/sclera: Conjunctivae normal.   Neck:      Thyroid: No thyroid mass or thyromegaly.      Vascular: No carotid bruit.   Cardiovascular:      Rate and Rhythm: Normal rate and regular rhythm.      Pulses:           Dorsalis pedis pulses are 2+ on the right side and 2+ on the left side.      Heart sounds: Normal heart sounds. No murmur heard.  Pulmonary:      Effort: Pulmonary effort is normal. No respiratory distress.      Breath sounds: Normal breath sounds.   Abdominal:      General: Bowel sounds are normal. There is no distension.      Palpations: Abdomen is soft. There is no mass.      Tenderness: There is no abdominal tenderness.   Musculoskeletal:         General: Normal range of motion.      Right lower leg: No edema.      Left lower leg: No edema.   Lymphadenopathy:      Cervical: No cervical adenopathy.   Skin:     General: Skin is warm and dry.      Findings: No rash.   Neurological:      General: No focal deficit present.      Mental Status: She is alert and oriented to person, place, and time.   Psychiatric:         Mood and Affect: Mood normal.         Behavior: Behavior normal.         Assessment/Plan:  Carlene Fong is a 72 y.o. female who presents today for :        ICD-10-CM ICD-9-CM    1. Routine general medical examination at a health care facility  Z00.00 V70.0       2. H/O hysterectomy for benign disease  Z90.710 V88.01       3. Obesity (BMI 30.0-34.9)  E66.9 278.00       4. Gastroesophageal reflux disease, unspecified whether esophagitis present  K21.9 530.81       5. Encounter for monitoring long-term proton pump  inhibitor therapy  Z51.81 V58.83     Z79.899 V58.69       6. H/O parathyroidectomy  E89.2 252.8       7. Meniere's disease, unspecified laterality  H81.09 386.00       8. Postmenopausal osteoporosis  M81.0 733.01       9. Vitamin D deficiency  E55.9 268.9       10. Medication management  Z79.899 V58.69 Comprehensive Metabolic Panel      11. Elevated platelet count  R79.89 790.6       12. Borderline anemia  D64.9 285.9 CBC Auto Differential      Ferritin      Iron and TIBC      13. Low serum albumin  R77.0 790.99 Comprehensive Metabolic Panel      14. Bilateral primary osteoarthritis of knee  M17.0 715.16       15. Postmenopausal  Z78.0 V49.81 DXA Bone Density Axial Skeleton 1 or more sites        ADVISED ON DIET/EXERCISE/SLEEP, ROUTINE EYE/DENTAL EXAMS, AND THE IMPORTANCE OF KEEPING UP WITH APPROPRIATE SCREENING TESTS BASED ON AGE AND RISK FACTORS.  MAMMOGRAM UP-TO-DATE AS OF 11/20/2022, UPDATED DEXA SCAN ORDERED, YEARLY FIT TEST UP-TO-DATE AS OF 03/20/2022-HER PRIOR COLONOSCOPY WAS IN 2009-UNREMARKABLE.  IMMUNIZATIONS ARE UP-TO-DATE EXCEPT OMICRON COVID-19 BOOSTER WHICH IS DECLINED.      OBESITY:  Eating a healthy diet and getting back on track with her regular walking routine, also advised on the importance of muscle building/strength training.      GERD:  Recently severe exacerbation for which she was evaluated by Dr. Thurston and the ER with unremarkable EKG, labs.  Now doing better on omeprazole 40 but for several weeks was unable to eat her usual diet due to her severe symptoms.  Continue omeprazole 40, ppi labs within normal limits, keep follow-up with GI Dr. Jaimes scheduled 4/11/2023, especially in light of recent mild anemia in the setting of severe GERD flare- may need EGD.      BORDERLINE ANEMIA WITH ELEVATED PLATELET COUNTS, WEAKNESS:  Has some new mild anemia with elevated platelets-suspected reactive thrombocytosis.  She is now back on her usual diet now that she is taking a prescription PPI.  Advise  repeat blood count to re-evaluate hematocrit, platelet function and iron studies in a few weeks-will check these prior to her follow-up with GI.    Recent blood pressure has been low and resting heart rate a bit high compared to prior, so important to eval this anemia.  Though some of this could be related to her not eating for several weeks due to her severe GERD.    HISTORY OF PRIMARY HYPERPARATHYROIDISM, STATUS POST PARATHYROIDECTOMY AND ASSOCIATED WITH POSTMENOPAUSAL OSTEOPOROSIS:  PTH, calcium/vitamin-D within normal limits, updated DEXA scan ordered.      BILATERAL KNEE OSTEOARTHRITIS:  Benefits from periodic cortisone shots per Orthopedics, has an upcoming appointment scheduled.  Also discussed the importance of continued physical activity and weight loss.    LES'S:  Have advised stopping hydrochlorothiazide which she was taking for many years management as her blood pressures have recently been quite low.  Will monitor off medication.    There are no Patient Instructions on file for this visit.      Follow up in about 4 weeks (around 4/14/2023) for to follow up on lab results.

## 2023-03-20 ENCOUNTER — PATIENT MESSAGE (OUTPATIENT)
Dept: FAMILY MEDICINE | Facility: CLINIC | Age: 73
End: 2023-03-20
Payer: COMMERCIAL

## 2023-03-22 ENCOUNTER — HOSPITAL ENCOUNTER (OUTPATIENT)
Dept: RADIOLOGY | Facility: HOSPITAL | Age: 73
Discharge: HOME | End: 2023-03-22
Attending: ORTHOPAEDIC SURGERY
Payer: COMMERCIAL

## 2023-03-22 DIAGNOSIS — M17.0 PRIMARY OSTEOARTHRITIS OF BOTH KNEES: ICD-10-CM

## 2023-03-22 PROCEDURE — 73564 XR KNEE ORTHO BILAT WITH FLEXION: ICD-10-PCS | Mod: 26,50,, | Performed by: RADIOLOGY

## 2023-03-22 PROCEDURE — 73564 X-RAY EXAM KNEE 4 OR MORE: CPT | Mod: 26,50,, | Performed by: RADIOLOGY

## 2023-03-22 PROCEDURE — 73564 X-RAY EXAM KNEE 4 OR MORE: CPT | Mod: TC,50,FY

## 2023-03-22 NOTE — PROGRESS NOTES
Subjective:      Patient ID: Carlene Fong is a 72 y.o. female.    Chief Complaint: Follow-up of the Right Knee and Follow-up of the Left Knee      HPI: Carlene Fong is here in follow-up bilateral knee pain related to osteoarthritis.  She is currently in her palate knee protocol.  She last saw Dr. Dobbs 3 months ago history of corticosteroid injections.  She reports injections were helpful.  Today, she complains of bilateral medial and anterior knee pain.    Past Medical History:   Diagnosis Date    Abnormal mammogram 8/10/2016    Followed up with diagnostic mammogram and ultrasound 10/22/2015at [facility]  that showed no concerning findings with recommendation to continue yearly screening.     Anxiety 8/8/2017    will need to readdress this if work up is negative and symptoms persist    Arthritis of knee 1/7/2015    Diverticulosis     Meniere's disease 8/15/2017    Osteopenia 12/1/2015    next bone density 4/2016, seeing endocrine for hyperparathyroidism     Positive anti-CCP test 4/7/2014    Primary hyperparathyroidism 8/26/2013    will be following up with Dr. [facility] in 2016, blood work today. stoped taking calcitonin due to nausea     Rheumatoid factor positive 4/7/2014    Vertigo 7/16/2017    Vitamin D deficiency 12/1/2015       Current Outpatient Medications:     calcium carbonate (CALCIUM 600 ORAL), Take by mouth., Disp: , Rfl:     ciclopirox (LOPROX) 0.77 % Crea, Apply topically 2 (two) times daily., Disp: , Rfl:     co-enzyme Q-10 30 mg capsule, Take 30 mg by mouth once daily. , Disp: , Rfl:     diclofenac sodium (VOLTAREN) 1 % Gel, Apply 2 g topically 3 (three) times daily., Disp: 100 g, Rfl: 2    FLAXSEED ORAL, Take by mouth once daily. , Disp: , Rfl:     fluticasone propionate (FLONASE) 50 mcg/actuation nasal spray, 2 sprays (100 mcg total) by Each Nostril route nightly., Disp: 48 mL, Rfl: 4    gabapentin (NEURONTIN) 300 MG capsule, Take 1 capsule (300 mg total) by mouth 2 (two)  "times daily., Disp: 180 capsule, Rfl: 3    hydroCHLOROthiazide (HYDRODIURIL) 12.5 MG Tab, Take 1 tablet (12.5 mg total) by mouth every other day., Disp: 90 tablet, Rfl: 4    meclizine (ANTIVERT) 25 mg tablet, Take 1 tablet (25 mg total) by mouth 3 (three) times daily as needed for Dizziness., Disp: 30 tablet, Rfl: 1    omeprazole (PRILOSEC) 40 MG capsule, Take 1 capsule (40 mg total) by mouth before breakfast., Disp: 90 capsule, Rfl: 1    ondansetron (ZOFRAN) 8 MG tablet, Take 1 tablet (8 mg total) by mouth every 8 (eight) hours as needed for Nausea., Disp: 30 tablet, Rfl: 1    vitamin D 1000 units Tab, Take 185 mg by mouth once daily., Disp: , Rfl:   Review of patient's allergies indicates:   Allergen Reactions    Ropinirole Swelling and Rash      palpitations, swelling of tongue   palpitations, swelling of tongue       Ht 5' 6" (1.676 m)   Wt 92.1 kg (203 lb)   LMP  (LMP Unknown)   BMI 32.77 kg/m²     ROS      Objective:    Ortho Exam         Assessment:     Imaging:  x-rays are reviewed significant for mild to moderate joint space narrowing, patellar tilting and narrowing of patellofemoral joint space        1. Primary osteoarthritis of both knees          Plan:         Patient is here specifically for palliative knee injections  CSI bilateral knees without complication       No follow-ups on file.            "

## 2023-03-23 ENCOUNTER — OFFICE VISIT (OUTPATIENT)
Dept: ORTHOPEDICS | Facility: CLINIC | Age: 73
End: 2023-03-23
Payer: COMMERCIAL

## 2023-03-23 VITALS — HEIGHT: 66 IN | BODY MASS INDEX: 32.62 KG/M2 | WEIGHT: 203 LBS

## 2023-03-23 DIAGNOSIS — M17.0 PRIMARY OSTEOARTHRITIS OF BOTH KNEES: Primary | ICD-10-CM

## 2023-03-23 PROCEDURE — 20610 DRAIN/INJ JOINT/BURSA W/O US: CPT | Mod: 50,S$GLB,, | Performed by: ORTHOPAEDIC SURGERY

## 2023-03-23 PROCEDURE — 99999 PR PBB SHADOW E&M-EST. PATIENT-LVL III: CPT | Mod: PBBFAC,,, | Performed by: ORTHOPAEDIC SURGERY

## 2023-03-23 PROCEDURE — 3288F FALL RISK ASSESSMENT DOCD: CPT | Mod: CPTII,S$GLB,, | Performed by: ORTHOPAEDIC SURGERY

## 2023-03-23 PROCEDURE — 3044F PR MOST RECENT HEMOGLOBIN A1C LEVEL <7.0%: ICD-10-PCS | Mod: CPTII,S$GLB,, | Performed by: ORTHOPAEDIC SURGERY

## 2023-03-23 PROCEDURE — 99212 PR OFFICE/OUTPT VISIT, EST, LEVL II, 10-19 MIN: ICD-10-PCS | Mod: 25,S$GLB,, | Performed by: ORTHOPAEDIC SURGERY

## 2023-03-23 PROCEDURE — 3008F BODY MASS INDEX DOCD: CPT | Mod: CPTII,S$GLB,, | Performed by: ORTHOPAEDIC SURGERY

## 2023-03-23 PROCEDURE — 1159F MED LIST DOCD IN RCRD: CPT | Mod: CPTII,S$GLB,, | Performed by: ORTHOPAEDIC SURGERY

## 2023-03-23 PROCEDURE — 3044F HG A1C LEVEL LT 7.0%: CPT | Mod: CPTII,S$GLB,, | Performed by: ORTHOPAEDIC SURGERY

## 2023-03-23 PROCEDURE — 1101F PT FALLS ASSESS-DOCD LE1/YR: CPT | Mod: CPTII,S$GLB,, | Performed by: ORTHOPAEDIC SURGERY

## 2023-03-23 PROCEDURE — 3288F PR FALLS RISK ASSESSMENT DOCUMENTED: ICD-10-PCS | Mod: CPTII,S$GLB,, | Performed by: ORTHOPAEDIC SURGERY

## 2023-03-23 PROCEDURE — 20610 LARGE JOINT ASPIRATION/INJECTION: BILATERAL KNEE: ICD-10-PCS | Mod: 50,S$GLB,, | Performed by: ORTHOPAEDIC SURGERY

## 2023-03-23 PROCEDURE — 99999 PR PBB SHADOW E&M-EST. PATIENT-LVL III: ICD-10-PCS | Mod: PBBFAC,,, | Performed by: ORTHOPAEDIC SURGERY

## 2023-03-23 PROCEDURE — 3008F PR BODY MASS INDEX (BMI) DOCUMENTED: ICD-10-PCS | Mod: CPTII,S$GLB,, | Performed by: ORTHOPAEDIC SURGERY

## 2023-03-23 PROCEDURE — 1125F AMNT PAIN NOTED PAIN PRSNT: CPT | Mod: CPTII,S$GLB,, | Performed by: ORTHOPAEDIC SURGERY

## 2023-03-23 PROCEDURE — 1101F PR PT FALLS ASSESS DOC 0-1 FALLS W/OUT INJ PAST YR: ICD-10-PCS | Mod: CPTII,S$GLB,, | Performed by: ORTHOPAEDIC SURGERY

## 2023-03-23 PROCEDURE — 99212 OFFICE O/P EST SF 10 MIN: CPT | Mod: 25,S$GLB,, | Performed by: ORTHOPAEDIC SURGERY

## 2023-03-23 PROCEDURE — 1159F PR MEDICATION LIST DOCUMENTED IN MEDICAL RECORD: ICD-10-PCS | Mod: CPTII,S$GLB,, | Performed by: ORTHOPAEDIC SURGERY

## 2023-03-23 PROCEDURE — 1125F PR PAIN SEVERITY QUANTIFIED, PAIN PRESENT: ICD-10-PCS | Mod: CPTII,S$GLB,, | Performed by: ORTHOPAEDIC SURGERY

## 2023-03-23 RX ORDER — LIDOCAINE HYDROCHLORIDE 10 MG/ML
2 INJECTION INFILTRATION; PERINEURAL
Status: DISCONTINUED | OUTPATIENT
Start: 2023-03-23 | End: 2023-03-23 | Stop reason: HOSPADM

## 2023-03-23 RX ORDER — TRIAMCINOLONE ACETONIDE 40 MG/ML
40 INJECTION, SUSPENSION INTRA-ARTICULAR; INTRAMUSCULAR
Status: DISCONTINUED | OUTPATIENT
Start: 2023-03-23 | End: 2023-03-23 | Stop reason: HOSPADM

## 2023-03-23 RX ADMIN — TRIAMCINOLONE ACETONIDE 40 MG: 40 INJECTION, SUSPENSION INTRA-ARTICULAR; INTRAMUSCULAR at 08:03

## 2023-03-23 RX ADMIN — LIDOCAINE HYDROCHLORIDE 2 ML: 10 INJECTION INFILTRATION; PERINEURAL at 08:03

## 2023-03-23 NOTE — PROCEDURES
Large Joint Aspiration/Injection: bilateral knee    Date/Time: 3/23/2023 8:00 AM  Performed by: Laly Jackson PA-C  Authorized by: Laly Jackson PA-C     Consent Done?:  Yes (Verbal)  Indications:  Arthritis and pain  Timeout: prior to procedure the correct patient, procedure, and site was verified    Prep: patient was prepped and draped in usual sterile fashion      Details:  Needle Size:  21 G  Approach:  Lateral  Location:  Knee  Laterality:  Bilateral  Site:  Bilateral knee  Medications (Right):  2 mL LIDOcaine HCL 10 mg/ml (1%) 10 mg/mL (1 %); 40 mg triamcinolone acetonide 40 mg/mL  Medications (Left):  2 mL LIDOcaine HCL 10 mg/ml (1%) 10 mg/mL (1 %); 40 mg triamcinolone acetonide 40 mg/mL  Patient tolerance:  Patient tolerated the procedure well with no immediate complications

## 2023-03-27 ENCOUNTER — PATIENT MESSAGE (OUTPATIENT)
Dept: ADMINISTRATIVE | Facility: HOSPITAL | Age: 73
End: 2023-03-27
Payer: COMMERCIAL

## 2023-03-27 ENCOUNTER — HOSPITAL ENCOUNTER (OUTPATIENT)
Dept: RADIOLOGY | Facility: HOSPITAL | Age: 73
Discharge: HOME | End: 2023-03-27
Attending: FAMILY MEDICINE
Payer: COMMERCIAL

## 2023-03-27 ENCOUNTER — PATIENT OUTREACH (OUTPATIENT)
Dept: ADMINISTRATIVE | Facility: HOSPITAL | Age: 73
End: 2023-03-27
Payer: COMMERCIAL

## 2023-03-27 DIAGNOSIS — Z78.0 POSTMENOPAUSAL: ICD-10-CM

## 2023-03-27 PROCEDURE — 77080 DXA BONE DENSITY AXIAL: CPT | Mod: 26,,, | Performed by: RADIOLOGY

## 2023-03-27 PROCEDURE — 77080 DXA BONE DENSITY AXIAL: CPT | Mod: TC

## 2023-03-27 PROCEDURE — 77080 DXA BONE DENSITY AXIAL SKELETON 1 OR MORE SITES: ICD-10-PCS | Mod: 26,,, | Performed by: RADIOLOGY

## 2023-03-27 NOTE — PROGRESS NOTES
Care Everywhere updates requested and reviewed.  Immunizations reconciled. Media reports reviewed.  Duplicate HM overrides and  orders removed.  Overdue HM topic chart audit and/or requested.  Overdue lab testing linked to upcoming lab appointments if applies.    Health Maintenance Due   Topic Date Due    COVID-19 Vaccine (4 - Booster for Pfizer series) 2022    Colorectal Cancer Screening  2023

## 2023-03-31 ENCOUNTER — CLINICAL SUPPORT (OUTPATIENT)
Dept: FAMILY MEDICINE | Facility: CLINIC | Age: 73
End: 2023-03-31
Payer: COMMERCIAL

## 2023-03-31 ENCOUNTER — PATIENT MESSAGE (OUTPATIENT)
Dept: FAMILY MEDICINE | Facility: CLINIC | Age: 73
End: 2023-03-31

## 2023-03-31 ENCOUNTER — LAB VISIT (OUTPATIENT)
Dept: LAB | Facility: HOSPITAL | Age: 73
End: 2023-03-31
Attending: FAMILY MEDICINE
Payer: COMMERCIAL

## 2023-03-31 DIAGNOSIS — Z12.11 COLON CANCER SCREENING: ICD-10-CM

## 2023-03-31 PROCEDURE — 82274 ASSAY TEST FOR BLOOD FECAL: CPT | Performed by: FAMILY MEDICINE

## 2023-04-04 ENCOUNTER — PATIENT MESSAGE (OUTPATIENT)
Dept: FAMILY MEDICINE | Facility: CLINIC | Age: 73
End: 2023-04-04
Payer: COMMERCIAL

## 2023-04-04 DIAGNOSIS — E21.0 PRIMARY HYPERPARATHYROIDISM: Primary | ICD-10-CM

## 2023-04-08 ENCOUNTER — LAB VISIT (OUTPATIENT)
Dept: LAB | Facility: HOSPITAL | Age: 73
End: 2023-04-08
Attending: FAMILY MEDICINE
Payer: COMMERCIAL

## 2023-04-08 DIAGNOSIS — R77.0 LOW SERUM ALBUMIN: ICD-10-CM

## 2023-04-08 DIAGNOSIS — Z79.899 MEDICATION MANAGEMENT: ICD-10-CM

## 2023-04-08 DIAGNOSIS — D64.9 BORDERLINE ANEMIA: ICD-10-CM

## 2023-04-08 LAB
ALBUMIN SERPL BCP-MCNC: 2.7 G/DL (ref 3.5–5.2)
ALP SERPL-CCNC: 80 U/L (ref 55–135)
ALT SERPL W/O P-5'-P-CCNC: 12 U/L (ref 10–44)
ANION GAP SERPL CALC-SCNC: 9 MMOL/L (ref 8–16)
AST SERPL-CCNC: 15 U/L (ref 10–40)
BASOPHILS # BLD AUTO: 0.04 K/UL (ref 0–0.2)
BASOPHILS NFR BLD: 0.4 % (ref 0–1.9)
BILIRUB SERPL-MCNC: 0.4 MG/DL (ref 0.1–1)
BUN SERPL-MCNC: 11 MG/DL (ref 8–23)
CALCIUM SERPL-MCNC: 9.3 MG/DL (ref 8.7–10.5)
CHLORIDE SERPL-SCNC: 102 MMOL/L (ref 95–110)
CO2 SERPL-SCNC: 25 MMOL/L (ref 23–29)
CREAT SERPL-MCNC: 0.6 MG/DL (ref 0.5–1.4)
DIFFERENTIAL METHOD: ABNORMAL
EOSINOPHIL # BLD AUTO: 0.6 K/UL (ref 0–0.5)
EOSINOPHIL NFR BLD: 5.4 % (ref 0–8)
ERYTHROCYTE [DISTWIDTH] IN BLOOD BY AUTOMATED COUNT: 14.4 % (ref 11.5–14.5)
EST. GFR  (NO RACE VARIABLE): >60 ML/MIN/1.73 M^2
FERRITIN SERPL-MCNC: 546 NG/ML (ref 20–300)
GLUCOSE SERPL-MCNC: 76 MG/DL (ref 70–110)
HCT VFR BLD AUTO: 41 % (ref 37–48.5)
HGB BLD-MCNC: 12.7 G/DL (ref 12–16)
IMM GRANULOCYTES # BLD AUTO: 0.09 K/UL (ref 0–0.04)
IMM GRANULOCYTES NFR BLD AUTO: 0.9 % (ref 0–0.5)
IRON SERPL-MCNC: 21 UG/DL (ref 30–160)
LYMPHOCYTES # BLD AUTO: 1.2 K/UL (ref 1–4.8)
LYMPHOCYTES NFR BLD: 11.4 % (ref 18–48)
MCH RBC QN AUTO: 30.4 PG (ref 27–31)
MCHC RBC AUTO-ENTMCNC: 31 G/DL (ref 32–36)
MCV RBC AUTO: 98 FL (ref 82–98)
MONOCYTES # BLD AUTO: 0.7 K/UL (ref 0.3–1)
MONOCYTES NFR BLD: 6.9 % (ref 4–15)
NEUTROPHILS # BLD AUTO: 7.6 K/UL (ref 1.8–7.7)
NEUTROPHILS NFR BLD: 75 % (ref 38–73)
NRBC BLD-RTO: 0 /100 WBC
PLATELET # BLD AUTO: 448 K/UL (ref 150–450)
PMV BLD AUTO: 9.4 FL (ref 9.2–12.9)
POTASSIUM SERPL-SCNC: 3.8 MMOL/L (ref 3.5–5.1)
PROT SERPL-MCNC: 6.7 G/DL (ref 6–8.4)
RBC # BLD AUTO: 4.18 M/UL (ref 4–5.4)
SATURATED IRON: 11 % (ref 20–50)
SODIUM SERPL-SCNC: 136 MMOL/L (ref 136–145)
TOTAL IRON BINDING CAPACITY: 192 UG/DL (ref 250–450)
TRANSFERRIN SERPL-MCNC: 130 MG/DL (ref 200–375)
WBC # BLD AUTO: 10.12 K/UL (ref 3.9–12.7)

## 2023-04-08 PROCEDURE — 36415 COLL VENOUS BLD VENIPUNCTURE: CPT | Mod: PO | Performed by: FAMILY MEDICINE

## 2023-04-08 PROCEDURE — 84466 ASSAY OF TRANSFERRIN: CPT | Performed by: FAMILY MEDICINE

## 2023-04-08 PROCEDURE — 80053 COMPREHEN METABOLIC PANEL: CPT | Performed by: FAMILY MEDICINE

## 2023-04-08 PROCEDURE — 85025 COMPLETE CBC W/AUTO DIFF WBC: CPT | Performed by: FAMILY MEDICINE

## 2023-04-08 PROCEDURE — 82728 ASSAY OF FERRITIN: CPT | Performed by: FAMILY MEDICINE

## 2023-04-10 ENCOUNTER — OFFICE VISIT (OUTPATIENT)
Dept: FAMILY MEDICINE | Facility: CLINIC | Age: 73
End: 2023-04-10
Payer: COMMERCIAL

## 2023-04-10 DIAGNOSIS — E66.9 OBESITY (BMI 30.0-34.9): ICD-10-CM

## 2023-04-10 DIAGNOSIS — R77.0 LOW SERUM ALBUMIN: ICD-10-CM

## 2023-04-10 DIAGNOSIS — Z79.899 ENCOUNTER FOR MONITORING LONG-TERM PROTON PUMP INHIBITOR THERAPY: ICD-10-CM

## 2023-04-10 DIAGNOSIS — E21.0 PRIMARY HYPERPARATHYROIDISM: ICD-10-CM

## 2023-04-10 DIAGNOSIS — Z51.81 ENCOUNTER FOR MONITORING LONG-TERM PROTON PUMP INHIBITOR THERAPY: ICD-10-CM

## 2023-04-10 DIAGNOSIS — K21.9 GASTROESOPHAGEAL REFLUX DISEASE, UNSPECIFIED WHETHER ESOPHAGITIS PRESENT: Primary | ICD-10-CM

## 2023-04-10 DIAGNOSIS — R79.89 ELEVATED FERRITIN: ICD-10-CM

## 2023-04-10 DIAGNOSIS — R53.1 WEAKNESS: ICD-10-CM

## 2023-04-10 DIAGNOSIS — H81.09 MENIERE'S DISEASE, UNSPECIFIED LATERALITY: ICD-10-CM

## 2023-04-10 DIAGNOSIS — R14.2 BELCHING: ICD-10-CM

## 2023-04-10 DIAGNOSIS — Z79.899 MEDICATION MANAGEMENT: ICD-10-CM

## 2023-04-10 DIAGNOSIS — E55.9 VITAMIN D DEFICIENCY: ICD-10-CM

## 2023-04-10 PROCEDURE — 3044F HG A1C LEVEL LT 7.0%: CPT | Mod: CPTII,95,, | Performed by: FAMILY MEDICINE

## 2023-04-10 PROCEDURE — 99214 PR OFFICE/OUTPT VISIT, EST, LEVL IV, 30-39 MIN: ICD-10-PCS | Mod: 95,,, | Performed by: FAMILY MEDICINE

## 2023-04-10 PROCEDURE — 1160F RVW MEDS BY RX/DR IN RCRD: CPT | Mod: CPTII,95,, | Performed by: FAMILY MEDICINE

## 2023-04-10 PROCEDURE — 3044F PR MOST RECENT HEMOGLOBIN A1C LEVEL <7.0%: ICD-10-PCS | Mod: CPTII,95,, | Performed by: FAMILY MEDICINE

## 2023-04-10 PROCEDURE — 1159F PR MEDICATION LIST DOCUMENTED IN MEDICAL RECORD: ICD-10-PCS | Mod: CPTII,95,, | Performed by: FAMILY MEDICINE

## 2023-04-10 PROCEDURE — 1160F PR REVIEW ALL MEDS BY PRESCRIBER/CLIN PHARMACIST DOCUMENTED: ICD-10-PCS | Mod: CPTII,95,, | Performed by: FAMILY MEDICINE

## 2023-04-10 PROCEDURE — 99214 OFFICE O/P EST MOD 30 MIN: CPT | Mod: 95,,, | Performed by: FAMILY MEDICINE

## 2023-04-10 PROCEDURE — 1159F MED LIST DOCD IN RCRD: CPT | Mod: CPTII,95,, | Performed by: FAMILY MEDICINE

## 2023-04-10 NOTE — PROGRESS NOTES
Office Visit    Patient Name: Carlene Fong    : 1950  MRN: [id number]    Subjective:  Carlene is a 72 y.o. female who presents today for:    No chief complaint on file.    The patient location is: HER HOME   The chief complaint leading to consultation is: FOLLOW UP CHEST PAIN(non-cardiac), FOLLOW UP LABS    Visit type: audiovisual    Face to Face time with patient: START 102    25 minutes of total time spent on the encounter, which includes face to face time and non-face to face time preparing to see the patient (eg, review of tests), Obtaining and/or reviewing separately obtained history, Documenting clinical information in the electronic or other health record, Independently interpreting results (not separately reported) and communicating results to the patient/family/caregiver, or Care coordination (not separately reported).         Each patient to whom he or she provides medical services by telemedicine is:  (1) informed of the relationship between the physician and patient and the respective role of any other health care provider with respect to management of the patient; and (2) notified that he or she may decline to receive medical services by telemedicine and may withdraw from such care at any time.    Notes:     Had physical 3/17/23    Chronic conditions:  primary hyperparathyroidism associated with vitamin D deficiency & Osteoporosis now s/p 3 gland parathyroidectomy on 2019, obesity, history of positive rheumatoid factor without clinical evidence of rheumatoid arthritis, left wrist carpal tunnel(s/p injection 10/2018), mild anxiety, GERD with recent exacerbation, bilateral knee pain followed by Ortho Dr Dobbs and managed with periodic cortisone injections.      Carlene presents today for follow-up of severe GERD symptoms associated with weakness that was thought to be related to lack of p.o. intake and to review follow-up labs.      Over the last few weeks since I prescribed  her omeprazole her GERD symptoms have significantly improved.  She is now able to eat a wide variety of foods without dysphagia or chest pain.  She is still having significant belching.      She has follow-up with GI Dr. Jaimes tomorrow.      We were concerned when she was evaluated as her GERD symptoms were so severe as to be causing such limited p.o. intake that she experienced hypotension, dehydration and orthostasis/faintness.      Today she reports that these systemic/constitutional symptoms are better as well.    Hot coffee and water are helping with constipation.   Has been better since taking fiber supplement and has not needed stool softener in a few weeks.  Weakness is improving.  She has been able to ease back into her walking routine.    She had repeat labs showing acute phase reactant profile with elevated ferritin/low TIBC/transferrin.  Previously elevated platelets have normalized as has hemoglobin and hematocrit.      Her CMP has shown low albumin.  Calcium level and electrolytes have been normal.    LABS 03/14/2023 OVERALL UNREMARKABLE IN TERMS OF LIVER/KIDNEY FUNCTION, PTH/VITAMIN-D/CALCIUM, HOWEVER, ALBUMIN IS LOW AT 2.5 AND PLATELETS ARE MILDLY ELEVATED w/ BORDERLINE ANEMIA.    REPEAT LABS 04/08/2023 WITH NORMALIZATION OF PLATELETS AND HEMOGLOBIN/HEMATOCRIT, ELEVATED FERRITIN WITH DECREASED TIBC-CONSISTENT WITH ACUTE PHASE REACTION ASSOCIATED WITH RECENT ILLNESS SYMPTOMS.  CMP NORMAL EXCEPT FOR PERSISTENTLY LOW ALBUMIN WITH SLIGHT INCREASE TO 2.7.    PAST MEDICAL HISTORY, SURGICAL/SOCIAL/FAMILY HISTORY REVIEWED AS PER CHART, WITH PERTINENT FINDINGS INCLUDED IN HISTORY SECTION OF NOTE.     Current Medications    Medication List with Changes/Refills   Current Medications    CALCIUM CARBONATE (CALCIUM 600 ORAL)    Take by mouth.    CICLOPIROX (LOPROX) 0.77 % CREA    Apply topically 2 (two) times daily.    CO-ENZYME Q-10 30 MG CAPSULE    Take 30 mg by mouth once daily.     DICLOFENAC SODIUM  (VOLTAREN) 1 % GEL    Apply 2 g topically 3 (three) times daily.    FLAXSEED ORAL    Take by mouth once daily.     FLUTICASONE PROPIONATE (FLONASE) 50 MCG/ACTUATION NASAL SPRAY    2 sprays (100 mcg total) by Each Nostril route nightly.    GABAPENTIN (NEURONTIN) 300 MG CAPSULE    Take 1 capsule (300 mg total) by mouth 2 (two) times daily.    HYDROCHLOROTHIAZIDE (HYDRODIURIL) 12.5 MG TAB    Take 1 tablet (12.5 mg total) by mouth every other day.    MECLIZINE (ANTIVERT) 25 MG TABLET    Take 1 tablet (25 mg total) by mouth 3 (three) times daily as needed for Dizziness.    OMEPRAZOLE (PRILOSEC) 40 MG CAPSULE    Take 1 capsule (40 mg total) by mouth before breakfast.    ONDANSETRON (ZOFRAN) 8 MG TABLET    Take 1 tablet (8 mg total) by mouth every 8 (eight) hours as needed for Nausea.    VITAMIN D 1000 UNITS TAB    Take 185 mg by mouth once daily.       Allergies   Review of patient's allergies indicates:   Allergen Reactions    Ropinirole Swelling and Rash      palpitations, swelling of tongue   palpitations, swelling of tongue         Review of Systems (Pertinent positives)  Review of Systems   Constitutional:  Positive for activity change. Negative for unexpected weight change.   HENT:  Negative for hearing loss, rhinorrhea and trouble swallowing (resolved on omeprazole).    Eyes:  Negative for discharge and visual disturbance.   Respiratory:  Negative for chest tightness and wheezing.    Cardiovascular:  Negative for chest pain (resolved) and palpitations.   Gastrointestinal:  Negative for blood in stool, constipation (improved/managed), diarrhea and vomiting.   Endocrine: Negative for polydipsia and polyuria.   Genitourinary:  Negative for difficulty urinating, dysuria, hematuria and menstrual problem.   Musculoskeletal:  Negative for arthralgias, joint swelling and neck pain.   Neurological:  Positive for weakness (improving). Negative for headaches.   Psychiatric/Behavioral:  Negative for confusion and dysphoric  mood.      LMP  (LMP Unknown)     Physical Exam  Vitals reviewed.   Constitutional:       General: She is not in acute distress.     Appearance: Normal appearance. She is well-developed.   HENT:      Head: Normocephalic and atraumatic.   Eyes:      Conjunctiva/sclera: Conjunctivae normal.   Pulmonary:      Effort: Pulmonary effort is normal.   Neurological:      General: No focal deficit present.      Mental Status: She is alert and oriented to person, place, and time.   Psychiatric:         Behavior: Behavior normal.         Assessment/Plan:  Carlene Fong is a 72 y.o. female who presents today for :        ICD-10-CM ICD-9-CM    1. Gastroesophageal reflux disease, unspecified whether esophagitis present  K21.9 530.81       2. Belching  R14.2 787.3       3. Encounter for monitoring long-term proton pump inhibitor therapy  Z51.81 V58.83     Z79.899 V58.69       4. Weakness  R53.1 780.79       5. Low serum albumin  R77.0 790.99       6. Obesity (BMI 30.0-34.9)  E66.9 278.00       7. Primary hyperparathyroidism  E21.0 252.01       8. Vitamin D deficiency  E55.9 268.9       9. Meniere's disease, unspecified laterality  H81.09 386.00       10. Medication management  Z79.899 V58.69         72-year-old female with chest pain, weakness, fatigue, seemingly all brought on by severe uncontrolled reflux causing limited p.o. intake.      Her symptoms have all improved with daily omeprazole 40.  She is still having some belching and does have GI follow-up tomorrow with Dr. Jaimes-consider EGD/H pylori testing.      Continue daily PPI, continued focus on balanced diet, hydration.      Physically she is feeling better, energy level is improving and labs show some improvements-anemia has resolved and platelets have normalized.  She still has some abnormalities with her iron levels indicate acute phase reactant profile with elevated ferritin but otherwise low iron markers-low iron/transferrin/TIBC.      Plan to just repeat  these in about 6-8 weeks nonfasting to see if they normalize with her feeling better.  If not will look into further investigation of liver pathology, etc..      Otherwise labs show no concerns.    There are no Patient Instructions on file for this visit.      Follow up in about 8 weeks (around 6/5/2023) for to follow up on lab results, return as needed for new concerns.

## 2023-04-11 ENCOUNTER — OFFICE VISIT (OUTPATIENT)
Dept: GASTROENTEROLOGY | Facility: CLINIC | Age: 73
End: 2023-04-11
Payer: COMMERCIAL

## 2023-04-11 VITALS — WEIGHT: 193.13 LBS | BODY MASS INDEX: 31.04 KG/M2 | HEIGHT: 66 IN

## 2023-04-11 DIAGNOSIS — K21.9 GASTROESOPHAGEAL REFLUX DISEASE WITHOUT ESOPHAGITIS: Primary | ICD-10-CM

## 2023-04-11 DIAGNOSIS — Z12.11 COLON CANCER SCREENING: Primary | ICD-10-CM

## 2023-04-11 DIAGNOSIS — R07.9 CHEST PAIN: ICD-10-CM

## 2023-04-11 PROCEDURE — 99204 PR OFFICE/OUTPT VISIT, NEW, LEVL IV, 45-59 MIN: ICD-10-PCS | Mod: S$GLB,,, | Performed by: INTERNAL MEDICINE

## 2023-04-11 PROCEDURE — 1126F AMNT PAIN NOTED NONE PRSNT: CPT | Mod: CPTII,S$GLB,, | Performed by: INTERNAL MEDICINE

## 2023-04-11 PROCEDURE — 3044F HG A1C LEVEL LT 7.0%: CPT | Mod: CPTII,S$GLB,, | Performed by: INTERNAL MEDICINE

## 2023-04-11 PROCEDURE — 1159F MED LIST DOCD IN RCRD: CPT | Mod: CPTII,S$GLB,, | Performed by: INTERNAL MEDICINE

## 2023-04-11 PROCEDURE — 1126F PR PAIN SEVERITY QUANTIFIED, NO PAIN PRESENT: ICD-10-PCS | Mod: CPTII,S$GLB,, | Performed by: INTERNAL MEDICINE

## 2023-04-11 PROCEDURE — 3008F PR BODY MASS INDEX (BMI) DOCUMENTED: ICD-10-PCS | Mod: CPTII,S$GLB,, | Performed by: INTERNAL MEDICINE

## 2023-04-11 PROCEDURE — 3044F PR MOST RECENT HEMOGLOBIN A1C LEVEL <7.0%: ICD-10-PCS | Mod: CPTII,S$GLB,, | Performed by: INTERNAL MEDICINE

## 2023-04-11 PROCEDURE — 99204 OFFICE O/P NEW MOD 45 MIN: CPT | Mod: S$GLB,,, | Performed by: INTERNAL MEDICINE

## 2023-04-11 PROCEDURE — 1159F PR MEDICATION LIST DOCUMENTED IN MEDICAL RECORD: ICD-10-PCS | Mod: CPTII,S$GLB,, | Performed by: INTERNAL MEDICINE

## 2023-04-11 PROCEDURE — 1101F PR PT FALLS ASSESS DOC 0-1 FALLS W/OUT INJ PAST YR: ICD-10-PCS | Mod: CPTII,S$GLB,, | Performed by: INTERNAL MEDICINE

## 2023-04-11 PROCEDURE — 1101F PT FALLS ASSESS-DOCD LE1/YR: CPT | Mod: CPTII,S$GLB,, | Performed by: INTERNAL MEDICINE

## 2023-04-11 PROCEDURE — 99999 PR PBB SHADOW E&M-EST. PATIENT-LVL IV: CPT | Mod: PBBFAC,,, | Performed by: INTERNAL MEDICINE

## 2023-04-11 PROCEDURE — 3008F BODY MASS INDEX DOCD: CPT | Mod: CPTII,S$GLB,, | Performed by: INTERNAL MEDICINE

## 2023-04-11 PROCEDURE — 3288F PR FALLS RISK ASSESSMENT DOCUMENTED: ICD-10-PCS | Mod: CPTII,S$GLB,, | Performed by: INTERNAL MEDICINE

## 2023-04-11 PROCEDURE — 99999 PR PBB SHADOW E&M-EST. PATIENT-LVL IV: ICD-10-PCS | Mod: PBBFAC,,, | Performed by: INTERNAL MEDICINE

## 2023-04-11 PROCEDURE — 3288F FALL RISK ASSESSMENT DOCD: CPT | Mod: CPTII,S$GLB,, | Performed by: INTERNAL MEDICINE

## 2023-04-11 NOTE — PATIENT INSTRUCTIONS
EGD Instructions    [facility] [address]  Clinic Office [phone]  Endoscopy Lab [phone]    You are scheduled for an EGD with Dr. Jaimes  on  4/14/23 at [facility] in [place].    Check in at the Hospital -1st floor, Information desk.   Call [phone] to reschedule.    You cannot have anything to eat or drink after Midnight. You can brush your teeth with a sip of water.     An adult friend/family member must come with you to drive you home.  You cannot drive, take a taxi, Uber/Lyft or bus to leave the Endoscopy Center alone.  If you do not have someone to drive you home, your test will be cancelled.     Please follow the directions of your doctor if you take any pills that thin your blood. If you take these meds: Aggrenox, Brilinta, Effient, Eliquis, Lovenox, Plavix, Pletal, Pradaxa, Ticilid, Xarelto or Coumadin, let the doctor's office know.    DON'T: On the morning of the test do not take insulin or pills for diabetes.     DO: On the morning of the test, do take any pills for blood pressure, heart, anti-rejection and or seizures with a small sip of water. Bring any inhalers with you.    Leave all valuables and jewelry at home. You will be at the hospital for 2-4 hours.    Call the Endoscopy department at [phone] with any questions about your procedure.    Thank you for choosing [facility].

## 2023-04-11 NOTE — PROGRESS NOTES
GASTROENTEROLOGY CLINIC NOTE    Reason for visit: The primary encounter diagnosis was Gastroesophageal reflux disease without esophagitis. A diagnosis of Chest pain was also pertinent to this visit.  Referring provider/PCP: Ashli Stubbs MD    HPI:  Carlene Fong is a 72 y.o. female here today for reflux, new patient    Accompanied by .  Ongoing reflux with pain in chest after eating, substernal in nature, was seen by PCP and given full dose protonix 40 , didn't seem to help so went to ER, was told likely reflux, cardiac eval in ER with EKG and labs were negative ;  over past few weeks, noticed much improved. Taking omeprazole 40.  Has also having some associated weakness and lethargy which is slowly improving as well.  Currently no chest pain.  No vomiting.  She does have a bland cough at night usually initially on lying down.  No radiating symptoms.  No pain postprandial anymore.  Never had egd.      Prior Endoscopy:  EGD: none  Colon:   2009 akingbola  Tics and hemorrhoids    She does annual stool cards now    (Portions of this note were dictated using voice recognition software and may contain dictation related errors in spelling/grammar/syntax not found on text review)    Review of Systems   Constitutional:  Negative for fever and malaise/fatigue.   Respiratory:  Negative for cough and shortness of breath.    Cardiovascular:  Negative for chest pain and palpitations.   Gastrointestinal:  Negative for abdominal pain, blood in stool, nausea and vomiting.   Neurological:  Negative for dizziness and headaches.     Past Medical History: has a past medical history of Abnormal mammogram, Anxiety, Arthritis of knee, Diverticulosis, Meniere's disease, Osteopenia, Positive anti-CCP test, Primary hyperparathyroidism, Rheumatoid factor positive, Vertigo, and Vitamin D deficiency.    Past Surgical History: has a past surgical history that includes Hysterectomy; Tubal ligation; Appendectomy; Rotator  cuff repair (Right, 12/2014); Parathyroidectomy (N/A, 05/22/2019); and Breast biopsy.    Family History:family history includes Breast cancer in her daughter; Dementia in her father; Diabetes in her father; No Known Problems in her mother.    Allergies:   Review of patient's allergies indicates:   Allergen Reactions    Ropinirole Swelling and Rash      palpitations, swelling of tongue   palpitations, swelling of tongue       Social History: reports that she has never smoked. She has never used smokeless tobacco. She reports that she does not drink alcohol and does not use drugs.    Home medications:   Current Outpatient Medications on File Prior to Visit   Medication Sig Dispense Refill    calcium carbonate (CALCIUM 600 ORAL) Take by mouth.      ciclopirox (LOPROX) 0.77 % Crea Apply topically 2 (two) times daily.      diclofenac sodium (VOLTAREN) 1 % Gel Apply 2 g topically 3 (three) times daily. 100 g 2    FLAXSEED ORAL Take by mouth once daily.       fluticasone propionate (FLONASE) 50 mcg/actuation nasal spray 2 sprays (100 mcg total) by Each Nostril route nightly. 48 mL 4    gabapentin (NEURONTIN) 300 MG capsule Take 1 capsule (300 mg total) by mouth 2 (two) times daily. 180 capsule 3    meclizine (ANTIVERT) 25 mg tablet Take 1 tablet (25 mg total) by mouth 3 (three) times daily as needed for Dizziness. 30 tablet 1    omeprazole (PRILOSEC) 40 MG capsule Take 1 capsule (40 mg total) by mouth before breakfast. 90 capsule 1    ondansetron (ZOFRAN) 8 MG tablet Take 1 tablet (8 mg total) by mouth every 8 (eight) hours as needed for Nausea. 30 tablet 1    vitamin D 1000 units Tab Take 185 mg by mouth once daily.      [DISCONTINUED] hydroCHLOROthiazide (HYDRODIURIL) 12.5 MG Tab Take 1 tablet (12.5 mg total) by mouth every other day. 90 tablet 4    [DISCONTINUED] co-enzyme Q-10 30 mg capsule Take 30 mg by mouth once daily.        No current facility-administered medications on file prior to visit.       Vital signs:  Ht  "5' 6" (1.676 m)   Wt 87.6 kg (193 lb 2 oz)   LMP  (LMP Unknown)   BMI 31.17 kg/m²     Physical Exam  Vitals reviewed.   Constitutional:       General: She is not in acute distress.  HENT:      Head: Normocephalic and atraumatic.   Eyes:      General: No scleral icterus.     Conjunctiva/sclera: Conjunctivae normal.   Abdominal:      General: There is no distension.      Palpations: Abdomen is soft.      Tenderness: There is no abdominal tenderness.   Skin:     General: Skin is warm.      Coloration: Skin is not pale.      Findings: No rash.   Neurological:      Mental Status: She is oriented to person, place, and time.      Gait: Gait normal.   Psychiatric:         Mood and Affect: Mood normal.         Behavior: Behavior normal.       I have reviewed prior labs, imaging, notes from last month    Assessment:  1. Gastroesophageal reflux disease without esophagitis    2. Chest pain      Atypical chest pain / reflux. Responded well to prilosec 40 and now symptoms nearly completely resolved    Plan:  Orders Placed This Encounter    Case Request Endoscopy: EGD (ESOPHAGOGASTRODUODENOSCOPY)       Continue prilosec 40    Plan egd for further eval    If symptoms return or change, would consider cardiologist eval    Continue annual stool cards with PCP      RTC prn    Hever Jaimes MD  [facility]            "

## 2023-04-11 NOTE — LETTER
April 11, 2023        Ashli Stubbs MD  [address]  [address]  [place] [address]             [place] [facility]  [address],   [facility] [address]  Phone: [phone]   Patient: Carlene Fong   MR Number: [id number]   YOB: 1950   Date of Visit: 4/11/2023       Dear Dr. Stubbs:    Thank you for referring Carlene Fong to me for evaluation. Below are the relevant portions of my assessment and plan of care.            If you have questions, please do not hesitate to call me. I look forward to following Carlene along with you.    Sincerely,      Hever Jaimes MD           CC    No Recipients

## 2023-04-11 NOTE — H&P (VIEW-ONLY)
GASTROENTEROLOGY CLINIC NOTE    Reason for visit: The primary encounter diagnosis was Gastroesophageal reflux disease without esophagitis. A diagnosis of Chest pain was also pertinent to this visit.  Referring provider/PCP: Ashli Stubbs MD    HPI:  Carlene Fong is a 72 y.o. female here today for reflux, new patient    Accompanied by .  Ongoing reflux with pain in chest after eating, substernal in nature, was seen by PCP and given full dose protonix 40 , didn't seem to help so went to ER, was told likely reflux, cardiac eval in ER with EKG and labs were negative ;  over past few weeks, noticed much improved. Taking omeprazole 40.  Has also having some associated weakness and lethargy which is slowly improving as well.  Currently no chest pain.  No vomiting.  She does have a bland cough at night usually initially on lying down.  No radiating symptoms.  No pain postprandial anymore.  Never had egd.      Prior Endoscopy:  EGD: none  Colon:   2009 akingbola  Tics and hemorrhoids    She does annual stool cards now    (Portions of this note were dictated using voice recognition software and may contain dictation related errors in spelling/grammar/syntax not found on text review)    Review of Systems   Constitutional:  Negative for fever and malaise/fatigue.   Respiratory:  Negative for cough and shortness of breath.    Cardiovascular:  Negative for chest pain and palpitations.   Gastrointestinal:  Negative for abdominal pain, blood in stool, nausea and vomiting.   Neurological:  Negative for dizziness and headaches.     Past Medical History: has a past medical history of Abnormal mammogram, Anxiety, Arthritis of knee, Diverticulosis, Meniere's disease, Osteopenia, Positive anti-CCP test, Primary hyperparathyroidism, Rheumatoid factor positive, Vertigo, and Vitamin D deficiency.    Past Surgical History: has a past surgical history that includes Hysterectomy; Tubal ligation; Appendectomy; Rotator  cuff repair (Right, 12/2014); Parathyroidectomy (N/A, 05/22/2019); and Breast biopsy.    Family History:family history includes Breast cancer in her daughter; Dementia in her father; Diabetes in her father; No Known Problems in her mother.    Allergies:   Review of patient's allergies indicates:   Allergen Reactions    Ropinirole Swelling and Rash      palpitations, swelling of tongue   palpitations, swelling of tongue       Social History: reports that she has never smoked. She has never used smokeless tobacco. She reports that she does not drink alcohol and does not use drugs.    Home medications:   Current Outpatient Medications on File Prior to Visit   Medication Sig Dispense Refill    calcium carbonate (CALCIUM 600 ORAL) Take by mouth.      ciclopirox (LOPROX) 0.77 % Crea Apply topically 2 (two) times daily.      diclofenac sodium (VOLTAREN) 1 % Gel Apply 2 g topically 3 (three) times daily. 100 g 2    FLAXSEED ORAL Take by mouth once daily.       fluticasone propionate (FLONASE) 50 mcg/actuation nasal spray 2 sprays (100 mcg total) by Each Nostril route nightly. 48 mL 4    gabapentin (NEURONTIN) 300 MG capsule Take 1 capsule (300 mg total) by mouth 2 (two) times daily. 180 capsule 3    meclizine (ANTIVERT) 25 mg tablet Take 1 tablet (25 mg total) by mouth 3 (three) times daily as needed for Dizziness. 30 tablet 1    omeprazole (PRILOSEC) 40 MG capsule Take 1 capsule (40 mg total) by mouth before breakfast. 90 capsule 1    ondansetron (ZOFRAN) 8 MG tablet Take 1 tablet (8 mg total) by mouth every 8 (eight) hours as needed for Nausea. 30 tablet 1    vitamin D 1000 units Tab Take 185 mg by mouth once daily.      [DISCONTINUED] hydroCHLOROthiazide (HYDRODIURIL) 12.5 MG Tab Take 1 tablet (12.5 mg total) by mouth every other day. 90 tablet 4    [DISCONTINUED] co-enzyme Q-10 30 mg capsule Take 30 mg by mouth once daily.        No current facility-administered medications on file prior to visit.       Vital signs:  Ht  "5' 6" (1.676 m)   Wt 87.6 kg (193 lb 2 oz)   LMP  (LMP Unknown)   BMI 31.17 kg/m²     Physical Exam  Vitals reviewed.   Constitutional:       General: She is not in acute distress.  HENT:      Head: Normocephalic and atraumatic.   Eyes:      General: No scleral icterus.     Conjunctiva/sclera: Conjunctivae normal.   Abdominal:      General: There is no distension.      Palpations: Abdomen is soft.      Tenderness: There is no abdominal tenderness.   Skin:     General: Skin is warm.      Coloration: Skin is not pale.      Findings: No rash.   Neurological:      Mental Status: She is oriented to person, place, and time.      Gait: Gait normal.   Psychiatric:         Mood and Affect: Mood normal.         Behavior: Behavior normal.       I have reviewed prior labs, imaging, notes from last month    Assessment:  1. Gastroesophageal reflux disease without esophagitis    2. Chest pain      Atypical chest pain / reflux. Responded well to prilosec 40 and now symptoms nearly completely resolved    Plan:  Orders Placed This Encounter    Case Request Endoscopy: EGD (ESOPHAGOGASTRODUODENOSCOPY)       Continue prilosec 40    Plan egd for further eval    If symptoms return or change, would consider cardiologist eval    Continue annual stool cards with PCP      RTC prn    Hever Jaimes MD  [facility]            "

## 2023-04-12 ENCOUNTER — TELEPHONE (OUTPATIENT)
Dept: ENDOSCOPY | Facility: HOSPITAL | Age: 73
End: 2023-04-12
Payer: COMMERCIAL

## 2023-04-12 NOTE — TELEPHONE ENCOUNTER
Spoke with patient about arrival time @ 0700.       NPO status reviewed: Patient must have nothing to eat or drink after midnight.      Medications: Do not take Insulin or oral diabetic medications the day of the procedure.  Take as prescribed: heart, seizure and blood pressure medication in the morning with a sip of water (less than an ounce).  Take any breathing medications and bring inhalers to hospital with you Leave all valuables and jewelry at home.     Wear comfortable clothes to procedure to change into hospital gown You cannot drive for 24 hours after your procedure because you will receive sedation for your procedure to make you comfortable.  A ride must be provided at discharge.

## 2023-04-13 ENCOUNTER — ANESTHESIA EVENT (OUTPATIENT)
Dept: ENDOSCOPY | Facility: HOSPITAL | Age: 73
End: 2023-04-13
Payer: COMMERCIAL

## 2023-04-13 NOTE — ANESTHESIA PREPROCEDURE EVALUATION
04/13/2023  Carlene Fong is a 72 y.o., female.      Pre-op Assessment    I have reviewed the Patient Summary Reports.     I have reviewed the Nursing Notes.    I have reviewed the Medications.     Review of Systems  Anesthesia Hx:  Denies Family Hx of Anesthesia complications.   Denies Personal Hx of Anesthesia complications.        Patient Active Problem List   Diagnosis    Primary hyperparathyroidism    Mass of breast-benign    Positive anti-CCP test    Rheumatoid factor positive    Impingement syndrome of left shoulder    Dry eyes    Bilateral primary osteoarthritis of knee    Obesity (BMI 30.0-34.9)    Vitamin D deficiency    H/O hysterectomy for benign disease    Postmenopausal osteoporosis    History of appendectomy    Vertigo    Anxiety    Meniere's disease    Chest wall contusion, left, subsequent encounter    Gastroesophageal reflux disease    Carpal tunnel syndrome of left wrist    H/O parathyroidectomy    Encounter for monitoring long-term proton pump inhibitor therapy       Past Medical History:   Diagnosis Date    Abnormal mammogram 8/10/2016    Followed up with diagnostic mammogram and ultrasound 10/22/2015at [facility]  that showed no concerning findings with recommendation to continue yearly screening.     Anxiety 8/8/2017    will need to readdress this if work up is negative and symptoms persist    Arthritis of knee 1/7/2015    Diverticulosis     Meniere's disease 8/15/2017    Osteopenia 12/1/2015    next bone density 4/2016, seeing endocrine for hyperparathyroidism     Positive anti-CCP test 4/7/2014    Primary hyperparathyroidism 8/26/2013    will be following up with Dr. [facility] in 2016, blood work today. stoped taking calcitonin due to nausea     Rheumatoid factor positive 4/7/2014    Vertigo 7/16/2017    Vitamin D deficiency 12/1/2015        ECHO: No results found for this or any previous visit.      There is no height or weight on file to calculate BMI.    Tobacco Use: Low Risk     Smoking Tobacco Use: Never    Smokeless Tobacco Use: Never    Passive Exposure: Not on file       Social History     Substance and Sexual Activity   Drug Use No        Alcohol Use: Not At Risk    Frequency of Alcohol Consumption: Never    Average Number of Drinks: Patient does not drink    Frequency of Binge Drinking: Never       Review of patient's allergies indicates:   Allergen Reactions    Ropinirole Swelling and Rash      palpitations, swelling of tongue   palpitations, swelling of tongue         Airway:  No value filed.     Physical Exam    Airway:  Mouth Opening: Normal  TM Distance: Normal          Anesthesia Plan  Type of Anesthesia, risks & benefits discussed:    Anesthesia Type: Gen Natural Airway  Intra-op Monitoring Plan: Standard ASA Monitors  Post Op Pain Control Plan: multimodal analgesia  Induction:  IV  Informed Consent: Informed consent signed with the Patient and all parties understand the risks and agree with anesthesia plan.  All questions answered.   ASA Score: 2  Day of Surgery Review of History & Physical: H&P Update referred to the surgeon/provider.    Ready For Surgery From Anesthesia Perspective.     .

## 2023-04-14 ENCOUNTER — ANESTHESIA (OUTPATIENT)
Dept: ENDOSCOPY | Facility: HOSPITAL | Age: 73
End: 2023-04-14
Payer: COMMERCIAL

## 2023-04-14 ENCOUNTER — HOSPITAL ENCOUNTER (OUTPATIENT)
Facility: HOSPITAL | Age: 73
Discharge: HOME | End: 2023-04-14
Attending: INTERNAL MEDICINE | Admitting: INTERNAL MEDICINE
Payer: COMMERCIAL

## 2023-04-14 DIAGNOSIS — K21.9 GERD (GASTROESOPHAGEAL REFLUX DISEASE): ICD-10-CM

## 2023-04-14 PROCEDURE — D9220A PRA ANESTHESIA: ICD-10-PCS | Mod: CRNA,,, | Performed by: NURSE ANESTHETIST, CERTIFIED REGISTERED

## 2023-04-14 PROCEDURE — 88305 TISSUE EXAM BY PATHOLOGIST: ICD-10-PCS | Mod: 26,,, | Performed by: PATHOLOGY

## 2023-04-14 PROCEDURE — 37000008 HC ANESTHESIA 1ST 15 MINUTES: Performed by: INTERNAL MEDICINE

## 2023-04-14 PROCEDURE — D9220A PRA ANESTHESIA: ICD-10-PCS | Mod: ANES,,, | Performed by: ANESTHESIOLOGY

## 2023-04-14 PROCEDURE — 63600175 PHARM REV CODE 636 W HCPCS: Performed by: NURSE ANESTHETIST, CERTIFIED REGISTERED

## 2023-04-14 PROCEDURE — 43239 EGD BIOPSY SINGLE/MULTIPLE: CPT | Mod: ,,, | Performed by: INTERNAL MEDICINE

## 2023-04-14 PROCEDURE — D9220A PRA ANESTHESIA: Mod: CRNA,,, | Performed by: NURSE ANESTHETIST, CERTIFIED REGISTERED

## 2023-04-14 PROCEDURE — 88305 TISSUE EXAM BY PATHOLOGIST: CPT | Mod: 59 | Performed by: PATHOLOGY

## 2023-04-14 PROCEDURE — 88305 TISSUE EXAM BY PATHOLOGIST: CPT | Mod: 26,,, | Performed by: PATHOLOGY

## 2023-04-14 PROCEDURE — 37000009 HC ANESTHESIA EA ADD 15 MINS: Performed by: INTERNAL MEDICINE

## 2023-04-14 PROCEDURE — 27201012 HC FORCEPS, HOT/COLD, DISP: Performed by: INTERNAL MEDICINE

## 2023-04-14 PROCEDURE — D9220A PRA ANESTHESIA: Mod: ANES,,, | Performed by: ANESTHESIOLOGY

## 2023-04-14 PROCEDURE — 25000003 PHARM REV CODE 250: Performed by: INTERNAL MEDICINE

## 2023-04-14 PROCEDURE — 25000003 PHARM REV CODE 250: Performed by: NURSE ANESTHETIST, CERTIFIED REGISTERED

## 2023-04-14 PROCEDURE — 43239 PR EGD, FLEX, W/BIOPSY, SGL/MULTI: ICD-10-PCS | Mod: ,,, | Performed by: INTERNAL MEDICINE

## 2023-04-14 PROCEDURE — 43239 EGD BIOPSY SINGLE/MULTIPLE: CPT | Performed by: INTERNAL MEDICINE

## 2023-04-14 RX ORDER — SODIUM CHLORIDE 9 MG/ML
INJECTION, SOLUTION INTRAVENOUS CONTINUOUS
Status: DISCONTINUED | OUTPATIENT
Start: 2023-04-14 | End: 2023-04-14 | Stop reason: HOSPADM

## 2023-04-14 RX ORDER — LIDOCAINE HYDROCHLORIDE 20 MG/ML
INJECTION INTRAVENOUS
Status: DISCONTINUED | OUTPATIENT
Start: 2023-04-14 | End: 2023-04-14

## 2023-04-14 RX ORDER — PROPOFOL 10 MG/ML
VIAL (ML) INTRAVENOUS
Status: DISCONTINUED | OUTPATIENT
Start: 2023-04-14 | End: 2023-04-14

## 2023-04-14 RX ORDER — SODIUM CHLORIDE 0.9 % (FLUSH) 0.9 %
10 SYRINGE (ML) INJECTION
Status: DISCONTINUED | OUTPATIENT
Start: 2023-04-14 | End: 2023-04-14 | Stop reason: HOSPADM

## 2023-04-14 RX ORDER — PROPOFOL 10 MG/ML
VIAL (ML) INTRAVENOUS CONTINUOUS PRN
Status: DISCONTINUED | OUTPATIENT
Start: 2023-04-14 | End: 2023-04-14

## 2023-04-14 RX ADMIN — LIDOCAINE HYDROCHLORIDE 100 MG: 20 INJECTION, SOLUTION INTRAVENOUS at 08:04

## 2023-04-14 RX ADMIN — SODIUM CHLORIDE: 0.9 INJECTION, SOLUTION INTRAVENOUS at 07:04

## 2023-04-14 RX ADMIN — PROPOFOL 150 MCG/KG/MIN: 10 INJECTION, EMULSION INTRAVENOUS at 08:04

## 2023-04-14 RX ADMIN — PROPOFOL 80 MG: 10 INJECTION, EMULSION INTRAVENOUS at 08:04

## 2023-04-14 NOTE — TRANSFER OF CARE
"Anesthesia Transfer of Care Note    Patient: Carlene Fong    Procedure(s) Performed: Procedure(s) (LRB):  EGD (ESOPHAGOGASTRODUODENOSCOPY) (N/A)    Patient location: GI    Anesthesia Type: general    Transport from OR: Transported from OR on room air with adequate spontaneous ventilation    Post pain: adequate analgesia    Post assessment: no apparent anesthetic complications    Post vital signs: stable    Level of consciousness: awake, alert and oriented    Nausea/Vomiting: no nausea/vomiting    Complications: none    Transfer of care protocol was followed      Last vitals:   Visit Vitals  BP (!) 147/91 (BP Location: Left arm, Patient Position: Lying)   Pulse 100   Temp 36.8 °C (98.2 °F) (Temporal)   Resp 16   Ht 5' 6" (1.676 m)   Wt 87.5 kg (193 lb)   LMP  (LMP Unknown)   SpO2 98%   Breastfeeding No   BMI 31.15 kg/m²     "

## 2023-04-14 NOTE — ANESTHESIA POSTPROCEDURE EVALUATION
Anesthesia Post Evaluation    Patient: Carlene Fong    Procedure(s) Performed: Procedure(s) (LRB):  EGD (ESOPHAGOGASTRODUODENOSCOPY) (N/A)    Final Anesthesia Type: general      Patient location during evaluation: PACU  Patient participation: Yes- Able to Participate  Level of consciousness: awake and alert  Post-procedure vital signs: reviewed and stable  Pain management: adequate  Airway patency: patent    PONV status at discharge: No PONV  Anesthetic complications: no      Cardiovascular status: stable  Respiratory status: spontaneous ventilation  Hydration status: euvolemic  Follow-up not needed.          Vitals Value Taken Time   /73 04/14/23 0815   Temp 36.3 °C (97.3 °F) 04/14/23 0815   Pulse 83 04/14/23 0815   Resp 17 04/14/23 0815   SpO2 98 % 04/14/23 0815         No case tracking events are documented in the log.      Pain/Oumar Score: Oumar Score: 10 (4/14/2023  8:15 AM)

## 2023-04-14 NOTE — [employer] PATIENT INSTRUCTIONS
Discharge Summary/Instructions after an Endoscopic Procedure  Patient Name: Carlene Fong  Patient MRN: [id number]  Patient YOB: 1950 Friday, April 14, 2023  Hever Jaimes MD  Dear patient,  As a result of recent federal legislation (The Federal Cures Act), you may   receive lab or pathology results from your procedure in your [facility]   account before your physician is able to contact you. Your physician or   their representative will relay the results to you with their   recommendations at their soonest availability.  Thank you,  Your health is very important to us during the Covid Crisis. Following your   procedure today, you will receive a daily text for 2 weeks asking about   signs or symptoms of Covid 19.  Please respond to this text when you   receive it so we can follow up and keep you as safe as possible.   RESTRICTIONS:  During your procedure today, you received medications for sedation.  These   medications may affect your judgment, balance and coordination.  Therefore,   for 24 hours, you have the following restrictions:   - DO NOT drive a car, operate machinery, make legal/financial decisions,   sign important papers or drink alcohol.    ACTIVITY:  Today: no heavy lifting, straining or running due to procedural   sedation/anesthesia.  The following day: return to full activity including work.  DIET:  Eat and drink normally unless instructed otherwise.     TREATMENT FOR COMMON SIDE EFFECTS:  - Mild abdominal pain, nausea, belching, bloating or excessive gas:  rest,   eat lightly and use a heating pad.  - Sore Throat: treat with throat lozenges and/or gargle with warm salt   water.  - Because air was used during the procedure, expelling large amounts of air   from your rectum or belching is normal.  - If a bowel prep was taken, you may not have a bowel movement for 1-3 days.    This is normal.  SYMPTOMS TO WATCH FOR AND REPORT TO YOUR PHYSICIAN:  1. Abdominal pain or bloating, other than  gas cramps.  2. Chest pain.  3. Back pain.  4. Signs of infection such as: chills or fever occurring within 24 hours   after the procedure.  5. Rectal bleeding, which would show as bright red, maroon, or black stools.   (A tablespoon of blood from the rectum is not serious, especially if   hemorrhoids are present.)  6. Vomiting.  7. Weakness or dizziness.  GO DIRECTLY TO THE NEAREST EMERGENCY ROOM IF YOU HAVE ANY OF THE FOLLOWING:      Difficulty breathing              Chills and/or fever over 101 F   Persistent vomiting and/or vomiting blood   Severe abdominal pain   Severe chest pain   Black, tarry stools   Bleeding- more than one tablespoon   Any other symptom or condition that you feel may need urgent attention  Your doctor recommends these additional instructions:  If any biopsies were taken, your doctors clinic will contact you in 1 to 2   weeks with any results.  - Discharge patient to home.   - Patient has a contact number available for emergencies.  The signs and   symptoms of potential delayed complications were discussed with the   patient.  Return to normal activities tomorrow.  Written discharge   instructions were provided to the patient.   - Resume previous diet.   - Continue present medications.   - Await pathology results.  For questions, problems or results please call your physician - Hever Jaimes MD.  EMERGENCY PHONE NUMBER: [phone],  LAB RESULTS: [phone]  IF A COMPLICATION OR EMERGENCY SITUATION ARISES AND YOU ARE UNABLE TO REACH   YOUR PHYSICIAN - GO DIRECTLY TO THE EMERGENCY ROOM.  Hever Jaimes MD  4/14/2023 8:11:43 AM  This report has been verified and signed electronically.  Dear patient,  As a result of recent federal legislation (The Federal Cures Act), you may   receive lab or pathology results from your procedure in your [facility]   account before your physician is able to contact you. Your physician or   their representative will relay the results to you with  their   recommendations at their soonest availability.  Thank you,  [employer]ATION

## 2023-04-15 ENCOUNTER — TELEPHONE (OUTPATIENT)
Dept: FAMILY MEDICINE | Facility: CLINIC | Age: 73
End: 2023-04-15
Payer: COMMERCIAL

## 2023-04-15 ENCOUNTER — PATIENT MESSAGE (OUTPATIENT)
Dept: FAMILY MEDICINE | Facility: CLINIC | Age: 73
End: 2023-04-15
Payer: COMMERCIAL

## 2023-04-16 PROBLEM — K44.9 HIATAL HERNIA WITH GERD WITHOUT ESOPHAGITIS: Status: ACTIVE | Noted: 2018-05-01

## 2023-04-17 VITALS
HEIGHT: 66 IN | HEART RATE: 77 BPM | BODY MASS INDEX: 31.02 KG/M2 | TEMPERATURE: 97 F | DIASTOLIC BLOOD PRESSURE: 71 MMHG | WEIGHT: 193 LBS | OXYGEN SATURATION: 100 % | SYSTOLIC BLOOD PRESSURE: 127 MMHG | RESPIRATION RATE: 20 BRPM

## 2023-04-18 LAB — HEMOCCULT STL QL IA: NEGATIVE

## 2023-04-21 LAB
FINAL PATHOLOGIC DIAGNOSIS: NORMAL
GROSS: NORMAL
Lab: NORMAL

## 2023-05-17 DIAGNOSIS — H81.09 MENIERE'S DISEASE, UNSPECIFIED LATERALITY: ICD-10-CM

## 2023-05-17 RX ORDER — HYDROCHLOROTHIAZIDE 12.5 MG/1
12.5 TABLET ORAL EVERY OTHER DAY
Qty: 45 TABLET | Refills: 9 | OUTPATIENT
Start: 2023-05-17

## 2023-05-17 NOTE — TELEPHONE ENCOUNTER
Refill Routing Note   Medication(s) are not appropriate for processing by [facility] for the following reason(s):      Clarification of medication (Rx) details: Defer for PCP review. The original prescription was discontinued on 4/11/2023 by Meli Crump MA for the following reason: Patient no longer taking. Unclear if patient is to continue taking HCTZ. Unable to reference d/c in [facility] with PCP 4/10/23. 4/11/23-gastro visit.    [facility] action(s):  Defer None identified   Medication Therapy Plan: The original prescription was discontinued on 4/11/2023 by Meli Crump MA for the following reason: Patient no longer taking. Unclear if patient is to continue taking HCTZ. Unable to reference d/c in [facility]. LOV with PCP 4/10/23. 4/11/23-gastro visit.      Appointments  past 12m or future 3m with PCP    Date Provider   Last Visit   4/10/2023 Ashli Stubbs MD   Next Visit   6/6/2023 Ashli Stubbs MD   ED visits in past 90 days: 1        Note composed:7:37 AM 05/17/2023

## 2023-05-17 NOTE — TELEPHONE ENCOUNTER
No care due was identified.  [facility] Embedded Care Due Messages. Reference number: [id number].   5/17/2023 1:53:06 AM CDT

## 2023-06-03 ENCOUNTER — LAB VISIT (OUTPATIENT)
Dept: LAB | Facility: HOSPITAL | Age: 73
End: 2023-06-03
Attending: FAMILY MEDICINE
Payer: COMMERCIAL

## 2023-06-03 DIAGNOSIS — R79.89 ELEVATED FERRITIN: ICD-10-CM

## 2023-06-03 DIAGNOSIS — R77.0 LOW SERUM ALBUMIN: ICD-10-CM

## 2023-06-03 LAB
ALBUMIN SERPL BCP-MCNC: 3.3 G/DL (ref 3.5–5.2)
ALP SERPL-CCNC: 91 U/L (ref 55–135)
ALT SERPL W/O P-5'-P-CCNC: 9 U/L (ref 10–44)
ANION GAP SERPL CALC-SCNC: 10 MMOL/L (ref 8–16)
AST SERPL-CCNC: 13 U/L (ref 10–40)
BASOPHILS # BLD AUTO: 0.05 K/UL (ref 0–0.2)
BASOPHILS NFR BLD: 0.7 % (ref 0–1.9)
BILIRUB SERPL-MCNC: 0.3 MG/DL (ref 0.1–1)
BUN SERPL-MCNC: 7 MG/DL (ref 8–23)
CALCIUM SERPL-MCNC: 9.4 MG/DL (ref 8.7–10.5)
CHLORIDE SERPL-SCNC: 107 MMOL/L (ref 95–110)
CO2 SERPL-SCNC: 25 MMOL/L (ref 23–29)
CREAT SERPL-MCNC: 0.7 MG/DL (ref 0.5–1.4)
DIFFERENTIAL METHOD: ABNORMAL
EOSINOPHIL # BLD AUTO: 0.3 K/UL (ref 0–0.5)
EOSINOPHIL NFR BLD: 3.4 % (ref 0–8)
ERYTHROCYTE [DISTWIDTH] IN BLOOD BY AUTOMATED COUNT: 14.7 % (ref 11.5–14.5)
EST. GFR  (NO RACE VARIABLE): >60 ML/MIN/1.73 M^2
FERRITIN SERPL-MCNC: 206 NG/ML (ref 20–300)
GLUCOSE SERPL-MCNC: 64 MG/DL (ref 70–110)
HCT VFR BLD AUTO: 42.6 % (ref 37–48.5)
HGB BLD-MCNC: 13.1 G/DL (ref 12–16)
IMM GRANULOCYTES # BLD AUTO: 0.03 K/UL (ref 0–0.04)
IMM GRANULOCYTES NFR BLD AUTO: 0.4 % (ref 0–0.5)
IRON SERPL-MCNC: 54 UG/DL (ref 30–160)
LYMPHOCYTES # BLD AUTO: 1.3 K/UL (ref 1–4.8)
LYMPHOCYTES NFR BLD: 17.3 % (ref 18–48)
MCH RBC QN AUTO: 28.7 PG (ref 27–31)
MCHC RBC AUTO-ENTMCNC: 30.8 G/DL (ref 32–36)
MCV RBC AUTO: 93 FL (ref 82–98)
MONOCYTES # BLD AUTO: 0.4 K/UL (ref 0.3–1)
MONOCYTES NFR BLD: 5.5 % (ref 4–15)
NEUTROPHILS # BLD AUTO: 5.4 K/UL (ref 1.8–7.7)
NEUTROPHILS NFR BLD: 72.7 % (ref 38–73)
NRBC BLD-RTO: 0 /100 WBC
PLATELET # BLD AUTO: 375 K/UL (ref 150–450)
PMV BLD AUTO: 9.5 FL (ref 9.2–12.9)
POTASSIUM SERPL-SCNC: 3.7 MMOL/L (ref 3.5–5.1)
PROT SERPL-MCNC: 6.9 G/DL (ref 6–8.4)
RBC # BLD AUTO: 4.57 M/UL (ref 4–5.4)
SATURATED IRON: 23 % (ref 20–50)
SODIUM SERPL-SCNC: 142 MMOL/L (ref 136–145)
TOTAL IRON BINDING CAPACITY: 235 UG/DL (ref 250–450)
TRANSFERRIN SERPL-MCNC: 159 MG/DL (ref 200–375)
WBC # BLD AUTO: 7.45 K/UL (ref 3.9–12.7)

## 2023-06-03 PROCEDURE — 85025 COMPLETE CBC W/AUTO DIFF WBC: CPT | Performed by: FAMILY MEDICINE

## 2023-06-03 PROCEDURE — 36415 COLL VENOUS BLD VENIPUNCTURE: CPT | Mod: PO | Performed by: FAMILY MEDICINE

## 2023-06-03 PROCEDURE — 84134 ASSAY OF PREALBUMIN: CPT | Performed by: FAMILY MEDICINE

## 2023-06-03 PROCEDURE — 84466 ASSAY OF TRANSFERRIN: CPT | Performed by: FAMILY MEDICINE

## 2023-06-03 PROCEDURE — 80053 COMPREHEN METABOLIC PANEL: CPT | Performed by: FAMILY MEDICINE

## 2023-06-03 PROCEDURE — 82728 ASSAY OF FERRITIN: CPT | Performed by: FAMILY MEDICINE

## 2023-06-05 LAB — PREALB SERPL-MCNC: 15 MG/DL (ref 20–43)

## 2023-06-06 ENCOUNTER — OFFICE VISIT (OUTPATIENT)
Dept: FAMILY MEDICINE | Facility: CLINIC | Age: 73
End: 2023-06-06
Payer: COMMERCIAL

## 2023-06-06 ENCOUNTER — TELEPHONE (OUTPATIENT)
Dept: FAMILY MEDICINE | Facility: CLINIC | Age: 73
End: 2023-06-06

## 2023-06-06 DIAGNOSIS — K44.9 HIATAL HERNIA WITH GERD WITHOUT ESOPHAGITIS: ICD-10-CM

## 2023-06-06 DIAGNOSIS — Z79.899 ENCOUNTER FOR MONITORING LONG-TERM PROTON PUMP INHIBITOR THERAPY: ICD-10-CM

## 2023-06-06 DIAGNOSIS — E66.3 OVERWEIGHT (BMI 25.0-29.9): ICD-10-CM

## 2023-06-06 DIAGNOSIS — E78.6 LOW HDL (UNDER 40): ICD-10-CM

## 2023-06-06 DIAGNOSIS — R53.1 WEAKNESS: ICD-10-CM

## 2023-06-06 DIAGNOSIS — Z51.81 ENCOUNTER FOR MONITORING LONG-TERM PROTON PUMP INHIBITOR THERAPY: ICD-10-CM

## 2023-06-06 DIAGNOSIS — Z79.899 MEDICATION MANAGEMENT: ICD-10-CM

## 2023-06-06 DIAGNOSIS — E44.1 PROTEIN-CALORIE MALNUTRITION, MILD: Primary | ICD-10-CM

## 2023-06-06 DIAGNOSIS — H81.09 MENIERE'S DISEASE, UNSPECIFIED LATERALITY: ICD-10-CM

## 2023-06-06 DIAGNOSIS — K21.9 HIATAL HERNIA WITH GERD WITHOUT ESOPHAGITIS: ICD-10-CM

## 2023-06-06 DIAGNOSIS — R79.0 ABNORMAL RESULT OF IRON PROFILE TESTING: ICD-10-CM

## 2023-06-06 PROCEDURE — 99214 OFFICE O/P EST MOD 30 MIN: CPT | Mod: 95,,, | Performed by: FAMILY MEDICINE

## 2023-06-06 PROCEDURE — 99214 PR OFFICE/OUTPT VISIT, EST, LEVL IV, 30-39 MIN: ICD-10-PCS | Mod: 95,,, | Performed by: FAMILY MEDICINE

## 2023-06-06 PROCEDURE — 3044F HG A1C LEVEL LT 7.0%: CPT | Mod: CPTII,95,, | Performed by: FAMILY MEDICINE

## 2023-06-06 PROCEDURE — 3044F PR MOST RECENT HEMOGLOBIN A1C LEVEL <7.0%: ICD-10-PCS | Mod: CPTII,95,, | Performed by: FAMILY MEDICINE

## 2023-06-06 NOTE — PROGRESS NOTES
Office Visit    Patient Name: Carlene Fong    : 1950  MRN: [id number]    Subjective:  Carlene is a 72 y.o. female who presents today for:    No chief complaint on file.    The patient location is: HER HOME   The chief complaint leading to consultation is: LAB RESULTS    Visit type: audiovisual    Face to Face time with patient: START 1042 END 1055    30  minutes of total time spent on the encounter, which includes face to face time and non-face to face time preparing to see the patient (eg, review of tests), Obtaining and/or reviewing separately obtained history, Documenting clinical information in the electronic or other health record, Independently interpreting results (not separately reported) and communicating results to the patient/family/caregiver, or Care coordination (not separately reported).     Each patient to whom he or she provides medical services by telemedicine is:  (1) informed of the relationship between the physician and patient and the respective role of any other health care provider with respect to management of the patient; and (2) notified that he or she may decline to receive medical services by telemedicine and may withdraw from such care at any time.    Notes:     Most recent OV 4/10/23  physical 3/17/23     Chronic conditions:  primary hyperparathyroidism associated with vitamin D deficiency & Osteoporosis now s/p 3 gland parathyroidectomy on 2019, obesity, history of positive rheumatoid factor without clinical evidence of rheumatoid arthritis, left wrist carpal tunnel(s/p injection 10/2018), mild anxiety, GERD with recent exacerbation, bilateral knee pain followed by Ortho Dr Dobbs and managed with periodic cortisone injections.      Carlene presents today for follow-up of severe GERD symptoms associated with weakness that was thought to be related to lack of p.o. intake and to review follow-up labs.      She continues to note improvement in her energy level and how  she feels generally with resolution of faintness/ Lightheadedness episodes with improved management of her acid reflux on daily PPI/gerd friendly diet and weight loss.  PO intake is much improved over the last few months. Eating consistently throughout the day and does not eat after 6PM to mitigate GERD symptoms.     She had and EGD with Dr Jaimes on 4/14/23 showing a small Hiatal Hernia that could be predisposing her to reflux.     GENERAL LIFESTYLE HABITS:   DIET: balanced and avoiding artificial sweeteners, NEEDS MORE DAIRY (calcium) and PROTEIN  EXERCISE: good, walking regularly in the mornings  SLEEP: good, goes to be early and wakes early to go to mass and walk, feels rested  WEIGHT: has intentionally lost an additional 15 lbs after previously unintentionally loosing aobut 15 lbs, total weight loss of 25+ lbs.     HCTZ discontinued with no BP concerns noted-- lost 25+lbs and the diuretic may have been contributing to some orthostatic symptoms that have since resolve     Labs prior to today's visit show improvement with normalization of Ferritin, improved Iron studies with decline in acute phase reactant profile.  LOW PREALBUMIN OF 15, normal CMP except glucose was 64 and ALBUMIN IMPROVED to 3.3.     LABS 03/14/2023 OVERALL UNREMARKABLE IN TERMS OF LIVER/KIDNEY FUNCTION, PTH/VITAMIN-D/CALCIUM, HOWEVER, ALBUMIN IS LOW AT 2.5 AND PLATELETS ARE MILDLY ELEVATED w/ BORDERLINE ANEMIA.    REPEAT LABS 04/08/2023 WITH NORMALIZATION OF PLATELETS AND HEMOGLOBIN/HEMATOCRIT, ELEVATED FERRITIN WITH DECREASED TIBC-CONSISTENT WITH ACUTE PHASE REACTION ASSOCIATED WITH RECENT ILLNESS SYMPTOMS.  CMP NORMAL EXCEPT FOR PERSISTENTLY LOW ALBUMIN WITH SLIGHT INCREASE TO 2.7.        PAST MEDICAL HISTORY, SURGICAL/SOCIAL/FAMILY HISTORY REVIEWED AS PER CHART, WITH PERTINENT FINDINGS INCLUDED IN HISTORY SECTION OF NOTE.     Current Medications    Medication List with Changes/Refills   Current Medications    CALCIUM CARBONATE (CALCIUM 600  ORAL)    Take by mouth.    CICLOPIROX (LOPROX) 0.77 % CREA    Apply topically 2 (two) times daily.    DICLOFENAC SODIUM (VOLTAREN) 1 % GEL    Apply 2 g topically 3 (three) times daily.    FLAXSEED ORAL    Take by mouth once daily.     FLUTICASONE PROPIONATE (FLONASE) 50 MCG/ACTUATION NASAL SPRAY    2 sprays (100 mcg total) by Each Nostril route nightly.    GABAPENTIN (NEURONTIN) 300 MG CAPSULE    Take 1 capsule (300 mg total) by mouth 2 (two) times daily.    MECLIZINE (ANTIVERT) 25 MG TABLET    Take 1 tablet (25 mg total) by mouth 3 (three) times daily as needed for Dizziness.    OMEPRAZOLE (PRILOSEC) 40 MG CAPSULE    Take 1 capsule (40 mg total) by mouth before breakfast.    ONDANSETRON (ZOFRAN) 8 MG TABLET    Take 1 tablet (8 mg total) by mouth every 8 (eight) hours as needed for Nausea.    VITAMIN D 1000 UNITS TAB    Take 185 mg by mouth once daily.       Allergies   Review of patient's allergies indicates:   Allergen Reactions    Ropinirole Swelling and Rash      palpitations, swelling of tongue   palpitations, swelling of tongue         Review of Systems (Pertinent positives)  Review of Systems   Constitutional:  Positive for activity change. Negative for unexpected weight change.   HENT:  Negative for hearing loss, rhinorrhea and trouble swallowing.    Eyes:  Negative for discharge and visual disturbance.   Respiratory:  Negative for chest tightness and wheezing.    Cardiovascular:  Negative for chest pain and palpitations.   Gastrointestinal:  Negative for blood in stool, constipation, diarrhea and vomiting.   Endocrine: Negative for polydipsia and polyuria.   Genitourinary:  Negative for difficulty urinating, dysuria, hematuria and menstrual problem.   Musculoskeletal:  Positive for arthralgias. Negative for joint swelling and neck pain.   Neurological:  Negative for weakness and headaches.   Psychiatric/Behavioral:  Negative for confusion and dysphoric mood.      LMP  (LMP Unknown)     Physical Exam  Vitals  reviewed.   Constitutional:       General: She is not in acute distress.     Appearance: Normal appearance. She is well-developed.   HENT:      Head: Normocephalic and atraumatic.   Eyes:      Conjunctiva/sclera: Conjunctivae normal.   Cardiovascular:      Rate and Rhythm: Regular rhythm.   Pulmonary:      Effort: Pulmonary effort is normal.   Neurological:      Mental Status: She is alert and oriented to person, place, and time.   Psychiatric:         Mood and Affect: Mood normal.         Behavior: Behavior normal.         Assessment/Plan:  Carlene Fong is a 72 y.o. female who presents today for :        ICD-10-CM ICD-9-CM    1. Protein-calorie malnutrition, mild  E44.1 263.1 Comprehensive Metabolic Panel      Lipid Panel      CBC Auto Differential      TSH      Vitamin D      Vitamin B12      Magnesium      Iron and TIBC      2. Weakness  R53.1 780.79       3. Meniere's disease, unspecified laterality  H81.09 386.00       4. Hiatal hernia with GERD without esophagitis  K44.9 553.3     K21.9 530.11       5. Encounter for monitoring long-term proton pump inhibitor therapy  Z51.81 V58.83 Comprehensive Metabolic Panel    Z79.899 V58.69 Vitamin D      Vitamin B12      Magnesium      6. Medication management  Z79.899 V58.69 Comprehensive Metabolic Panel      Lipid Panel      CBC Auto Differential      TSH      Vitamin D      Vitamin B12      Magnesium      Iron and TIBC      7. Overweight (BMI 25.0-29.9)  E66.3 278.02       8. Low HDL (under 40)  E78.6 272.5 Lipid Panel      9. Abnormal result of iron profile testing  R79.0 790.6 CBC Auto Differential      Iron and TIBC        H/o Weakness and lightheadedness in setting of episode of very poor PO intake when GERD was previously flared up.  THESE SYMPTOMS HAVE NOW RESOLVED.     Has Hiatal Hernia on EGD but otw unremarkable, and symptoms now significantly improved on daily PPI (omeprazole), GERD friendly diet w/o eating past about 6 PM and also weight loss-- now  intentional.    Laboratory profile with improvement-- previously with significant inflammatory response with acute phase reactant fluctuation.     Ferritin now normal, iron studies normalizing. Electrolytes and kidney function normal with improving albumin, though PRE-ALBUMIN IS LOW at 15.     Continue healthy diet but increase protein intake and include some dairy based protein sources to increase dietary calcium consumption-  has h/o Osteoporosis.    Recheck labs w/ PREALBUMIN and include PPI labs as she will likely remain on Omeprazole over the long term given her underlying hiatal hernia.     Virtual visit to follow, follow up sooner if concerns.           There are no Patient Instructions on file for this visit.      Follow up in about 3 months (around 9/6/2023) for to follow up on lab results, return as needed for new concerns.

## 2023-06-07 DIAGNOSIS — J30.89 NON-SEASONAL ALLERGIC RHINITIS DUE TO OTHER ALLERGIC TRIGGER: ICD-10-CM

## 2023-06-07 RX ORDER — FLUTICASONE PROPIONATE 50 MCG
2 SPRAY, SUSPENSION (ML) NASAL NIGHTLY
Qty: 48 ML | Refills: 3 | Status: SHIPPED | OUTPATIENT
Start: 2023-06-07

## 2023-06-07 NOTE — TELEPHONE ENCOUNTER
No care due was identified.  [facility] Embedded Care Due Messages. Reference number: [id number].   6/07/2023 2:26:42 AM CDT

## 2023-06-07 NOTE — TELEPHONE ENCOUNTER
Refill Decision Note   Carlene Hetal  is requesting a refill authorization.  Brief Assessment and Rationale for Refill:  Approve     Medication Therapy Plan:       Medication Reconciliation Completed: No   Comments:     No Care Gaps recommended.     Note composed:9:26 AM 06/07/2023

## 2023-06-23 ENCOUNTER — OFFICE VISIT (OUTPATIENT)
Dept: ORTHOPEDICS | Facility: CLINIC | Age: 73
End: 2023-06-23
Payer: COMMERCIAL

## 2023-06-23 VITALS — WEIGHT: 192.88 LBS | BODY MASS INDEX: 31 KG/M2 | HEIGHT: 66 IN

## 2023-06-23 DIAGNOSIS — M17.0 PRIMARY OSTEOARTHRITIS OF BOTH KNEES: Primary | ICD-10-CM

## 2023-06-23 PROCEDURE — 20610 DRAIN/INJ JOINT/BURSA W/O US: CPT | Mod: 50,S$GLB,, | Performed by: ORTHOPAEDIC SURGERY

## 2023-06-23 PROCEDURE — 3008F BODY MASS INDEX DOCD: CPT | Mod: CPTII,S$GLB,, | Performed by: ORTHOPAEDIC SURGERY

## 2023-06-23 PROCEDURE — 1125F AMNT PAIN NOTED PAIN PRSNT: CPT | Mod: CPTII,S$GLB,, | Performed by: ORTHOPAEDIC SURGERY

## 2023-06-23 PROCEDURE — 3044F HG A1C LEVEL LT 7.0%: CPT | Mod: CPTII,S$GLB,, | Performed by: ORTHOPAEDIC SURGERY

## 2023-06-23 PROCEDURE — 3044F PR MOST RECENT HEMOGLOBIN A1C LEVEL <7.0%: ICD-10-PCS | Mod: CPTII,S$GLB,, | Performed by: ORTHOPAEDIC SURGERY

## 2023-06-23 PROCEDURE — 99213 OFFICE O/P EST LOW 20 MIN: CPT | Mod: 25,S$GLB,, | Performed by: ORTHOPAEDIC SURGERY

## 2023-06-23 PROCEDURE — 99999 PR PBB SHADOW E&M-EST. PATIENT-LVL III: CPT | Mod: PBBFAC,,, | Performed by: ORTHOPAEDIC SURGERY

## 2023-06-23 PROCEDURE — 3288F FALL RISK ASSESSMENT DOCD: CPT | Mod: CPTII,S$GLB,, | Performed by: ORTHOPAEDIC SURGERY

## 2023-06-23 PROCEDURE — 3288F PR FALLS RISK ASSESSMENT DOCUMENTED: ICD-10-PCS | Mod: CPTII,S$GLB,, | Performed by: ORTHOPAEDIC SURGERY

## 2023-06-23 PROCEDURE — 1159F PR MEDICATION LIST DOCUMENTED IN MEDICAL RECORD: ICD-10-PCS | Mod: CPTII,S$GLB,, | Performed by: ORTHOPAEDIC SURGERY

## 2023-06-23 PROCEDURE — 99999 PR PBB SHADOW E&M-EST. PATIENT-LVL III: ICD-10-PCS | Mod: PBBFAC,,, | Performed by: ORTHOPAEDIC SURGERY

## 2023-06-23 PROCEDURE — 20610 LARGE JOINT ASPIRATION/INJECTION: BILATERAL KNEE: ICD-10-PCS | Mod: 50,S$GLB,, | Performed by: ORTHOPAEDIC SURGERY

## 2023-06-23 PROCEDURE — 1159F MED LIST DOCD IN RCRD: CPT | Mod: CPTII,S$GLB,, | Performed by: ORTHOPAEDIC SURGERY

## 2023-06-23 PROCEDURE — 3008F PR BODY MASS INDEX (BMI) DOCUMENTED: ICD-10-PCS | Mod: CPTII,S$GLB,, | Performed by: ORTHOPAEDIC SURGERY

## 2023-06-23 PROCEDURE — 99213 PR OFFICE/OUTPT VISIT, EST, LEVL III, 20-29 MIN: ICD-10-PCS | Mod: 25,S$GLB,, | Performed by: ORTHOPAEDIC SURGERY

## 2023-06-23 PROCEDURE — 1101F PR PT FALLS ASSESS DOC 0-1 FALLS W/OUT INJ PAST YR: ICD-10-PCS | Mod: CPTII,S$GLB,, | Performed by: ORTHOPAEDIC SURGERY

## 2023-06-23 PROCEDURE — 1101F PT FALLS ASSESS-DOCD LE1/YR: CPT | Mod: CPTII,S$GLB,, | Performed by: ORTHOPAEDIC SURGERY

## 2023-06-23 PROCEDURE — 1125F PR PAIN SEVERITY QUANTIFIED, PAIN PRESENT: ICD-10-PCS | Mod: CPTII,S$GLB,, | Performed by: ORTHOPAEDIC SURGERY

## 2023-06-23 RX ORDER — LIDOCAINE HYDROCHLORIDE 10 MG/ML
2 INJECTION INFILTRATION; PERINEURAL
Status: DISCONTINUED | OUTPATIENT
Start: 2023-06-23 | End: 2023-06-23 | Stop reason: HOSPADM

## 2023-06-23 RX ORDER — TRIAMCINOLONE ACETONIDE 40 MG/ML
40 INJECTION, SUSPENSION INTRA-ARTICULAR; INTRAMUSCULAR
Status: DISCONTINUED | OUTPATIENT
Start: 2023-06-23 | End: 2023-06-23 | Stop reason: HOSPADM

## 2023-06-23 RX ADMIN — TRIAMCINOLONE ACETONIDE 40 MG: 40 INJECTION, SUSPENSION INTRA-ARTICULAR; INTRAMUSCULAR at 08:06

## 2023-06-23 RX ADMIN — LIDOCAINE HYDROCHLORIDE 2 ML: 10 INJECTION INFILTRATION; PERINEURAL at 08:06

## 2023-06-23 NOTE — PROGRESS NOTES
Subjective:      Patient ID: Carlene Fong is a 72 y.o. female.    Chief Complaint: No chief complaint on file.      HPI: Carlene Fong is here in follow-up bilateral knee pain related to osteoarthritis.  She is currently on our palliative knee protocol.  She was last seen and treated by myself about 3 months ago with bilateral corticosteroid injections.  She reports injections were very helpful for about 3 months.  Today, she complains of  mild bilateral knee pain. Ambulation has not been impaired.     Past Medical History:   Diagnosis Date    Abnormal mammogram 8/10/2016    Followed up with diagnostic mammogram and ultrasound 10/22/2015at [facility]  that showed no concerning findings with recommendation to continue yearly screening.     Anxiety 8/8/2017    will need to readdress this if work up is negative and symptoms persist    Arthritis of knee 1/7/2015    Diverticulosis     Meniere's disease 8/15/2017    Osteopenia 12/1/2015    next bone density 4/2016, seeing endocrine for hyperparathyroidism     Positive anti-CCP test 4/7/2014    Primary hyperparathyroidism 8/26/2013    will be following up with Dr. [facility] in 2016, blood work today. stoped taking calcitonin due to nausea     Rheumatoid factor positive 4/7/2014    Vertigo 7/16/2017    Vitamin D deficiency 12/1/2015       Current Outpatient Medications:     calcium carbonate (CALCIUM 600 ORAL), Take by mouth., Disp: , Rfl:     ciclopirox (LOPROX) 0.77 % Crea, Apply topically 2 (two) times daily., Disp: , Rfl:     diclofenac sodium (VOLTAREN) 1 % Gel, Apply 2 g topically 3 (three) times daily., Disp: 100 g, Rfl: 2    FLAXSEED ORAL, Take by mouth once daily. , Disp: , Rfl:     fluticasone propionate (FLONASE) 50 mcg/actuation nasal spray, 2 SPRAYS (100 MCG TOTAL) BY EACH NOSTRIL ROUTE NIGHTLY., Disp: 48 mL, Rfl: 3    gabapentin (NEURONTIN) 300 MG capsule, Take 1 capsule (300 mg total) by mouth 2 (two) times daily., Disp: 180 capsule, Rfl:  3    meclizine (ANTIVERT) 25 mg tablet, Take 1 tablet (25 mg total) by mouth 3 (three) times daily as needed for Dizziness., Disp: 30 tablet, Rfl: 1    omeprazole (PRILOSEC) 40 MG capsule, Take 1 capsule (40 mg total) by mouth before breakfast., Disp: 90 capsule, Rfl: 1    ondansetron (ZOFRAN) 8 MG tablet, Take 1 tablet (8 mg total) by mouth every 8 (eight) hours as needed for Nausea., Disp: 30 tablet, Rfl: 1    vitamin D 1000 units Tab, Take 185 mg by mouth once daily., Disp: , Rfl:   Review of patient's allergies indicates:   Allergen Reactions    Ropinirole Swelling and Rash      palpitations, swelling of tongue   palpitations, swelling of tongue       LMP  (LMP Unknown)     ROS      Objective:    Ortho Exam       GEN: Well developed, well nourished female. AAOX3. No acute distress.   Normocephalic, atraumatic.   EMILIA  Breathing unlabored.  Mood and affect appropriate.     BILATERAL KNEE:  The patient walks with no limp.  Resisted SLR neg.  The skin over the knee is intact.  Knee effusion none.  Tendernes is located medial.  Range of motion- Flexion full deg, Extension full deg,   Ligament exam:              MCL intact              Lachman intact              Post sag intact              LCL intact  Patellar crepitation present.  Pulses DP present, PT present  Motor normal 5/5  strength in all tested muscle groups.   Sensory normal    Assessment:     Imaging:  x-rays are reviewed significant for mild to moderate joint space narrowing, patellar tilting and narrowing of patellofemoral joint space        1. Primary osteoarthritis of both knees            Plan:         Patient is here specifically for palliative knee injections  CSI bilateral knees without complication  She will continue to follow with a PA every 3 months as needed for repeat injections  She should be seen annually by Dr. Dobbs for xrays (12/2023)     No follow-ups on file.

## 2023-06-23 NOTE — PROCEDURES
Large Joint Aspiration/Injection: bilateral knee    Date/Time: 6/23/2023 8:30 AM  Performed by: Laly Jackson PA-C  Authorized by: Laly Jackson PA-C     Indications:  Arthritis and pain  Timeout: prior to procedure the correct patient, procedure, and site was verified    Prep: patient was prepped and draped in usual sterile fashion      Details:  Needle Size:  21 G  Location:  Knee  Laterality:  Bilateral  Site:  Bilateral knee  Medications (Right):  2 mL LIDOcaine HCL 10 mg/ml (1%) 10 mg/mL (1 %); 40 mg triamcinolone acetonide 40 mg/mL  Medications (Left):  2 mL LIDOcaine HCL 10 mg/ml (1%) 10 mg/mL (1 %); 40 mg triamcinolone acetonide 40 mg/mL  Patient tolerance:  Patient tolerated the procedure well with no immediate complications

## 2023-07-19 ENCOUNTER — OFFICE VISIT (OUTPATIENT)
Dept: FAMILY MEDICINE | Facility: CLINIC | Age: 73
End: 2023-07-19
Payer: COMMERCIAL

## 2023-07-19 VITALS
OXYGEN SATURATION: 98 % | WEIGHT: 184.06 LBS | BODY MASS INDEX: 29.58 KG/M2 | DIASTOLIC BLOOD PRESSURE: 84 MMHG | HEART RATE: 96 BPM | SYSTOLIC BLOOD PRESSURE: 124 MMHG | HEIGHT: 66 IN

## 2023-07-19 DIAGNOSIS — H04.123 DRY EYES: ICD-10-CM

## 2023-07-19 DIAGNOSIS — H00.014 HORDEOLUM EXTERNUM OF LEFT UPPER EYELID: Primary | ICD-10-CM

## 2023-07-19 PROCEDURE — 1126F PR PAIN SEVERITY QUANTIFIED, NO PAIN PRESENT: ICD-10-PCS | Mod: CPTII,S$GLB,, | Performed by: FAMILY MEDICINE

## 2023-07-19 PROCEDURE — 3044F PR MOST RECENT HEMOGLOBIN A1C LEVEL <7.0%: ICD-10-PCS | Mod: CPTII,S$GLB,, | Performed by: FAMILY MEDICINE

## 2023-07-19 PROCEDURE — 1160F RVW MEDS BY RX/DR IN RCRD: CPT | Mod: CPTII,S$GLB,, | Performed by: FAMILY MEDICINE

## 2023-07-19 PROCEDURE — 3008F PR BODY MASS INDEX (BMI) DOCUMENTED: ICD-10-PCS | Mod: CPTII,S$GLB,, | Performed by: FAMILY MEDICINE

## 2023-07-19 PROCEDURE — 3074F PR MOST RECENT SYSTOLIC BLOOD PRESSURE < 130 MM HG: ICD-10-PCS | Mod: CPTII,S$GLB,, | Performed by: FAMILY MEDICINE

## 2023-07-19 PROCEDURE — 3288F FALL RISK ASSESSMENT DOCD: CPT | Mod: CPTII,S$GLB,, | Performed by: FAMILY MEDICINE

## 2023-07-19 PROCEDURE — 1101F PR PT FALLS ASSESS DOC 0-1 FALLS W/OUT INJ PAST YR: ICD-10-PCS | Mod: CPTII,S$GLB,, | Performed by: FAMILY MEDICINE

## 2023-07-19 PROCEDURE — 3008F BODY MASS INDEX DOCD: CPT | Mod: CPTII,S$GLB,, | Performed by: FAMILY MEDICINE

## 2023-07-19 PROCEDURE — 3288F PR FALLS RISK ASSESSMENT DOCUMENTED: ICD-10-PCS | Mod: CPTII,S$GLB,, | Performed by: FAMILY MEDICINE

## 2023-07-19 PROCEDURE — 1159F MED LIST DOCD IN RCRD: CPT | Mod: CPTII,S$GLB,, | Performed by: FAMILY MEDICINE

## 2023-07-19 PROCEDURE — 99999 PR PBB SHADOW E&M-EST. PATIENT-LVL IV: ICD-10-PCS | Mod: PBBFAC,,, | Performed by: FAMILY MEDICINE

## 2023-07-19 PROCEDURE — 1101F PT FALLS ASSESS-DOCD LE1/YR: CPT | Mod: CPTII,S$GLB,, | Performed by: FAMILY MEDICINE

## 2023-07-19 PROCEDURE — 1159F PR MEDICATION LIST DOCUMENTED IN MEDICAL RECORD: ICD-10-PCS | Mod: CPTII,S$GLB,, | Performed by: FAMILY MEDICINE

## 2023-07-19 PROCEDURE — 99999 PR PBB SHADOW E&M-EST. PATIENT-LVL IV: CPT | Mod: PBBFAC,,, | Performed by: FAMILY MEDICINE

## 2023-07-19 PROCEDURE — 1160F PR REVIEW ALL MEDS BY PRESCRIBER/CLIN PHARMACIST DOCUMENTED: ICD-10-PCS | Mod: CPTII,S$GLB,, | Performed by: FAMILY MEDICINE

## 2023-07-19 PROCEDURE — 3074F SYST BP LT 130 MM HG: CPT | Mod: CPTII,S$GLB,, | Performed by: FAMILY MEDICINE

## 2023-07-19 PROCEDURE — 3079F DIAST BP 80-89 MM HG: CPT | Mod: CPTII,S$GLB,, | Performed by: FAMILY MEDICINE

## 2023-07-19 PROCEDURE — 99213 OFFICE O/P EST LOW 20 MIN: CPT | Mod: S$GLB,,, | Performed by: FAMILY MEDICINE

## 2023-07-19 PROCEDURE — 3079F PR MOST RECENT DIASTOLIC BLOOD PRESSURE 80-89 MM HG: ICD-10-PCS | Mod: CPTII,S$GLB,, | Performed by: FAMILY MEDICINE

## 2023-07-19 PROCEDURE — 99213 PR OFFICE/OUTPT VISIT, EST, LEVL III, 20-29 MIN: ICD-10-PCS | Mod: S$GLB,,, | Performed by: FAMILY MEDICINE

## 2023-07-19 PROCEDURE — 1126F AMNT PAIN NOTED NONE PRSNT: CPT | Mod: CPTII,S$GLB,, | Performed by: FAMILY MEDICINE

## 2023-07-19 PROCEDURE — 3044F HG A1C LEVEL LT 7.0%: CPT | Mod: CPTII,S$GLB,, | Performed by: FAMILY MEDICINE

## 2023-07-19 RX ORDER — ERYTHROMYCIN 5 MG/G
OINTMENT OPHTHALMIC EVERY 6 HOURS
Qty: 3.5 G | Refills: 1 | Status: SHIPPED | OUTPATIENT
Start: 2023-07-19 | End: 2023-07-26

## 2023-08-24 DIAGNOSIS — K21.9 GASTROESOPHAGEAL REFLUX DISEASE, UNSPECIFIED WHETHER ESOPHAGITIS PRESENT: ICD-10-CM

## 2023-08-24 RX ORDER — OMEPRAZOLE 40 MG/1
40 CAPSULE, DELAYED RELEASE ORAL
Qty: 90 CAPSULE | Refills: 3 | Status: SHIPPED | OUTPATIENT
Start: 2023-08-24

## 2023-08-24 NOTE — TELEPHONE ENCOUNTER
Refill Decision Note      Refill Decision Note   Carlene Fong  is requesting a refill authorization.  Brief Assessment and Rationale for Refill:  Approve     Medication Therapy Plan:         Comments:     Note composed:7:05 AM 08/24/2023             Appointments     Last Visit   7/19/2023 Ashli Stubbs MD   Next Visit   9/12/2023 Ashli Stubbs MD

## 2023-08-24 NOTE — TELEPHONE ENCOUNTER
No care due was identified.  [facility] Embedded Care Due Messages. Reference number: [id number].   8/24/2023 5:34:36 AM CDT

## 2023-09-08 ENCOUNTER — LAB VISIT (OUTPATIENT)
Dept: LAB | Facility: HOSPITAL | Age: 73
End: 2023-09-08
Attending: FAMILY MEDICINE
Payer: COMMERCIAL

## 2023-09-08 DIAGNOSIS — E78.6 LOW HDL (UNDER 40): ICD-10-CM

## 2023-09-08 DIAGNOSIS — R79.0 ABNORMAL RESULT OF IRON PROFILE TESTING: ICD-10-CM

## 2023-09-08 DIAGNOSIS — Z51.81 ENCOUNTER FOR MONITORING LONG-TERM PROTON PUMP INHIBITOR THERAPY: ICD-10-CM

## 2023-09-08 DIAGNOSIS — Z79.899 MEDICATION MANAGEMENT: ICD-10-CM

## 2023-09-08 DIAGNOSIS — E44.1 PROTEIN-CALORIE MALNUTRITION, MILD: ICD-10-CM

## 2023-09-08 DIAGNOSIS — Z79.899 ENCOUNTER FOR MONITORING LONG-TERM PROTON PUMP INHIBITOR THERAPY: ICD-10-CM

## 2023-09-08 LAB
25(OH)D3+25(OH)D2 SERPL-MCNC: 66 NG/ML (ref 30–96)
ALBUMIN SERPL BCP-MCNC: 3.5 G/DL (ref 3.5–5.2)
ALP SERPL-CCNC: 88 U/L (ref 55–135)
ALT SERPL W/O P-5'-P-CCNC: 7 U/L (ref 10–44)
ANION GAP SERPL CALC-SCNC: 9 MMOL/L (ref 8–16)
AST SERPL-CCNC: 13 U/L (ref 10–40)
BASOPHILS # BLD AUTO: 0.05 K/UL (ref 0–0.2)
BASOPHILS NFR BLD: 0.6 % (ref 0–1.9)
BILIRUB SERPL-MCNC: 0.5 MG/DL (ref 0.1–1)
BUN SERPL-MCNC: 12 MG/DL (ref 8–23)
CALCIUM SERPL-MCNC: 9.7 MG/DL (ref 8.7–10.5)
CHLORIDE SERPL-SCNC: 106 MMOL/L (ref 95–110)
CHOLEST SERPL-MCNC: 208 MG/DL (ref 120–199)
CHOLEST/HDLC SERPL: 4.4 {RATIO} (ref 2–5)
CO2 SERPL-SCNC: 28 MMOL/L (ref 23–29)
CREAT SERPL-MCNC: 0.7 MG/DL (ref 0.5–1.4)
DIFFERENTIAL METHOD: NORMAL
EOSINOPHIL # BLD AUTO: 0.4 K/UL (ref 0–0.5)
EOSINOPHIL NFR BLD: 5.4 % (ref 0–8)
ERYTHROCYTE [DISTWIDTH] IN BLOOD BY AUTOMATED COUNT: 14 % (ref 11.5–14.5)
EST. GFR  (NO RACE VARIABLE): >60 ML/MIN/1.73 M^2
GLUCOSE SERPL-MCNC: 81 MG/DL (ref 70–110)
HCT VFR BLD AUTO: 43.3 % (ref 37–48.5)
HDLC SERPL-MCNC: 47 MG/DL (ref 40–75)
HDLC SERPL: 22.6 % (ref 20–50)
HGB BLD-MCNC: 14 G/DL (ref 12–16)
IMM GRANULOCYTES # BLD AUTO: 0.02 K/UL (ref 0–0.04)
IMM GRANULOCYTES NFR BLD AUTO: 0.3 % (ref 0–0.5)
IRON SERPL-MCNC: 80 UG/DL (ref 30–160)
LDLC SERPL CALC-MCNC: 144.6 MG/DL (ref 63–159)
LYMPHOCYTES # BLD AUTO: 1.4 K/UL (ref 1–4.8)
LYMPHOCYTES NFR BLD: 18 % (ref 18–48)
MAGNESIUM SERPL-MCNC: 2.2 MG/DL (ref 1.6–2.6)
MCH RBC QN AUTO: 31 PG (ref 27–31)
MCHC RBC AUTO-ENTMCNC: 32.3 G/DL (ref 32–36)
MCV RBC AUTO: 96 FL (ref 82–98)
MONOCYTES # BLD AUTO: 0.4 K/UL (ref 0.3–1)
MONOCYTES NFR BLD: 5.6 % (ref 4–15)
NEUTROPHILS # BLD AUTO: 5.4 K/UL (ref 1.8–7.7)
NEUTROPHILS NFR BLD: 70.1 % (ref 38–73)
NONHDLC SERPL-MCNC: 161 MG/DL
NRBC BLD-RTO: 0 /100 WBC
PLATELET # BLD AUTO: 337 K/UL (ref 150–450)
PMV BLD AUTO: 10.2 FL (ref 9.2–12.9)
POTASSIUM SERPL-SCNC: 4.5 MMOL/L (ref 3.5–5.1)
PROT SERPL-MCNC: 6.8 G/DL (ref 6–8.4)
RBC # BLD AUTO: 4.52 M/UL (ref 4–5.4)
SATURATED IRON: 29 % (ref 20–50)
SODIUM SERPL-SCNC: 143 MMOL/L (ref 136–145)
TOTAL IRON BINDING CAPACITY: 278 UG/DL (ref 250–450)
TRANSFERRIN SERPL-MCNC: 188 MG/DL (ref 200–375)
TRIGL SERPL-MCNC: 82 MG/DL (ref 30–150)
TSH SERPL DL<=0.005 MIU/L-ACNC: 2.54 UIU/ML (ref 0.4–4)
VIT B12 SERPL-MCNC: 371 PG/ML (ref 210–950)
WBC # BLD AUTO: 7.73 K/UL (ref 3.9–12.7)

## 2023-09-08 PROCEDURE — 84466 ASSAY OF TRANSFERRIN: CPT | Performed by: FAMILY MEDICINE

## 2023-09-08 PROCEDURE — 80053 COMPREHEN METABOLIC PANEL: CPT | Performed by: FAMILY MEDICINE

## 2023-09-08 PROCEDURE — 82306 VITAMIN D 25 HYDROXY: CPT | Performed by: FAMILY MEDICINE

## 2023-09-08 PROCEDURE — 80061 LIPID PANEL: CPT | Performed by: FAMILY MEDICINE

## 2023-09-08 PROCEDURE — 82607 VITAMIN B-12: CPT | Performed by: FAMILY MEDICINE

## 2023-09-08 PROCEDURE — 84443 ASSAY THYROID STIM HORMONE: CPT | Performed by: FAMILY MEDICINE

## 2023-09-08 PROCEDURE — 83540 ASSAY OF IRON: CPT | Performed by: FAMILY MEDICINE

## 2023-09-08 PROCEDURE — 36415 COLL VENOUS BLD VENIPUNCTURE: CPT | Mod: PO | Performed by: FAMILY MEDICINE

## 2023-09-08 PROCEDURE — 85025 COMPLETE CBC W/AUTO DIFF WBC: CPT | Performed by: FAMILY MEDICINE

## 2023-09-08 PROCEDURE — 83735 ASSAY OF MAGNESIUM: CPT | Performed by: FAMILY MEDICINE

## 2023-09-11 ENCOUNTER — PATIENT MESSAGE (OUTPATIENT)
Dept: ADMINISTRATIVE | Facility: HOSPITAL | Age: 73
End: 2023-09-11
Payer: COMMERCIAL

## 2023-09-12 ENCOUNTER — OFFICE VISIT (OUTPATIENT)
Dept: FAMILY MEDICINE | Facility: CLINIC | Age: 73
End: 2023-09-12
Payer: COMMERCIAL

## 2023-09-12 ENCOUNTER — TELEPHONE (OUTPATIENT)
Dept: FAMILY MEDICINE | Facility: CLINIC | Age: 73
End: 2023-09-12

## 2023-09-12 DIAGNOSIS — Z12.31 ENCOUNTER FOR SCREENING MAMMOGRAM FOR BREAST CANCER: ICD-10-CM

## 2023-09-12 DIAGNOSIS — E55.9 VITAMIN D DEFICIENCY: ICD-10-CM

## 2023-09-12 DIAGNOSIS — K21.9 HIATAL HERNIA WITH GERD WITHOUT ESOPHAGITIS: Primary | ICD-10-CM

## 2023-09-12 DIAGNOSIS — H81.09 MENIERE'S DISEASE, UNSPECIFIED LATERALITY: ICD-10-CM

## 2023-09-12 DIAGNOSIS — K44.9 HIATAL HERNIA WITH GERD WITHOUT ESOPHAGITIS: Primary | ICD-10-CM

## 2023-09-12 DIAGNOSIS — Z79.899 ENCOUNTER FOR MONITORING LONG-TERM PROTON PUMP INHIBITOR THERAPY: ICD-10-CM

## 2023-09-12 DIAGNOSIS — Z51.81 ENCOUNTER FOR MONITORING LONG-TERM PROTON PUMP INHIBITOR THERAPY: ICD-10-CM

## 2023-09-12 DIAGNOSIS — R79.89 LOW SERUM PREALBUMIN: ICD-10-CM

## 2023-09-12 DIAGNOSIS — Z79.899 MEDICATION MANAGEMENT: ICD-10-CM

## 2023-09-12 DIAGNOSIS — M81.0 POSTMENOPAUSAL OSTEOPOROSIS: ICD-10-CM

## 2023-09-12 DIAGNOSIS — E78.2 MODERATE MIXED HYPERLIPIDEMIA NOT REQUIRING STATIN THERAPY: ICD-10-CM

## 2023-09-12 DIAGNOSIS — E21.0 PRIMARY HYPERPARATHYROIDISM: ICD-10-CM

## 2023-09-12 PROCEDURE — 99214 OFFICE O/P EST MOD 30 MIN: CPT | Mod: 95,,, | Performed by: FAMILY MEDICINE

## 2023-09-12 PROCEDURE — 1159F MED LIST DOCD IN RCRD: CPT | Mod: CPTII,95,, | Performed by: FAMILY MEDICINE

## 2023-09-12 PROCEDURE — 3044F HG A1C LEVEL LT 7.0%: CPT | Mod: CPTII,95,, | Performed by: FAMILY MEDICINE

## 2023-09-12 PROCEDURE — 3044F PR MOST RECENT HEMOGLOBIN A1C LEVEL <7.0%: ICD-10-PCS | Mod: CPTII,95,, | Performed by: FAMILY MEDICINE

## 2023-09-12 PROCEDURE — 1159F PR MEDICATION LIST DOCUMENTED IN MEDICAL RECORD: ICD-10-PCS | Mod: CPTII,95,, | Performed by: FAMILY MEDICINE

## 2023-09-12 PROCEDURE — 1160F PR REVIEW ALL MEDS BY PRESCRIBER/CLIN PHARMACIST DOCUMENTED: ICD-10-PCS | Mod: CPTII,95,, | Performed by: FAMILY MEDICINE

## 2023-09-12 PROCEDURE — 99214 PR OFFICE/OUTPT VISIT, EST, LEVL IV, 30-39 MIN: ICD-10-PCS | Mod: 95,,, | Performed by: FAMILY MEDICINE

## 2023-09-12 PROCEDURE — 1160F RVW MEDS BY RX/DR IN RCRD: CPT | Mod: CPTII,95,, | Performed by: FAMILY MEDICINE

## 2023-09-12 NOTE — PROGRESS NOTES
Office Visit    Patient Name: Carlene Fong    : 1950  MRN: [id number]    Subjective:  Carlene is a 72 y.o. female who presents today for:    No chief complaint on file.    The patient location is: HER HOME  The chief complaint leading to consultation is: LAB REVIEW AND FOLLOW UP    Visit type: audiovisual    Face to Face time with patient: START  940   30 minutes of total time spent on the encounter, which includes face to face time and non-face to face time preparing to see the patient (eg, review of tests), Obtaining and/or reviewing separately obtained history, Documenting clinical information in the electronic or other health record, Independently interpreting results (not separately reported) and communicating results to the patient/family/caregiver, or Care coordination (not separately reported).     Each patient to whom he or she provides medical services by telemedicine is:  (1) informed of the relationship between the physician and patient and the respective role of any other health care provider with respect to management of the patient; and (2) notified that he or she may decline to receive medical services by telemedicine and may withdraw from such care at any time.    Notes:     PRIOR PHYSICAL 3/17/23  Recently seen for stye on 23-- resolved with erythromycin ointment  LABS 23 all unremarkable except LDL did increase to 144-- goo ratio of LDL to HDL, normal B12/Bit D/ Mg/ TSH, normal Liver/kidney function and CBC    Chronic conditions:  primary hyperparathyroidism associated with vitamin D deficiency & Osteoporosis now s/p 3 gland parathyroidectomy on 2019, obesity, history of positive rheumatoid factor without clinical evidence of rheumatoid arthritis, left wrist carpal tunnel(s/p injection 10/2018), mild anxiety, GERD with recent exacerbation, bilateral knee pain followed by Ortho Dr Dobbs and managed with periodic cortisone injections.      Carlene presents today  for follow-up of severe GERD symptoms associated with weakness that was thought to be related to lack of p.o. intake and to review follow-up labs.       She continues to note improvement in her energy level and how she feels generally with resolution of faintness/ Lightheadedness episodes with improved management of her acid reflux on daily PPI/gerd friendly diet and weight loss.  PO intake is much improved over the last few months. Eating consistently throughout the day and does not eat after 6PM to mitigate GERD symptoms.      She had and EGD with Dr Jaimes on 4/14/23 showing a small Hiatal Hernia that could be predisposing her to reflux.   At times she will get a discomfort w/ breathing laterally in the evening that attributes with Prilosec wearing off.   Not waking up with Reflux symptoms.      GENERAL LIFESTYLE HABITS:   DIET: balanced and avoiding artificial sweeteners, has increased her dairy/ protein intake. Healthy balanced meal and limiting portions-- getting veggies in and hydrating regularly  EXERCISE: good, walking off an on in the mornings-- 15 minutes in the mornings at times-- some knee pain limiting  SLEEP: good, goes to be early and wakes early to go to mass and walk, feels rested  WEIGHT: total weight loss of 25+ lbs-- MAINTAINING BMI 29 and feels better at this weight     HCTZ discontinued with no BP concerns noted-- lost 25+lbs and the diuretic may have been contributing to some orthostatic symptoms that have since resolved    PAST MEDICAL HISTORY, SURGICAL/SOCIAL/FAMILY HISTORY REVIEWED AS PER CHART, WITH PERTINENT FINDINGS INCLUDED IN HISTORY SECTION OF NOTE.     Current Medications    Medication List with Changes/Refills   Current Medications    CALCIUM CARBONATE (CALCIUM 600 ORAL)    Take by mouth.    CICLOPIROX (LOPROX) 0.77 % CREA    Apply topically 2 (two) times daily.    DICLOFENAC SODIUM (VOLTAREN) 1 % GEL    Apply 2 g topically 3 (three) times daily.    FLAXSEED ORAL    Take by mouth  once daily.     FLUTICASONE PROPIONATE (FLONASE) 50 MCG/ACTUATION NASAL SPRAY    2 SPRAYS (100 MCG TOTAL) BY EACH NOSTRIL ROUTE NIGHTLY.    GABAPENTIN (NEURONTIN) 300 MG CAPSULE    Take 1 capsule (300 mg total) by mouth 2 (two) times daily.    MECLIZINE (ANTIVERT) 25 MG TABLET    Take 1 tablet (25 mg total) by mouth 3 (three) times daily as needed for Dizziness.    OMEPRAZOLE (PRILOSEC) 40 MG CAPSULE    TAKE 1 CAPSULE BY MOUTH BEFORE BREAKFAST.    ONDANSETRON (ZOFRAN) 8 MG TABLET    Take 1 tablet (8 mg total) by mouth every 8 (eight) hours as needed for Nausea.    VITAMIN D 1000 UNITS TAB    Take 185 mg by mouth once daily.       Allergies   Review of patient's allergies indicates:   Allergen Reactions    Ropinirole Swelling and Rash      palpitations, swelling of tongue   palpitations, swelling of tongue         Review of Systems (Pertinent positives)  Review of Systems   Constitutional:  Negative for activity change and unexpected weight change.   HENT:  Negative for hearing loss, rhinorrhea and trouble swallowing.    Eyes:  Negative for discharge and visual disturbance.   Respiratory:  Negative for chest tightness and wheezing.    Cardiovascular:  Negative for chest pain and palpitations.   Gastrointestinal:  Negative for blood in stool, constipation, diarrhea and vomiting.   Endocrine: Negative for polydipsia and polyuria.   Genitourinary:  Negative for difficulty urinating, dysuria, hematuria and menstrual problem.   Musculoskeletal:  Positive for arthralgias. Negative for joint swelling and neck pain.   Neurological:  Negative for weakness and headaches.   Psychiatric/Behavioral:  Negative for confusion and dysphoric mood.        LMP  (LMP Unknown)     Physical Exam  Vitals reviewed.   Constitutional:       General: She is not in acute distress.     Appearance: Normal appearance. She is well-developed.   HENT:      Head: Normocephalic and atraumatic.   Eyes:      Conjunctiva/sclera: Conjunctivae normal.    Pulmonary:      Effort: Pulmonary effort is normal.   Neurological:      Mental Status: She is alert and oriented to person, place, and time.   Psychiatric:         Mood and Affect: Mood normal.         Behavior: Behavior normal.           Assessment/Plan:  Carlene Fong is a 72 y.o. female who presents today for :        ICD-10-CM ICD-9-CM    1. Hiatal hernia with GERD without esophagitis  K44.9 553.3     K21.9 530.11       2. Encounter for monitoring long-term proton pump inhibitor therapy  Z51.81 V58.83 Hemoglobin A1C    Z79.899 V58.69 Comprehensive Metabolic Panel      Lipid Panel      CBC Auto Differential      TSH      Vitamin D      Vitamin B12      Magnesium      Ferritin      Prealbumin      3. Encounter for screening mammogram for breast cancer  Z12.31 V76.12 Mammo Digital Screening Bilat      4. Postmenopausal osteoporosis  M81.0 733.01       5. Primary hyperparathyroidism  E21.0 252.01 Hemoglobin A1C      Comprehensive Metabolic Panel      Lipid Panel      CBC Auto Differential      TSH      Vitamin D      Vitamin B12      Magnesium      Ferritin      Prealbumin      6. Vitamin D deficiency  E55.9 268.9 Vitamin B12      7. Meniere's disease, unspecified laterality  H81.09 386.00       8. Moderate mixed hyperlipidemia not requiring statin therapy  E78.2 272.2 Hemoglobin A1C      Comprehensive Metabolic Panel      Lipid Panel      CBC Auto Differential      TSH      Vitamin D      Vitamin B12      Magnesium      Ferritin      Prealbumin      9. Medication management  Z79.899 V58.69 Hemoglobin A1C      Comprehensive Metabolic Panel      Lipid Panel      CBC Auto Differential      TSH      Vitamin D      Vitamin B12      Magnesium      Ferritin      Prealbumin      10. Low serum prealbumin  R79.89 790.6 Hemoglobin A1C      Comprehensive Metabolic Panel      Lipid Panel      CBC Auto Differential      TSH      Vitamin D      Vitamin B12      Magnesium      Ferritin      Prealbumin        HH w/ GERD:  significant improvement on daily omeprazole 40 (Pepcid prn in the PM but not needing lately), s/p weight loss, GERD friendly diet (does not eat late).  EGD with Dr Jaimes on 4/14/23 showing a small Hiatal Hernia that could be predisposing her to reflux.   Continue PPI lab monitoring     OP: some improvement on most recent DEXA with treatment of Hyperparathyroidism s/p parathyroidectomy. Vit D / Ca WNL. Continue supplements, regular walking and repeat DEXA 3/20/25    WEAKNESS: improved with tx GERD, better PO intake and stopping HCTZ    Meniere's stable off HCTZ    Patient Instructions   FLU SHOT THIS THURSDAY  DECLINES COVID       Follow up in about 6 months (around 3/12/2024) for to follow up on lab results, return as needed for new concerns.

## 2023-09-12 NOTE — TELEPHONE ENCOUNTER
----- Message from Ashli Stubbs MD sent at 9/12/2023 10:05 AM CDT -----  Regarding: vv follow up  Please schedule for labs prior to in person physical in 6 months thanks

## 2023-09-22 ENCOUNTER — OFFICE VISIT (OUTPATIENT)
Dept: ORTHOPEDICS | Facility: CLINIC | Age: 73
End: 2023-09-22
Payer: COMMERCIAL

## 2023-09-22 VITALS — HEIGHT: 66 IN | BODY MASS INDEX: 29.71 KG/M2

## 2023-09-22 DIAGNOSIS — M17.0 PRIMARY OSTEOARTHRITIS OF BOTH KNEES: Primary | ICD-10-CM

## 2023-09-22 PROCEDURE — 99999 PR PBB SHADOW E&M-EST. PATIENT-LVL III: ICD-10-PCS | Mod: PBBFAC,,, | Performed by: ORTHOPAEDIC SURGERY

## 2023-09-22 PROCEDURE — 20610 DRAIN/INJ JOINT/BURSA W/O US: CPT | Mod: 50,S$GLB,, | Performed by: ORTHOPAEDIC SURGERY

## 2023-09-22 PROCEDURE — 20610 LARGE JOINT ASPIRATION/INJECTION: BILATERAL KNEE: ICD-10-PCS | Mod: 50,S$GLB,, | Performed by: ORTHOPAEDIC SURGERY

## 2023-09-22 PROCEDURE — 99499 NO LOS: ICD-10-PCS | Mod: S$GLB,,, | Performed by: ORTHOPAEDIC SURGERY

## 2023-09-22 PROCEDURE — 99999 PR PBB SHADOW E&M-EST. PATIENT-LVL III: CPT | Mod: PBBFAC,,, | Performed by: ORTHOPAEDIC SURGERY

## 2023-09-22 PROCEDURE — 99499 UNLISTED E&M SERVICE: CPT | Mod: S$GLB,,, | Performed by: ORTHOPAEDIC SURGERY

## 2023-09-22 RX ORDER — TRIAMCINOLONE ACETONIDE 40 MG/ML
80 INJECTION, SUSPENSION INTRA-ARTICULAR; INTRAMUSCULAR
Status: DISCONTINUED | OUTPATIENT
Start: 2023-09-22 | End: 2023-09-22 | Stop reason: HOSPADM

## 2023-09-22 RX ADMIN — TRIAMCINOLONE ACETONIDE 80 MG: 40 INJECTION, SUSPENSION INTRA-ARTICULAR; INTRAMUSCULAR at 09:09

## 2023-09-22 NOTE — PROGRESS NOTES
The patient is seen specifically for palliative injection of both knees.  She gives consent for this.    To [place] next to plan her palliative management around the schedule.

## 2023-09-22 NOTE — PROCEDURES
Large Joint Aspiration/Injection: bilateral knee    Date/Time: 9/22/2023 9:45 AM    Performed by: Franklin Dobbs MD  Authorized by: Franklin Dobbs MD    Consent Done?:  Yes (Verbal)  Approach:  Anterolateral  Location:  Knee  Laterality:  Bilateral  Site:  Bilateral knee  Medications (Left):  80 mg triamcinolone acetonide 40 mg/mL     After obtaining verbal informed consent both of the patient's knees were prepped aseptically and injected through an inferior lateral approach using 40 mg of triamcinolone and 1 cc of 1% plain Xylocaine.  The patient was warned about postinjection flare and how to manage it with ice, rest and over-the-counter analgesics.  They're advised to contact me for any severe, uncontrolled pain.

## 2023-10-19 ENCOUNTER — HOSPITAL ENCOUNTER (EMERGENCY)
Facility: HOSPITAL | Age: 73
Discharge: HOME | End: 2023-10-19
Attending: EMERGENCY MEDICINE
Payer: COMMERCIAL

## 2023-10-19 VITALS
HEART RATE: 87 BPM | SYSTOLIC BLOOD PRESSURE: 178 MMHG | WEIGHT: 175 LBS | DIASTOLIC BLOOD PRESSURE: 82 MMHG | OXYGEN SATURATION: 98 % | BODY MASS INDEX: 28.25 KG/M2 | RESPIRATION RATE: 16 BRPM

## 2023-10-19 DIAGNOSIS — I10 ASYMPTOMATIC HYPERTENSION: ICD-10-CM
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
## 2023-10-19 DIAGNOSIS — S00.03XA CONTUSION OF SCALP, INITIAL ENCOUNTER: ICD-10-CM

## 2023-10-19 DIAGNOSIS — R07.81 RIB PAIN ON LEFT SIDE: ICD-10-CM

## 2023-10-19 PROCEDURE — 99284 EMERGENCY DEPT VISIT MOD MDM: CPT | Mod: 25,27

## 2023-10-19 PROCEDURE — 99284 EMERGENCY DEPT VISIT MOD MDM: CPT | Mod: 25

## 2023-10-19 RX ORDER — ACETAMINOPHEN 325 MG/1
650 TABLET ORAL
Status: DISCONTINUED | OUTPATIENT
Start: 2023-10-19 | End: 2023-10-19

## 2023-10-19 RX ORDER — MUPIROCIN 20 MG/G
OINTMENT TOPICAL 2 TIMES DAILY
Qty: 15 G | Refills: 0 | Status: SHIPPED | OUTPATIENT
Start: 2023-10-19 | End: 2023-10-24

## 2023-10-19 RX ORDER — LIDOCAINE 50 MG/G
1 PATCH TOPICAL
Status: DISCONTINUED | OUTPATIENT
Start: 2023-10-19 | End: 2023-10-19

## 2023-10-19 NOTE — ED TRIAGE NOTES
Tripped on uneven surface just PTA. Fell forward striking forehead on pavement. No LOC. Presents awake, alert with c/o abrasions to left forehead, bilateral forearms, and knees. Denies painful ROM or difficulty ambulating. Presents in no distress.

## 2023-10-19 NOTE — ED PROVIDER NOTES
Encounter Date: 10/19/2023       History     Chief Complaint   Patient presents with    Fall     Pt presents to the ed after a fall on a sidewalk. Pt has swelling to the forehead on the left side. Pt has multiple abrasions to both arms. Denies LOC. Pt is not on any blood thinners.      72-year-old female with past medical history as stated below presents to the ED after mechanical fall.  She states she walks on the sidewalk every morning and tripped on an uneven surface around 9 AM.  She denies any symptoms preceding the fall to include chest pain, shortness of breath, dizziness, lightheadedness, palpitations.  She struck her left frontal scalp on the concrete.  She is not on blood thinners.  No LOC. Hematoma noted to left frontal scalp. Abrasions noted to bilateral forearms, right knee.  Patient also has some pain and stiffness to left knee, left hand. Denies CP, SOB, abdominal pain, N/V, headache, vision changes, neck pain.    The history is provided by the patient.     Review of patient's allergies indicates:   Allergen Reactions    Ropinirole Swelling and Rash      palpitations, swelling of tongue   palpitations, swelling of tongue     Past Medical History:   Diagnosis Date    Abnormal mammogram 8/10/2016    Followed up with diagnostic mammogram and ultrasound 10/22/2015at [facility]  that showed no concerning findings with recommendation to continue yearly screening.     Anxiety 8/8/2017    will need to readdress this if work up is negative and symptoms persist    Arthritis of knee 1/7/2015    Diverticulosis     Meniere's disease 8/15/2017    Osteopenia 12/1/2015    next bone density 4/2016, seeing endocrine for hyperparathyroidism     Positive anti-CCP test 4/7/2014    Primary hyperparathyroidism 8/26/2013    will be following up with Dr. [facility] in 2016, blood work today. stoped taking calcitonin due to nausea     Rheumatoid factor positive 4/7/2014    Vertigo 7/16/2017    Vitamin D deficiency 12/1/2015      Past Surgical History:   Procedure Laterality Date    APPENDECTOMY      BREAST BIOPSY      ESOPHAGOGASTRODUODENOSCOPY N/A 4/14/2023    Procedure: EGD (ESOPHAGOGASTRODUODENOSCOPY);  Surgeon: Hever Jaimes MD;  Location: [facility];  Service: Endoscopy;  Laterality: N/A;    HYSTERECTOMY      PARATHYROIDECTOMY N/A 05/22/2019    Procedure: PARATHYROIDECTOMY;  Surgeon: Andie Mcfarlane MD;  Location: [facility] OR [address] [place] [address];  Service: General;  Laterality: N/A;  NIMS / Intraop PTH Monitoring    ROTATOR CUFF REPAIR Right 12/2014    TUBAL LIGATION       Family History   Problem Relation Age of Onset    No Known Problems Mother     Diabetes Father     Dementia Father     Breast cancer Daughter     Rheum arthritis Neg Hx     Lupus Neg Hx     Inflammatory bowel disease Neg Hx      Social History     Tobacco Use    Smoking status: Never    Smokeless tobacco: Never   Substance Use Topics    Alcohol use: No    Drug use: No     Review of Systems   Constitutional:  Negative for chills and fever.   Eyes:  Negative for visual disturbance.   Respiratory:  Negative for shortness of breath.    Cardiovascular:  Negative for chest pain.   Gastrointestinal:  Negative for abdominal pain, nausea and vomiting.   Musculoskeletal:  Positive for arthralgias. Negative for neck pain.   Skin:  Positive for color change and wound.   Neurological:  Negative for dizziness, speech difficulty, light-headedness and headaches.   Psychiatric/Behavioral:  Negative for agitation and confusion.        Physical Exam     Initial Vitals [10/19/23 1019]   BP Pulse Resp Temp SpO2   (!) 151/92 103 18 -- 100 %      MAP       --         Physical Exam    Nursing note and vitals reviewed.  Constitutional: She appears well-developed and well-nourished. She is not diaphoretic.  Non-toxic appearance. No distress.   HENT:   Head: Normocephalic. Head is with contusion. Head is without raccoon's eyes, without Booth's sign and without laceration.       Right Ear: Hearing,  tympanic membrane, external ear and ear canal normal. No hemotympanum.   Left Ear: Hearing, tympanic membrane, external ear and ear canal normal. No hemotympanum.   Nose: No mucosal edema or nasal deformity. Right sinus exhibits no maxillary sinus tenderness and no frontal sinus tenderness. Left sinus exhibits no maxillary sinus tenderness and no frontal sinus tenderness.   Mouth/Throat: Uvula is midline, oropharynx is clear and moist and mucous membranes are normal.   Eyes: Conjunctivae and EOM are normal. Pupils are equal, round, and reactive to light.   Neck: Neck supple.   Midline spine nontender to palpation   Normal range of motion.  Cardiovascular:  Normal rate and regular rhythm.           Pulmonary/Chest: Breath sounds normal. No respiratory distress. She has no wheezes. She has no rales. She exhibits no tenderness.   Abdominal: Abdomen is soft. She exhibits no distension. There is no abdominal tenderness.   Musculoskeletal:         General: Normal range of motion.      Cervical back: Normal range of motion and neck supple. Normal range of motion.      Comments: Right knee: Abrasion to anterior right knee. FROM, no bony tenderness  Left knee: No wounds. Mild anterior soft tissue edema. Patella TTP. FROM, though patient endorses stiffness.   Left hand: Base of CMC TTP. Abrasions to thenar aspect. No snuffbox tenderness. FROM. 2+ radial pulses.      Neurological: She is alert and oriented to person, place, and time. GCS score is 15. GCS eye subscore is 4. GCS verbal subscore is 5. GCS motor subscore is 6.   Skin: Skin is warm and dry.   Multiple skin tears to forearms bilaterally, superficial. No lacerations.   Psychiatric: She has a normal mood and affect. Her behavior is normal. Judgment and thought content normal.         ED Course   Procedures  Labs Reviewed - No data to display       Imaging Results              X-Ray Ribs 2 View Left (Final result)  Result time 10/19/23 12:55:36      Final result by  Sanjana Gandhi MD (10/19/23 12:55:36)                   Impression:      As above      Electronically signed by: Sanjana Gandhi MD  Date:    10/19/2023  Time:    12:55               Narrative:    EXAMINATION:  XR RIBS 2 VIEW LEFT    CLINICAL HISTORY:  Pleurodynia    TECHNIQUE:  Views of the left ribs were performed.    COMPARISON:  None.    FINDINGS:  Subtle area of linear lucency noted laterally involving the left 5th rib, only seen on one view, to correlate with patient's area of pain, could represent a nondisplaced fracture.  More importantly, no underlying pneumothorax or pulmonary contusion.                                       CT Cervical Spine Without Contrast (Final result)  Result time 10/19/23 12:00:03      Final result by Brian Youngblood MD (10/19/23 12:00:03)                   Impression:      1. No acute intracranial findings.  2. Left anterior scalp hematoma without associated skull fracture.  3. No acute cervical spine fracture.      Electronically signed by: Brian Youngblood  Date:    10/19/2023  Time:    12:00               Narrative:    EXAMINATION:  CT HEAD WITHOUT CONTRAST; CT CERVICAL SPINE WITHOUT CONTRAST    CLINICAL HISTORY:  Head trauma, minor (Age >= 65y);; Neck trauma (Age >= 65y);    TECHNIQUE:  Low dose axial images were obtained through the head cervical spine.  Coronal and sagittal reformations were also performed. Contrast was not administered.    COMPARISON:  MRI of the brain 08/18/2017.  CT head 07/16/2017.    FINDINGS:  Head:    Blood: No acute intracranial hemorrhage.    Parenchyma: No definite loss of gray-white differentiation to suggest acute or subacute transcortical infarct. Parenchymal volume loss.  Nonspecific areas of white matter hypoattenuation may relate to sequela of chronic small vessel ischemic disease.    Ventricles/Extra-axial spaces: No abnormal extra-axial fluid collection. Basal cisterns are patent.    Vessels: Mild atherosclerotic  calcification.    Orbits: Unremarkable.    Scalp: Left anterior scalp hematoma.    Skull: There are no depressed skull fractures or destructive bone lesions.    Sinuses and mastoids: Scattered paranasal sinus mucosal thickening and retention cysts.  Areas of frothy debris may indicate acute mucosal inflammation in the appropriate clinical context.  Suggested mild chronic osteitis of the left maxillary sinus walls.    Other findings: None    Cervical spine:    Fractures: No acute fractures    Alignment: There is no significant vertebral subluxation  Atlanto-axial and atlanto-occipital joints: Atlanto-axial and atlanto-occipital intervals are not widened.  Facet joints: There is no traumatic facet joint widening.  Vertebral bodies: Query osseous demineralization.  Degenerative endplate change.  Discs: Degenerative disc disease.  Spinal canal and foraminal narrowing: Although CT does not optimally evaluate the soft tissue contents of the spinal canal and foramina, no critical stenosis is suggested.      Paraspinal soft tissues: Operative sequela in the neck marginating the thyroid gland, possibly related to parathyroid surgery.    Upper Lungs:Clear                                       CT Head Without Contrast (Final result)  Result time 10/19/23 12:00:03      Final result by Brian Youngblood MD (10/19/23 12:00:03)                   Impression:      1. No acute intracranial findings.  2. Left anterior scalp hematoma without associated skull fracture.  3. No acute cervical spine fracture.      Electronically signed by: Brian Youngblood  Date:    10/19/2023  Time:    12:00               Narrative:    EXAMINATION:  CT HEAD WITHOUT CONTRAST; CT CERVICAL SPINE WITHOUT CONTRAST    CLINICAL HISTORY:  Head trauma, minor (Age >= 65y);; Neck trauma (Age >= 65y);    TECHNIQUE:  Low dose axial images were obtained through the head cervical spine.  Coronal and sagittal reformations were also performed. Contrast was not  administered.    COMPARISON:  MRI of the brain 08/18/2017.  CT head 07/16/2017.    FINDINGS:  Head:    Blood: No acute intracranial hemorrhage.    Parenchyma: No definite loss of gray-white differentiation to suggest acute or subacute transcortical infarct. Parenchymal volume loss.  Nonspecific areas of white matter hypoattenuation may relate to sequela of chronic small vessel ischemic disease.    Ventricles/Extra-axial spaces: No abnormal extra-axial fluid collection. Basal cisterns are patent.    Vessels: Mild atherosclerotic calcification.    Orbits: Unremarkable.    Scalp: Left anterior scalp hematoma.    Skull: There are no depressed skull fractures or destructive bone lesions.    Sinuses and mastoids: Scattered paranasal sinus mucosal thickening and retention cysts.  Areas of frothy debris may indicate acute mucosal inflammation in the appropriate clinical context.  Suggested mild chronic osteitis of the left maxillary sinus walls.    Other findings: None    Cervical spine:    Fractures: No acute fractures    Alignment: There is no significant vertebral subluxation  Atlanto-axial and atlanto-occipital joints: Atlanto-axial and atlanto-occipital intervals are not widened.  Facet joints: There is no traumatic facet joint widening.  Vertebral bodies: Query osseous demineralization.  Degenerative endplate change.  Discs: Degenerative disc disease.  Spinal canal and foraminal narrowing: Although CT does not optimally evaluate the soft tissue contents of the spinal canal and foramina, no critical stenosis is suggested.      Paraspinal soft tissues: Operative sequela in the neck marginating the thyroid gland, possibly related to parathyroid surgery.    Upper Lungs:Clear                                       X-Ray Knee 3 View Left (Final result)  Result time 10/19/23 12:03:19      Final result by Brian Youngblood MD (10/19/23 12:03:19)                   Impression:      No convincing evidence of acute fracture or  dislocation.    Question at least small suprapatellar knee joint effusion.      Electronically signed by: Brian Youngblood  Date:    10/19/2023  Time:    12:03               Narrative:    EXAMINATION:  XR KNEE 3 VIEW LEFT    CLINICAL HISTORY:  Unspecified fall, initial encounter    TECHNIQUE:  AP, lateral, and Merchant views of the left knee were performed.    COMPARISON:  Bilateral knee radiograph 03/22/2023.    FINDINGS:  No definite evidence of acute fracture or dislocation.  DJD.  Question at least small suprapatellar knee joint effusion.  No definite evidence of radiopaque foreign body.                                       X-Ray Hand 3 view Left (Final result)  Result time 10/19/23 11:10:54      Final result by Brian Youngblood MD (10/19/23 11:10:54)                   Impression:      No convincing evidence of acute fracture or dislocation.      Electronically signed by: Brian Youngblood  Date:    10/19/2023  Time:    11:10               Narrative:    EXAMINATION:  XR HAND COMPLETE 3 VIEW LEFT    CLINICAL HISTORY:  fall;.    TECHNIQUE:  PA, lateral, and oblique views of the left hand were performed.    COMPARISON:  None    FINDINGS:  Suspect osseous demineralization.    No definite evidence of acute fracture or dislocation.  Degenerative changes noted at the radiocarpal, thumb CMC and MCP joints, as well as the 1st through 4th IP joints.    No definite radiopaque foreign body.                                       Medications - No data to display    Medical Decision Making  72-year-old female presents to the ED after mechanical fall.  Head trauma noted.  Hematoma to left frontal scalp.  Abrasions to bilateral forearms, right knee. CT head and c-spine. XR left knee and hand.    Differential Diagnosis includes, but is not limited to:  ICH, SAH, fracture, muscle strain, ligament tear/sprain, laceration, foreign body, abrasion, soft tissue contusion, osteoarthritis.        Amount and/or Complexity of Data  Reviewed  Radiology: ordered.               ED Course as of 10/19/23 1313   Thu Oct 19, 2023   1105 Offered tylenol, lidoderm. Pt declined [CS]   1216 X-Ray Hand 3 view Left  No convincing evidence of acute fracture or dislocation.  Degenerative change [CS]   1216 CT Cervical Spine Without Contrast  1. No acute intracranial findings.  2. Left anterior scalp hematoma without associated skull fracture.  3. No acute cervical spine fracture.   [CS]   1216 CT Head Without Contrast [CS]   1217 X-Ray Knee 3 View Left  No convincing evidence of acute fracture or dislocation.     Question at least small suprapatellar knee joint effusion.   [CS]   1304 X-Ray Ribs 2 View Left  Subtle area of linear lucency noted laterally involving the left 5th rib, only seen on one view, to correlate with patient's area of pain, could represent a nondisplaced fracture.  More importantly, no underlying pneumothorax or pulmonary contusion.    Patient's pain to ribs 9/10. [CS]   1305 All results discussed with patient. Workup negative. Discussed wound care to abrasions, monitor for any signs of infection. Presentation otherwise consistent with contusions. Stable for DC at this time. ED return precautions discussed for worsening pain, neurologic signs, etc. Follow up with PCP--asymptomatic hypertension today [CS]      ED Course User Index  [CS] Pat Rider PA-C                      Clinical Impression:   Final diagnoses:  [W19.XXXA] Fall (Primary)  [R07.81] Rib pain on left side  [S00.03XA] Contusion of scalp, initial encounter  [I10] Asymptomatic hypertension        ED Disposition Condition    Discharge Stable          ED Prescriptions       Medication Sig Dispense Start Date End Date Auth. Provider    mupirocin (BACTROBAN) 2 % ointment Apply topically 2 (two) times daily. for 5 days 15 g 10/19/2023 10/24/2023 Pat Rider PA-C          Follow-up Information       Follow up With Specialties Details Why Contact Info     Ashli Stubbs MD Family Medicine Schedule an appointment as soon as possible for a visit   [address]  [address]  [facility] [address]  [phone]               Pat Rider, PA-C  10/19/23 4612

## 2023-10-19 NOTE — DISCHARGE INSTRUCTIONS
It was nice meeting you, and I hope you feel better soon. You may return to the ER at any time for any new or concerning symptoms, worsening condition, or failure to improve.    Our goal in the ER is to always give you outstanding care and exceptional service. You may receive a survey by mail or email in the next week about your experience in our ED. We would greatly appreciate you completing and returning the survey. Your feedback provides us with a way to recognize our staff who give very good care and it helps us learn how to improve when your experience was below our aspiration of excellence.     Sincerely,     Pat Rider PA-C  Emergency Room Physician Assistant  [facility]

## 2023-10-23 ENCOUNTER — NURSE TRIAGE (OUTPATIENT)
Dept: ADMINISTRATIVE | Facility: CLINIC | Age: 73
End: 2023-10-23
Payer: COMMERCIAL

## 2023-10-23 ENCOUNTER — OFFICE VISIT (OUTPATIENT)
Dept: FAMILY MEDICINE | Facility: CLINIC | Age: 73
End: 2023-10-23
Payer: COMMERCIAL

## 2023-10-23 VITALS
WEIGHT: 183 LBS | TEMPERATURE: 98 F | SYSTOLIC BLOOD PRESSURE: 126 MMHG | OXYGEN SATURATION: 98 % | DIASTOLIC BLOOD PRESSURE: 82 MMHG | HEIGHT: 66 IN | HEART RATE: 105 BPM | BODY MASS INDEX: 29.41 KG/M2

## 2023-10-23 DIAGNOSIS — S22.32XD CLOSED FRACTURE OF ONE RIB OF LEFT SIDE WITH ROUTINE HEALING, SUBSEQUENT ENCOUNTER: Primary | ICD-10-CM

## 2023-10-23 PROCEDURE — 3008F BODY MASS INDEX DOCD: CPT | Mod: CPTII,S$GLB,, | Performed by: PEDIATRICS

## 2023-10-23 PROCEDURE — 3074F SYST BP LT 130 MM HG: CPT | Mod: CPTII,S$GLB,, | Performed by: PEDIATRICS

## 2023-10-23 PROCEDURE — 3079F DIAST BP 80-89 MM HG: CPT | Mod: CPTII,S$GLB,, | Performed by: PEDIATRICS

## 2023-10-23 PROCEDURE — 1160F PR REVIEW ALL MEDS BY PRESCRIBER/CLIN PHARMACIST DOCUMENTED: ICD-10-PCS | Mod: CPTII,S$GLB,, | Performed by: PEDIATRICS

## 2023-10-23 PROCEDURE — 3288F FALL RISK ASSESSMENT DOCD: CPT | Mod: CPTII,S$GLB,, | Performed by: PEDIATRICS

## 2023-10-23 PROCEDURE — 99999 PR PBB SHADOW E&M-EST. PATIENT-LVL IV: CPT | Mod: PBBFAC,,, | Performed by: PEDIATRICS

## 2023-10-23 PROCEDURE — 1100F PR PT FALLS ASSESS DOC 2+ FALLS/FALL W/INJURY/YR: ICD-10-PCS | Mod: CPTII,S$GLB,, | Performed by: PEDIATRICS

## 2023-10-23 PROCEDURE — 3044F HG A1C LEVEL LT 7.0%: CPT | Mod: CPTII,S$GLB,, | Performed by: PEDIATRICS

## 2023-10-23 PROCEDURE — 1125F PR PAIN SEVERITY QUANTIFIED, PAIN PRESENT: ICD-10-PCS | Mod: CPTII,S$GLB,, | Performed by: PEDIATRICS

## 2023-10-23 PROCEDURE — 1159F MED LIST DOCD IN RCRD: CPT | Mod: CPTII,S$GLB,, | Performed by: PEDIATRICS

## 2023-10-23 PROCEDURE — 3079F PR MOST RECENT DIASTOLIC BLOOD PRESSURE 80-89 MM HG: ICD-10-PCS | Mod: CPTII,S$GLB,, | Performed by: PEDIATRICS

## 2023-10-23 PROCEDURE — 3044F PR MOST RECENT HEMOGLOBIN A1C LEVEL <7.0%: ICD-10-PCS | Mod: CPTII,S$GLB,, | Performed by: PEDIATRICS

## 2023-10-23 PROCEDURE — 99214 PR OFFICE/OUTPT VISIT, EST, LEVL IV, 30-39 MIN: ICD-10-PCS | Mod: S$GLB,,, | Performed by: PEDIATRICS

## 2023-10-23 PROCEDURE — 1160F RVW MEDS BY RX/DR IN RCRD: CPT | Mod: CPTII,S$GLB,, | Performed by: PEDIATRICS

## 2023-10-23 PROCEDURE — 1159F PR MEDICATION LIST DOCUMENTED IN MEDICAL RECORD: ICD-10-PCS | Mod: CPTII,S$GLB,, | Performed by: PEDIATRICS

## 2023-10-23 PROCEDURE — 1125F AMNT PAIN NOTED PAIN PRSNT: CPT | Mod: CPTII,S$GLB,, | Performed by: PEDIATRICS

## 2023-10-23 PROCEDURE — 3008F PR BODY MASS INDEX (BMI) DOCUMENTED: ICD-10-PCS | Mod: CPTII,S$GLB,, | Performed by: PEDIATRICS

## 2023-10-23 PROCEDURE — 99214 OFFICE O/P EST MOD 30 MIN: CPT | Mod: S$GLB,,, | Performed by: PEDIATRICS

## 2023-10-23 PROCEDURE — 1100F PTFALLS ASSESS-DOCD GE2>/YR: CPT | Mod: CPTII,S$GLB,, | Performed by: PEDIATRICS

## 2023-10-23 PROCEDURE — 3288F PR FALLS RISK ASSESSMENT DOCUMENTED: ICD-10-PCS | Mod: CPTII,S$GLB,, | Performed by: PEDIATRICS

## 2023-10-23 PROCEDURE — 99999 PR PBB SHADOW E&M-EST. PATIENT-LVL IV: ICD-10-PCS | Mod: PBBFAC,,, | Performed by: PEDIATRICS

## 2023-10-23 PROCEDURE — 3074F PR MOST RECENT SYSTOLIC BLOOD PRESSURE < 130 MM HG: ICD-10-PCS | Mod: CPTII,S$GLB,, | Performed by: PEDIATRICS

## 2023-10-23 RX ORDER — IBUPROFEN 800 MG/1
800 TABLET ORAL EVERY 6 HOURS PRN
Qty: 30 TABLET | Refills: 0 | Status: SHIPPED | OUTPATIENT
Start: 2023-10-23 | End: 2024-02-13

## 2023-10-23 NOTE — TELEPHONE ENCOUNTER
"Pt calling states she fell Thursday was seen in ED Friday ans is still having pain to the L side. Rates "6/7" out of 10. Per protocol advised to SEE TODAY IN OFFICE. verbalized understanding. Denies any further questions or concerns at this time, advised to call back if they have any that come up. Advised pt to call back with any other concerns or worsening symptoms. Verbalized understanding and will route message to provider.     Reason for Disposition   Patient wants to be seen   MODERATE pain (e.g., interferes with normal activities or awakens from sleep)    Additional Information   Negative: Major injury from dangerous force (e.g., fall > 10 feet or 3 meters)   Negative: Major bleeding (e.g., actively dripping or spurting) and can't be stopped   Negative: Shock suspected (e.g., cold/pale/clammy skin, too weak to stand)   Negative: Difficult to awaken or acting confused (e.g., disoriented, slurred speech)   Negative: SEVERE weakness (i.e., unable to walk or barely able to walk, requires support) and new-onset or worsening   Negative: Can't stand (bear weight) or walk and new-onset after fall   Negative: Sounds like a life-threatening emergency to the triager   Negative: Injury (or injuries) that need emergency care   Negative: Sounds like a serious injury to the triager   Negative: Muscle pain and dark (cola colored) or red-colored urine   Negative: Unable to get up until help (e.g., caregiver, family, friend) arrived and on the ground 1 hour or more   Negative: Patient sounds very sick or weak to the triager   Negative: MODERATE weakness (i.e., interferes with work, school, normal activities) and new-onset or worsening   Negative: Fever > 101 F (38.3 C) and age > 60 years   Negative: Fever > 100.0 F (37.8 C) and bedridden (e.g., CVA, chronic illness, recovering from surgery)   Negative: Fever > 100.0 F (37.8 C) and diabetes mellitus or weak immune system (e.g., HIV positive, cancer chemo, splenectomy, organ " transplant, chronic steroids)   Negative: Suspicious history for the fall   Negative: Caller has URGENT question and triager unable to answer question   Negative: New-onset or worsening pale skin (pallor)   Negative: No prior tetanus shots (or is not fully vaccinated) and any wound (e.g., cut or scrape)   Negative: HIV positive or severe immunodeficiency (severely weak immune system) and DIRTY cut or scrape   Negative: Caller has NON-URGENT question and triager unable to answer question   Negative: MILD weakness (i.e., does not interfere with ability to work, go to school, normal activities)  (Exception: mild weakness is a chronic symptom.)   Negative: Last tetanus shot > 5 years ago and DIRTY cut or scrape   Negative: Last tetanus shot > 10 years ago and CLEAN cut or scrape (e.g., object and skin were clean)   Negative: Passed out (i.e., lost consciousness, collapsed and was not responding)   Negative: Shock suspected (e.g., cold/pale/clammy skin, too weak to stand, low BP, rapid pulse)   Negative: Sounds like a life-threatening emergency to the triager   Negative: SEVERE pain (e.g., excruciating, scale 8-10) and present > 1 hour   Negative: Sudden onset of severe flank pain and age > 60 years   Negative: Abdominal pain and age > 60 years   Negative: Unable to urinate (or only a few drops) > 4 hours and bladder feels very full (e.g., palpable bladder or strong urge to urinate)   Negative: Pain radiates into groin, scrotum   Negative: Blood in urine (red, pink, or tea-colored)   Negative: Vomiting   Negative: Weakness of a leg or foot (e.g., unable to bear weight, dragging foot)   Negative: Patient sounds very sick or weak to the triager   Negative: Fever > 100.4 F (38.0 C)   Negative: Pain or burning with passing urine (urination)    Protocols used: Falls and [id number]-UB, Flank Pain-A-OH

## 2023-10-23 NOTE — PROGRESS NOTES
Subjective:      Patient ID: Carlene Fong is a 72 y.o. female.    Chief Complaint: Fall (Fell on 10/19/23 left side pain. Blew nose this morning has blood in it. )    Patient here for follow up from fall. She was walking on her sidewalk like she always does and tripped. Reports she is still having left sided pain at her ribs, which has her concerned about her spleen. Did not realize her xray revealed rib fracture until now. Has been taking tylenol for pain.    Fall  Pertinent negatives include no fever, headaches, nausea or vomiting.     Review of Systems   Constitutional:  Negative for chills, fatigue and fever.   HENT:  Negative for congestion, ear pain, postnasal drip, rhinorrhea, sinus pressure, sinus pain, sneezing and sore throat.    Respiratory:  Negative for cough, chest tightness, shortness of breath and wheezing.    Cardiovascular:  Negative for chest pain and palpitations.   Gastrointestinal:  Negative for diarrhea, nausea and vomiting.   Genitourinary:  Negative for difficulty urinating.   Musculoskeletal:  Negative for arthralgias.        Rib pain left side   Skin:  Negative for rash.   Neurological:  Negative for dizziness and headaches.   Psychiatric/Behavioral:  Negative for confusion.         Current Outpatient Medications on File Prior to Visit   Medication Sig    calcium carbonate (CALCIUM 600 ORAL) Take by mouth.    ciclopirox (LOPROX) 0.77 % Crea Apply topically 2 (two) times daily.    diclofenac sodium (VOLTAREN) 1 % Gel Apply 2 g topically 3 (three) times daily.    FLAXSEED ORAL Take by mouth once daily.     gabapentin (NEURONTIN) 300 MG capsule Take 1 capsule (300 mg total) by mouth 2 (two) times daily.    mupirocin (BACTROBAN) 2 % ointment Apply topically 2 (two) times daily. for 5 days    omeprazole (PRILOSEC) 40 MG capsule TAKE 1 CAPSULE BY MOUTH BEFORE BREAKFAST.    vitamin D 1000 units Tab Take 185 mg by mouth once daily.    fluticasone propionate (FLONASE) 50 mcg/actuation nasal  spray 2 SPRAYS (100 MCG TOTAL) BY EACH NOSTRIL ROUTE NIGHTLY. (Patient not taking: Reported on 9/22/2023)    meclizine (ANTIVERT) 25 mg tablet Take 1 tablet (25 mg total) by mouth 3 (three) times daily as needed for Dizziness. (Patient not taking: Reported on 9/22/2023)    ondansetron (ZOFRAN) 8 MG tablet Take 1 tablet (8 mg total) by mouth every 8 (eight) hours as needed for Nausea. (Patient not taking: Reported on 9/22/2023)     No current facility-administered medications on file prior to visit.        Objective:     Vitals:    10/23/23 1415   BP: 126/82   Pulse: 105   Temp: 97.9 °F (36.6 °C)      Physical Exam  Constitutional:       General: She is not in acute distress.     Appearance: Normal appearance.   HENT:      Head: Normocephalic and atraumatic.        Right Ear: Tympanic membrane, ear canal and external ear normal.      Left Ear: Tympanic membrane, ear canal and external ear normal.      Nose: Nose normal.      Mouth/Throat:      Mouth: Mucous membranes are moist.      Pharynx: Oropharynx is clear.   Eyes:      Extraocular Movements: Extraocular movements intact.      Conjunctiva/sclera: Conjunctivae normal.      Pupils: Pupils are equal, round, and reactive to light.   Cardiovascular:      Rate and Rhythm: Normal rate and regular rhythm.      Pulses: Normal pulses.      Heart sounds: Normal heart sounds.   Pulmonary:      Effort: Pulmonary effort is normal. No respiratory distress.      Breath sounds: Normal breath sounds. No wheezing.   Abdominal:      General: Abdomen is flat. Bowel sounds are normal.   Musculoskeletal:         General: No swelling. Normal range of motion.        Arms:       Cervical back: Normal range of motion and neck supple.        Legs:    Skin:     General: Skin is warm and dry.      Findings: No rash.   Neurological:      Mental Status: She is alert and oriented to person, place, and time.      Gait: Gait normal.   Psychiatric:         Mood and Affect: Mood normal.          Behavior: Behavior normal.      Wounds as marked, all scabbed w/ bruising and tenderness, no warmth, erythema, drainage noted.  Assessment:         1. Closed fracture of one rib of left side with routine healing, subsequent encounter          Plan:   1. Closed fracture of one rib of left side with routine healing, subsequent encounter  - ibuprofen (ADVIL,MOTRIN) 800 MG tablet; Take 1 tablet (800 mg total) by mouth every 6 (six) hours as needed for Pain.  Dispense: 30 tablet; Refill: 0     -Apply ice to painful area several times daily  -Use antiinflammatory as instructed  -Rest back, avoid heavy lifting or workouts x2 weeks  -Follow up as needed with pcp         Harsh Woodruff NP   [facility]  10/23/23

## 2023-10-24 ENCOUNTER — PATIENT MESSAGE (OUTPATIENT)
Dept: FAMILY MEDICINE | Facility: CLINIC | Age: 73
End: 2023-10-24
Payer: COMMERCIAL

## 2023-10-24 NOTE — TELEPHONE ENCOUNTER
LAZARO. Patient was also seen in [place] for a follow up. I messaged her to offer an appointment with you if needed.

## 2023-11-24 ENCOUNTER — HOSPITAL ENCOUNTER (OUTPATIENT)
Dept: RADIOLOGY | Facility: HOSPITAL | Age: 73
Discharge: HOME | End: 2023-11-24
Attending: FAMILY MEDICINE
Payer: COMMERCIAL

## 2023-11-24 DIAGNOSIS — Z12.31 ENCOUNTER FOR SCREENING MAMMOGRAM FOR BREAST CANCER: ICD-10-CM

## 2023-11-24 PROCEDURE — 77067 SCR MAMMO BI INCL CAD: CPT | Mod: 26,,, | Performed by: RADIOLOGY

## 2023-11-24 PROCEDURE — 77067 MAMMO DIGITAL SCREENING BILAT WITH TOMO: ICD-10-PCS | Mod: 26,,, | Performed by: RADIOLOGY

## 2023-11-24 PROCEDURE — 77063 BREAST TOMOSYNTHESIS BI: CPT | Mod: 26,,, | Performed by: RADIOLOGY

## 2023-11-24 PROCEDURE — 77067 SCR MAMMO BI INCL CAD: CPT | Mod: TC

## 2023-11-24 PROCEDURE — 77063 MAMMO DIGITAL SCREENING BILAT WITH TOMO: ICD-10-PCS | Mod: 26,,, | Performed by: RADIOLOGY

## 2023-12-22 ENCOUNTER — OFFICE VISIT (OUTPATIENT)
Dept: ORTHOPEDICS | Facility: CLINIC | Age: 73
End: 2023-12-22
Payer: COMMERCIAL

## 2023-12-22 VITALS — BODY MASS INDEX: 29.25 KG/M2 | WEIGHT: 182 LBS | HEIGHT: 66 IN

## 2023-12-22 DIAGNOSIS — M17.0 PRIMARY OSTEOARTHRITIS OF BOTH KNEES: Primary | ICD-10-CM

## 2023-12-22 PROCEDURE — 3288F PR FALLS RISK ASSESSMENT DOCUMENTED: ICD-10-PCS | Mod: CPTII,S$GLB,, | Performed by: ORTHOPAEDIC SURGERY

## 2023-12-22 PROCEDURE — 1100F PR PT FALLS ASSESS DOC 2+ FALLS/FALL W/INJURY/YR: ICD-10-PCS | Mod: CPTII,S$GLB,, | Performed by: ORTHOPAEDIC SURGERY

## 2023-12-22 PROCEDURE — 99999 PR PBB SHADOW E&M-EST. PATIENT-LVL III: CPT | Mod: PBBFAC,,, | Performed by: ORTHOPAEDIC SURGERY

## 2023-12-22 PROCEDURE — 1125F PR PAIN SEVERITY QUANTIFIED, PAIN PRESENT: ICD-10-PCS | Mod: CPTII,S$GLB,, | Performed by: ORTHOPAEDIC SURGERY

## 2023-12-22 PROCEDURE — 20610 DRAIN/INJ JOINT/BURSA W/O US: CPT | Mod: 50,S$GLB,, | Performed by: ORTHOPAEDIC SURGERY

## 2023-12-22 PROCEDURE — 3044F HG A1C LEVEL LT 7.0%: CPT | Mod: CPTII,S$GLB,, | Performed by: ORTHOPAEDIC SURGERY

## 2023-12-22 PROCEDURE — 99999 PR PBB SHADOW E&M-EST. PATIENT-LVL III: ICD-10-PCS | Mod: PBBFAC,,, | Performed by: ORTHOPAEDIC SURGERY

## 2023-12-22 PROCEDURE — 20610 LARGE JOINT ASPIRATION/INJECTION: BILATERAL KNEE: ICD-10-PCS | Mod: 50,S$GLB,, | Performed by: ORTHOPAEDIC SURGERY

## 2023-12-22 PROCEDURE — 99213 PR OFFICE/OUTPT VISIT, EST, LEVL III, 20-29 MIN: ICD-10-PCS | Mod: 25,S$GLB,, | Performed by: ORTHOPAEDIC SURGERY

## 2023-12-22 PROCEDURE — 1125F AMNT PAIN NOTED PAIN PRSNT: CPT | Mod: CPTII,S$GLB,, | Performed by: ORTHOPAEDIC SURGERY

## 2023-12-22 PROCEDURE — 1160F PR REVIEW ALL MEDS BY PRESCRIBER/CLIN PHARMACIST DOCUMENTED: ICD-10-PCS | Mod: CPTII,S$GLB,, | Performed by: ORTHOPAEDIC SURGERY

## 2023-12-22 PROCEDURE — 99213 OFFICE O/P EST LOW 20 MIN: CPT | Mod: 25,S$GLB,, | Performed by: ORTHOPAEDIC SURGERY

## 2023-12-22 PROCEDURE — 3008F BODY MASS INDEX DOCD: CPT | Mod: CPTII,S$GLB,, | Performed by: ORTHOPAEDIC SURGERY

## 2023-12-22 PROCEDURE — 3288F FALL RISK ASSESSMENT DOCD: CPT | Mod: CPTII,S$GLB,, | Performed by: ORTHOPAEDIC SURGERY

## 2023-12-22 PROCEDURE — 1160F RVW MEDS BY RX/DR IN RCRD: CPT | Mod: CPTII,S$GLB,, | Performed by: ORTHOPAEDIC SURGERY

## 2023-12-22 PROCEDURE — 1100F PTFALLS ASSESS-DOCD GE2>/YR: CPT | Mod: CPTII,S$GLB,, | Performed by: ORTHOPAEDIC SURGERY

## 2023-12-22 PROCEDURE — 3008F PR BODY MASS INDEX (BMI) DOCUMENTED: ICD-10-PCS | Mod: CPTII,S$GLB,, | Performed by: ORTHOPAEDIC SURGERY

## 2023-12-22 PROCEDURE — 1159F MED LIST DOCD IN RCRD: CPT | Mod: CPTII,S$GLB,, | Performed by: ORTHOPAEDIC SURGERY

## 2023-12-22 PROCEDURE — 3044F PR MOST RECENT HEMOGLOBIN A1C LEVEL <7.0%: ICD-10-PCS | Mod: CPTII,S$GLB,, | Performed by: ORTHOPAEDIC SURGERY

## 2023-12-22 PROCEDURE — 1159F PR MEDICATION LIST DOCUMENTED IN MEDICAL RECORD: ICD-10-PCS | Mod: CPTII,S$GLB,, | Performed by: ORTHOPAEDIC SURGERY

## 2023-12-22 RX ORDER — TRIAMCINOLONE ACETONIDE 40 MG/ML
80 INJECTION, SUSPENSION INTRA-ARTICULAR; INTRAMUSCULAR
Status: DISCONTINUED | OUTPATIENT
Start: 2023-12-22 | End: 2023-12-22 | Stop reason: HOSPADM

## 2023-12-22 RX ADMIN — TRIAMCINOLONE ACETONIDE 80 MG: 40 INJECTION, SUSPENSION INTRA-ARTICULAR; INTRAMUSCULAR at 09:12

## 2023-12-22 NOTE — PROGRESS NOTES
Subjective:      Patient ID: Carlene Fong is a 73 y.o. female.    Chief Complaint: Follow-up of the Right Knee (Pt is requesting injections . Pt states pain in right knee ) and Follow-up of the Left Knee (Pt is requesting injections )    HPI    Follow-up for osteoarthritis.  The patient reports that she sustained a fall in October worsening her knee symptoms for a period of time.  She reports that periodic injections are helpful and she is gradually returning to her pre-injection baseline.  She requests reinjection of both knees were palliation of chronic, moderate osteoarthritis.          Review of Systems   Constitutional: Negative for fever and weight loss.   HENT:  Negative for congestion.    Eyes:  Negative for visual disturbance.   Cardiovascular:  Negative for chest pain.   Respiratory:  Negative for shortness of breath.    Hematologic/Lymphatic: Negative for bleeding problem. Does not bruise/bleed easily.   Skin:  Negative for poor wound healing.   Musculoskeletal:  Positive for joint pain.   Gastrointestinal:  Negative for abdominal pain.   Genitourinary:  Negative for dysuria.   Neurological:  Negative for seizures.   Psychiatric/Behavioral:  Negative for altered mental status.    Allergic/Immunologic: Negative for persistent infections.         Objective:      Ortho/SPM Exam      Right knee      The patient is not in acute distress.   Sclerae normal  Body habitus is normal.  Respiratory distress:  none   The patient walks without a limp.  Hip irritability  negative.   The skin has a healed abrasion  Knee effusion trace  Palpation- nontender  Range of motion- Flexion 135 deg, Extension 0 deg,   Ligament laxity exam:  1+ valgus laxity   Patellar apprehension negative.  Popliteal cyst negative  Patellar crepitation absent.  Meniscal irritability not applicable  Pulses DP present, PT present.  Motor normal 5/5 strength in all tested muscle groups.   Sensory normal.    The left knee is  identical    Radiographs reviewed.  Recent radiographs show no fracture.  There is moderate joint space narrowing, [place] to bilateral          Assessment:       Encounter Diagnosis   Name Primary?    Primary osteoarthritis of both knees Yes        The condition is structurally moderate.  The patient does not have severe pain or disability and has satisfactory palliation with occasional injections.  Progressive symptoms may develop in the future.          Plan:       Carlene was seen today for follow-up and follow-up.    Diagnoses and all orders for this visit:    Primary osteoarthritis of both knees          I explained my diagnostic impression and the reasoning behind it in detail, using layman's terms.  Models and/or pictures were used to help in the explanation.    Reinjection requested and consent given    Patient is planning travel to [place] in June and would like palliative injection prior to that.

## 2023-12-22 NOTE — PROCEDURES
Large Joint Aspiration/Injection: bilateral knee    Date/Time: 12/22/2023 9:15 AM    Performed by: Franklin Dobbs MD  Authorized by: Franklin Dobbs MD    Consent Done?:  Yes (Verbal)  Approach:  Anterolateral  Location:  Knee  Laterality:  Bilateral  Site:  Bilateral knee  Medications (Left):  80 mg triamcinolone acetonide 40 mg/mL     After obtaining verbal informed consent both of the patient's knees were prepped aseptically and injected through an inferior lateral approach using 40 mg of triamcinolone and 1 cc of 1% plain Xylocaine.  The patient was warned about postinjection flare and how to manage it with ice, rest and over-the-counter analgesics.  They're advised to contact me for any severe, uncontrolled pain.

## 2024-01-17 ENCOUNTER — TELEPHONE (OUTPATIENT)
Dept: FAMILY MEDICINE | Facility: CLINIC | Age: 74
End: 2024-01-17
Payer: COMMERCIAL

## 2024-01-17 DIAGNOSIS — U07.1 COVID-19 VIRUS INFECTION: Primary | ICD-10-CM

## 2024-01-17 NOTE — TELEPHONE ENCOUNTER
----- Message from Beulah Todd sent at 1/17/2024  7:30 AM CST -----  Regarding: Call back  Contact: [phone]  Type:  Needs Medical Advice    Who Called: PT   Symptoms (please be specific): Cough not feeling good or herself   How long has patient had these symptoms:  Tested positive for Covi19  Pharmacy name and phone #:  [facility] [address] Phone: [phone] Fax: [phone]  Would the patient rather a call back or a response via [facility]? Call back   Best Call Back Number:  [phone]   Additional Information:

## 2024-01-17 NOTE — TELEPHONE ENCOUNTER
Please advise that I have sent her the antiviral Paxlovid to start taking ASAP.  She can also take over-the-counter meds such as Mucinex DM with plenty of water, Tylenol/ibuprofen as needed, throat lozenges.      Please give ER precautions if having shortness of breath but hopefully the Paxlovid will help her to turn the corner quickly.  She should reach out with any additional questions or concerns

## 2024-01-17 NOTE — TELEPHONE ENCOUNTER
"Called pt and told her that Dr. Stubbs said  "Please advise that I have sent her the antiviral Paxlovid to start taking ASAP.  She can also take over-the-counter meds such as Mucinex DM with plenty of water, Tylenol/ibuprofen as needed, throat lozenges.       Please give ER precautions if having shortness of breath but hopefully the Paxlovid will help her to turn the corner quickly.  She should reach out with any additional questions or concerns"    Pt stated she has a friend who is taking paxlovid and it causes a metallic taste in her mouth. Pt wanted to know what she could do for that. I stated just try mints or mouth wash but the taste should go away once she completes her paxlovid.    Pt also wanted to know if she had to wait to take her medication this evening. I told pt to take her first set as soon as she gets her rx then to take her next one right before she goes to bed. Then tomorrow to take it once in the morning and once again at night   "

## 2024-01-25 ENCOUNTER — PATIENT MESSAGE (OUTPATIENT)
Dept: ORTHOPEDICS | Facility: CLINIC | Age: 74
End: 2024-01-25
Payer: COMMERCIAL

## 2024-02-13 ENCOUNTER — HOSPITAL ENCOUNTER (OUTPATIENT)
Facility: HOSPITAL | Age: 74
Discharge: HOME | End: 2024-02-14
Attending: EMERGENCY MEDICINE | Admitting: HOSPITALIST
Payer: COMMERCIAL

## 2024-02-13 DIAGNOSIS — G45.9 TIA (TRANSIENT ISCHEMIC ATTACK): Primary | ICD-10-CM

## 2024-02-13 DIAGNOSIS — R07.9 CHEST PAIN: ICD-10-CM

## 2024-02-13 PROBLEM — R29.90 STROKE-LIKE SYMPTOMS: Status: ACTIVE | Noted: 2024-02-13

## 2024-02-13 PROBLEM — R73.9 HYPERGLYCEMIA: Status: ACTIVE | Noted: 2024-02-13

## 2024-02-13 PROBLEM — E78.5 HYPERLIPIDEMIA: Status: ACTIVE | Noted: 2024-02-13

## 2024-02-13 LAB
ALBUMIN SERPL BCP-MCNC: 3.9 G/DL (ref 3.5–5.2)
ALP SERPL-CCNC: 96 U/L (ref 55–135)
ALT SERPL W/O P-5'-P-CCNC: 13 U/L (ref 10–44)
ANION GAP SERPL CALC-SCNC: 13 MMOL/L (ref 8–16)
AST SERPL-CCNC: 12 U/L (ref 10–40)
BASOPHILS # BLD AUTO: 0.07 K/UL (ref 0–0.2)
BASOPHILS NFR BLD: 0.8 % (ref 0–1.9)
BILIRUB SERPL-MCNC: 0.3 MG/DL (ref 0.1–1)
BUN SERPL-MCNC: 11 MG/DL (ref 8–23)
CALCIUM SERPL-MCNC: 9.9 MG/DL (ref 8.7–10.5)
CHLORIDE SERPL-SCNC: 104 MMOL/L (ref 95–110)
CHOLEST SERPL-MCNC: 248 MG/DL (ref 120–199)
CHOLEST/HDLC SERPL: 4.1 {RATIO} (ref 2–5)
CO2 SERPL-SCNC: 26 MMOL/L (ref 23–29)
CREAT SERPL-MCNC: 0.7 MG/DL (ref 0.5–1.4)
DIFFERENTIAL METHOD BLD: ABNORMAL
EOSINOPHIL # BLD AUTO: 0.2 K/UL (ref 0–0.5)
EOSINOPHIL NFR BLD: 1.9 % (ref 0–8)
ERYTHROCYTE [DISTWIDTH] IN BLOOD BY AUTOMATED COUNT: 14 % (ref 11.5–14.5)
EST. GFR  (NO RACE VARIABLE): >60 ML/MIN/1.73 M^2
GLUCOSE SERPL-MCNC: 131 MG/DL (ref 70–110)
HCT VFR BLD AUTO: 46.1 % (ref 37–48.5)
HDLC SERPL-MCNC: 61 MG/DL (ref 40–75)
HDLC SERPL: 24.6 % (ref 20–50)
HGB BLD-MCNC: 15.6 G/DL (ref 12–16)
IMM GRANULOCYTES # BLD AUTO: 0.02 K/UL (ref 0–0.04)
IMM GRANULOCYTES NFR BLD AUTO: 0.2 % (ref 0–0.5)
INR PPP: 1.1 (ref 0.8–1.2)
LDLC SERPL CALC-MCNC: 161.8 MG/DL (ref 63–159)
LYMPHOCYTES # BLD AUTO: 1.6 K/UL (ref 1–4.8)
LYMPHOCYTES NFR BLD: 19 % (ref 18–48)
MCH RBC QN AUTO: 31 PG (ref 27–31)
MCHC RBC AUTO-ENTMCNC: 33.8 G/DL (ref 32–36)
MCV RBC AUTO: 92 FL (ref 82–98)
MONOCYTES # BLD AUTO: 0.4 K/UL (ref 0.3–1)
MONOCYTES NFR BLD: 5 % (ref 4–15)
NEUTROPHILS # BLD AUTO: 6.2 K/UL (ref 1.8–7.7)
NEUTROPHILS NFR BLD: 73.1 % (ref 38–73)
NONHDLC SERPL-MCNC: 187 MG/DL
NRBC BLD-RTO: 0 /100 WBC
PLATELET # BLD AUTO: ABNORMAL K/UL (ref 150–450)
PLATELET BLD QL SMEAR: ABNORMAL
PMV BLD AUTO: 10.1 FL (ref 9.2–12.9)
POCT GLUCOSE: 107 MG/DL (ref 70–110)
POCT GLUCOSE: 119 MG/DL (ref 70–110)
POTASSIUM SERPL-SCNC: 3.6 MMOL/L (ref 3.5–5.1)
PROT SERPL-MCNC: 7.5 G/DL (ref 6–8.4)
PROTHROMBIN TIME: 11.7 SEC (ref 9–12.5)
RBC # BLD AUTO: 5.03 M/UL (ref 4–5.4)
SODIUM SERPL-SCNC: 143 MMOL/L (ref 136–145)
TRIGL SERPL-MCNC: 126 MG/DL (ref 30–150)
TSH SERPL DL<=0.005 MIU/L-ACNC: 2.33 UIU/ML (ref 0.4–4)
WBC # BLD AUTO: 8.53 K/UL (ref 3.9–12.7)

## 2024-02-13 PROCEDURE — 85610 PROTHROMBIN TIME: CPT | Performed by: NURSE PRACTITIONER

## 2024-02-13 PROCEDURE — 63600175 PHARM REV CODE 636 W HCPCS: Performed by: HOSPITALIST

## 2024-02-13 PROCEDURE — G0378 HOSPITAL OBSERVATION PER HR: HCPCS

## 2024-02-13 PROCEDURE — 80053 COMPREHEN METABOLIC PANEL: CPT | Performed by: NURSE PRACTITIONER

## 2024-02-13 PROCEDURE — 80061 LIPID PANEL: CPT | Performed by: NURSE PRACTITIONER

## 2024-02-13 PROCEDURE — 25500020 PHARM REV CODE 255: Performed by: HOSPITALIST

## 2024-02-13 PROCEDURE — 93005 ELECTROCARDIOGRAM TRACING: CPT

## 2024-02-13 PROCEDURE — 99285 EMERGENCY DEPT VISIT HI MDM: CPT | Mod: 25

## 2024-02-13 PROCEDURE — 82962 GLUCOSE BLOOD TEST: CPT

## 2024-02-13 PROCEDURE — 84443 ASSAY THYROID STIM HORMONE: CPT | Performed by: NURSE PRACTITIONER

## 2024-02-13 PROCEDURE — 93010 ELECTROCARDIOGRAM REPORT: CPT | Mod: ,,, | Performed by: INTERNAL MEDICINE

## 2024-02-13 PROCEDURE — 94761 N-INVAS EAR/PLS OXIMETRY MLT: CPT

## 2024-02-13 PROCEDURE — 96374 THER/PROPH/DIAG INJ IV PUSH: CPT | Mod: 59

## 2024-02-13 PROCEDURE — 85025 COMPLETE CBC W/AUTO DIFF WBC: CPT | Performed by: NURSE PRACTITIONER

## 2024-02-13 PROCEDURE — 25000003 PHARM REV CODE 250: Performed by: EMERGENCY MEDICINE

## 2024-02-13 PROCEDURE — 96372 THER/PROPH/DIAG INJ SC/IM: CPT | Mod: 59 | Performed by: HOSPITALIST

## 2024-02-13 RX ORDER — AMOXICILLIN 250 MG
1 CAPSULE ORAL 2 TIMES DAILY PRN
Status: DISCONTINUED | OUTPATIENT
Start: 2024-02-13 | End: 2024-02-14 | Stop reason: HOSPADM

## 2024-02-13 RX ORDER — LABETALOL HYDROCHLORIDE 5 MG/ML
10 INJECTION, SOLUTION INTRAVENOUS
Status: DISCONTINUED | OUTPATIENT
Start: 2024-02-13 | End: 2024-02-14 | Stop reason: HOSPADM

## 2024-02-13 RX ORDER — IBUPROFEN 200 MG
24 TABLET ORAL
Status: DISCONTINUED | OUTPATIENT
Start: 2024-02-13 | End: 2024-02-14 | Stop reason: HOSPADM

## 2024-02-13 RX ORDER — TALC
6 POWDER (GRAM) TOPICAL NIGHTLY PRN
Status: DISCONTINUED | OUTPATIENT
Start: 2024-02-13 | End: 2024-02-14 | Stop reason: HOSPADM

## 2024-02-13 RX ORDER — IBUPROFEN 200 MG
16 TABLET ORAL
Status: DISCONTINUED | OUTPATIENT
Start: 2024-02-13 | End: 2024-02-14 | Stop reason: HOSPADM

## 2024-02-13 RX ORDER — PROCHLORPERAZINE EDISYLATE 5 MG/ML
5 INJECTION INTRAMUSCULAR; INTRAVENOUS EVERY 6 HOURS PRN
Status: DISCONTINUED | OUTPATIENT
Start: 2024-02-13 | End: 2024-02-14 | Stop reason: HOSPADM

## 2024-02-13 RX ORDER — POLYETHYLENE GLYCOL 3350 17 G/17G
17 POWDER, FOR SOLUTION ORAL 2 TIMES DAILY PRN
Status: DISCONTINUED | OUTPATIENT
Start: 2024-02-13 | End: 2024-02-14 | Stop reason: HOSPADM

## 2024-02-13 RX ORDER — ASPIRIN 81 MG/1
81 TABLET ORAL DAILY
Status: DISCONTINUED | OUTPATIENT
Start: 2024-02-14 | End: 2024-02-14 | Stop reason: HOSPADM

## 2024-02-13 RX ORDER — CEFDINIR 300 MG/1
300 CAPSULE ORAL 2 TIMES DAILY
COMMUNITY
Start: 2023-10-13 | End: 2024-02-13

## 2024-02-13 RX ORDER — ASPIRIN 325 MG
325 TABLET ORAL
Status: COMPLETED | OUTPATIENT
Start: 2024-02-13 | End: 2024-02-13

## 2024-02-13 RX ORDER — ATORVASTATIN CALCIUM 40 MG/1
40 TABLET, FILM COATED ORAL DAILY
Status: DISCONTINUED | OUTPATIENT
Start: 2024-02-14 | End: 2024-02-14 | Stop reason: HOSPADM

## 2024-02-13 RX ORDER — GLUCAGON 1 MG
1 KIT INJECTION
Status: DISCONTINUED | OUTPATIENT
Start: 2024-02-13 | End: 2024-02-14 | Stop reason: HOSPADM

## 2024-02-13 RX ORDER — ENOXAPARIN SODIUM 100 MG/ML
40 INJECTION SUBCUTANEOUS EVERY 24 HOURS
Status: DISCONTINUED | OUTPATIENT
Start: 2024-02-13 | End: 2024-02-14 | Stop reason: HOSPADM

## 2024-02-13 RX ORDER — SODIUM CHLORIDE 0.9 % (FLUSH) 0.9 %
10 SYRINGE (ML) INJECTION
Status: DISCONTINUED | OUTPATIENT
Start: 2024-02-13 | End: 2024-02-14 | Stop reason: HOSPADM

## 2024-02-13 RX ORDER — ACETAMINOPHEN 325 MG/1
650 TABLET ORAL EVERY 4 HOURS PRN
Status: DISCONTINUED | OUTPATIENT
Start: 2024-02-13 | End: 2024-02-14 | Stop reason: HOSPADM

## 2024-02-13 RX ORDER — PENICILLIN V POTASSIUM 500 MG/1
TABLET, FILM COATED ORAL
COMMUNITY
Start: 2023-11-16 | End: 2024-02-13

## 2024-02-13 RX ORDER — BROMPHENIRAMINE MALEATE, PSEUDOEPHEDRINE HYDROCHLORIDE, AND DEXTROMETHORPHAN HYDROBROMIDE 2; 30; 10 MG/5ML; MG/5ML; MG/5ML
SYRUP ORAL
COMMUNITY
Start: 2023-10-13 | End: 2024-02-13

## 2024-02-13 RX ORDER — BISACODYL 10 MG/1
10 SUPPOSITORY RECTAL DAILY PRN
Status: DISCONTINUED | OUTPATIENT
Start: 2024-02-13 | End: 2024-02-14 | Stop reason: HOSPADM

## 2024-02-13 RX ORDER — SIMETHICONE 80 MG
1 TABLET,CHEWABLE ORAL 4 TIMES DAILY PRN
Status: DISCONTINUED | OUTPATIENT
Start: 2024-02-13 | End: 2024-02-14 | Stop reason: HOSPADM

## 2024-02-13 RX ORDER — SODIUM CHLORIDE 0.9 % (FLUSH) 0.9 %
10 SYRINGE (ML) INJECTION EVERY 12 HOURS PRN
Status: DISCONTINUED | OUTPATIENT
Start: 2024-02-13 | End: 2024-02-14 | Stop reason: HOSPADM

## 2024-02-13 RX ORDER — ONDANSETRON HYDROCHLORIDE 2 MG/ML
4 INJECTION, SOLUTION INTRAVENOUS EVERY 8 HOURS PRN
Status: DISCONTINUED | OUTPATIENT
Start: 2024-02-13 | End: 2024-02-14 | Stop reason: HOSPADM

## 2024-02-13 RX ORDER — LORAZEPAM 2 MG/ML
1 INJECTION INTRAMUSCULAR EVERY 4 HOURS PRN
Status: DISCONTINUED | OUTPATIENT
Start: 2024-02-13 | End: 2024-02-14 | Stop reason: HOSPADM

## 2024-02-13 RX ADMIN — LORAZEPAM 1 MG: 2 INJECTION INTRAMUSCULAR; INTRAVENOUS at 07:02

## 2024-02-13 RX ADMIN — ENOXAPARIN SODIUM 40 MG: 100 INJECTION SUBCUTANEOUS at 07:02

## 2024-02-13 RX ADMIN — IOHEXOL 100 ML: 350 INJECTION, SOLUTION INTRAVENOUS at 06:02

## 2024-02-13 RX ADMIN — ASPIRIN 325 MG ORAL TABLET 325 MG: 325 PILL ORAL at 05:02

## 2024-02-13 NOTE — SUBJECTIVE & OBJECTIVE
Past Medical History:   Diagnosis Date    Abnormal mammogram 8/10/2016    Followed up with diagnostic mammogram and ultrasound 10/22/2015at [facility]  that showed no concerning findings with recommendation to continue yearly screening.     Anxiety 8/8/2017    will need to readdress this if work up is negative and symptoms persist    Arthritis of knee 1/7/2015    Diverticulosis     Meniere's disease 8/15/2017    Osteopenia 12/1/2015    next bone density 4/2016, seeing endocrine for hyperparathyroidism     Positive anti-CCP test 4/7/2014    Primary hyperparathyroidism 8/26/2013    will be following up with Dr. [facility] in 2016, blood work today. stoped taking calcitonin due to nausea     Rheumatoid factor positive 4/7/2014    Vertigo 7/16/2017    Vitamin D deficiency 12/1/2015       Past Surgical History:   Procedure Laterality Date    APPENDECTOMY      BREAST BIOPSY      ESOPHAGOGASTRODUODENOSCOPY N/A 4/14/2023    Procedure: EGD (ESOPHAGOGASTRODUODENOSCOPY);  Surgeon: Hever Jaimes MD;  Location: [facility];  Service: Endoscopy;  Laterality: N/A;    HYSTERECTOMY      PARATHYROIDECTOMY N/A 05/22/2019    Procedure: PARATHYROIDECTOMY;  Surgeon: Andie Mcfarlane MD;  Location: [address];  Service: General;  Laterality: N/A;  NIMS / Intraop PTH Monitoring    ROTATOR CUFF REPAIR Right 12/2014    TUBAL LIGATION         Review of patient's allergies indicates:   Allergen Reactions    Ropinirole Swelling and Rash      palpitations, swelling of tongue   palpitations, swelling of tongue       No current facility-administered medications on file prior to encounter.     Current Outpatient Medications on File Prior to Encounter   Medication Sig    calcium carbonate (CALCIUM 600 ORAL) Take by mouth.    diclofenac sodium (VOLTAREN) 1 % Gel Apply 2 g topically 3 (three) times daily.    FLAXSEED ORAL Take by mouth once daily.     fluticasone propionate (FLONASE) 50 mcg/actuation nasal spray 2 SPRAYS (100 MCG TOTAL)  BY EACH NOSTRIL ROUTE NIGHTLY.    gabapentin (NEURONTIN) 300 MG capsule Take 1 capsule (300 mg total) by mouth 2 (two) times daily. (Patient taking differently: Take 300 mg by mouth every evening.)    meclizine (ANTIVERT) 25 mg tablet Take 1 tablet (25 mg total) by mouth 3 (three) times daily as needed for Dizziness.    omeprazole (PRILOSEC) 40 MG capsule TAKE 1 CAPSULE BY MOUTH BEFORE BREAKFAST.    ondansetron (ZOFRAN) 8 MG tablet Take 1 tablet (8 mg total) by mouth every 8 (eight) hours as needed for Nausea.    vitamin D 1000 units Tab Take 1,000 Units by mouth once daily.    ZINC ORAL Take by mouth once daily.    [DISCONTINUED] brompheniramine-pseudoeph-DM (BROMFED DM) 2-30-10 mg/5 mL Syrp Take by mouth.    [DISCONTINUED] cefdinir (OMNICEF) 300 MG capsule Take 300 mg by mouth 2 (two) times daily.    [DISCONTINUED] ciclopirox (LOPROX) 0.77 % Crea Apply topically 2 (two) times daily.    [DISCONTINUED] ibuprofen (ADVIL,MOTRIN) 800 MG tablet Take 1 tablet (800 mg total) by mouth every 6 (six) hours as needed for Pain.    [DISCONTINUED] penicillin v potassium (VEETID) 500 MG tablet      Family History       Problem Relation (Age of Onset)    Breast cancer Daughter    Dementia Father    Diabetes Father    No Known Problems Mother          Tobacco Use    Smoking status: Never    Smokeless tobacco: Never   Substance and Sexual Activity    Alcohol use: No    Drug use: No    Sexual activity: Yes     Partners: Male     Comment: ; 2 children     Review of Systems   Constitutional:  Negative for chills, fatigue and fever.   Respiratory:  Negative for cough, shortness of breath, wheezing and stridor.    Cardiovascular:  Negative for chest pain, palpitations and leg swelling.   Gastrointestinal:  Negative for abdominal pain, diarrhea and nausea.   Neurological:  Positive for dizziness and speech difficulty. Negative for syncope and weakness.   Psychiatric/Behavioral:  Positive for confusion. Negative for agitation.       Objective:     Vital Signs (Most Recent):  Temp: 97.4 °F (36.3 °C) (02/13/24 1209)  Pulse: 89 (02/13/24 1429)  Resp: 19 (02/13/24 1429)  BP: (!) 152/94 (02/13/24 1429)  SpO2: 100 % (02/13/24 1429) Vital Signs (24h Range):  Temp:  [97.4 °F (36.3 °C)] 97.4 °F (36.3 °C)  Pulse:  [] 89  Resp:  [17-23] 19  SpO2:  [98 %-100 %] 100 %  BP: (143-161)/(75-94) 152/94     Weight: 79.4 kg (175 lb)  Body mass index is 28.25 kg/m².     Physical Exam  Constitutional:       Appearance: Normal appearance.   HENT:      Mouth/Throat:      Mouth: Mucous membranes are moist.      Pharynx: Oropharynx is clear.   Eyes:      Extraocular Movements: Extraocular movements intact.      Conjunctiva/sclera: Conjunctivae normal.      Pupils: Pupils are equal, round, and reactive to light.   Cardiovascular:      Rate and Rhythm: Normal rate and regular rhythm.      Pulses: Normal pulses.      Heart sounds: Normal heart sounds.   Pulmonary:      Effort: Pulmonary effort is normal.      Breath sounds: Normal breath sounds.   Abdominal:      Palpations: Abdomen is soft.   Musculoskeletal:         General: Normal range of motion.   Skin:     General: Skin is warm and dry.   Neurological:      General: No focal deficit present.      Mental Status: She is alert and oriented to person, place, and time.      Cranial Nerves: No cranial nerve deficit.      Motor: No weakness.   Psychiatric:         Mood and Affect: Mood normal.         Behavior: Behavior normal.              CRANIAL NERVES     CN III, IV, VI   Pupils are equal, round, and reactive to light.       Significant Labs: All pertinent labs within the past 24 hours have been reviewed.    Significant Imaging: I have reviewed all pertinent imaging results/findings within the past 24 hours.

## 2024-02-13 NOTE — PHARMACY MED REC
"      [facility]           Pharmacy  Admission Medication History     The home medication history was taken by Marivel Quezada.      Medication history obtained from Medications listed below were obtained from: Patient/family    Based on information gathered for medication list, you may go to "Admission" then "Reconcile Home Medications" tabs to review and/or act upon those items.     The home medication list has been updated by the Pharmacy department.   Please read ALL comments highlighted in yellow.   Please address this information as you see fit.    Feel free to contact us if you have any questions or require assistance.    The medications listed below were removed from the home medication list.  Please reorder if appropriate:    Patient reports NOT TAKING the following medication(s):  Loprox cream  Ibuprofen 800mg  Bromfed dm  Omnicef 300mg  Veetid 500mg      No current facility-administered medications on file prior to encounter.     Current Outpatient Medications on File Prior to Encounter   Medication Sig Dispense Refill    calcium carbonate (CALCIUM 600 ORAL) Take by mouth.      diclofenac sodium (VOLTAREN) 1 % Gel Apply 2 g topically 3 (three) times daily. 100 g 2    FLAXSEED ORAL Take by mouth once daily.       fluticasone propionate (FLONASE) 50 mcg/actuation nasal spray 2 SPRAYS (100 MCG TOTAL) BY EACH NOSTRIL ROUTE NIGHTLY. 48 mL 3    gabapentin (NEURONTIN) 300 MG capsule Take 1 capsule (300 mg total) by mouth 2 (two) times daily. (Patient taking differently: Take 300 mg by mouth every evening.) 180 capsule 3    meclizine (ANTIVERT) 25 mg tablet Take 1 tablet (25 mg total) by mouth 3 (three) times daily as needed for Dizziness. 30 tablet 1    omeprazole (PRILOSEC) 40 MG capsule TAKE 1 CAPSULE BY MOUTH BEFORE BREAKFAST. 90 capsule 3    ondansetron (ZOFRAN) 8 MG tablet Take 1 tablet (8 mg total) by mouth every 8 (eight) hours as needed for Nausea. 30 tablet 1    vitamin D 1000 units " Tab Take 1,000 Units by mouth once daily.      ZINC ORAL Take by mouth once daily.         Please address this information as you see fit.  Feel free to contact us if you have any questions or require assistance.    Marivel Quezada  [phone]            .

## 2024-02-13 NOTE — HPI
73-year-old female with a history of anxiety, Meniere's disease, hyperparathyroidism, osteoporosis, and a history of hypercalcemia treated with parathyroidectomy. The patient experienced an episode of dizziness, nausea, and transient memory loss, including an inability to recall her 's and grandchildren's names, which has since resolved. At the ER, there was no noted unilateral weakness, and she walks with a steady gait. During the neurological examination, the patient was alert and oriented, with normal strength, no sensory deficits, no facial abnormalities, no pronator drift, a good finger-to-nose exam, and 5/5 muscle strength in all four extremities. A CT head without contrast showed no acute findings. Laboratory results revealed hyperglycemia and elevated total and LDL cholesterol levels. The TSH level was also within normal limits.

## 2024-02-13 NOTE — H&P
[facility] Emergency [facility] Medicine  History & Physical    Patient Name: Carlene Fong  MRN: [id number]  Patient Class: OP- Observation  Admission Date: 2/13/2024  Attending Physician: Gagan Grossman MD  Primary Care Provider: Ashli Stubbs MD         Patient information was obtained from patient, spouse/SO, past medical records, and ER records.     Subjective:     Principal Problem:Stroke-like symptoms    Chief Complaint:   Chief Complaint   Patient presents with    Dizziness     Pt complains of feeling dizzy this am then nausea and then states she was unable to remember husbands name, and grandchildren's names, which has now resolved, no unilteral weakness noted , walks with steady gait         HPI: 73-year-old female with a history of anxiety, Meniere's disease, hyperparathyroidism, osteoporosis, and a history of hypercalcemia treated with parathyroidectomy. The patient experienced an episode of dizziness, nausea, and transient memory loss, including an inability to recall her 's and grandchildren's names, which has since resolved. At the ER, there was no noted unilateral weakness, and she walks with a steady gait. During the neurological examination, the patient was alert and oriented, with normal strength, no sensory deficits, no facial abnormalities, no pronator drift, a good finger-to-nose exam, and 5/5 muscle strength in all four extremities. A CT head without contrast showed no acute findings. Laboratory results revealed hyperglycemia and elevated total and LDL cholesterol levels. The TSH level was also within normal limits.     Past Medical History:   Diagnosis Date    Abnormal mammogram 8/10/2016    Followed up with diagnostic mammogram and ultrasound 10[facility]22/2015at [facility]  that showed no concerning findings with recommendation to continue yearly screening.     Anxiety 8/8/2017    will need to readdress this if work up is negative and symptoms persist    Arthritis of knee 1/7/2015     Diverticulosis     Meniere's disease 8/15/2017    Osteopenia 12/1/2015    next bone density 4/2016, seeing endocrine for hyperparathyroidism     Positive anti-CCP test 4/7/2014    Primary hyperparathyroidism 8/26/2013    will be following up with Dr. [facility] in 2016, blood work today. stoped taking calcitonin due to nausea     Rheumatoid factor positive 4/7/2014    Vertigo 7/16/2017    Vitamin D deficiency 12/1/2015       Past Surgical History:   Procedure Laterality Date    APPENDECTOMY      BREAST BIOPSY      ESOPHAGOGASTRODUODENOSCOPY N/A 4/14/2023    Procedure: EGD (ESOPHAGOGASTRODUODENOSCOPY);  Surgeon: Hever Jaimes MD;  Location: [facility];  Service: Endoscopy;  Laterality: N/A;    HYSTERECTOMY      PARATHYROIDECTOMY N/A 05/22/2019    Procedure: PARATHYROIDECTOMY;  Surgeon: Andie Mcfarlane MD;  Location: [facility] OR [address] [place] [address];  Service: General;  Laterality: N/A;  NIMS / Intraop PTH Monitoring    ROTATOR CUFF REPAIR Right 12/2014    TUBAL LIGATION         Review of patient's allergies indicates:   Allergen Reactions    Ropinirole Swelling and Rash      palpitations, swelling of tongue   palpitations, swelling of tongue       No current facility-administered medications on file prior to encounter.     Current Outpatient Medications on File Prior to Encounter   Medication Sig    calcium carbonate (CALCIUM 600 ORAL) Take by mouth.    diclofenac sodium (VOLTAREN) 1 % Gel Apply 2 g topically 3 (three) times daily.    FLAXSEED ORAL Take by mouth once daily.     fluticasone propionate (FLONASE) 50 mcg/actuation nasal spray 2 SPRAYS (100 MCG TOTAL) BY EACH NOSTRIL ROUTE NIGHTLY.    gabapentin (NEURONTIN) 300 MG capsule Take 1 capsule (300 mg total) by mouth 2 (two) times daily. (Patient taking differently: Take 300 mg by mouth every evening.)    meclizine (ANTIVERT) 25 mg tablet Take 1 tablet (25 mg total) by mouth 3 (three) times daily as needed for Dizziness.    omeprazole (PRILOSEC) 40 MG capsule  TAKE 1 CAPSULE BY MOUTH BEFORE BREAKFAST.    ondansetron (ZOFRAN) 8 MG tablet Take 1 tablet (8 mg total) by mouth every 8 (eight) hours as needed for Nausea.    vitamin D 1000 units Tab Take 1,000 Units by mouth once daily.    ZINC ORAL Take by mouth once daily.    [DISCONTINUED] brompheniramine-pseudoeph-DM (BROMFED DM) 2-30-10 mg/5 mL Syrp Take by mouth.    [DISCONTINUED] cefdinir (OMNICEF) 300 MG capsule Take 300 mg by mouth 2 (two) times daily.    [DISCONTINUED] ciclopirox (LOPROX) 0.77 % Crea Apply topically 2 (two) times daily.    [DISCONTINUED] ibuprofen (ADVIL,MOTRIN) 800 MG tablet Take 1 tablet (800 mg total) by mouth every 6 (six) hours as needed for Pain.    [DISCONTINUED] penicillin v potassium (VEETID) 500 MG tablet      Family History       Problem Relation (Age of Onset)    Breast cancer Daughter    Dementia Father    Diabetes Father    No Known Problems Mother          Tobacco Use    Smoking status: Never    Smokeless tobacco: Never   Substance and Sexual Activity    Alcohol use: No    Drug use: No    Sexual activity: Yes     Partners: Male     Comment: ; 2 children     Review of Systems   Constitutional:  Negative for chills, fatigue and fever.   Respiratory:  Negative for cough, shortness of breath, wheezing and stridor.    Cardiovascular:  Negative for chest pain, palpitations and leg swelling.   Gastrointestinal:  Negative for abdominal pain, diarrhea and nausea.   Neurological:  Positive for dizziness and speech difficulty. Negative for syncope and weakness.   Psychiatric/Behavioral:  Positive for confusion. Negative for agitation.      Objective:     Vital Signs (Most Recent):  Temp: 97.4 °F (36.3 °C) (02/13/24 1209)  Pulse: 89 (02/13/24 1429)  Resp: 19 (02/13/24 1429)  BP: (!) 152/94 (02/13/24 1429)  SpO2: 100 % (02/13/24 1429) Vital Signs (24h Range):  Temp:  [97.4 °F (36.3 °C)] 97.4 °F (36.3 °C)  Pulse:  [] 89  Resp:  [17-23] 19  SpO2:  [98 %-100 %] 100 %  BP: (143-161)/(75-94)  152/94     Weight: 79.4 kg (175 lb)  Body mass index is 28.25 kg/m².     Physical Exam  Constitutional:       Appearance: Normal appearance.   HENT:      Mouth/Throat:      Mouth: Mucous membranes are moist.      Pharynx: Oropharynx is clear.   Eyes:      Extraocular Movements: Extraocular movements intact.      Conjunctiva/sclera: Conjunctivae normal.      Pupils: Pupils are equal, round, and reactive to light.   Cardiovascular:      Rate and Rhythm: Normal rate and regular rhythm.      Pulses: Normal pulses.      Heart sounds: Normal heart sounds.   Pulmonary:      Effort: Pulmonary effort is normal.      Breath sounds: Normal breath sounds.   Abdominal:      Palpations: Abdomen is soft.   Musculoskeletal:         General: Normal range of motion.   Skin:     General: Skin is warm and dry.   Neurological:      General: No focal deficit present.      Mental Status: She is alert and oriented to person, place, and time.      Cranial Nerves: No cranial nerve deficit.      Motor: No weakness.   Psychiatric:         Mood and Affect: Mood normal.         Behavior: Behavior normal.              CRANIAL NERVES     CN III, IV, VI   Pupils are equal, round, and reactive to light.       Significant Labs: All pertinent labs within the past 24 hours have been reviewed.    Significant Imaging: I have reviewed all pertinent imaging results/findings within the past 24 hours.  Assessment/Plan:     * Stroke-like symptoms  Sudden dizziness, nausea, and transient memory loss, with difficulty expressing words, which resolved in less than 24hrs are characteristic of a TIA.   Other causes on the ddx include: complex migraine, vertigo, anxiety    Plan:  MRI brain  CTA head/neck  Echo  Telemetry  Start statin and ASA    Hyperlipidemia  Start statin      Hyperglycemia  A1c 5.0    Monitor      Meniere's disease  Resume home meds      Anxiety  Resume home meds      Primary hyperparathyroidism          VTE Risk Mitigation (From admission,  onward)      None               On 02/13/2024, patient should be placed in hospital observation services under my care.             Gagan Grossman MD  Department of Hospital Medicine  [facility] - Emergency Dept

## 2024-02-13 NOTE — ED PROVIDER NOTES
Encounter Date: 2/13/2024       History     Chief Complaint   Patient presents with    Dizziness     Pt complains of feeling dizzy this am then nausea and then states she was unable to remember husbands name, and grandchildren's names, which has now resolved, no unilteral weakness noted , walks with steady gait      Patient presents with .  She states that at 9:00 a.m. she had an episode of dizziness, memory loss, and difficulty in expressing words.  This is verified by the .  This is now completely resolved.  She denies any headache or visual abnormalities.  She denies any dizziness or unsteadiness on her feet.  She denies any complications with gait.  She denies any focal motor weakness, paresthesias, or numbness.  She denies any chest pain/pressure/tightness.  She denies any shortness of breath or dyspnea on exertion.    The history is provided by the patient and the spouse.     Review of patient's allergies indicates:   Allergen Reactions    Ropinirole Swelling and Rash      palpitations, swelling of tongue   palpitations, swelling of tongue     Past Medical History:   Diagnosis Date    Abnormal mammogram 8/10/2016    Followed up with diagnostic mammogram and ultrasound 10[facility]at [facility]  that showed no concerning findings with recommendation to continue yearly screening.     Anxiety 8/8/2017    will need to readdress this if work up is negative and symptoms persist    Arthritis of knee 1/7/2015    Diverticulosis     Meniere's disease 8/15/2017    Osteopenia 12/1/2015    next bone density 4/2016, seeing endocrine for hyperparathyroidism     Positive anti-CCP test 4/7/2014    Primary hyperparathyroidism 8/26/2013    will be following up with Dr. [facility] in 2016, blood work today. stoped taking calcitonin due to nausea     Rheumatoid factor positive 4/7/2014    Vertigo 7/16/2017    Vitamin D deficiency 12/1/2015     Past Surgical History:   Procedure Laterality Date    APPENDECTOMY       BREAST BIOPSY      ESOPHAGOGASTRODUODENOSCOPY N/A 4/14/2023    Procedure: EGD (ESOPHAGOGASTRODUODENOSCOPY);  Surgeon: Hever Jaimes MD;  Location: [facility];  Service: Endoscopy;  Laterality: N/A;    HYSTERECTOMY      PARATHYROIDECTOMY N/A 05/22/2019    Procedure: PARATHYROIDECTOMY;  Surgeon: Andie Mcfarlane MD;  Location: [facility] OR [address] [place] [address];  Service: General;  Laterality: N/A;  NIMS / Intraop PTH Monitoring    ROTATOR CUFF REPAIR Right 12/2014    TUBAL LIGATION       Family History   Problem Relation Age of Onset    No Known Problems Mother     Diabetes Father     Dementia Father     Breast cancer Daughter     Rheum arthritis Neg Hx     Lupus Neg Hx     Inflammatory bowel disease Neg Hx      Social History     Tobacco Use    Smoking status: Never    Smokeless tobacco: Never   Substance Use Topics    Alcohol use: No    Drug use: No     Review of Systems   Neurological:  Negative for dizziness.       Physical Exam     Initial Vitals [02/13/24 1209]   BP Pulse Resp Temp SpO2   (!) 161/75 (!) 118 20 97.4 °F (36.3 °C) 98 %      MAP       --         Physical Exam    Vitals reviewed.  Constitutional: She appears well-developed.   Cardiovascular:  Normal rate and regular rhythm.           No murmur heard.  Pulmonary/Chest: Breath sounds normal. She has no wheezes.   Abdominal: Abdomen is soft. There is no abdominal tenderness.   Musculoskeletal:         General: Normal range of motion.     Neurological: She is alert and oriented to person, place, and time. She has normal strength. No sensory deficit.   No facial abnormalities, no pronator drift, good finger-to-nose exam, no sensation abnormalities, 5/5 muscle strength all 4 extremities   Skin: Skin is warm and dry. No rash noted.         ED Course   Procedures  Labs Reviewed   CBC W/ AUTO DIFFERENTIAL - Abnormal; Notable for the following components:       Result Value    Gran % 73.1 (*)     All other components within normal limits   COMPREHENSIVE METABOLIC PANEL -  Abnormal; Notable for the following components:    Glucose 131 (*)     All other components within normal limits   LIPID PANEL - Abnormal; Notable for the following components:    Cholesterol 248 (*)     LDL Cholesterol 161.8 (*)     All other components within normal limits   POCT GLUCOSE - Abnormal; Notable for the following components:    POCT Glucose 119 (*)     All other components within normal limits   PROTIME-INR   TSH   POCT GLUCOSE, HAND-HELD DEVICE   POCT GLUCOSE     EKG Readings: (Independently Interpreted)   Sinus rhythm rate of 100, right axis, nonspecific T-wave changes       Imaging Results              CTA Head and Neck (xpd) (Final result)  Result time 02/13/24 18:41:33   Procedure changed from CTA Head     Final result by Zack Green MD (02/13/24 18:41:33)                   Impression:      Stable CT scan of the head.  No evidence of intracranial hemorrhage or large territorial infarction.  Follow-up MRI of the brain may be obtained further evaluation.    No large vessel occlusion in the intracranial circulation.    No hemodynamically significant stenosis of the neck vessels.      Electronically signed by: Zack Green MD  Date:    02/13/2024  Time:    18:41               Narrative:    EXAMINATION:  CTA HEAD AND NECK (XPD)    CLINICAL HISTORY:  Dizziness, non-specific.    TECHNIQUE:  Non contrast low dose axial images were obtained through the head.  CT angiogram was performed from the level of the janice to the top of the head following the IV administration of 100mL of Omnipaque 350.   Sagittal and coronal reconstructions and maximum intensity projection reconstructions were performed. Arterial stenosis percentages are based on NASCET measurement criteria.    COMPARISON:  CT head dated 02/13/2024.    CT head dated 10/19/2023.    FINDINGS:  CT scan of the head:    The subcutaneous tissues are unremarkable.  The bony calvarium intact.  The paranasal sinuses are unremarkable.  The mastoids are  clear.  The orbits and intraorbital contents are unremarkable.    The craniocervical junction is intact.  There is no evidence of intracranial hemorrhage.  The ventricles and sulci are stable.  There are minimal hypodensities in the periventricular and subcortical white matter.  There is unchanged appearance of a calcified meningioma at level of the vertex.  The gray-white differentiation is maintained.  There is no dense vessel sign.  There is no evidence of mass effect.    CTA neck:    There are minimal calcifications of the aortic arch.  There is a normal 3 vessel aortic arch.  The visualized subclavian arteries are within normal limits.    The bilateral common carotid arteries are within normal limits with minimal calcifications at the left carotid bulb.  The internal and external carotid arteries are within normal limits.    The origins of the vertebral arteries are unremarkable.  There is a dominant left vertebral artery.    There is no evidence of hemodynamically significant stenosis in the neck vessels.    CTA head:    The intracranial segments of the ICA are within normal limits.  The anterior cerebral arteries and anterior component complex are within normal limits.  The middle cerebral arteries are unremarkable.    The intracranial segments of the vertebral is unremarkable.  The basilar is within normal limits.  The posterior cerebral arteries are unremarkable.    There is no evidence of large vessel occlusion in the intracranial circulation.  No evidence of aneurysm or vascular malformation.    The dural venous sinuses are within normal limits.    Additional findings:    There is no evidence of lymphadenopathy in the neck.  There is a 7 mm nodule within the left thyroid lobe.    The trachea is unremarkable.  The visualized lung apices are within normal limits.  There is straightening of normal cervical lordosis.  There are minimal degenerative changes in the cervical spine.                                        CT Head Without Contrast (Final result)  Result time 02/13/24 13:42:05      Final result by Cesar Shetty MD (02/13/24 13:42:05)                   Impression:      No acute intracranial process is identified.      Electronically signed by: Cesar Shetty  Date:    02/13/2024  Time:    13:42               Narrative:    EXAMINATION:  CT HEAD WITHOUT CONTRAST    CLINICAL HISTORY:  Neuro deficit, acute, stroke suspected;    TECHNIQUE:  Low dose axial images were obtained through the head.  Coronal and sagittal reformations were also performed. Contrast was not administered.    COMPARISON:  CT 10/19/2023, MRI 08/18/2017    FINDINGS:  There is central volume loss again suspected.    No overt hydrocephalus mass effect intracranial hemorrhage or acute territorial infarct.    The brain parenchyma overall maintains normal attenuation.    The calvarium is intact.  The visualized sinuses and mastoid air cells are clear.                                       Medications   sodium chloride 0.9% flush 10 mL (has no administration in time range)   melatonin tablet 6 mg (has no administration in time range)   ondansetron injection 4 mg (has no administration in time range)   prochlorperazine injection Soln 5 mg (has no administration in time range)   polyethylene glycol packet 17 g (has no administration in time range)   senna-docusate 8.6-50 mg per tablet 1 tablet (has no administration in time range)   bisacodyL suppository 10 mg (has no administration in time range)   simethicone chewable tablet 80 mg (has no administration in time range)   acetaminophen tablet 650 mg (has no administration in time range)   glucose chewable tablet 16 g (has no administration in time range)   glucose chewable tablet 24 g (has no administration in time range)   glucagon (human recombinant) injection 1 mg (has no administration in time range)   sodium chloride 0.9% flush 10 mL (has no administration in time range)   sodium chloride 0.9%  bolus 500 mL 500 mL (has no administration in time range)   labetaloL injection 10 mg (has no administration in time range)   bisacodyL suppository 10 mg (has no administration in time range)   enoxaparin injection 40 mg (40 mg Subcutaneous Given 2/13/24 1910)   aspirin EC tablet 81 mg (has no administration in time range)   atorvastatin tablet 40 mg (has no administration in time range)   dextrose 10% bolus 125 mL 125 mL (has no administration in time range)   dextrose 10% bolus 250 mL 250 mL (has no administration in time range)   LORazepam injection 1 mg (1 mg Intravenous Given 2/13/24 1910)   aspirin tablet 325 mg (325 mg Oral Given 2/13/24 1703)   iohexoL (OMNIPAQUE 350) injection 100 mL (100 mLs Intravenous Given 2/13/24 1801)     Medical Decision Making  Concern for TIA in a patient with no history and no further workup in the past.  Express this to [facility] who will admit the patient further workup and treatment.    Amount and/or Complexity of Data Reviewed  Labs:  Decision-making details documented in ED Course.  Radiology:  Decision-making details documented in ED Course.    Risk  OTC drugs.  Risk Details: Differential diagnosis includes but is not limited to:  CVA, anxiety, electrolyte abnormality, hypoglycemia               ED Course as of 02/13/24 1922 Tue Feb 13, 2024   1315 CBC W/ AUTO DIFFERENTIAL  wnl [CD]   1354 CT Head Without Contrast  No acute findings [CD]   1411 POCT glucose(!)  hyperglycemia [CD]   1411 LDL - Lipid Panel(!)  Elevated total and LDL [CD]   1411 TSH  Wnl [CD]   1411 Comprehensive metabolic panel(!)  nonspecific [CD]   1412 Protime-INR  wnl [CD]      ED Course User Index  [CD] Wally Rushing DO                           Clinical Impression:  Final diagnoses:  [G45.9] TIA (transient ischemic attack) (Primary)  [R07.9] Chest pain          ED Disposition Condition    Observation                 Wally Rushing DO  02/13/24 1924

## 2024-02-13 NOTE — ASSESSMENT & PLAN NOTE
Sudden dizziness, nausea, and transient memory loss, with difficulty expressing words, which resolved in less than 24hrs are characteristic of a TIA.   Other causes on the ddx include: complex migraine, vertigo, anxiety    Plan:  MRI brain  CTA head/neck  Echo  Telemetry  Start statin and ASA

## 2024-02-14 VITALS
DIASTOLIC BLOOD PRESSURE: 71 MMHG | SYSTOLIC BLOOD PRESSURE: 142 MMHG | RESPIRATION RATE: 18 BRPM | TEMPERATURE: 99 F | HEART RATE: 92 BPM | HEIGHT: 66 IN | OXYGEN SATURATION: 98 % | WEIGHT: 175 LBS | BODY MASS INDEX: 28.12 KG/M2

## 2024-02-14 LAB
ALBUMIN SERPL BCP-MCNC: 3.1 G/DL (ref 3.5–5.2)
ALP SERPL-CCNC: 69 U/L (ref 55–135)
ALT SERPL W/O P-5'-P-CCNC: 9 U/L (ref 10–44)
ANION GAP SERPL CALC-SCNC: 11 MMOL/L (ref 8–16)
APTT PPP: 30.5 SEC (ref 21–32)
ASCENDING AORTA: 2.62 CM
AST SERPL-CCNC: 9 U/L (ref 10–40)
AV INDEX (PROSTH): 0.81
AV MEAN GRADIENT: 4 MMHG
AV PEAK GRADIENT: 7 MMHG
AV VALVE AREA BY VELOCITY RATIO: 2.47 CM²
AV VALVE AREA: 2.6 CM²
AV VELOCITY RATIO: 0.77
BASOPHILS # BLD AUTO: 0.05 K/UL (ref 0–0.2)
BASOPHILS NFR BLD: 0.8 % (ref 0–1.9)
BILIRUB SERPL-MCNC: 0.4 MG/DL (ref 0.1–1)
BSA FOR ECHO PROCEDURE: 1.92 M2
BUN SERPL-MCNC: 11 MG/DL (ref 8–23)
CALCIUM SERPL-MCNC: 8.9 MG/DL (ref 8.7–10.5)
CHLORIDE SERPL-SCNC: 108 MMOL/L (ref 95–110)
CO2 SERPL-SCNC: 25 MMOL/L (ref 23–29)
CREAT SERPL-MCNC: 0.7 MG/DL (ref 0.5–1.4)
CV ECHO LV RWT: 0.49 CM
DIFFERENTIAL METHOD BLD: NORMAL
DOP CALC AO PEAK VEL: 1.36 M/S
DOP CALC AO VTI: 21.6 CM
DOP CALC LVOT AREA: 3.2 CM2
DOP CALC LVOT DIAMETER: 2.02 CM
DOP CALC LVOT PEAK VEL: 1.05 M/S
DOP CALC LVOT STROKE VOLUME: 56.05 CM3
DOP CALC MV VTI: 20 CM
DOP CALCLVOT PEAK VEL VTI: 17.5 CM
E WAVE DECELERATION TIME: 120.53 MSEC
E/A RATIO: 0.53
E/E' RATIO: 5.81 M/S
ECHO LV POSTERIOR WALL: 1.01 CM (ref 0.6–1.1)
EOSINOPHIL # BLD AUTO: 0.1 K/UL (ref 0–0.5)
EOSINOPHIL NFR BLD: 2 % (ref 0–8)
ERYTHROCYTE [DISTWIDTH] IN BLOOD BY AUTOMATED COUNT: 14.4 % (ref 11.5–14.5)
EST. GFR  (NO RACE VARIABLE): >60 ML/MIN/1.73 M^2
FRACTIONAL SHORTENING: 43 % (ref 28–44)
GLUCOSE SERPL-MCNC: 86 MG/DL (ref 70–110)
HCT VFR BLD AUTO: 39.4 % (ref 37–48.5)
HGB BLD-MCNC: 13.1 G/DL (ref 12–16)
IMM GRANULOCYTES # BLD AUTO: 0.03 K/UL (ref 0–0.04)
IMM GRANULOCYTES NFR BLD AUTO: 0.5 % (ref 0–0.5)
INR PPP: 1 (ref 0.8–1.2)
INTERVENTRICULAR SEPTUM: 0.86 CM (ref 0.6–1.1)
IVC DIAMETER: 1.34 CM
LA MAJOR: 3.57 CM
LA MINOR: 3.76 CM
LA WIDTH: 3.2 CM
LEFT ATRIUM SIZE: 2.9 CM
LEFT ATRIUM VOLUME INDEX MOD: 13.4 ML/M2
LEFT ATRIUM VOLUME INDEX: 15.3 ML/M2
LEFT ATRIUM VOLUME MOD: 25.27 CM3
LEFT ATRIUM VOLUME: 28.89 CM3
LEFT INTERNAL DIMENSION IN SYSTOLE: 2.32 CM (ref 2.1–4)
LEFT VENTRICLE DIASTOLIC VOLUME INDEX: 39.23 ML/M2
LEFT VENTRICLE DIASTOLIC VOLUME: 74.15 ML
LEFT VENTRICLE MASS INDEX: 64 G/M2
LEFT VENTRICLE SYSTOLIC VOLUME INDEX: 9.8 ML/M2
LEFT VENTRICLE SYSTOLIC VOLUME: 18.45 ML
LEFT VENTRICULAR INTERNAL DIMENSION IN DIASTOLE: 4.1 CM (ref 3.5–6)
LEFT VENTRICULAR MASS: 120.29 G
LV LATERAL E/E' RATIO: 5.08 M/S
LV SEPTAL E/E' RATIO: 6.78 M/S
LVOT MG: 2.49 MMHG
LVOT MV: 0.77 CM/S
LYMPHOCYTES # BLD AUTO: 1.4 K/UL (ref 1–4.8)
LYMPHOCYTES NFR BLD: 21.9 % (ref 18–48)
MAGNESIUM SERPL-MCNC: 2.2 MG/DL (ref 1.6–2.6)
MCH RBC QN AUTO: 30.5 PG (ref 27–31)
MCHC RBC AUTO-ENTMCNC: 33.2 G/DL (ref 32–36)
MCV RBC AUTO: 92 FL (ref 82–98)
MONOCYTES # BLD AUTO: 0.4 K/UL (ref 0.3–1)
MONOCYTES NFR BLD: 6.5 % (ref 4–15)
MV MEAN GRADIENT: 2 MMHG
MV PEAK A VEL: 1.15 M/S
MV PEAK E VEL: 0.61 M/S
MV PEAK GRADIENT: 7 MMHG
MV STENOSIS PRESSURE HALF TIME: 38.31 MS
MV VALVE AREA BY CONTINUITY EQUATION: 2.8 CM2
MV VALVE AREA P 1/2 METHOD: 5.74 CM2
NEUTROPHILS # BLD AUTO: 4.5 K/UL (ref 1.8–7.7)
NEUTROPHILS NFR BLD: 68.3 % (ref 38–73)
NRBC BLD-RTO: 0 /100 WBC
OHS LV EJECTION FRACTION SIMPSONS BIPLANE MOD: 72 %
PHOSPHATE SERPL-MCNC: 4.8 MG/DL (ref 2.7–4.5)
PISA TR MAX VEL: 2.54 M/S
PLATELET # BLD AUTO: 258 K/UL (ref 150–450)
PMV BLD AUTO: 9.4 FL (ref 9.2–12.9)
POTASSIUM SERPL-SCNC: 3.9 MMOL/L (ref 3.5–5.1)
PROT SERPL-MCNC: 5.7 G/DL (ref 6–8.4)
PROTHROMBIN TIME: 10.5 SEC (ref 9–12.5)
RA MAJOR: 4.03 CM
RA PRESSURE ESTIMATED: 3 MMHG
RA WIDTH: 3.36 CM
RBC # BLD AUTO: 4.3 M/UL (ref 4–5.4)
RIGHT VENTRICULAR END-DIASTOLIC DIMENSION: 3.29 CM
RV TB RVSP: 6 MMHG
RV TISSUE DOPPLER FREE WALL SYSTOLIC VELOCITY 1 (APICAL 4 CHAMBER VIEW): 12.65 CM/S
SINUS: 2.49 CM
SODIUM SERPL-SCNC: 144 MMOL/L (ref 136–145)
STJ: 1.87 CM
TDI LATERAL: 0.12 M/S
TDI SEPTAL: 0.09 M/S
TDI: 0.11 M/S
TR MAX PG: 26 MMHG
TRICUSPID ANNULAR PLANE SYSTOLIC EXCURSION: 1.9 CM
TROPONIN I SERPL DL<=0.01 NG/ML-MCNC: <0.006 NG/ML (ref 0–0.03)
TV REST PULMONARY ARTERY PRESSURE: 29 MMHG
WBC # BLD AUTO: 6.58 K/UL (ref 3.9–12.7)
Z-SCORE OF LEFT VENTRICULAR DIMENSION IN END DIASTOLE: -2.55
Z-SCORE OF LEFT VENTRICULAR DIMENSION IN END SYSTOLE: -2.69

## 2024-02-14 PROCEDURE — 63600175 PHARM REV CODE 636 W HCPCS: Performed by: HOSPITALIST

## 2024-02-14 PROCEDURE — 85025 COMPLETE CBC W/AUTO DIFF WBC: CPT | Performed by: HOSPITALIST

## 2024-02-14 PROCEDURE — 84100 ASSAY OF PHOSPHORUS: CPT | Performed by: HOSPITALIST

## 2024-02-14 PROCEDURE — G0378 HOSPITAL OBSERVATION PER HR: HCPCS

## 2024-02-14 PROCEDURE — 85730 THROMBOPLASTIN TIME PARTIAL: CPT | Performed by: HOSPITALIST

## 2024-02-14 PROCEDURE — 84484 ASSAY OF TROPONIN QUANT: CPT | Performed by: HOSPITALIST

## 2024-02-14 PROCEDURE — 94761 N-INVAS EAR/PLS OXIMETRY MLT: CPT

## 2024-02-14 PROCEDURE — 25000003 PHARM REV CODE 250: Performed by: HOSPITALIST

## 2024-02-14 PROCEDURE — 36415 COLL VENOUS BLD VENIPUNCTURE: CPT | Performed by: HOSPITALIST

## 2024-02-14 PROCEDURE — 83735 ASSAY OF MAGNESIUM: CPT | Performed by: HOSPITALIST

## 2024-02-14 PROCEDURE — 80053 COMPREHEN METABOLIC PANEL: CPT | Performed by: HOSPITALIST

## 2024-02-14 PROCEDURE — 85610 PROTHROMBIN TIME: CPT | Performed by: HOSPITALIST

## 2024-02-14 PROCEDURE — 96376 TX/PRO/DX INJ SAME DRUG ADON: CPT | Mod: 59

## 2024-02-14 RX ORDER — GABAPENTIN 300 MG/1
300 CAPSULE ORAL NIGHTLY
Status: DISCONTINUED | OUTPATIENT
Start: 2024-02-14 | End: 2024-02-14 | Stop reason: HOSPADM

## 2024-02-14 RX ORDER — MECLIZINE HCL 12.5 MG 12.5 MG/1
25 TABLET ORAL 3 TIMES DAILY PRN
Status: DISCONTINUED | OUTPATIENT
Start: 2024-02-14 | End: 2024-02-14 | Stop reason: HOSPADM

## 2024-02-14 RX ORDER — ATORVASTATIN CALCIUM 40 MG/1
40 TABLET, FILM COATED ORAL DAILY
Qty: 90 TABLET | Refills: 3 | Status: SHIPPED | OUTPATIENT
Start: 2024-02-15 | End: 2024-05-09 | Stop reason: SINTOL

## 2024-02-14 RX ORDER — NEOMYCIN/POLYMYXIN B/PRAMOXINE 3.5-10K-1
2 CREAM (GRAM) TOPICAL DAILY
Status: DISCONTINUED | OUTPATIENT
Start: 2024-02-14 | End: 2024-02-14 | Stop reason: HOSPADM

## 2024-02-14 RX ORDER — ASPIRIN 81 MG/1
81 TABLET ORAL DAILY
Qty: 360 TABLET | Refills: 0 | Status: SHIPPED | OUTPATIENT
Start: 2024-02-15 | End: 2025-02-14

## 2024-02-14 RX ORDER — NEOMYCIN/POLYMYXIN B/PRAMOXINE 3.5-10K-1
2 CREAM (GRAM) TOPICAL DAILY
COMMUNITY

## 2024-02-14 RX ORDER — PANTOPRAZOLE SODIUM 40 MG/1
40 TABLET, DELAYED RELEASE ORAL DAILY
Status: DISCONTINUED | OUTPATIENT
Start: 2024-02-14 | End: 2024-02-14 | Stop reason: HOSPADM

## 2024-02-14 RX ADMIN — PANTOPRAZOLE SODIUM 40 MG: 40 TABLET, DELAYED RELEASE ORAL at 08:02

## 2024-02-14 RX ADMIN — ATORVASTATIN CALCIUM 40 MG: 40 TABLET, FILM COATED ORAL at 08:02

## 2024-02-14 RX ADMIN — LORAZEPAM 1 MG: 2 INJECTION INTRAMUSCULAR; INTRAVENOUS at 08:02

## 2024-02-14 RX ADMIN — Medication 2 TABLET: at 09:02

## 2024-02-14 RX ADMIN — ASPIRIN 81 MG: 81 TABLET, COATED ORAL at 08:02

## 2024-02-14 NOTE — PLAN OF CARE
Discharge orders noted. AVS prepared with medication list, importance of medication compliance, follow up appointments, diet, home care instructions, treatment plan, self management, and when to seek medical attention. Detailed clinical reference list attached. AVS printed and handed to patient by bedside nurse. VN reviewed discharge instructions with patient and  using teachback method.  Allowed time for questions, all questions answered.  Patient and  verbalized complete understanding of discharge instructions and voices no concerns.      Discharge instructions complete.  New med called into home pharmacy  Transport wheelchair requested.  Bedside nurse notified.

## 2024-02-14 NOTE — NURSING
Discharge instruction and education packet provided. VN notified. IV site removed cath tip intact. Telemetry discontinued without adverse reaction. Patient shows no acute distress. Medications Called to [facility] pharmacy.   at bedside to transport pt home.

## 2024-02-14 NOTE — ED NOTES
Pt updated on next steps for admission to the unit. Pt verbalized understanding. Provided pt w/ pillow and water. Denies other needs at this time. CB within reach.

## 2024-02-14 NOTE — PLAN OF CARE
SW spoke wit Pt and Pt Christopher Fong [phone] (Spouse). Pt confirmed demographics. Pt independent with ADL's. Pt spouse will transport her home at D/C. No other D/C needs identified.     SW to assist for any future needs.     SW requested sooner PCP appt.    Future Appointments   Date Time Provider Department Center   3/14/2024  8:15 AM Franklin Dobbs MD [facility] ORTHO [place] [facility]   3/15/2024  7:30 AM LAB, [facility] [place]   3/22/2024  8:00 AM Ashli Stubbs MD [facility] FAM MED [place] [facility]     [place] - Telemetry  Initial Discharge Assessment       Primary Care Provider: Ashli Stubbs MD    Admission Diagnosis: TIA (transient ischemic attack) [G45.9]  Chest pain [R07.9]    Admission Date: 2/13/2024  Expected Discharge Date: 2/14/2024         Payor: CIGNA / Plan: CIGNA OPEN ACCESS PLUS / Product Type: Commercial /     Extended Emergency Contact Information  Primary Emergency Contact: Linh Shelley  Address: [address] [place], [place] [address] [facility]  Home Phone: [phone]  Mobile Phone: [phone]  Relation: Daughter  Secondary Emergency Contact: Christopher Fong   [facility]  Home Phone: [phone]  Relation: Spouse    Discharge Plan A: (P) Home, Home with family         [id number] - [place] - [address] [facility]  [address] [place] [address]  Phone: [phone] Fax: [phone]    [facility] - [place], [place] - [address]  [address]  [place] [address]  Phone: [phone] Fax: [phone]      Initial Assessment (most recent)       Adult Discharge Assessment - 02/14/24 1039          Discharge Assessment    Assessment Type Discharge Planning Assessment     Confirmed/corrected address, phone number and insurance Yes     Confirmed Demographics Correct on Facesheet     Source of Information patient;family (P)      People in Home spouse (P)      Do  you expect to return to your current living situation? Yes (P)      Do you have help at home or someone to help you manage your care at home? Yes (P)      Who are your caregiver(s) and their phone number(s)? Christopher Fong [phone] (Spouse) (P)      Prior to hospitilization cognitive status: Alert/Oriented (P)      Current cognitive status: Alert/Oriented (P)      Walking or Climbing Stairs Difficulty no (P)      Dressing/Bathing Difficulty no (P)      Equipment Currently Used at Home none (P)      Patient currently being followed by outpatient case management? No (P)      Do you currently have service(s) that help you manage your care at home? No (P)      Do you take prescription medications? Yes (P)      Do you have prescription coverage? Yes (P)      Coverage [employer] (P)      Do you have any problems affording any of your prescribed medications? No (P)      Is the patient taking medications as prescribed? yes (P)      Who is going to help you get home at discharge? Christopher Fong [phone] (Spouse) (P)      How do you get to doctors appointments? family or friend will provide (P)      Are you on dialysis? No (P)      Do you take coumadin? No (P)      Discharge Plan A Home;Home with family (P)      DME Needed Upon Discharge  none (P)         OTHER    Name(s) of People in Home Christopher Fong [phone] (Spouse) (P)                       Dian Vazquez LMSW  Phone: [phone]  [facility]

## 2024-02-14 NOTE — PLAN OF CARE
02/13/24 2153   Admission   Initial VN Admission Questions Complete   Communication Issues? None   Shift   Virtual Nurse - Rounding Complete   Pain Management Interventions quiet environment facilitated;relaxation techniques promoted   Virtual Nurse - Patient Verbalized Approval Of Camera Use;VN Rounding   Type of Frequent Check   Type Telemetry Monitoring   Safety/Activity   Patient Rounds bed in low position;call light in patient/parent reach;clutter free environment maintained;ID band on;visualized patient;placement of personal items at bedside;bed wheels locked   Safety Promotion/Fall Prevention assistive device/personal item within reach;bed alarm set;side rails raised x 2;Fall Risk reviewed with patient/family;nonskid shoes/socks when out of bed;room near unit station;high risk medications identified;instructed to call staff for mobility   Safety Precautions emergency equipment at bedside   Activity Assistance Provided independent   Elimination Assistance urinal within reach   Positioning   Body Position position changed independently   Head of Bed (HOB) Positioning HOB at 30-45 degrees   Positioning/Transfer Devices pillows;in use      VN cued into room to complete admit assessment. VIP model introduced; VN working alongside bedside treatment team.  Plan of care reviewed with patient. Patient informed of fall risk, fall precautions, call light within reach, side rails x2 elevated. Patient notified to ask staff for assistance. Patient verbalized complete understanding. Time allowed for questions. Will continue to monitor and intervene as needed.

## 2024-02-14 NOTE — PLAN OF CARE
Pt medically to D/C home. Pt lives with spouse and spouse will transport home. No needs from CM. Pt clear from CM.    Future Appointments   Date Time Provider Department Center   2/28/2024 10:00 AM Ashli Stubbs MD [facility] FAM MED [place] [facility]   3/14/2024  8:15 AM Franklin Dobbs MD [facility] [place] [facility]   3/15/2024  7:30 AM LAB, [facility] [place]   3/22/2024  8:00 AM Ashli Stubbs MD [facility] FAM MED [place] [facility]        02/14/24 1305   Final Note   Assessment Type Final Discharge Note   Anticipated Discharge Disposition Home   What phone number can be called within the next 1-3 days to see how you are doing after discharge? [phone]   Hospital Resources/Appts/Education Provided Appointments scheduled and added to AVS   Post-Acute Status   Discharge Delays None known at this time     Dian Vazquez LMSW  Phone: [phone]  [facility]

## 2024-02-15 LAB
OHS QRS DURATION: 72 MS
OHS QTC CALCULATION: 469 MS

## 2024-02-16 NOTE — ASSESSMENT & PLAN NOTE
Sudden dizziness, nausea, and transient memory loss, with difficulty expressing words, which resolved in less than 24hrs are characteristic of a TIA.   Other causes on the ddx include: complex migraine, vertigo, anxiety    MRI brain was normal  CTA head/neck showed no occlusion  Echo results pending. Follow up with PCP  Telemetry  Start statin and ASA    She was discharged in stable condition

## 2024-02-16 NOTE — DISCHARGE SUMMARY
[facility] Medicine  Discharge Summary      Patient Name: Carlene Fong  MRN: [id number]  HUMPHREY: [id number]  Patient Class: OP- Observation  Admission Date: 2/13/2024  Hospital Length of Stay: 0 days  Discharge Date and Time: 2/14/2024 12:58 PM  Attending Physician: No att. providers found   Discharging Provider: Gagan Grossman MD  Primary Care Provider: Ashli Stubbs MD    Primary Care Team: Networked reference to record PCT     HPI:   73-year-old female with a history of anxiety, Meniere's disease, hyperparathyroidism, osteoporosis, and a history of hypercalcemia treated with parathyroidectomy. The patient experienced an episode of dizziness, nausea, and transient memory loss, including an inability to recall her 's and grandchildren's names, which has since resolved. At the ER, there was no noted unilateral weakness, and she walks with a steady gait. During the neurological examination, the patient was alert and oriented, with normal strength, no sensory deficits, no facial abnormalities, no pronator drift, a good finger-to-nose exam, and 5/5 muscle strength in all four extremities. A CT head without contrast showed no acute findings. Laboratory results revealed hyperglycemia and elevated total and LDL cholesterol levels. The TSH level was also within normal limits.     * No surgery found *      Hospital Course:   No notes on file     Goals of Care Treatment Preferences:  Code Status: Full Code      Consults:     Neuro  * Stroke-like symptoms  Sudden dizziness, nausea, and transient memory loss, with difficulty expressing words, which resolved in less than 24hrs are characteristic of a TIA.   Other causes on the ddx include: complex migraine, vertigo, anxiety    MRI brain was normal  CTA head/neck showed no occlusion  Echo results pending. Follow up with PCP  Telemetry  Start statin and ASA    She was discharged in stable condition    Cardiac/Vascular  Hyperlipidemia  Start  statin      Endocrine  Hyperglycemia  A1c 5.0    Monitor      Primary hyperparathyroidism        Other  Meniere's disease  Resume home meds      Anxiety  Resume home meds        Final Active Diagnoses:    Diagnosis Date Noted POA    PRINCIPAL PROBLEM:  Stroke-like symptoms [R29.90] 02/13/2024 Yes    Hyperglycemia [R73.9] 02/13/2024 Yes    Hyperlipidemia [E78.5] 02/13/2024 Yes    Meniere's disease [H81.09] 08/15/2017 Yes    Anxiety [F41.9] 08/08/2017 Yes    Primary hyperparathyroidism [E21.0] 08/26/2013 Yes      Problems Resolved During this Admission:       Discharged Condition: good    Disposition: Home or Self Care    Follow Up:    Patient Instructions:   No discharge procedures on file.    Significant Diagnostic Studies: Labs: BMP:   Recent Labs   Lab 02/14/24  0307   GLU 86      K 3.9      CO2 25   BUN 11   CREATININE 0.7   CALCIUM 8.9   MG 2.2   , CMP   Recent Labs   Lab 02/14/24  0307      K 3.9      CO2 25   GLU 86   BUN 11   CREATININE 0.7   CALCIUM 8.9   PROT 5.7*   ALBUMIN 3.1*   BILITOT 0.4   ALKPHOS 69   AST 9*   ALT 9*   ANIONGAP 11   , and All labs within the past 24 hours have been reviewed    Pending Diagnostic Studies:       None           Medications:  Reconciled Home Medications:      Medication List        START taking these medications      aspirin 81 MG EC tablet  Commonly known as: ECOTRIN  Take 1 tablet (81 mg total) by mouth once daily.     atorvastatin 40 MG tablet  Commonly known as: LIPITOR  Take 1 tablet (40 mg total) by mouth once daily.            CONTINUE taking these medications      calcium phosphate-vitamin D3 250 mg-12.5 mcg (500 unit) Chew  Take 2 tablets by mouth Daily.     diclofenac sodium 1 % Gel  Commonly known as: VOLTAREN  Apply 2 g topically 3 (three) times daily.     FLAXSEED ORAL  Take by mouth once daily.     fluticasone propionate 50 mcg/actuation nasal spray  Commonly known as: FLONASE  2 SPRAYS (100 MCG TOTAL) BY EACH NOSTRIL ROUTE NIGHTLY.      gabapentin 300 MG capsule  Commonly known as: NEURONTIN  Take 1 capsule (300 mg total) by mouth 2 (two) times daily.     meclizine 25 mg tablet  Commonly known as: ANTIVERT  Take 1 tablet (25 mg total) by mouth 3 (three) times daily as needed for Dizziness.     omeprazole 40 MG capsule  Commonly known as: PRILOSEC  TAKE 1 CAPSULE BY MOUTH BEFORE BREAKFAST.     ondansetron 8 MG tablet  Commonly known as: ZOFRAN  Take 1 tablet (8 mg total) by mouth every 8 (eight) hours as needed for Nausea.     vitamin D 1000 units Tab  Commonly known as: VITAMIN D3  Take 1,000 Units by mouth once daily.     ZINC ORAL  Take by mouth once daily.              Indwelling Lines/Drains at time of discharge:   Lines/Drains/Airways       None                   Time spent on the discharge of patient: 30 minutes         Gagan Grossman MD  Department of Hospital Medicine  [facility]

## 2024-02-18 PROBLEM — Z79.82 LONG-TERM USE OF ASPIRIN THERAPY: Status: ACTIVE | Noted: 2024-02-18

## 2024-02-18 PROBLEM — R73.9 HYPERGLYCEMIA: Status: RESOLVED | Noted: 2024-02-13 | Resolved: 2024-02-18

## 2024-02-18 PROBLEM — Z86.73 HISTORY OF TRANSIENT ISCHEMIC ATTACK (TIA): Status: ACTIVE | Noted: 2024-02-18

## 2024-02-18 NOTE — PROGRESS NOTES
Office Visit    Patient Name: Carlene Fong    : 1950  MRN: [id number]    Subjective:  Carlene is a 73 y.o. female who presents today for:    Follow-up (Hos, they cut pts leg at hos and she wants you to look at it)    Hospital Follow Up: TIA    Admission Date: 2024  Discharge Date and Time: 2024 12:58 PM  3/14/23; A1c 5.0, normal liver and kidney function and CBC  & 24     HPI:   73-year-old female with a history of anxiety, Meniere's disease, hyperparathyroidism, osteoporosis, and a history of hypercalcemia treated with parathyroidectomy.   The patient experienced an episode of dizziness, nausea, and transient memory loss, including an inability to recall her 's and grandchildren's names, which has since resolved. At the ER, there was no noted unilateral weakness, and she walks with a steady gait. During the neurological examination, the patient was alert and oriented, with normal strength, no sensory deficits, no facial abnormalities, no pronator drift, a good finger-to-nose exam, and 5/5 muscle strength in all four extremities. A CT head without contrast showed no acute findings. Laboratory results revealed hyperglycemia and elevated total and LDL (161 24) cholesterol levels. The TSH level was also within normal limits.      Neuro  * Stroke-like symptoms  Sudden dizziness, nausea, and transient memory loss, with difficulty expressing words, which resolved in less than 24hrs are characteristic of a TIA.   Other causes on the ddx include: complex migraine, vertigo, anxiety     MRI brain was normal  CTA head/neck showed no occlusion  Echo results Unremarkable from 24: Est EF 72%, no shunt  Telemetry  Start statin and ASA     Since discharge she reports feeling overall well. She feels her episode of neurologic concerns were most likely related to a combination of her Meniere's/vertigo and some anxiety.    Diet/Activity Level:  Returned to baseline since discharge.   She had not been exercising regularly however, leading up to her hospitalization due to concerns following a recent fall.    Weakness/ Memory/ Neuro concerns? NONE, she has had new coordination, memory or strength deficits since discharge.    She does have a concern about a right lower extremity skin tear that she seems to have sustained during transport to and from her MRI.  She obtain some local wound care during her admission and since discharge has been keeping it covered with a Band-Aid.  The skin is red and irritated which she has not had any discharge from the wound.  Normal strength and sensation of her right leg.  Has noticed increased bruising since starting the daily 81 mg aspirin.    MRI Brain 2/14/24:   No acute process seen.  Minimal involutional change and minimal small vessel ischemic change.     CTA Head/Neck 2/13/24:  Stable CT scan of the head.  No evidence of intracranial hemorrhage or large territorial infarction.  Follow-up MRI of the brain may be obtained further evaluation.  No large vessel occlusion in the intracranial circulation.  No hemodynamically significant stenosis of the neck vessels.    ECHO 2/14/24:     Left Ventricle: There is concentric remodeling. Normal wall motion. There is normal systolic function. Biplane (2D) method of discs ejection fraction is 72%. There is normal diastolic function.    Right Ventricle: Normal right ventricular cavity size. Systolic function is normal. TAPSE is 1.90 cm.    Mitral Valve: There is mild regurgitation.    Tricuspid Valve: There is mild regurgitation.    Pulmonary Artery: The estimated pulmonary artery systolic pressure is 29 mmHg.    IVC/SVC: Normal venous pressure at 3 mmHg.    Study is negative for shunt    PAST MEDICAL HISTORY, SURGICAL/SOCIAL/FAMILY HISTORY REVIEWED AS PER CHART, WITH PERTINENT FINDINGS INCLUDED IN HISTORY SECTION OF NOTE.     Current Medications    Medication List with Changes/Refills   New Medications    ESCITALOPRAM  OXALATE (LEXAPRO) 10 MG TABLET    Take 1 tablet (10 mg total) by mouth once daily.    LORAZEPAM (ATIVAN) 1 MG TABLET    1/2-1 tablet daily as needed for severe anxiety   Current Medications    ASPIRIN (ECOTRIN) 81 MG EC TABLET    Take 1 tablet (81 mg total) by mouth once daily.    ATORVASTATIN (LIPITOR) 40 MG TABLET    Take 1 tablet (40 mg total) by mouth once daily.    CALCIUM PHOSPHATE-VITAMIN D3 250 MG-12.5 MCG (500 UNIT) CHEW    Take 2 tablets by mouth Daily.    DICLOFENAC SODIUM (VOLTAREN) 1 % GEL    Apply 2 g topically 3 (three) times daily.    FLAXSEED ORAL    Take by mouth once daily.     FLUTICASONE PROPIONATE (FLONASE) 50 MCG/ACTUATION NASAL SPRAY    2 SPRAYS (100 MCG TOTAL) BY EACH NOSTRIL ROUTE NIGHTLY.    GABAPENTIN (NEURONTIN) 300 MG CAPSULE    Take 1 capsule (300 mg total) by mouth 2 (two) times daily.    MECLIZINE (ANTIVERT) 25 MG TABLET    Take 1 tablet (25 mg total) by mouth 3 (three) times daily as needed for Dizziness.    OMEPRAZOLE (PRILOSEC) 40 MG CAPSULE    TAKE 1 CAPSULE BY MOUTH BEFORE BREAKFAST.    ONDANSETRON (ZOFRAN) 8 MG TABLET    Take 1 tablet (8 mg total) by mouth every 8 (eight) hours as needed for Nausea.    VITAMIN D 1000 UNITS TAB    Take 1,000 Units by mouth once daily.    ZINC ORAL    Take by mouth once daily.       Allergies   Review of patient's allergies indicates:   Allergen Reactions    Ropinirole Swelling and Rash      palpitations, swelling of tongue   palpitations, swelling of tongue         Review of Systems (Pertinent positives)  Review of Systems   Constitutional:  Negative for unexpected weight change.   HENT:  Negative for trouble swallowing.    Eyes:  Negative for visual disturbance.   Respiratory:  Negative for shortness of breath.    Cardiovascular:  Negative for chest pain.   Skin:  Positive for wound.   Neurological:  Positive for dizziness (intermittent, has Meniere's, overall well controlled.). Negative for tremors, seizures, syncope, speech difficulty,  "weakness, numbness and headaches.   Hematological:  Bruises/bleeds easily.   Psychiatric/Behavioral:  Positive for sleep disturbance. Negative for dysphoric mood. The patient is nervous/anxious.        /78 (BP Location: Right arm, Patient Position: Sitting, BP Method: Large (Manual))   Pulse 97   Temp 97.9 °F (36.6 °C)   Ht 5' 6" (1.676 m)   Wt 79.5 kg (175 lb 4.3 oz)   LMP  (LMP Unknown)   SpO2 98%   BMI 28.29 kg/m²     Physical Exam  Vitals reviewed.   Constitutional:       General: She is not in acute distress.     Appearance: Normal appearance. She is well-developed.   HENT:      Head: Normocephalic and atraumatic.   Eyes:      Conjunctiva/sclera: Conjunctivae normal.   Cardiovascular:      Rate and Rhythm: Normal rate and regular rhythm.   Pulmonary:      Effort: Pulmonary effort is normal.      Breath sounds: Normal breath sounds.   Musculoskeletal:      Right lower leg: No edema.      Left lower leg: No edema.   Skin:     General: Skin is warm and dry.      Findings: Bruising present.          Neurological:      General: No focal deficit present.      Mental Status: She is alert and oriented to person, place, and time.      Cranial Nerves: Cranial nerves 2-12 are intact. No cranial nerve deficit or facial asymmetry.      Motor: Motor function is intact. No weakness, tremor, abnormal muscle tone or pronator drift.      Coordination: Coordination is intact. Romberg sign negative. Coordination normal. Rapid alternating movements normal.      Gait: Gait is intact.   Psychiatric:         Mood and Affect: Mood is anxious.         Behavior: Behavior normal.         Assessment/Plan:  Carlene Fong is a 73 y.o. female who presents today for :        ICD-10-CM ICD-9-CM    1. Stroke-like symptoms  R29.90 781.99       2. History of transient ischemic attack (TIA)  Z86.73 V12.54       3. Hyperlipidemia, unspecified hyperlipidemia type  E78.5 272.4       4. Long-term use of aspirin therapy  Z79.82 " V58.66       5. Overweight (BMI 25.0-29.9)  E66.3 278.02       6. H/O parathyroidectomy  E89.2 252.8       7. Meniere's disease, unspecified laterality  H81.09 386.00       8. Anxiety  F41.9 300.00 EScitalopram oxalate (LEXAPRO) 10 MG tablet      LORazepam (ATIVAN) 1 MG tablet        STROKE LIKE SYMPTOMS SUSPECTED 2/2 TIA: Admitted 2/13-2/14/2024 for evaluation of Sudden dizziness, nausea, and transient memory loss, with difficulty expressing words, which resolved in less than 24hrs.     MRI brain was normal  CTA head/neck showed no occlusion  Echo results Unremarkable from 2/14/24: Est EF 72%, no shunt  Telemetry  Started statin and ASA    NOW ON LIPITOR 40 & ASA 81 W/ NO FOLLOW UP CONCERNS     Recheck labs with lipids already scheduled for next month.   BP WNL and A1c has been WNL, most recently 5.0 & recheck w/ labs next month  Weight stable over the last 4 months improved BMI of 28.2     GIVEN HER VERY BRIEF SYMPTOM PRESENTATION, NORMAL WORKUP AND UNDERLYING ANXIETY/DIZZINESS ISSUES, SUSPECT THAT HER CONCERN FOR BRIEF DIZZINESS, NAUSEA, TRANSIENT MEMORY LOSS WERE MORE LIKELY RELATED TO FLARE UP OF VERTIGO WITH SUBSEQUENT ANXIETY, HOWEVER, VERY REASONABLE TO TREAT WITH DAILY STATIN AND ASPIRIN FOR TIA PREVENTION, ESPECIALLY GIVEN THE FACT THAT SHE DOES HAVE SOME UNDERLYING HYPERLIPIDEMIA.  NORMAL NEUROLOGIC EXAM TODAY.      MENIERE's: has been stable off medication, stopped HCTZ and BP at goal.  OVERALL DOING VERY WELL-HAD A BRIEF FLARE-UP OF VERTIGO WHEN SHE LOOKED VERY DEEPLY DOWN AND THEN LOOKED UP-SHE REALIZES THAT SHE CAN NOT HAVE HER HEAD AND KNEES MORE EXTREME POSITIONS AS THIS IS A RISK FACTOR FOR VERTIGO.    H/O Parathyroidectomy: Electrolytes, PTH, Calcium and Vit D all WNL s/p parathyroidectomy and monitored.     ANXIETY:   May have contributed to recent symptoms, she describes daily anxiety and would like to start something like Lexapro-prescription for 10 mg Lexapro send advised that this may take a  few weeks to reach maximal effectiveness.  Will follow-up at her upcoming visit.  Okay to also take occasional Ativan as needed for more targeted/situational anxiety.  Has had some associated poor sleep and discuss that decreasing anxiety may help with this over time and she may also take intermittent melatonin as needed-she reports this did help during her hospitalization.      SKIN TEAR OF THE RIGHT LOWER EXTREMITY:  Some ongoing intermittent oozing and she was advised to decrease her aspirin to 81 mg every other day given the bleeding and bruising she has had since starting it.  Also advised on washing the lesion daily with warm soapy water, patting dry with a clean towel and then applying Vaseline and keeping covered with a Band-Aid until healed.  Advised to notify if any symptoms of infection develop, but there are no concerns for this today.  Has good circulation and good granulation tissue and suspect that the skin tear will heal with conservative measures.    A total of 45 minutes was spent on this encounter that included explaining differentials, symptom counseling, review of recent results, and next steps of diagnosis and management plan, along with direct documentation of the encounter.      There are no Patient Instructions on file for this visit.      Follow up in about 6 weeks (around 4/1/2024) for to follow up on lab results, return as needed for new concerns.

## 2024-02-19 ENCOUNTER — PATIENT MESSAGE (OUTPATIENT)
Dept: ADMINISTRATIVE | Facility: HOSPITAL | Age: 74
End: 2024-02-19
Payer: COMMERCIAL

## 2024-02-19 ENCOUNTER — OFFICE VISIT (OUTPATIENT)
Dept: FAMILY MEDICINE | Facility: CLINIC | Age: 74
End: 2024-02-19
Payer: COMMERCIAL

## 2024-02-19 VITALS
OXYGEN SATURATION: 98 % | DIASTOLIC BLOOD PRESSURE: 78 MMHG | HEIGHT: 66 IN | HEART RATE: 97 BPM | TEMPERATURE: 98 F | WEIGHT: 175.25 LBS | BODY MASS INDEX: 28.16 KG/M2 | SYSTOLIC BLOOD PRESSURE: 122 MMHG

## 2024-02-19 DIAGNOSIS — E78.5 HYPERLIPIDEMIA, UNSPECIFIED HYPERLIPIDEMIA TYPE: ICD-10-CM

## 2024-02-19 DIAGNOSIS — Z86.73 HISTORY OF TRANSIENT ISCHEMIC ATTACK (TIA): ICD-10-CM

## 2024-02-19 DIAGNOSIS — S81.811S: ICD-10-CM

## 2024-02-19 DIAGNOSIS — R42 VERTIGO: ICD-10-CM

## 2024-02-19 DIAGNOSIS — H81.09 MENIERE'S DISEASE, UNSPECIFIED LATERALITY: ICD-10-CM

## 2024-02-19 DIAGNOSIS — F41.9 ANXIETY: ICD-10-CM

## 2024-02-19 DIAGNOSIS — R29.90 STROKE-LIKE SYMPTOMS: Primary | ICD-10-CM

## 2024-02-19 DIAGNOSIS — E66.3 OVERWEIGHT (BMI 25.0-29.9): ICD-10-CM

## 2024-02-19 DIAGNOSIS — Z90.89 H/O PARATHYROIDECTOMY: ICD-10-CM

## 2024-02-19 DIAGNOSIS — Z79.82 LONG-TERM USE OF ASPIRIN THERAPY: ICD-10-CM

## 2024-02-19 DIAGNOSIS — Z98.890 H/O PARATHYROIDECTOMY: ICD-10-CM

## 2024-02-19 PROCEDURE — 1160F RVW MEDS BY RX/DR IN RCRD: CPT | Mod: CPTII,S$GLB,, | Performed by: FAMILY MEDICINE

## 2024-02-19 PROCEDURE — 3288F FALL RISK ASSESSMENT DOCD: CPT | Mod: CPTII,S$GLB,, | Performed by: FAMILY MEDICINE

## 2024-02-19 PROCEDURE — 1101F PT FALLS ASSESS-DOCD LE1/YR: CPT | Mod: CPTII,S$GLB,, | Performed by: FAMILY MEDICINE

## 2024-02-19 PROCEDURE — 99215 OFFICE O/P EST HI 40 MIN: CPT | Mod: S$GLB,,, | Performed by: FAMILY MEDICINE

## 2024-02-19 PROCEDURE — 3074F SYST BP LT 130 MM HG: CPT | Mod: CPTII,S$GLB,, | Performed by: FAMILY MEDICINE

## 2024-02-19 PROCEDURE — 1125F AMNT PAIN NOTED PAIN PRSNT: CPT | Mod: CPTII,S$GLB,, | Performed by: FAMILY MEDICINE

## 2024-02-19 PROCEDURE — 3008F BODY MASS INDEX DOCD: CPT | Mod: CPTII,S$GLB,, | Performed by: FAMILY MEDICINE

## 2024-02-19 PROCEDURE — 3078F DIAST BP <80 MM HG: CPT | Mod: CPTII,S$GLB,, | Performed by: FAMILY MEDICINE

## 2024-02-19 PROCEDURE — 1159F MED LIST DOCD IN RCRD: CPT | Mod: CPTII,S$GLB,, | Performed by: FAMILY MEDICINE

## 2024-02-19 PROCEDURE — 99999 PR PBB SHADOW E&M-EST. PATIENT-LVL V: CPT | Mod: PBBFAC,,, | Performed by: FAMILY MEDICINE

## 2024-02-19 RX ORDER — MECLIZINE HYDROCHLORIDE 25 MG/1
25 TABLET ORAL 3 TIMES DAILY PRN
Qty: 30 TABLET | Refills: 1 | Status: SHIPPED | OUTPATIENT
Start: 2024-02-19

## 2024-02-19 RX ORDER — LORAZEPAM 1 MG/1
TABLET ORAL
Qty: 10 TABLET | Refills: 1 | Status: SHIPPED | OUTPATIENT
Start: 2024-02-19

## 2024-02-19 RX ORDER — ONDANSETRON HYDROCHLORIDE 8 MG/1
8 TABLET, FILM COATED ORAL EVERY 8 HOURS PRN
Qty: 30 TABLET | Refills: 1 | Status: SHIPPED | OUTPATIENT
Start: 2024-02-19

## 2024-02-19 RX ORDER — ESCITALOPRAM OXALATE 10 MG/1
10 TABLET ORAL DAILY
Qty: 90 TABLET | Refills: 3 | Status: SHIPPED | OUTPATIENT
Start: 2024-02-19 | End: 2024-05-09 | Stop reason: SDUPTHER

## 2024-02-19 NOTE — PATIENT INSTRUCTIONS
Start taking Lexapro 10 mg every morning with breakfast, this may take about 4-6 weeks to start reaching ineffective level.  Will follow-up in 5 weeks at upcoming office visit.  Okay to still take occasional as needed Ativan 1/2-1 pill as needed for panic/more intense anxiety episodes.      Take medicines as prescribed on medication list by yoana to decrease aspirin to every other day given predisposition to bleeding and bruising.    For skin care wash with warm soapy water daily and scrubbed gently.  Pat dry and then cover with Vaseline and a Band-Aid until the wound has closed.  Notify if any spreading redness, increased tenderness or foul-smelling discharge occurs.  Currently there are no concerns for infection however.

## 2024-03-14 ENCOUNTER — OFFICE VISIT (OUTPATIENT)
Dept: ORTHOPEDICS | Facility: CLINIC | Age: 74
End: 2024-03-14
Payer: COMMERCIAL

## 2024-03-14 DIAGNOSIS — M17.0 PRIMARY OSTEOARTHRITIS OF BOTH KNEES: Primary | ICD-10-CM

## 2024-03-14 PROCEDURE — 20610 DRAIN/INJ JOINT/BURSA W/O US: CPT | Mod: 50,S$GLB,, | Performed by: ORTHOPAEDIC SURGERY

## 2024-03-14 PROCEDURE — 99499 UNLISTED E&M SERVICE: CPT | Mod: S$GLB,,, | Performed by: ORTHOPAEDIC SURGERY

## 2024-03-14 PROCEDURE — 99999 PR PBB SHADOW E&M-EST. PATIENT-LVL III: CPT | Mod: PBBFAC,,, | Performed by: ORTHOPAEDIC SURGERY

## 2024-03-14 PROCEDURE — 1101F PT FALLS ASSESS-DOCD LE1/YR: CPT | Mod: CPTII,S$GLB,, | Performed by: ORTHOPAEDIC SURGERY

## 2024-03-14 PROCEDURE — 3288F FALL RISK ASSESSMENT DOCD: CPT | Mod: CPTII,S$GLB,, | Performed by: ORTHOPAEDIC SURGERY

## 2024-03-14 RX ORDER — TRIAMCINOLONE ACETONIDE 40 MG/ML
80 INJECTION, SUSPENSION INTRA-ARTICULAR; INTRAMUSCULAR
Status: DISCONTINUED | OUTPATIENT
Start: 2024-03-14 | End: 2024-03-14 | Stop reason: HOSPADM

## 2024-03-14 RX ADMIN — TRIAMCINOLONE ACETONIDE 80 MG: 40 INJECTION, SUSPENSION INTRA-ARTICULAR; INTRAMUSCULAR at 08:03

## 2024-03-14 NOTE — PROGRESS NOTES
The patient is seen specifically for palliative, bilateral knee injections.  She gives consent for this.    The patient will be traveling on a cruise in early June and may have an early injection before that trip as well.

## 2024-03-14 NOTE — PROCEDURES
Large Joint Aspiration/Injection: bilateral knee    Date/Time: 3/14/2024 8:15 AM    Performed by: Franklin Dobbs MD  Authorized by: Franklin Dobbs MD    Consent Done?:  Yes (Verbal)  Approach:  Anterolateral  Location:  Knee  Laterality:  Bilateral  Site:  Bilateral knee  Medications (Left):  80 mg triamcinolone acetonide 40 mg/mL     After obtaining verbal informed consent both of the patient's knees were prepped aseptically and injected through an inferior lateral approach using 40 mg of triamcinolone and 1 cc of 1% plain Xylocaine.  The patient was warned about postinjection flare and how to manage it with ice, rest and over-the-counter analgesics.  They're advised to contact me for any severe, uncontrolled pain.

## 2024-03-15 ENCOUNTER — LAB VISIT (OUTPATIENT)
Dept: LAB | Facility: HOSPITAL | Age: 74
End: 2024-03-15
Attending: FAMILY MEDICINE
Payer: COMMERCIAL

## 2024-03-15 DIAGNOSIS — R79.89 LOW SERUM PREALBUMIN: ICD-10-CM

## 2024-03-15 DIAGNOSIS — Z79.899 ENCOUNTER FOR MONITORING LONG-TERM PROTON PUMP INHIBITOR THERAPY: ICD-10-CM

## 2024-03-15 DIAGNOSIS — Z51.81 ENCOUNTER FOR MONITORING LONG-TERM PROTON PUMP INHIBITOR THERAPY: ICD-10-CM

## 2024-03-15 DIAGNOSIS — E21.0 PRIMARY HYPERPARATHYROIDISM: ICD-10-CM

## 2024-03-15 DIAGNOSIS — E78.2 MODERATE MIXED HYPERLIPIDEMIA NOT REQUIRING STATIN THERAPY: ICD-10-CM

## 2024-03-15 DIAGNOSIS — E55.9 VITAMIN D DEFICIENCY: ICD-10-CM

## 2024-03-15 DIAGNOSIS — Z79.899 MEDICATION MANAGEMENT: ICD-10-CM

## 2024-03-15 LAB
25(OH)D3+25(OH)D2 SERPL-MCNC: 64 NG/ML (ref 30–96)
ALBUMIN SERPL BCP-MCNC: 3.6 G/DL (ref 3.5–5.2)
ALP SERPL-CCNC: 78 U/L (ref 55–135)
ALT SERPL W/O P-5'-P-CCNC: 9 U/L (ref 10–44)
ANION GAP SERPL CALC-SCNC: 10 MMOL/L (ref 8–16)
AST SERPL-CCNC: 12 U/L (ref 10–40)
BASOPHILS # BLD AUTO: 0.02 K/UL (ref 0–0.2)
BASOPHILS NFR BLD: 0.2 % (ref 0–1.9)
BILIRUB SERPL-MCNC: 0.5 MG/DL (ref 0.1–1)
BUN SERPL-MCNC: 11 MG/DL (ref 8–23)
CALCIUM SERPL-MCNC: 10 MG/DL (ref 8.7–10.5)
CHLORIDE SERPL-SCNC: 109 MMOL/L (ref 95–110)
CHOLEST SERPL-MCNC: 142 MG/DL (ref 120–199)
CHOLEST/HDLC SERPL: 3 {RATIO} (ref 2–5)
CO2 SERPL-SCNC: 24 MMOL/L (ref 23–29)
CREAT SERPL-MCNC: 0.7 MG/DL (ref 0.5–1.4)
DIFFERENTIAL METHOD BLD: ABNORMAL
EOSINOPHIL # BLD AUTO: 0 K/UL (ref 0–0.5)
EOSINOPHIL NFR BLD: 0.1 % (ref 0–8)
ERYTHROCYTE [DISTWIDTH] IN BLOOD BY AUTOMATED COUNT: 14.6 % (ref 11.5–14.5)
EST. GFR  (NO RACE VARIABLE): >60 ML/MIN/1.73 M^2
ESTIMATED AVG GLUCOSE: 94 MG/DL (ref 68–131)
FERRITIN SERPL-MCNC: 197 NG/ML (ref 20–300)
GLUCOSE SERPL-MCNC: 94 MG/DL (ref 70–110)
HBA1C MFR BLD: 4.9 % (ref 4–5.6)
HCT VFR BLD AUTO: 43.1 % (ref 37–48.5)
HDLC SERPL-MCNC: 48 MG/DL (ref 40–75)
HDLC SERPL: 33.8 % (ref 20–50)
HGB BLD-MCNC: 13.7 G/DL (ref 12–16)
IMM GRANULOCYTES # BLD AUTO: 0.03 K/UL (ref 0–0.04)
IMM GRANULOCYTES NFR BLD AUTO: 0.3 % (ref 0–0.5)
LDLC SERPL CALC-MCNC: 79.4 MG/DL (ref 63–159)
LYMPHOCYTES # BLD AUTO: 1.2 K/UL (ref 1–4.8)
LYMPHOCYTES NFR BLD: 13.5 % (ref 18–48)
MAGNESIUM SERPL-MCNC: 2.2 MG/DL (ref 1.6–2.6)
MCH RBC QN AUTO: 31.4 PG (ref 27–31)
MCHC RBC AUTO-ENTMCNC: 31.8 G/DL (ref 32–36)
MCV RBC AUTO: 99 FL (ref 82–98)
MONOCYTES # BLD AUTO: 0.4 K/UL (ref 0.3–1)
MONOCYTES NFR BLD: 4.3 % (ref 4–15)
NEUTROPHILS # BLD AUTO: 7 K/UL (ref 1.8–7.7)
NEUTROPHILS NFR BLD: 81.6 % (ref 38–73)
NONHDLC SERPL-MCNC: 94 MG/DL
NRBC BLD-RTO: 0 /100 WBC
PLATELET # BLD AUTO: 311 K/UL (ref 150–450)
PMV BLD AUTO: 10.3 FL (ref 9.2–12.9)
POTASSIUM SERPL-SCNC: 4.9 MMOL/L (ref 3.5–5.1)
PREALB SERPL-MCNC: 14 MG/DL (ref 20–43)
PROT SERPL-MCNC: 6.8 G/DL (ref 6–8.4)
RBC # BLD AUTO: 4.36 M/UL (ref 4–5.4)
SODIUM SERPL-SCNC: 143 MMOL/L (ref 136–145)
TRIGL SERPL-MCNC: 73 MG/DL (ref 30–150)
TSH SERPL DL<=0.005 MIU/L-ACNC: 1.26 UIU/ML (ref 0.4–4)
VIT B12 SERPL-MCNC: 299 PG/ML (ref 210–950)
WBC # BLD AUTO: 8.59 K/UL (ref 3.9–12.7)

## 2024-03-15 PROCEDURE — 84443 ASSAY THYROID STIM HORMONE: CPT | Performed by: FAMILY MEDICINE

## 2024-03-15 PROCEDURE — 83036 HEMOGLOBIN GLYCOSYLATED A1C: CPT | Performed by: FAMILY MEDICINE

## 2024-03-15 PROCEDURE — 83735 ASSAY OF MAGNESIUM: CPT | Performed by: FAMILY MEDICINE

## 2024-03-15 PROCEDURE — 36415 COLL VENOUS BLD VENIPUNCTURE: CPT | Mod: PO | Performed by: FAMILY MEDICINE

## 2024-03-15 PROCEDURE — 80061 LIPID PANEL: CPT | Performed by: FAMILY MEDICINE

## 2024-03-15 PROCEDURE — 82728 ASSAY OF FERRITIN: CPT | Performed by: FAMILY MEDICINE

## 2024-03-15 PROCEDURE — 80053 COMPREHEN METABOLIC PANEL: CPT | Performed by: FAMILY MEDICINE

## 2024-03-15 PROCEDURE — 84134 ASSAY OF PREALBUMIN: CPT | Performed by: FAMILY MEDICINE

## 2024-03-15 PROCEDURE — 82607 VITAMIN B-12: CPT | Performed by: FAMILY MEDICINE

## 2024-03-15 PROCEDURE — 85025 COMPLETE CBC W/AUTO DIFF WBC: CPT | Performed by: FAMILY MEDICINE

## 2024-03-15 PROCEDURE — 82306 VITAMIN D 25 HYDROXY: CPT | Performed by: FAMILY MEDICINE

## 2024-03-21 PROBLEM — E44.1 MILD PROTEIN-CALORIE MALNUTRITION: Status: ACTIVE | Noted: 2024-03-21

## 2024-03-21 PROBLEM — R29.90 STROKE-LIKE SYMPTOMS: Status: RESOLVED | Noted: 2024-02-13 | Resolved: 2024-03-21

## 2024-03-21 PROBLEM — R79.89 LOW VITAMIN B12 LEVEL: Status: ACTIVE | Noted: 2024-03-21

## 2024-03-21 NOTE — PROGRESS NOTES
Office Visit    Patient Name: Carlene Fong    : 1950  MRN: [id number]    Subjective:  Carlene is a 73 y.o. female who presents today for:    Annual Exam    Prior Annual 3/17/23    MOST RECENT OV W/ ME 24 -- Hospital follow up for stroke like symptoms, though to be most likely anxiety in nature s/p (-) W/U & started on LIPITOR 40 & ASA 81 for possible TIA    Ortho Dr Dobbs 3/14/24 for OA of bilateral knees: palliative, bilateral knee injections with cortisone    10/23/23-- L rib fractures s/p fall     23 seen by me for follow up of Hiatal Hernia w/ GERD symptoms  significant improvement on daily omeprazole 40 (Pepcid prn in the PM but not needing lately), s/p weight loss, GERD friendly diet (does not eat late).  EGD with Dr Jaimes on 23 showing a small Hiatal Hernia that could be predisposing her to reflux.        Carlene presents today for annual wellness exam with lab reivew and monitoring of chronic conditions-- primary hyperparathyroidism associated with vitamin D deficiency & Osteoporosis now s/p 3 gland parathyroidectomy on 2019, obesity, history of positive rheumatoid factor without clinical evidence of rheumatoid arthritis, left wrist carpal tunnel(s/p injection 10/2018), mild anxiety, GERD with recent exacerbation, bilateral knee pain followed by Ortho Dr Dobbs and managed with periodic cortisone injections.     As far as her Hyperparathyroidism with elevated Calcium: I referred her to Dash Balderas/ Rm secondary to osteoporosis dx 1/15/2019 and hypercalcemia at 12.   She underwent 3 gland parathyroidectomy 19 and most recently followed up with Endo Dr Balderas on 2019.   Her TSH/PTH/ and Renal function panel have normalized as of 3/10/21.   She is taking Calcium 500 BID and Vitamin D 1000 IU daily.      Sees gynecology for yearly physicals-- Dr De Dios upcoming on 24. She has had a hysterectomy with removal of one ovary.      Today she is feeling overall well.     Her recent severe GERD symptoms are now improving on omeprazole 40 mg daily.   Allergies-- stable with prn zyrtec, Flonase.    No recurrence of stroke like symptoms since d/c from hospital for TIA work up.  Anxiety on Lexapro 10 -- starting to notice improvement in anxiety. Worrying still but more manageable.   She is easing back into a normal diet and activity routine.  Has upcoming [employer] scheduled for this summer.      LABS 3/15/24 WNL except for LOW PRE-ALBUMIN of 14. Lipid w/ LDL improved to 79 on Lipitor 40, normal liver, kidney function, H/H, TSH and vitamin Levels except B12 lower end at 299.      General lifestyle habits are as follows:   Diet : healthy- eating consistent low carb  healthy meals-- drinking plenty of water   Exercise: cardio--was walking 20-30 min every day-- was out of her routine with her severe GERD, but now easing back in  Sleep: Fair-- sleeps 7-8 hours nightly and does not snore.   Weight:  DOWN 35+ lbs in the last 18 months, CURRENTLY STABLE AT BMI 27     Immunizations: SHINGRIX 2/2/ 11/5/2018, TDaP 12/1/2015, PREVNAR 13 1/10/2018, Pneumovax 23 1/15/2019, FLU SHOT UTD 9/14/23, COVID-19 COMPLETED 3/2/21 w/ PFIZER BOOSTER 12/29/21 & FURTHER COVID VACCINES DECLINED, RSV VACCINE ADVISED     Screening tests: colonoscopy 8/2009-- normal-->repeat in 10 years-- DECLINES AND WISHES TO CONTINUE FIT TESTING(FIT - 4/18/23-REPEAT ORDERED), DEXA 3/16/21-- Osteoporosis of the hips & PROLIA ADVISED (endocrine agrees) but she declines starting it & UPDATED DEXA 3/27/23 SHOWED SOME IMPROVEMENT to borderline OP only w/ treatment of hyperparathyroidism-- REPEAT 2 YEARS (3/2025), mammograms-- up to date 11/24/23,  No longer needs PAPS-HYSTERECTOMY, , Hep C (-)  5/27/14     Eye/Dental: up to date with both         PAST MEDICAL HISTORY, SURGICAL/SOCIAL/FAMILY HISTORY REVIEWED AS PER CHART, WITH PERTINENT FINDINGS INCLUDED IN HISTORY SECTION OF NOTE.     Current Medications    Medication List with  Changes/Refills   New Medications    CYANOCOBALAMIN, VITAMIN B-12, 1,000 MCG LOZG    Place 1 lozenge under the tongue once a week.   Current Medications    ASPIRIN (ECOTRIN) 81 MG EC TABLET    Take 1 tablet (81 mg total) by mouth once daily.    ATORVASTATIN (LIPITOR) 40 MG TABLET    Take 1 tablet (40 mg total) by mouth once daily.    CALCIUM PHOSPHATE-VITAMIN D3 250 MG-12.5 MCG (500 UNIT) CHEW    Take 2 tablets by mouth Daily.    DICLOFENAC SODIUM (VOLTAREN) 1 % GEL    Apply 2 g topically 3 (three) times daily.    ESCITALOPRAM OXALATE (LEXAPRO) 10 MG TABLET    Take 1 tablet (10 mg total) by mouth once daily.    FLAXSEED ORAL    Take by mouth once daily.     FLUTICASONE PROPIONATE (FLONASE) 50 MCG/ACTUATION NASAL SPRAY    2 SPRAYS (100 MCG TOTAL) BY EACH NOSTRIL ROUTE NIGHTLY.    GABAPENTIN (NEURONTIN) 300 MG CAPSULE    Take 1 capsule (300 mg total) by mouth 2 (two) times daily.    LORAZEPAM (ATIVAN) 1 MG TABLET    1/2-1 tablet daily as needed for severe anxiety    MECLIZINE (ANTIVERT) 25 MG TABLET    Take 1 tablet (25 mg total) by mouth 3 (three) times daily as needed for Dizziness.    OMEPRAZOLE (PRILOSEC) 40 MG CAPSULE    TAKE 1 CAPSULE BY MOUTH BEFORE BREAKFAST.    ONDANSETRON (ZOFRAN) 8 MG TABLET    Take 1 tablet (8 mg total) by mouth every 8 (eight) hours as needed for Nausea.    VITAMIN D 1000 UNITS TAB    Take 1,000 Units by mouth once daily.       Allergies   Review of patient's allergies indicates:   Allergen Reactions    Ropinirole Swelling and Rash      palpitations, swelling of tongue   palpitations, swelling of tongue         Review of Systems (Pertinent positives)  Review of Systems   Constitutional:  Negative for activity change and unexpected weight change.   HENT:  Negative for hearing loss, rhinorrhea and trouble swallowing.    Eyes:  Negative for discharge and visual disturbance.   Respiratory:  Negative for chest tightness and wheezing.    Cardiovascular:  Negative for chest pain and  "palpitations.   Gastrointestinal:  Negative for blood in stool, constipation, diarrhea and vomiting.   Endocrine: Negative for polydipsia and polyuria.   Genitourinary:  Negative for difficulty urinating, dysuria, hematuria and menstrual problem.   Musculoskeletal:  Positive for arthralgias. Negative for joint swelling and neck pain.   Neurological:  Negative for weakness and headaches.   Hematological:  Bruises/bleeds easily (bruising).   Psychiatric/Behavioral:  Negative for confusion and dysphoric mood.        /72 (BP Location: Left arm, Patient Position: Sitting, BP Method: Large (Manual))   Pulse 78   Temp 99.1 °F (37.3 °C)   Ht 5' 6" (1.676 m)   Wt 77.9 kg (171 lb 11.8 oz)   LMP  (LMP Unknown)   SpO2 100%   BMI 27.72 kg/m²     Physical Exam  Vitals reviewed.   Constitutional:       General: She is not in acute distress.     Appearance: Normal appearance. She is well-developed.   HENT:      Head: Normocephalic and atraumatic.      Right Ear: Tympanic membrane and ear canal normal. Tympanic membrane is not erythematous or bulging.      Left Ear: Tympanic membrane and ear canal normal. Tympanic membrane is not erythematous or bulging.      Nose: Nose normal.      Mouth/Throat:      Mouth: Mucous membranes are moist.      Pharynx: No oropharyngeal exudate or posterior oropharyngeal erythema.   Eyes:      Extraocular Movements: Extraocular movements intact.      Conjunctiva/sclera: Conjunctivae normal.   Neck:      Thyroid: No thyroid mass or thyromegaly.      Vascular: No carotid bruit.   Cardiovascular:      Rate and Rhythm: Normal rate and regular rhythm.      Pulses:           Dorsalis pedis pulses are 2+ on the right side and 2+ on the left side.      Heart sounds: Normal heart sounds. No murmur heard.  Pulmonary:      Effort: Pulmonary effort is normal. No respiratory distress.      Breath sounds: Normal breath sounds.   Abdominal:      General: Bowel sounds are normal. There is no distension.    "   Palpations: Abdomen is soft. There is no mass.      Tenderness: There is no abdominal tenderness.   Musculoskeletal:         General: Normal range of motion.      Right lower leg: No edema.      Left lower leg: No edema.   Lymphadenopathy:      Cervical: No cervical adenopathy.   Skin:     General: Skin is warm and dry.      Findings: No rash.   Neurological:      General: No focal deficit present.      Mental Status: She is alert and oriented to person, place, and time.   Psychiatric:         Mood and Affect: Mood normal.         Behavior: Behavior normal.           Assessment/Plan:  Carlene Fong is a 73 y.o. female who presents today for :        ICD-10-CM ICD-9-CM    1. Routine general medical examination at a health care facility  Z00.00 V70.0       2. Overweight (BMI 25.0-29.9)  E66.3 278.02       3. History of transient ischemic attack (TIA)  Z86.73 V12.54       4. Mixed hyperlipidemia  E78.2 272.2       5. Long-term use of aspirin therapy  Z79.82 V58.66       6. Hiatal hernia with GERD without esophagitis  K44.9 553.3     K21.9 530.11       7. Encounter for monitoring long-term proton pump inhibitor therapy  Z51.81 V58.83     Z79.899 V58.69       8. H/O parathyroidectomy  E89.2 252.8       9. Postmenopausal osteoporosis  M81.0 733.01       10. Vitamin D deficiency  E55.9 268.9       11. Bilateral primary osteoarthritis of knee  M17.0 715.16       12. H/O hysterectomy for benign disease  Z90.710 V88.01       13. Meniere's disease, unspecified laterality  H81.09 386.00       14. Anxiety  F41.9 300.00       15. Medication management  Z79.899 V58.69       16. Colon cancer screening  Z12.11 V76.51 Fecal Immunochemical Test (iFOBT)      17. Mild protein-calorie malnutrition  E44.1 263.1 Comprehensive Metabolic Panel      Prealbumin      18. Low vitamin B12 level  R79.89 790.6 Vitamin B12      cyanocobalamin, vitamin B-12, 1,000 mcg Lozg        ADVISED ON DIET/EXERCISE/SLEEP, ROUTINE EYE/DENTAL EXAMS, AND  THE IMPORTANCE OF KEEPING UP WITH APPROPRIATE SCREENING TESTS BASED ON AGE AND RISK FACTORS.  YEARLY FIT TEST DUE 4/18/24--ORDERED  NEXT DEXA DUE 3/2025  NEXT MAMMO DUE 11/2024- PER GYN  IMMUNIZATIONS UTD EXCEPT DECLINES UPDATED COVID & RSV ADVISED-- DECLINES COVID VACCINE, OPEN TO RSV BUT DOES NOT WANT TO GET TODAY.       STROKE LIKE SYMPTOMS SUSPECTED 2/2 TIA: Admitted 2/13-2/14/2024 for evaluation of Sudden dizziness, nausea, and transient memory loss, with difficulty expressing words, which resolved in less than 24hrs.     MRI brain was normal  CTA head/neck showed no occlusion  Echo results Unremarkable from 2/14/24: Est EF 72%, no shunt  Telemetry  Started statin and ASA     NOW ON LIPITOR 40 & ASA 81 W/ NO FOLLOW UP CONCERNS  & IMPROVEMENT IN LDL TO 79 ON STATIN-- TAKING ASPIRIN 81 EVERY OTHER DAY SECONDARY TO BRUISING.     CONTINUE ASA 81/ LIPITOR 40 FOR SECONDARY PREVENTION AND REPEAT LIPIDS IN 6 MONTHS.      MENIERE's: Stable off medication, stopped HCTZ and BP at goal.      H/O Parathyroidectomy: Electrolytes, PTH, Calcium and Vit D all WNL s/p parathyroidectomy and monitored. OSTEOPOROSIS IMPROVING w/ Next DEXA due 3/2025     ANXIETY:  Started on LEXAPRO 10. Occasional Ativan as needed for more targeted/situational anxiety.    Has had some associated poor sleep and discuss that decreasing anxiety may help with this over time and she may also take intermittent melatonin as needed-she reports this did help during her hospitalization.      PROTEIN CALORIE MALNUTRITION ASSOCIATED w/ NEARLY 40 LB WEIGHT LOSS: Feeling well but diet could be better with protein intake. Prealbumin low at 14. B12 borderline low and will start a weekly SL supplement.  PLANS TO ADD IN MORE PROTEIN, START THE B12 SUPPLEMENT AND HAVE ADVISED REPEAT LABS IN 8 WEEKS WITH A VIRTUAL VISIT TO FOLLOW.  DIETITIAN CONSULT IF PRE-ALBUMIN REMAINS LOW.    GERD w/ HIATAL HERNIA: doing well on Omeprazole 40 mg Q AM with Pepcid 40 QHS PRN, s/p EGD  4/14/23    OA of BI ALTERAL KNEES: Periodic Cortisone injections for mgmt of arthritis symptoms per ortho Dr Dobbs     SKIN TEAR OF THE RIGHT LOWER EXTREMITY:  healing, washing with warm soapy water and applying emollient such as Vaseline with good results.             There are no Patient Instructions on file for this visit.      Follow up in about 2 months (around 5/22/2024) for to follow up on lab results, return as needed for new concerns.

## 2024-03-22 ENCOUNTER — OFFICE VISIT (OUTPATIENT)
Dept: FAMILY MEDICINE | Facility: CLINIC | Age: 74
End: 2024-03-22
Payer: COMMERCIAL

## 2024-03-22 VITALS
WEIGHT: 171.75 LBS | HEART RATE: 78 BPM | DIASTOLIC BLOOD PRESSURE: 72 MMHG | SYSTOLIC BLOOD PRESSURE: 124 MMHG | HEIGHT: 66 IN | BODY MASS INDEX: 27.6 KG/M2 | OXYGEN SATURATION: 100 % | TEMPERATURE: 99 F

## 2024-03-22 DIAGNOSIS — E55.9 VITAMIN D DEFICIENCY: ICD-10-CM

## 2024-03-22 DIAGNOSIS — Z79.899 MEDICATION MANAGEMENT: ICD-10-CM

## 2024-03-22 DIAGNOSIS — Z79.82 LONG-TERM USE OF ASPIRIN THERAPY: ICD-10-CM

## 2024-03-22 DIAGNOSIS — Z79.899 ENCOUNTER FOR MONITORING LONG-TERM PROTON PUMP INHIBITOR THERAPY: ICD-10-CM

## 2024-03-22 DIAGNOSIS — E44.1 MILD PROTEIN-CALORIE MALNUTRITION: ICD-10-CM

## 2024-03-22 DIAGNOSIS — Z90.89 H/O PARATHYROIDECTOMY: ICD-10-CM

## 2024-03-22 DIAGNOSIS — Z98.890 H/O PARATHYROIDECTOMY: ICD-10-CM

## 2024-03-22 DIAGNOSIS — Z12.11 COLON CANCER SCREENING: ICD-10-CM

## 2024-03-22 DIAGNOSIS — H81.09 MENIERE'S DISEASE, UNSPECIFIED LATERALITY: ICD-10-CM

## 2024-03-22 DIAGNOSIS — M81.0 POSTMENOPAUSAL OSTEOPOROSIS: ICD-10-CM

## 2024-03-22 DIAGNOSIS — R79.89 LOW VITAMIN B12 LEVEL: ICD-10-CM

## 2024-03-22 DIAGNOSIS — K44.9 HIATAL HERNIA WITH GERD WITHOUT ESOPHAGITIS: ICD-10-CM

## 2024-03-22 DIAGNOSIS — K21.9 HIATAL HERNIA WITH GERD WITHOUT ESOPHAGITIS: ICD-10-CM

## 2024-03-22 DIAGNOSIS — Z90.710 H/O HYSTERECTOMY FOR BENIGN DISEASE: ICD-10-CM

## 2024-03-22 DIAGNOSIS — Z51.81 ENCOUNTER FOR MONITORING LONG-TERM PROTON PUMP INHIBITOR THERAPY: ICD-10-CM

## 2024-03-22 DIAGNOSIS — Z86.73 HISTORY OF TRANSIENT ISCHEMIC ATTACK (TIA): ICD-10-CM

## 2024-03-22 DIAGNOSIS — E66.3 OVERWEIGHT (BMI 25.0-29.9): ICD-10-CM

## 2024-03-22 DIAGNOSIS — Z00.00 ROUTINE GENERAL MEDICAL EXAMINATION AT A HEALTH CARE FACILITY: Primary | ICD-10-CM

## 2024-03-22 DIAGNOSIS — F41.9 ANXIETY: ICD-10-CM

## 2024-03-22 DIAGNOSIS — M17.0 BILATERAL PRIMARY OSTEOARTHRITIS OF KNEE: ICD-10-CM

## 2024-03-22 DIAGNOSIS — E78.2 MIXED HYPERLIPIDEMIA: ICD-10-CM

## 2024-03-22 PROCEDURE — 3078F DIAST BP <80 MM HG: CPT | Mod: CPTII,S$GLB,, | Performed by: FAMILY MEDICINE

## 2024-03-22 PROCEDURE — 1160F RVW MEDS BY RX/DR IN RCRD: CPT | Mod: CPTII,S$GLB,, | Performed by: FAMILY MEDICINE

## 2024-03-22 PROCEDURE — 99999 PR PBB SHADOW E&M-EST. PATIENT-LVL IV: CPT | Mod: PBBFAC,,, | Performed by: FAMILY MEDICINE

## 2024-03-22 PROCEDURE — 3074F SYST BP LT 130 MM HG: CPT | Mod: CPTII,S$GLB,, | Performed by: FAMILY MEDICINE

## 2024-03-22 PROCEDURE — 1159F MED LIST DOCD IN RCRD: CPT | Mod: CPTII,S$GLB,, | Performed by: FAMILY MEDICINE

## 2024-03-22 PROCEDURE — 1126F AMNT PAIN NOTED NONE PRSNT: CPT | Mod: CPTII,S$GLB,, | Performed by: FAMILY MEDICINE

## 2024-03-22 PROCEDURE — 1101F PT FALLS ASSESS-DOCD LE1/YR: CPT | Mod: CPTII,S$GLB,, | Performed by: FAMILY MEDICINE

## 2024-03-22 PROCEDURE — 3288F FALL RISK ASSESSMENT DOCD: CPT | Mod: CPTII,S$GLB,, | Performed by: FAMILY MEDICINE

## 2024-03-22 PROCEDURE — 99397 PER PM REEVAL EST PAT 65+ YR: CPT | Mod: S$GLB,,, | Performed by: FAMILY MEDICINE

## 2024-03-22 PROCEDURE — 3044F HG A1C LEVEL LT 7.0%: CPT | Mod: CPTII,S$GLB,, | Performed by: FAMILY MEDICINE

## 2024-03-22 PROCEDURE — 3008F BODY MASS INDEX DOCD: CPT | Mod: CPTII,S$GLB,, | Performed by: FAMILY MEDICINE

## 2024-03-22 RX ORDER — CHOLECALCIFEROL (VITAMIN D3) 25 MCG
1 TABLET,CHEWABLE ORAL WEEKLY
Qty: 30 LOZENGE | Refills: 2 | Status: SHIPPED | OUTPATIENT
Start: 2024-03-22 | End: 2024-03-22 | Stop reason: SDUPTHER

## 2024-03-22 RX ORDER — CHOLECALCIFEROL (VITAMIN D3) 25 MCG
1 TABLET,CHEWABLE ORAL WEEKLY
Qty: 50 LOZENGE | Refills: 2 | Status: SHIPPED | OUTPATIENT
Start: 2024-03-22

## 2024-04-29 ENCOUNTER — PATIENT MESSAGE (OUTPATIENT)
Dept: FAMILY MEDICINE | Facility: CLINIC | Age: 74
End: 2024-04-29
Payer: COMMERCIAL

## 2024-05-02 ENCOUNTER — PATIENT OUTREACH (OUTPATIENT)
Dept: ADMINISTRATIVE | Facility: HOSPITAL | Age: 74
End: 2024-05-02
Payer: COMMERCIAL

## 2024-05-02 NOTE — PROGRESS NOTES
Population Health Chart Review & Patient Outreach Details      Additional Pop Health Notes:    Patient stated that she mailed fit kit off on Monday. Should be receiving it soon.       Updates Requested / Reviewed:      Updated Care Coordination Note, Care Everywhere, Care Team Updated, and Immunizations Reconciliation Completed or Queried: [facility] Topics Overdue:      [facility] Score: 1     Colon Cancer Screening    RSV Vaccine                  Health Maintenance Topic(s) Outreach Outcomes & Actions Taken:    Colorectal Cancer Screening - Outreach Outcomes & Actions Taken  : Reminded Patient to Complete Already Received Home Test

## 2024-05-04 ENCOUNTER — LAB VISIT (OUTPATIENT)
Dept: LAB | Facility: HOSPITAL | Age: 74
End: 2024-05-04
Attending: FAMILY MEDICINE
Payer: COMMERCIAL

## 2024-05-04 DIAGNOSIS — Z12.11 COLON CANCER SCREENING: ICD-10-CM

## 2024-05-04 DIAGNOSIS — J30.89 NON-SEASONAL ALLERGIC RHINITIS DUE TO OTHER ALLERGIC TRIGGER: ICD-10-CM

## 2024-05-04 PROCEDURE — 82274 ASSAY TEST FOR BLOOD FECAL: CPT | Performed by: FAMILY MEDICINE

## 2024-05-04 RX ORDER — FLUTICASONE PROPIONATE 50 MCG
2 SPRAY, SUSPENSION (ML) NASAL NIGHTLY
Qty: 48 ML | Refills: 3 | Status: SHIPPED | OUTPATIENT
Start: 2024-05-04

## 2024-05-04 NOTE — TELEPHONE ENCOUNTER
Refill Decision Note   Carlene Fong  is requesting a refill authorization.  Brief Assessment and Rationale for Refill:  Approve     Medication Therapy Plan:         Comments:     Note composed:3:11 PM 05/04/2024

## 2024-05-04 NOTE — TELEPHONE ENCOUNTER
No care due was identified.  [facility] Embedded Care Due Messages. Reference number: [id number].   5/04/2024 9:16:34 AM CDT

## 2024-05-07 LAB — HEMOCCULT STL QL IA: NEGATIVE

## 2024-05-09 ENCOUNTER — PATIENT MESSAGE (OUTPATIENT)
Dept: FAMILY MEDICINE | Facility: CLINIC | Age: 74
End: 2024-05-09
Payer: COMMERCIAL

## 2024-05-09 DIAGNOSIS — E78.2 MIXED HYPERLIPIDEMIA: Primary | ICD-10-CM

## 2024-05-09 DIAGNOSIS — F41.9 ANXIETY: ICD-10-CM

## 2024-05-09 RX ORDER — ESCITALOPRAM OXALATE 10 MG/1
10 TABLET ORAL DAILY
Qty: 90 TABLET | Refills: 3 | Status: SHIPPED | OUTPATIENT
Start: 2024-05-09 | End: 2025-05-09

## 2024-05-09 RX ORDER — ATORVASTATIN CALCIUM 10 MG/1
10 TABLET, FILM COATED ORAL
Qty: 90 TABLET | Refills: 3 | Status: SHIPPED | OUTPATIENT
Start: 2024-05-09 | End: 2025-05-09

## 2024-05-15 ENCOUNTER — PATIENT MESSAGE (OUTPATIENT)
Dept: FAMILY MEDICINE | Facility: CLINIC | Age: 74
End: 2024-05-15
Payer: COMMERCIAL

## 2024-05-15 DIAGNOSIS — M53.3 SACROILIAC PAIN: Primary | ICD-10-CM

## 2024-05-17 DIAGNOSIS — K21.9 GASTROESOPHAGEAL REFLUX DISEASE, UNSPECIFIED WHETHER ESOPHAGITIS PRESENT: ICD-10-CM

## 2024-05-17 RX ORDER — METHYLPREDNISOLONE 4 MG/1
TABLET ORAL
Qty: 21 EACH | Refills: 0 | Status: SHIPPED | OUTPATIENT
Start: 2024-05-17 | End: 2024-05-23 | Stop reason: SDUPTHER

## 2024-05-17 NOTE — TELEPHONE ENCOUNTER
No care due was identified.  [facility] Embedded Care Due Messages. Reference number: [id number].   5/17/2024 2:05:02 PM CDT

## 2024-05-17 NOTE — TELEPHONE ENCOUNTER
Please get patient week after next for urgent care visit to discuss her ongoing groin and leg pain, thank you

## 2024-05-18 RX ORDER — OMEPRAZOLE 40 MG/1
40 CAPSULE, DELAYED RELEASE ORAL
Qty: 90 CAPSULE | Refills: 3 | Status: SHIPPED | OUTPATIENT
Start: 2024-05-18

## 2024-05-18 NOTE — TELEPHONE ENCOUNTER
Refill Decision Note   Carlene Fong  is requesting a refill authorization.  Brief Assessment and Rationale for Refill:  Approve     Medication Therapy Plan:         Comments:     Note composed:7:38 AM 05/18/2024

## 2024-05-21 ENCOUNTER — LAB VISIT (OUTPATIENT)
Dept: LAB | Facility: HOSPITAL | Age: 74
End: 2024-05-21
Attending: FAMILY MEDICINE
Payer: COMMERCIAL

## 2024-05-21 DIAGNOSIS — M17.0 PRIMARY OSTEOARTHRITIS OF BOTH KNEES: Primary | ICD-10-CM

## 2024-05-21 DIAGNOSIS — R79.89 LOW VITAMIN B12 LEVEL: ICD-10-CM

## 2024-05-21 DIAGNOSIS — E44.1 MILD PROTEIN-CALORIE MALNUTRITION: ICD-10-CM

## 2024-05-21 LAB
ALBUMIN SERPL BCP-MCNC: 3.5 G/DL (ref 3.5–5.2)
ALP SERPL-CCNC: 98 U/L (ref 55–135)
ALT SERPL W/O P-5'-P-CCNC: 16 U/L (ref 10–44)
ANION GAP SERPL CALC-SCNC: 8 MMOL/L (ref 8–16)
AST SERPL-CCNC: 12 U/L (ref 10–40)
BILIRUB SERPL-MCNC: 0.4 MG/DL (ref 0.1–1)
BUN SERPL-MCNC: 17 MG/DL (ref 8–23)
CALCIUM SERPL-MCNC: 9.6 MG/DL (ref 8.7–10.5)
CHLORIDE SERPL-SCNC: 109 MMOL/L (ref 95–110)
CO2 SERPL-SCNC: 26 MMOL/L (ref 23–29)
CREAT SERPL-MCNC: 0.8 MG/DL (ref 0.5–1.4)
EST. GFR  (NO RACE VARIABLE): >60 ML/MIN/1.73 M^2
GLUCOSE SERPL-MCNC: 63 MG/DL (ref 70–110)
POTASSIUM SERPL-SCNC: 3.9 MMOL/L (ref 3.5–5.1)
PREALB SERPL-MCNC: 21 MG/DL (ref 20–43)
PROT SERPL-MCNC: 6.7 G/DL (ref 6–8.4)
SODIUM SERPL-SCNC: 143 MMOL/L (ref 136–145)
VIT B12 SERPL-MCNC: 401 PG/ML (ref 210–950)

## 2024-05-21 PROCEDURE — 84134 ASSAY OF PREALBUMIN: CPT | Performed by: FAMILY MEDICINE

## 2024-05-21 PROCEDURE — 36415 COLL VENOUS BLD VENIPUNCTURE: CPT | Mod: PO | Performed by: FAMILY MEDICINE

## 2024-05-21 PROCEDURE — 82607 VITAMIN B-12: CPT | Performed by: FAMILY MEDICINE

## 2024-05-21 PROCEDURE — 80053 COMPREHEN METABOLIC PANEL: CPT | Performed by: FAMILY MEDICINE

## 2024-05-22 ENCOUNTER — HOSPITAL ENCOUNTER (OUTPATIENT)
Dept: RADIOLOGY | Facility: HOSPITAL | Age: 74
Discharge: HOME | End: 2024-05-22
Attending: FAMILY MEDICINE
Payer: COMMERCIAL

## 2024-05-22 ENCOUNTER — PATIENT MESSAGE (OUTPATIENT)
Dept: FAMILY MEDICINE | Facility: CLINIC | Age: 74
End: 2024-05-22
Payer: COMMERCIAL

## 2024-05-22 DIAGNOSIS — R10.30 INGUINAL PAIN, UNSPECIFIED LATERALITY: ICD-10-CM

## 2024-05-22 DIAGNOSIS — R10.30 INGUINAL PAIN, UNSPECIFIED LATERALITY: Primary | ICD-10-CM

## 2024-05-22 PROCEDURE — 73521 X-RAY EXAM HIPS BI 2 VIEWS: CPT | Mod: TC,FY

## 2024-05-22 PROCEDURE — 73521 X-RAY EXAM HIPS BI 2 VIEWS: CPT | Mod: 26,,, | Performed by: STUDENT IN AN ORGANIZED HEALTH CARE EDUCATION/TRAINING PROGRAM

## 2024-05-22 NOTE — TELEPHONE ENCOUNTER
Please schedule these hip x-rays for at the same time  patient is getting her knee x-rays, thank you

## 2024-05-23 ENCOUNTER — OFFICE VISIT (OUTPATIENT)
Dept: ORTHOPEDICS | Facility: CLINIC | Age: 74
End: 2024-05-23
Payer: COMMERCIAL

## 2024-05-23 ENCOUNTER — HOSPITAL ENCOUNTER (OUTPATIENT)
Dept: RADIOLOGY | Facility: HOSPITAL | Age: 74
Discharge: HOME | End: 2024-05-23
Attending: ORTHOPAEDIC SURGERY
Payer: COMMERCIAL

## 2024-05-23 VITALS — BODY MASS INDEX: 27.32 KG/M2 | WEIGHT: 170 LBS | HEIGHT: 66 IN

## 2024-05-23 DIAGNOSIS — M53.3 SACROILIAC PAIN: ICD-10-CM

## 2024-05-23 DIAGNOSIS — M65.9 SYNOVITIS OF LEFT HIP: Primary | ICD-10-CM

## 2024-05-23 DIAGNOSIS — M17.0 PRIMARY OSTEOARTHRITIS OF BOTH KNEES: ICD-10-CM

## 2024-05-23 PROCEDURE — 1125F AMNT PAIN NOTED PAIN PRSNT: CPT | Mod: CPTII,S$GLB,, | Performed by: ORTHOPAEDIC SURGERY

## 2024-05-23 PROCEDURE — 1160F RVW MEDS BY RX/DR IN RCRD: CPT | Mod: CPTII,S$GLB,, | Performed by: ORTHOPAEDIC SURGERY

## 2024-05-23 PROCEDURE — 73564 X-RAY EXAM KNEE 4 OR MORE: CPT | Mod: TC,50,PN

## 2024-05-23 PROCEDURE — 3008F BODY MASS INDEX DOCD: CPT | Mod: CPTII,S$GLB,, | Performed by: ORTHOPAEDIC SURGERY

## 2024-05-23 PROCEDURE — 73564 X-RAY EXAM KNEE 4 OR MORE: CPT | Mod: 26,50,, | Performed by: RADIOLOGY

## 2024-05-23 PROCEDURE — 3288F FALL RISK ASSESSMENT DOCD: CPT | Mod: CPTII,S$GLB,, | Performed by: ORTHOPAEDIC SURGERY

## 2024-05-23 PROCEDURE — 1101F PT FALLS ASSESS-DOCD LE1/YR: CPT | Mod: CPTII,S$GLB,, | Performed by: ORTHOPAEDIC SURGERY

## 2024-05-23 PROCEDURE — 1159F MED LIST DOCD IN RCRD: CPT | Mod: CPTII,S$GLB,, | Performed by: ORTHOPAEDIC SURGERY

## 2024-05-23 PROCEDURE — 3044F HG A1C LEVEL LT 7.0%: CPT | Mod: CPTII,S$GLB,, | Performed by: ORTHOPAEDIC SURGERY

## 2024-05-23 PROCEDURE — 99213 OFFICE O/P EST LOW 20 MIN: CPT | Mod: S$GLB,,, | Performed by: ORTHOPAEDIC SURGERY

## 2024-05-23 PROCEDURE — 99999 PR PBB SHADOW E&M-EST. PATIENT-LVL III: CPT | Mod: PBBFAC,,, | Performed by: ORTHOPAEDIC SURGERY

## 2024-05-23 RX ORDER — METHYLPREDNISOLONE 4 MG/1
TABLET ORAL
Qty: 21 EACH | Refills: 0 | Status: SHIPPED | OUTPATIENT
Start: 2024-05-23 | End: 2024-06-13

## 2024-05-23 NOTE — PROGRESS NOTES
Subjective:      Patient ID: Carlene Fong is a 73 y.o. female.    Chief Complaint:  Left hip pain  HPI      Carlene Fong is seen for evaluation and treatment of hip pain.  They have experienced problems with their left hip over the past approximately 1 month Pain is located in the groin . They have been treated with  Medrol Dosepak with moderate relief . Symptoms have recently improved. Ambulation reportedly has been impaired. Self care ADLs are not painful.    The patient is also followed for bilateral knee OA which she reports is quiescent at this time.    The patient is leaving for [employer] in about 1 week and is seeking palliative management in order to facilitate her travel.    Review of Systems   Constitutional: Negative for fever and weight loss.   HENT:  Negative for congestion.    Eyes:  Negative for visual disturbance.   Cardiovascular:  Negative for chest pain.   Respiratory:  Negative for shortness of breath.    Hematologic/Lymphatic: Negative for bleeding problem. Does not bruise/bleed easily.   Skin:  Negative for poor wound healing.   Musculoskeletal:  Positive for joint pain.   Gastrointestinal:  Negative for abdominal pain.   Genitourinary:  Negative for dysuria.   Neurological:  Negative for seizures.   Psychiatric/Behavioral:  Negative for altered mental status.    Allergic/Immunologic: Negative for persistent infections.         Objective:            Ortho/SPM Exam      Left hip    The patient is not in acute distress.   Body habitus is:normal.   Sclerae normal  The patient walks with a limp.   Respiratory distress:  none  The skin over the hip is:intact.   There is:no local tenderness.   Range of motion- Flexion 90, External rotation 30, internal rotation 25.  Resisted SLR positive.  Pain with rotation negative  Sciatic tension findings negative.  Shortening/lengthening compared to the contralateral side exam deferred.  Pulses DP present, PT present.  Motor normal 5/5  strength in all tested muscle groups.   Sensory normal.    IMAGING- left hip radiographs show no localized bone lesion.  Joint space is maintained.  Tiny osteophytes are noted.    Radiographs of both knees show mild joint space narrowing, Kellgren stage II              Assessment:       Encounter Diagnoses   Name Primary?    Synovitis of left hip Yes    Primary osteoarthritis of both knees             Considering the patient's history of positive rheumatoid factor, the left hip synovitis may be inflammatory.  There is no evidence of structural degeneration at this time, so medical management has a significant chance of successful palliation of symptoms.  Progressive arthritis may develop in the future.  If symptoms persist or worsen, further imaging will be needed to confirm diagnosis and rule out other structural problems in the hip that are not visible radiographically.    Considering the above, and the lack of active knee symptoms, the Medrol Dosepak will likely provide adequate palliation of the knees.        Plan:       Carlene was seen today for knee pain.    Diagnoses and all orders for this visit:    Synovitis of left hip    Primary osteoarthritis of both knees          I explained my diagnostic impression and the reasoning behind it in detail, using layman's terms.  Models and/or pictures were used to help in the explanation.    Avoidance of provocative activities was discussed at length    Repeat Medrol Dosepak    May supplement this with over-the-counter arthritic medication.

## 2024-05-28 ENCOUNTER — OFFICE VISIT (OUTPATIENT)
Dept: FAMILY MEDICINE | Facility: CLINIC | Age: 74
End: 2024-05-28
Payer: COMMERCIAL

## 2024-05-28 DIAGNOSIS — E44.1 MILD PROTEIN-CALORIE MALNUTRITION: ICD-10-CM

## 2024-05-28 DIAGNOSIS — M25.552 LEFT HIP PAIN: ICD-10-CM

## 2024-05-28 DIAGNOSIS — M25.552 LEFT HIP PAIN: Primary | ICD-10-CM

## 2024-05-28 DIAGNOSIS — G89.29 CHRONIC PAIN OF BOTH KNEES: ICD-10-CM

## 2024-05-28 DIAGNOSIS — M25.561 CHRONIC PAIN OF BOTH KNEES: ICD-10-CM

## 2024-05-28 DIAGNOSIS — Z79.899 MEDICATION MANAGEMENT: ICD-10-CM

## 2024-05-28 DIAGNOSIS — Z86.73 HISTORY OF TRANSIENT ISCHEMIC ATTACK (TIA): ICD-10-CM

## 2024-05-28 DIAGNOSIS — E66.3 OVERWEIGHT (BMI 25.0-29.9): ICD-10-CM

## 2024-05-28 DIAGNOSIS — K21.9 HIATAL HERNIA WITH GERD WITHOUT ESOPHAGITIS: ICD-10-CM

## 2024-05-28 DIAGNOSIS — M25.562 CHRONIC PAIN OF BOTH KNEES: ICD-10-CM

## 2024-05-28 DIAGNOSIS — Z79.82 LONG-TERM USE OF ASPIRIN THERAPY: ICD-10-CM

## 2024-05-28 DIAGNOSIS — K44.9 HIATAL HERNIA WITH GERD WITHOUT ESOPHAGITIS: ICD-10-CM

## 2024-05-28 PROCEDURE — 99214 OFFICE O/P EST MOD 30 MIN: CPT | Mod: 95,,, | Performed by: FAMILY MEDICINE

## 2024-05-28 PROCEDURE — 1159F MED LIST DOCD IN RCRD: CPT | Mod: CPTII,95,, | Performed by: FAMILY MEDICINE

## 2024-05-28 PROCEDURE — 1160F RVW MEDS BY RX/DR IN RCRD: CPT | Mod: CPTII,95,, | Performed by: FAMILY MEDICINE

## 2024-05-28 PROCEDURE — 3044F HG A1C LEVEL LT 7.0%: CPT | Mod: CPTII,95,, | Performed by: FAMILY MEDICINE

## 2024-05-28 RX ORDER — IBUPROFEN 800 MG/1
800 TABLET ORAL 3 TIMES DAILY PRN
Qty: 270 TABLET | Refills: 0 | Status: SHIPPED | OUTPATIENT
Start: 2024-05-28

## 2024-05-28 RX ORDER — IBUPROFEN 800 MG/1
800 TABLET ORAL 3 TIMES DAILY PRN
Qty: 90 TABLET | Refills: 1 | Status: SHIPPED | OUTPATIENT
Start: 2024-05-28 | End: 2024-05-28

## 2024-05-28 NOTE — TELEPHONE ENCOUNTER
No care due was identified.  [facility] Embedded Care Due Messages. Reference number: [id number].   5/28/2024 10:48:38 AM CDT

## 2024-05-28 NOTE — PROGRESS NOTES
Office Visit    Patient Name: Carlene Fong    : 1950  MRN: [id number]    Subjective:  Carlene is a 73 y.o. female who presents today for:    No chief complaint on file.    The patient location is: HER HOME   The chief complaint leading to consultation is: FOLLOW UP LABS, LEG /GROIN PAIN     Visit type: audiovisual    Face to Face time with patient: START 938     30 minutes of total time spent on the encounter, which includes face to face time and non-face to face time preparing to see the patient (eg, review of tests), Obtaining and/or reviewing separately obtained history, Documenting clinical information in the electronic or other health record, Independently interpreting results (not separately reported) and communicating results to the patient/family/caregiver, or Care coordination (not separately reported).     Each patient to whom he or she provides medical services by telemedicine is:  (1) informed of the relationship between the physician and patient and the respective role of any other health care provider with respect to management of the patient; and (2) notified that he or she may decline to receive medical services by telemedicine and may withdraw from such care at any time.    Notes:     PHYSICAL w/ ME 3/22/24   2/19/24 -- Hospital follow up for stroke like symptoms, though to be most likely anxiety in nature s/p (-) W/U & started on LIPITOR 40 & ASA 81 for possible TIA--> now on reduced dose of Lipitor 10  tolerability concerns.      Ortho Dr Dobbs 24  for OA of bilateral knees: palliative, bilateral knee injections with cortisone intermittently but this past appointment agreed with Medrol dose pack prescribed by me for her hip pain-- concern for synovitis of L hip-- remote h/o of +Rheumatoid Factor. She is noticing improvement on a second medrol dose pack and with Ibuprofen PRN.     Xrays  &  of hips and knees   No fracture or dislocation.  Hip cartilage space  is maintained.  Sacroiliac joints are symmetric.  Degenerative changes of the lower lumbar spine.  Mild tricompartmental joint space narrowing is present bilaterally most severe in the medial femorotibial compartment of the left knee.   10/23/23-- L rib fractures s/p fall     LABS 5/21/24 with improvement in PreAlbumin from 14-->21 and normal CMP but with glucose of 63, B12 improved from 299-->401.    She reports feeling better with a higher protein diet and adding in the weekly 1,000 mcg B12 Lozenge. No concerns and plans to follow up with Ortho Dr Dobbs after her upcoming cruise to discuss how her knee and hp pain are doing. Has not had any low back pain or sciatica symptoms.      9/12/23 seen by me for follow up of Hiatal Hernia w/ GERD symptoms  significant improvement on daily omeprazole 40 (Pepcid prn in the PM but not needing lately), s/p weight loss, GERD friendly diet (does not eat late).  EGD with Dr Jaimes on 4/14/23 showing a small Hiatal Hernia that could be predisposing her to reflux.         Chronic conditions-- primary hyperparathyroidism associated with vitamin D deficiency & Osteoporosis now s/p 3 gland parathyroidectomy on 5/22/2019, obesity, history of positive rheumatoid factor without clinical evidence of rheumatoid arthritis, left wrist carpal tunnel(s/p injection 10/2018), mild anxiety, GERD with recent exacerbation, bilateral knee pain followed by Ortho Dr Dobbs and managed with periodic cortisone injections.     As far as her Hyperparathyroidism with elevated Calcium: I referred her to [facility] Balder[facility]/ Rm secondary to osteoporosis dx 1/15/2019 and hypercalcemia at 14.   She underwent 3 gland parathyroidectomy 5/22/19 and most recently followed up with Endo Dr Balderas on 7/8/2019.   Her TSH/PTH/ and Renal function panel have normalized as of 3/10/21.   She is taking Calcium 500 BID and Vitamin D 1000 IU daily.          PAST MEDICAL HISTORY, SURGICAL/SOCIAL/FAMILY HISTORY REVIEWED AS PER CHART,  WITH PERTINENT FINDINGS INCLUDED IN HISTORY SECTION OF NOTE.     Current Medications    Medication List with Changes/Refills   New Medications    IBUPROFEN (ADVIL,MOTRIN) 800 MG TABLET    Take 1 tablet (800 mg total) by mouth 3 (three) times daily as needed for Pain. Take with food   Current Medications    ASPIRIN (ECOTRIN) 81 MG EC TABLET    Take 1 tablet (81 mg total) by mouth once daily.    ATORVASTATIN (LIPITOR) 10 MG TABLET    Take 1 tablet (10 mg total) by mouth after dinner.    CALCIUM PHOSPHATE-VITAMIN D3 250 MG-12.5 MCG (500 UNIT) CHEW    Take 2 tablets by mouth Daily.    CYANOCOBALAMIN, VITAMIN B-12, 1,000 MCG LOZG    Place 1 lozenge under the tongue once a week.    DICLOFENAC SODIUM (VOLTAREN) 1 % GEL    Apply 2 g topically 3 (three) times daily.    ESCITALOPRAM OXALATE (LEXAPRO) 10 MG TABLET    Take 1 tablet (10 mg total) by mouth once daily.    FLAXSEED ORAL    Take by mouth once daily.     FLUTICASONE PROPIONATE (FLONASE) 50 MCG/ACTUATION NASAL SPRAY    2 SPRAYS (100 MCG TOTAL) BY EACH NOSTRIL ROUTE NIGHTLY.    GABAPENTIN (NEURONTIN) 300 MG CAPSULE    Take 1 capsule (300 mg total) by mouth 2 (two) times daily.    LORAZEPAM (ATIVAN) 1 MG TABLET    1/2-1 tablet daily as needed for severe anxiety    MECLIZINE (ANTIVERT) 25 MG TABLET    Take 1 tablet (25 mg total) by mouth 3 (three) times daily as needed for Dizziness.    METHYLPREDNISOLONE (MEDROL DOSEPACK) 4 MG TABLET    use as directed    OMEPRAZOLE (PRILOSEC) 40 MG CAPSULE    TAKE 1 CAPSULE BY MOUTH BEFORE BREAKFAST    ONDANSETRON (ZOFRAN) 8 MG TABLET    Take 1 tablet (8 mg total) by mouth every 8 (eight) hours as needed for Nausea.    VITAMIN D 1000 UNITS TAB    Take 1,000 Units by mouth once daily.       Allergies   Review of patient's allergies indicates:   Allergen Reactions    Ropinirole Swelling and Rash      palpitations, swelling of tongue   palpitations, swelling of tongue         Review of Systems (Pertinent positives)  Review of Systems    Constitutional:  Positive for activity change. Negative for unexpected weight change.   HENT:  Negative for hearing loss, rhinorrhea and trouble swallowing.    Eyes:  Negative for discharge and visual disturbance.   Respiratory:  Negative for chest tightness and wheezing.    Cardiovascular:  Negative for chest pain and palpitations.   Gastrointestinal:  Negative for blood in stool, constipation, diarrhea and vomiting.   Endocrine: Negative for polydipsia and polyuria.   Genitourinary:  Negative for difficulty urinating, dysuria, hematuria and menstrual problem.   Musculoskeletal:  Positive for arthralgias. Negative for joint swelling and neck pain.   Neurological:  Negative for weakness and headaches.   Psychiatric/Behavioral:  Negative for confusion and dysphoric mood.        LMP  (LMP Unknown)     Physical Exam  Vitals reviewed.   Constitutional:       General: She is not in acute distress.     Appearance: Normal appearance. She is well-developed.   HENT:      Head: Normocephalic and atraumatic.   Eyes:      Conjunctiva/sclera: Conjunctivae normal.   Pulmonary:      Effort: Pulmonary effort is normal.   Neurological:      Mental Status: She is alert and oriented to person, place, and time.   Psychiatric:         Mood and Affect: Mood normal.         Behavior: Behavior normal.           Assessment/Plan:  Carlene Fong is a 73 y.o. female who presents today for :        ICD-10-CM ICD-9-CM    1. Left hip pain  M25.552 719.45 ibuprofen (ADVIL,MOTRIN) 800 MG tablet      2. Chronic pain of both knees  M25.561 719.46 ibuprofen (ADVIL,MOTRIN) 800 MG tablet    M25.562 338.29     G89.29        3. Overweight (BMI 25.0-29.9)  E66.3 278.02       4. Hiatal hernia with GERD without esophagitis  K44.9 553.3     K21.9 530.11       5. Mild protein-calorie malnutrition  E44.1 263.1       6. History of transient ischemic attack (TIA)  Z86.73 V12.54       7. Long-term use of aspirin therapy  Z79.82 V58.66       8. Medication  management  Z79.899 V58.69         Joint pain better s/p medrol dose pack. Has mild deg arthritis/ pain of knees followed by ortho Dr Dobbs-- will follow up with him after cruise.   For now feels comfortable maintaining with Ibuprofen 800 TID prn but explained that she should not remain on that high of an NSAID dose chronically.    May need repeat inflammatory arthritis labs if joint pain is persisting given her remote h/o + RF.     Continue ot work on weight loss/ regular exercise, consider formal PT.     Continue high protein diet and B12, Vit D/ calcium supplements and blood work now improved    GERD stable with weight loss, GERD friendly diet and omeprazole dialy but caution advised with NSAIDS    ASA 81 and Lipitor 10 for secondary TIA prevention.     Repeat labs with physical in 8 months, follow up sooner if concerns        There are no Patient Instructions on file for this visit.      No follow-ups on file.

## 2024-06-11 ENCOUNTER — PATIENT MESSAGE (OUTPATIENT)
Dept: ORTHOPEDICS | Facility: CLINIC | Age: 74
End: 2024-06-11
Payer: COMMERCIAL

## 2024-06-11 DIAGNOSIS — M65.9 SYNOVITIS OF LEFT HIP: Primary | ICD-10-CM

## 2024-06-24 ENCOUNTER — HOSPITAL ENCOUNTER (OUTPATIENT)
Dept: RADIOLOGY | Facility: HOSPITAL | Age: 74
Discharge: HOME | End: 2024-06-24
Attending: ORTHOPAEDIC SURGERY
Payer: COMMERCIAL

## 2024-06-24 DIAGNOSIS — M65.9 SYNOVITIS OF LEFT HIP: ICD-10-CM

## 2024-06-24 PROCEDURE — 73721 MRI JNT OF LWR EXTRE W/O DYE: CPT | Mod: TC,LT

## 2024-06-24 PROCEDURE — 73721 MRI JNT OF LWR EXTRE W/O DYE: CPT | Mod: 26,LT,, | Performed by: RADIOLOGY

## 2024-06-24 NOTE — PROGRESS NOTES
Established Patient - Audio Only Telehealth Visit     The patient location is: home  The chief complaint leading to consultation is: review of recent MRI hip  Visit type: Virtual visit with audio only (telephone)  Total time spent with patient: 15 minutes    The reason for the audio only service rather than synchronous audio and video virtual visit was related to technical difficulties or patient preference/necessity.     Each patient to whom I provide medical services by telemedicine is:  (1) informed of the relationship between the physician and patient and the respective role of any other health care provider with respect to management of the patient; and (2) notified that they may decline to receive medical services by telemedicine and may withdraw from such care at any time. Patient verbally consented to receive this service via voice-only telephone call.    HPI: Ms. Fong was last seen by Dr. Dobbs about a month ago for some ongoing left hip pain duration approximately 1 month.    Since that visit, she notes that her pain has improved somewhat.  She has been treating her pain with ibuprofen t.i.d. and protected weight-bearing using a cane.    Patient denies any new symptoms.  She presents today for a virtual visit to review recent MRI results.    MRI L Hip 6-  Impression:     Significant bone marrow edema LEFT femoral head and neck region as well as superomedial acetabulum implies articular based process such as advanced degenerative change.     Superimposed subchondral fracture at the level of the anterosuperior femoral head suspect given concavity at that level.  Correlation advised.    Assessment and plan:   1. Synovitis of left hip        2. Primary osteoarthritis of left hip           The MRI results were reviewed in detail.  It appears as have her ongoing left hip pain is from some known osteoarthritis coupled with transient edema within the femoral head and possible avascular necrosis.    We  reviewed continued anti-inflammatories for pain coupled with protected weight-bearing.    Patient is in possession of both a walker and a cane and plans to use them to limit her weight-bearing over the next few weeks.    She also notes some increasing bilateral knee pain from known bilateral knee osteoarthritis.  The patient would like to try a round of viscosupplementation.  I will place an order for this today.    Follow-up: Keep regularly scheduled appointment     This service was not originating from a related E/M service provided within the previous 7 days nor will  to an E/M service or procedure within the next 24 hours or my soonest available appointment.  Prevailing standard of care was able to be met in this audio-only visit.

## 2024-06-25 ENCOUNTER — OFFICE VISIT (OUTPATIENT)
Dept: ORTHOPEDICS | Facility: CLINIC | Age: 74
End: 2024-06-25
Payer: COMMERCIAL

## 2024-06-25 DIAGNOSIS — M17.0 PRIMARY OSTEOARTHRITIS OF BOTH KNEES: ICD-10-CM

## 2024-06-25 DIAGNOSIS — M16.12 PRIMARY OSTEOARTHRITIS OF LEFT HIP: ICD-10-CM

## 2024-06-25 DIAGNOSIS — M65.9 SYNOVITIS OF LEFT HIP: Primary | ICD-10-CM

## 2024-06-25 PROCEDURE — 1159F MED LIST DOCD IN RCRD: CPT | Mod: CPTII,95,, | Performed by: PHYSICIAN ASSISTANT

## 2024-06-25 PROCEDURE — 99213 OFFICE O/P EST LOW 20 MIN: CPT | Mod: 95,,, | Performed by: PHYSICIAN ASSISTANT

## 2024-06-25 PROCEDURE — 1160F RVW MEDS BY RX/DR IN RCRD: CPT | Mod: CPTII,95,, | Performed by: PHYSICIAN ASSISTANT

## 2024-06-25 PROCEDURE — 3044F HG A1C LEVEL LT 7.0%: CPT | Mod: CPTII,95,, | Performed by: PHYSICIAN ASSISTANT

## 2024-07-13 NOTE — PATIENT INSTRUCTIONS
-Please take antibiotic to completion.  -We will call with urine culture results if positive.   -Refrain from taking any ibuprofen for the rest of the day today.   -Take muscle relaxer at night only. This medication will cause drowsiness.     Please follow up with your primary care provider within 2-5 days if your signs and symptoms have not resolved or worsen.     If your condition worsens or fails to improve we recommend that you receive another evaluation at the emergency room immediately or contact your primary medical clinic to discuss your concerns.   You must understand that you have received an Urgent Care treatment only and that you may be released before all of your medical problems are known or treated. You, the patient, will arrange for follow up care as instructed.         Self-Care for Low Back Pain    Most people have low back pain now and then. In many cases, it isnt serious and self-care can help. Sometimes low back pain can be a sign of a bigger problem. Call your healthcare provider if your pain returns often or gets worse over time. For the long-term care of your back, get regular exercise, lose any excess weight and learn good posture.  Take a short rest  Lying down during the day may be beneficial for short periods of time if severe pain increases with sitting or standing. Long-term bed rest could be detrimental.  Reduce pain and swelling  Cold reduces swelling. Both cold and heat can reduce pain. Protect your skin by placing a towel between your body and the ice or heat source.  · For the first few days, apply an ice pack for 15 to 20 minutes .  · After the first few days, try heat for 15 minutes at a time to ease pain. Never sleep on a heating pad.  · Over-the-counter medicine can help control pain and swelling. Try aspirin or ibuprofen.  Exercise  Exercise can help your back heal. It also helps your back get stronger and more flexible, preventing any reinjury. Ask your healthcare provider  about specific exercises for your back.  Use good posture to avoid reinjury  · When moving, bend at the hips and knees. Dont bend at the waist or twist around.  · When lifting, keep the object close to your body. Dont try to lift more than you can handle.  · When sitting, keep your lower back supported. Use a rolled-up towel as needed.  Seek immediate medical care if:  · Youre unable to stand or walk.  · You have a temperature over 100.4°F (38.0°C)  · You have frequent, painful, or bloody urination.  · You have severe abdominal pain.  · You have a sharp, stabbing pain.  · Your pain is constant.  · You have pain or numbness in your leg.  · You feel pain in a new area of your back.  · You notice that the pain isnt decreasing after more than a week.   Date Last Reviewed: 9/29/2015  © 3213-9582 [employer]. [address] [place] [address], [place] [address]. All rights reserved. This information is not intended as a substitute for professional medical care. Always follow your healthcare professional's instructions.         Imaging Studies

## 2024-07-18 ENCOUNTER — OFFICE VISIT (OUTPATIENT)
Dept: ORTHOPEDICS | Facility: CLINIC | Age: 74
End: 2024-07-18
Payer: COMMERCIAL

## 2024-07-18 VITALS — WEIGHT: 170 LBS | BODY MASS INDEX: 27.32 KG/M2 | HEIGHT: 66 IN

## 2024-07-18 DIAGNOSIS — M17.0 PRIMARY OSTEOARTHRITIS OF BOTH KNEES: ICD-10-CM

## 2024-07-18 DIAGNOSIS — R93.7 BONE MARROW EDEMA: Primary | ICD-10-CM

## 2024-07-18 PROCEDURE — 3288F FALL RISK ASSESSMENT DOCD: CPT | Mod: CPTII,S$GLB,, | Performed by: ORTHOPAEDIC SURGERY

## 2024-07-18 PROCEDURE — 20610 DRAIN/INJ JOINT/BURSA W/O US: CPT | Mod: 50,S$GLB,, | Performed by: ORTHOPAEDIC SURGERY

## 2024-07-18 PROCEDURE — 1125F AMNT PAIN NOTED PAIN PRSNT: CPT | Mod: CPTII,S$GLB,, | Performed by: ORTHOPAEDIC SURGERY

## 2024-07-18 PROCEDURE — 3044F HG A1C LEVEL LT 7.0%: CPT | Mod: CPTII,S$GLB,, | Performed by: ORTHOPAEDIC SURGERY

## 2024-07-18 PROCEDURE — 1159F MED LIST DOCD IN RCRD: CPT | Mod: CPTII,S$GLB,, | Performed by: ORTHOPAEDIC SURGERY

## 2024-07-18 PROCEDURE — 99214 OFFICE O/P EST MOD 30 MIN: CPT | Mod: 25,S$GLB,, | Performed by: ORTHOPAEDIC SURGERY

## 2024-07-18 PROCEDURE — 3008F BODY MASS INDEX DOCD: CPT | Mod: CPTII,S$GLB,, | Performed by: ORTHOPAEDIC SURGERY

## 2024-07-18 PROCEDURE — 99999 PR PBB SHADOW E&M-EST. PATIENT-LVL III: CPT | Mod: PBBFAC,,, | Performed by: ORTHOPAEDIC SURGERY

## 2024-07-18 PROCEDURE — 1101F PT FALLS ASSESS-DOCD LE1/YR: CPT | Mod: CPTII,S$GLB,, | Performed by: ORTHOPAEDIC SURGERY

## 2024-07-18 PROCEDURE — 1160F RVW MEDS BY RX/DR IN RCRD: CPT | Mod: CPTII,S$GLB,, | Performed by: ORTHOPAEDIC SURGERY

## 2024-07-18 RX ORDER — TRIAMCINOLONE ACETONIDE 40 MG/ML
80 INJECTION, SUSPENSION INTRA-ARTICULAR; INTRAMUSCULAR
Status: DISCONTINUED | OUTPATIENT
Start: 2024-07-18 | End: 2024-07-18 | Stop reason: HOSPADM

## 2024-07-18 RX ADMIN — TRIAMCINOLONE ACETONIDE 80 MG: 40 INJECTION, SUSPENSION INTRA-ARTICULAR; INTRAMUSCULAR at 08:07

## 2024-07-18 NOTE — PROGRESS NOTES
Subjective:      Patient ID: Carlene Fong is a 73 y.o. female.    Chief Complaint: Knee Pain (Brandt Knee )    HPI    Follow-up for left hip and both knees.  The patient reports that her left hip is now asymptomatic.  She used a cane and took ibuprofen when she traveled and now has no more groin pain.    The patient is followed for chronic osteoarthritis of both knees would like both knees injected today.          Review of Systems   Constitutional: Negative for fever and weight loss.   HENT:  Negative for congestion.    Eyes:  Negative for visual disturbance.   Cardiovascular:  Negative for chest pain.   Respiratory:  Negative for shortness of breath.    Hematologic/Lymphatic: Negative for bleeding problem. Does not bruise/bleed easily.   Skin:  Negative for poor wound healing.   Musculoskeletal:  Positive for arthritis and joint pain.   Gastrointestinal:  Negative for abdominal pain.   Genitourinary:  Negative for dysuria.   Neurological:  Negative for seizures.   Psychiatric/Behavioral:  Negative for altered mental status.    Allergic/Immunologic: Negative for persistent infections.         Objective:      Ortho/SPM Exam      Left hip    The patient is not in acute distress.   Body habitus is:normal.   Sclerae normal  The patient walks with a slight limp.   Respiratory distress:  none  The skin over the hip is:intact.   There is no local tenderness.   Range of motion- Flexion full, External rotation full, internal rotation full.  Resisted SLR negative.  Pain with rotation negative  Sciatic tension findings negative.  Shortening/lengthening compared to the contralateral side absent.  Pulses DP present, PT present.  Motor normal 5/5 strength in all tested muscle groups.   Sensory normal.    Right knee    Trace effusion  Range of motion 0-135  1+ valgus laxity    Left knee is identical to right    Left hip MRI shows extensive bone marrow edema in the femoral head and intertrochanteric region.  There is also a  crescent sign.        Assessment:       Encounter Diagnoses   Name Primary?    Bone marrow edema Yes    Primary osteoarthritis of both knees         The left hip diagnosis is not fit well into any specific category.  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    Both knees have at least moderate OA which responds well to injection.          Plan:       Carlene was seen today for knee pain.    Diagnoses and all orders for this visit:    Bone marrow edema    Primary osteoarthritis of both knees          Left hip    I explained my diagnostic impression and the reasoning behind it in detail, using layman's terms.  Models and/or pictures were used to help in the explanation.    Considering the patient's symptomatic improvement I recommend observation.  There is certainly risk of progression which might require the consideration of intervention in the future.    The patient is advised to use a cane if she experiences any left groin pain.    Both knees    Consent given for injection    X-ray next visit

## 2024-07-18 NOTE — PROCEDURES
Large Joint Aspiration/Injection: bilateral knee    Date/Time: 7/18/2024 8:30 AM    Performed by: Franklin Dobbs MD  Authorized by: Franklin Dobbs MD    Consent Done?:  Yes (Verbal)  Approach:  Anterolateral  Location:  Knee  Laterality:  Bilateral  Site:  Bilateral knee  Medications (Left):  80 mg triamcinolone acetonide 40 mg/mL     After obtaining verbal informed consent both of the patient's knees were prepped aseptically and injected through an inferior lateral approach using 40 mg of triamcinolone and 1 cc of 1% plain Xylocaine.  The patient was warned about postinjection flare and how to manage it with ice, rest and over-the-counter analgesics.  They're advised to contact me for any severe, uncontrolled pain.

## 2024-10-17 ENCOUNTER — HOSPITAL ENCOUNTER (OUTPATIENT)
Dept: RADIOLOGY | Facility: HOSPITAL | Age: 74
Discharge: HOME | End: 2024-10-17
Attending: ORTHOPAEDIC SURGERY
Payer: COMMERCIAL

## 2024-10-17 ENCOUNTER — OFFICE VISIT (OUTPATIENT)
Dept: ORTHOPEDICS | Facility: CLINIC | Age: 74
End: 2024-10-17
Payer: COMMERCIAL

## 2024-10-17 VITALS — HEIGHT: 66 IN | BODY MASS INDEX: 27.28 KG/M2 | WEIGHT: 169.75 LBS

## 2024-10-17 DIAGNOSIS — R93.7 BONE MARROW EDEMA: Primary | ICD-10-CM

## 2024-10-17 DIAGNOSIS — R93.7 BONE MARROW EDEMA: ICD-10-CM

## 2024-10-17 DIAGNOSIS — M17.0 PRIMARY OSTEOARTHRITIS OF BOTH KNEES: ICD-10-CM

## 2024-10-17 PROCEDURE — 3008F BODY MASS INDEX DOCD: CPT | Mod: CPTII,S$GLB,, | Performed by: ORTHOPAEDIC SURGERY

## 2024-10-17 PROCEDURE — 99213 OFFICE O/P EST LOW 20 MIN: CPT | Mod: S$GLB,,, | Performed by: ORTHOPAEDIC SURGERY

## 2024-10-17 PROCEDURE — 1101F PT FALLS ASSESS-DOCD LE1/YR: CPT | Mod: CPTII,S$GLB,, | Performed by: ORTHOPAEDIC SURGERY

## 2024-10-17 PROCEDURE — 3288F FALL RISK ASSESSMENT DOCD: CPT | Mod: CPTII,S$GLB,, | Performed by: ORTHOPAEDIC SURGERY

## 2024-10-17 PROCEDURE — 73564 X-RAY EXAM KNEE 4 OR MORE: CPT | Mod: TC,50,FY

## 2024-10-17 PROCEDURE — 73502 X-RAY EXAM HIP UNI 2-3 VIEWS: CPT | Mod: TC,FY,LT

## 2024-10-17 PROCEDURE — 1159F MED LIST DOCD IN RCRD: CPT | Mod: CPTII,S$GLB,, | Performed by: ORTHOPAEDIC SURGERY

## 2024-10-17 PROCEDURE — 1160F RVW MEDS BY RX/DR IN RCRD: CPT | Mod: CPTII,S$GLB,, | Performed by: ORTHOPAEDIC SURGERY

## 2024-10-17 PROCEDURE — 73564 X-RAY EXAM KNEE 4 OR MORE: CPT | Mod: 26,50,, | Performed by: STUDENT IN AN ORGANIZED HEALTH CARE EDUCATION/TRAINING PROGRAM

## 2024-10-17 PROCEDURE — 73502 X-RAY EXAM HIP UNI 2-3 VIEWS: CPT | Mod: 26,LT,, | Performed by: STUDENT IN AN ORGANIZED HEALTH CARE EDUCATION/TRAINING PROGRAM

## 2024-10-17 PROCEDURE — 99999 PR PBB SHADOW E&M-EST. PATIENT-LVL III: CPT | Mod: PBBFAC,,, | Performed by: ORTHOPAEDIC SURGERY

## 2024-10-17 PROCEDURE — 1125F AMNT PAIN NOTED PAIN PRSNT: CPT | Mod: CPTII,S$GLB,, | Performed by: ORTHOPAEDIC SURGERY

## 2024-10-17 PROCEDURE — 3044F HG A1C LEVEL LT 7.0%: CPT | Mod: CPTII,S$GLB,, | Performed by: ORTHOPAEDIC SURGERY

## 2024-10-17 RX ORDER — CALCITONIN SALMON 200 [IU]/.09ML
1 SPRAY, METERED NASAL DAILY
Qty: 3.7 ML | Refills: 1 | Status: SHIPPED | OUTPATIENT
Start: 2024-10-17 | End: 2025-10-17

## 2024-10-17 NOTE — PROGRESS NOTES
Subjective:      Patient ID: Carlene Fong is a 73 y.o. female.    Chief Complaint: Knee Pain (Bilateral f/u ) and Hip Pain (Right hip pain )    HPI    Follow-up for bilateral hip and knee issues.  The patient reports that her knees currently feel well.  Injected about 3 months ago.  She denies any left hip pain at this time.  The patient reports about 2 weeks of right groin pain radiating to the thigh, worse with weight-bearing.  She is using ibuprofen with some relief          Review of Systems   Constitutional: Negative for fever and weight loss.   HENT:  Negative for congestion.    Eyes:  Negative for visual disturbance.   Cardiovascular:  Negative for chest pain.   Respiratory:  Negative for shortness of breath.    Hematologic/Lymphatic: Negative for bleeding problem. Does not bruise/bleed easily.   Skin:  Negative for poor wound healing.   Musculoskeletal:  Positive for joint pain.   Gastrointestinal:  Negative for abdominal pain.   Genitourinary:  Negative for dysuria.   Neurological:  Negative for seizures.   Psychiatric/Behavioral:  Negative for altered mental status.    Allergic/Immunologic: Negative for persistent infections.         Objective:      Ortho/SPM Exam      Right hip    The patient is not in acute distress.   Body habitus is:  Normal.   Sclerae normal  The patient walks with a limp.   Respiratory distress:  none  The skin over the hip is:intact.   There is:no local tenderness.   Range of motion- Flexion 100, External rotation 30, internal rotation 30.  Resisted SLR positive.  Pain with rotation positive  Sciatic tension findings negative.  Shortening/lengthening compared to the contralateral side exam deferred.  Pulses DP present, PT present.  Motor normal 5/5 strength in all tested muscle groups.   Sensory normal.    Radiographs of both knees show mild joint space narrowing, Kellgren stage II.    Radiographs of the left hip and pelvis show no fracture, no dislocation.  There is no  femoral head collapse.  No localized bone lesions.  Osteopenia is present          Assessment:       Encounter Diagnoses   Name Primary?    Primary osteoarthritis of both knees     Bone marrow edema Yes        The knees have mild objective findings and can be managed medically for now.    Given the recent history of bone marrow edema in the left hip it is very likely that this is occurring in the right hip at this time.          Plan:       Carlene was seen today for knee pain and hip pain.    Diagnoses and all orders for this visit:    Bone marrow edema    Primary osteoarthritis of both knees          I explained my diagnostic impression and the reasoning behind it in detail, using layman's terms.  Models and/or pictures were used to help in the explanation.    We agree to observe the knees for now.  Patient will use ibuprofen p.r.n.      The only way to confirm the diagnosis of the right hip is MRI.  The patient will be out of town for a couple of weeks starting this weekend.  Considering the clinical course of the left hip, I recommend that we treat her empirically with calcitonin.  If the right hip condition persists or worsens further imaging will be ordered.

## 2024-10-26 ENCOUNTER — PATIENT MESSAGE (OUTPATIENT)
Dept: ORTHOPEDICS | Facility: CLINIC | Age: 74
End: 2024-10-26
Payer: COMMERCIAL

## 2024-11-04 ENCOUNTER — HOSPITAL ENCOUNTER (OUTPATIENT)
Dept: RADIOLOGY | Facility: HOSPITAL | Age: 74
Discharge: HOME | End: 2024-11-04
Payer: COMMERCIAL

## 2024-11-04 ENCOUNTER — TELEPHONE (OUTPATIENT)
Dept: ORTHOPEDICS | Facility: CLINIC | Age: 74
End: 2024-11-04
Payer: COMMERCIAL

## 2024-11-04 DIAGNOSIS — M25.551 RIGHT HIP PAIN: ICD-10-CM

## 2024-11-04 PROCEDURE — 73721 MRI JNT OF LWR EXTRE W/O DYE: CPT | Mod: TC,RT

## 2024-11-04 PROCEDURE — 73721 MRI JNT OF LWR EXTRE W/O DYE: CPT | Mod: 26,RT,, | Performed by: RADIOLOGY

## 2024-11-04 NOTE — TELEPHONE ENCOUNTER
Explained to pt Dr. Dobbs does not treat back pain, there for he would not be able to add her lumbar spine to her MRI today. Recommended pt see Neurosurgery in regards to her back/spine pain.     Pt requested getting in with Dr. Dobbs this week to discuss MRI results and on going pain- pt scheduled for Thursday 11/7 @ 2:00pm    unk

## 2024-11-07 ENCOUNTER — OFFICE VISIT (OUTPATIENT)
Dept: ORTHOPEDICS | Facility: CLINIC | Age: 74
End: 2024-11-07
Payer: COMMERCIAL

## 2024-11-07 DIAGNOSIS — R93.7 BONE MARROW EDEMA: Primary | ICD-10-CM

## 2024-11-07 DIAGNOSIS — M17.11 PRIMARY OSTEOARTHRITIS OF RIGHT KNEE: ICD-10-CM

## 2024-11-07 PROCEDURE — 99999 PR PBB SHADOW E&M-EST. PATIENT-LVL III: CPT | Mod: PBBFAC,,, | Performed by: ORTHOPAEDIC SURGERY

## 2024-11-07 RX ORDER — TRIAMCINOLONE ACETONIDE 40 MG/ML
40 INJECTION, SUSPENSION INTRA-ARTICULAR; INTRAMUSCULAR
Status: DISCONTINUED | OUTPATIENT
Start: 2024-11-07 | End: 2024-11-07 | Stop reason: HOSPADM

## 2024-11-07 RX ADMIN — TRIAMCINOLONE ACETONIDE 40 MG: 40 INJECTION, SUSPENSION INTRA-ARTICULAR; INTRAMUSCULAR at 02:11

## 2024-11-07 NOTE — PROCEDURES
----- Message from Kellie Mota sent at 9/1/2020  8:35 AM CDT -----  Contact: daughter(Jessica)-[phone]  Would like to consult with nurse regarding patient procedure, please call back at [phone]. Thanks/ar     Large Joint Aspiration/Injection: R knee    Date/Time: 11/7/2024 2:00 PM    Performed by: Franklin Dobbs MD  Authorized by: Franklin Dobbs MD    Location:  Knee  Site:  R knee  Medications:  40 mg triamcinolone acetonide 40 mg/mL     After obtaining verbal informed consent the patient's right knee was prepped aseptically and injected through an inferior lateral approach using 40 mg of triamcinolone and 1 cc of 1% plain Xylocaine.  The patient was warned about postinjection flare and how to manage it with ice, rest and over-the-counter analgesics.  They're advised to contact me for any severe, uncontrolled pain.

## 2024-11-07 NOTE — PROGRESS NOTES
Subjective:      Patient ID: Carlene Fong is a 73 y.o. female.    Chief Complaint: Results (MRI RIGHT HIP )    HPI    The patient reports 2 weeks of right hip pain radiating distally.  She was on vacation when it started.  She is using a walker and taking ibuprofen with some relief.    The patient also requested reinjection of her right knee      Review of Systems   Constitutional: Negative for fever and weight loss.   HENT:  Negative for congestion.    Eyes:  Negative for visual disturbance.   Cardiovascular:  Negative for chest pain.   Respiratory:  Negative for shortness of breath.    Hematologic/Lymphatic: Negative for bleeding problem. Does not bruise/bleed easily.   Skin:  Negative for poor wound healing.   Musculoskeletal:  Positive for joint pain.   Gastrointestinal:  Negative for abdominal pain.   Genitourinary:  Negative for dysuria.   Neurological:  Negative for seizures.   Psychiatric/Behavioral:  Negative for altered mental status.    Allergic/Immunologic: Negative for persistent infections.         Objective:      Ortho/SPM Exam      Right hip    The patient is not in acute distress.   Body habitus is:normal.   Sclerae normal  The patient walks with a limp.   Respiratory distress:  none  The skin over the hip is:intact.   There is:no local tenderness.   Range of motion- Flexion 90, External rotation 35, internal rotation 30.  Resisted SLR positive.  Pain with rotation negative  Sciatic tension findings negative.  Shortening/lengthening compared to the contralateral side exam deferred.  Pulses DP present, PT present.  Motor normal 5/5 strength in all tested muscle groups.   Sensory normal.    MRI of the right hip shows extensive bone marrow edema in the femoral head, neck and proximal metaphysis.  No femoral head collapse.  The left hip is also visualized and shows resolution of previously noted bone marrow edema          Assessment:       Encounter Diagnoses   Name Primary?    Bone marrow edema  Yes    Primary osteoarthritis of right knee         Although avascular necrosis of the right hip is possible, the MRIs much more suggestive of transient bone marrow edema syndrome.    The patient is strongly feels that right knee injections are helpful.  I suspect that much of the right knee pain is referred from the hip.  Despite explaining this, the patient strongly desires reinjection.          Plan:       Carlene was seen today for results.    Diagnoses and all orders for this visit:    Bone marrow edema    Primary osteoarthritis of right knee          I explained my diagnostic impression and the reasoning behind it in detail, using layman's terms.  Models and/or pictures were used to help in the explanation.      Ibuprofen for the right hip    Minimization of activity recommended along with walker usage    Calcitonin discussed and declined    If progressive collapse occurs, arthroplasty would be considered    Consent was given for the right knee Injection

## 2024-11-26 ENCOUNTER — HOSPITAL ENCOUNTER (OUTPATIENT)
Dept: RADIOLOGY | Facility: HOSPITAL | Age: 74
Discharge: HOME | End: 2024-11-26
Attending: FAMILY MEDICINE
Payer: COMMERCIAL

## 2024-11-26 DIAGNOSIS — Z12.31 ENCOUNTER FOR SCREENING MAMMOGRAM FOR BREAST CANCER: ICD-10-CM

## 2024-11-26 PROCEDURE — 77063 BREAST TOMOSYNTHESIS BI: CPT | Mod: TC

## 2024-12-12 ENCOUNTER — TELEPHONE (OUTPATIENT)
Dept: FAMILY MEDICINE | Facility: CLINIC | Age: 74
End: 2024-12-12
Payer: COMMERCIAL

## 2024-12-12 ENCOUNTER — PATIENT MESSAGE (OUTPATIENT)
Dept: FAMILY MEDICINE | Facility: CLINIC | Age: 74
End: 2024-12-12
Payer: COMMERCIAL

## 2024-12-12 NOTE — TELEPHONE ENCOUNTER
Pt selam that her appt with Dr Stubbs will not be enough time before surgery wants sooner appt  Booked 1/7 at 11 am and aware this appt is at [facility] as was the one on 0110

## 2024-12-12 NOTE — TELEPHONE ENCOUNTER
Pt asked for a phonecall but phone keeps going to [facility] told pt ill let another assistant attempt to call in 5 minutes to discuss appointment

## 2024-12-12 NOTE — TELEPHONE ENCOUNTER
----- Message from Nancy sent at 12/12/2024  2:38 PM CST -----  .Type:  Needs Medical Advice    Who Called: pt    Would the patient rather a call back or a response via [facility]? Call back  Best Call Back Number: [phone]  Additional Information:     Pt stated she would like a call back regarding her medical clearance appt on1/13

## 2024-12-17 ENCOUNTER — HOSPITAL ENCOUNTER (OUTPATIENT)
Dept: RADIOLOGY | Facility: HOSPITAL | Age: 74
Discharge: HOME | End: 2024-12-17
Attending: FAMILY MEDICINE
Payer: COMMERCIAL

## 2024-12-17 DIAGNOSIS — R92.8 ABNORMAL MAMMOGRAM: ICD-10-CM

## 2024-12-17 PROCEDURE — 77065 DX MAMMO INCL CAD UNI: CPT | Mod: 26,LT,, | Performed by: RADIOLOGY

## 2024-12-17 PROCEDURE — 77061 BREAST TOMOSYNTHESIS UNI: CPT | Mod: 26,LT,, | Performed by: RADIOLOGY

## 2024-12-17 PROCEDURE — 77065 DX MAMMO INCL CAD UNI: CPT | Mod: TC,LT

## 2025-01-07 ENCOUNTER — LAB VISIT (OUTPATIENT)
Dept: LAB | Facility: HOSPITAL | Age: 75
End: 2025-01-07
Attending: FAMILY MEDICINE
Payer: COMMERCIAL

## 2025-01-07 ENCOUNTER — OFFICE VISIT (OUTPATIENT)
Dept: INTERNAL MEDICINE | Facility: CLINIC | Age: 75
End: 2025-01-07
Payer: COMMERCIAL

## 2025-01-07 ENCOUNTER — HOSPITAL ENCOUNTER (OUTPATIENT)
Dept: CARDIOLOGY | Facility: CLINIC | Age: 75
Discharge: HOME | End: 2025-01-07
Payer: COMMERCIAL

## 2025-01-07 VITALS
WEIGHT: 178.56 LBS | DIASTOLIC BLOOD PRESSURE: 74 MMHG | HEART RATE: 88 BPM | HEIGHT: 66 IN | BODY MASS INDEX: 28.7 KG/M2 | OXYGEN SATURATION: 97 % | SYSTOLIC BLOOD PRESSURE: 124 MMHG | TEMPERATURE: 98 F

## 2025-01-07 DIAGNOSIS — M81.0 POSTMENOPAUSAL OSTEOPOROSIS: ICD-10-CM

## 2025-01-07 DIAGNOSIS — E66.3 OVERWEIGHT (BMI 25.0-29.9): ICD-10-CM

## 2025-01-07 DIAGNOSIS — Z51.81 ENCOUNTER FOR MONITORING LONG-TERM PROTON PUMP INHIBITOR THERAPY: ICD-10-CM

## 2025-01-07 DIAGNOSIS — Z86.73 HISTORY OF TRANSIENT ISCHEMIC ATTACK (TIA): ICD-10-CM

## 2025-01-07 DIAGNOSIS — E55.9 VITAMIN D DEFICIENCY: ICD-10-CM

## 2025-01-07 DIAGNOSIS — T14.8XXA BRUISING: ICD-10-CM

## 2025-01-07 DIAGNOSIS — E44.1 MILD PROTEIN-CALORIE MALNUTRITION: ICD-10-CM

## 2025-01-07 DIAGNOSIS — R79.89 LOW VITAMIN B12 LEVEL: ICD-10-CM

## 2025-01-07 DIAGNOSIS — E78.2 MIXED HYPERLIPIDEMIA: ICD-10-CM

## 2025-01-07 DIAGNOSIS — M25.559 HIP PAIN, UNSPECIFIED LATERALITY: Primary | ICD-10-CM

## 2025-01-07 DIAGNOSIS — Z79.899 ENCOUNTER FOR MONITORING LONG-TERM PROTON PUMP INHIBITOR THERAPY: ICD-10-CM

## 2025-01-07 DIAGNOSIS — Z79.899 MEDICATION MANAGEMENT: ICD-10-CM

## 2025-01-07 DIAGNOSIS — Z01.810 PREOP CARDIOVASCULAR EXAM: ICD-10-CM

## 2025-01-07 DIAGNOSIS — M16.9 OSTEOARTHROSIS, HIP: ICD-10-CM

## 2025-01-07 DIAGNOSIS — Z79.82 LONG-TERM USE OF ASPIRIN THERAPY: ICD-10-CM

## 2025-01-07 DIAGNOSIS — K44.9 HIATAL HERNIA WITH GERD WITHOUT ESOPHAGITIS: ICD-10-CM

## 2025-01-07 DIAGNOSIS — K21.9 HIATAL HERNIA WITH GERD WITHOUT ESOPHAGITIS: ICD-10-CM

## 2025-01-07 LAB
25(OH)D3+25(OH)D2 SERPL-MCNC: 75 NG/ML (ref 30–96)
ALBUMIN SERPL BCP-MCNC: 3.8 G/DL (ref 3.5–5.2)
ALP SERPL-CCNC: 111 U/L (ref 40–150)
ALT SERPL W/O P-5'-P-CCNC: 11 U/L (ref 10–44)
ANION GAP SERPL CALC-SCNC: 13 MMOL/L (ref 8–16)
APTT PPP: 25.1 SEC (ref 21–32)
AST SERPL-CCNC: 13 U/L (ref 10–40)
BASOPHILS # BLD AUTO: 0.05 K/UL (ref 0–0.2)
BASOPHILS NFR BLD: 0.6 % (ref 0–1.9)
BILIRUB SERPL-MCNC: 0.3 MG/DL (ref 0.1–1)
BUN SERPL-MCNC: 12 MG/DL (ref 8–23)
CALCIUM SERPL-MCNC: 10.1 MG/DL (ref 8.7–10.5)
CHLORIDE SERPL-SCNC: 105 MMOL/L (ref 95–110)
CHOLEST SERPL-MCNC: 154 MG/DL (ref 120–199)
CHOLEST/HDLC SERPL: 3.1 {RATIO} (ref 2–5)
CO2 SERPL-SCNC: 26 MMOL/L (ref 23–29)
CREAT SERPL-MCNC: 0.6 MG/DL (ref 0.5–1.4)
DIFFERENTIAL METHOD BLD: ABNORMAL
EOSINOPHIL # BLD AUTO: 0.2 K/UL (ref 0–0.5)
EOSINOPHIL NFR BLD: 1.7 % (ref 0–8)
ERYTHROCYTE [DISTWIDTH] IN BLOOD BY AUTOMATED COUNT: 14.3 % (ref 11.5–14.5)
EST. GFR  (NO RACE VARIABLE): >60 ML/MIN/1.73 M^2
GLUCOSE SERPL-MCNC: 94 MG/DL (ref 70–110)
HCT VFR BLD AUTO: 44.8 % (ref 37–48.5)
HDLC SERPL-MCNC: 50 MG/DL (ref 40–75)
HDLC SERPL: 32.5 % (ref 20–50)
HGB BLD-MCNC: 14.3 G/DL (ref 12–16)
IMM GRANULOCYTES # BLD AUTO: 0.03 K/UL (ref 0–0.04)
IMM GRANULOCYTES NFR BLD AUTO: 0.3 % (ref 0–0.5)
INR PPP: 0.9 (ref 0.8–1.2)
LDLC SERPL CALC-MCNC: 86.6 MG/DL (ref 63–159)
LYMPHOCYTES # BLD AUTO: 1.7 K/UL (ref 1–4.8)
LYMPHOCYTES NFR BLD: 18.6 % (ref 18–48)
MAGNESIUM SERPL-MCNC: 2.1 MG/DL (ref 1.6–2.6)
MCH RBC QN AUTO: 31.3 PG (ref 27–31)
MCHC RBC AUTO-ENTMCNC: 31.9 G/DL (ref 32–36)
MCV RBC AUTO: 98 FL (ref 82–98)
MONOCYTES # BLD AUTO: 0.5 K/UL (ref 0.3–1)
MONOCYTES NFR BLD: 5.1 % (ref 4–15)
NEUTROPHILS # BLD AUTO: 6.7 K/UL (ref 1.8–7.7)
NEUTROPHILS NFR BLD: 73.7 % (ref 38–73)
NONHDLC SERPL-MCNC: 104 MG/DL
NRBC BLD-RTO: 0 /100 WBC
OHS QRS DURATION: 72 MS
OHS QTC CALCULATION: 432 MS
PLATELET # BLD AUTO: 385 K/UL (ref 150–450)
PMV BLD AUTO: 9.8 FL (ref 9.2–12.9)
POTASSIUM SERPL-SCNC: 4.6 MMOL/L (ref 3.5–5.1)
PREALB SERPL-MCNC: 19 MG/DL (ref 20–43)
PROT SERPL-MCNC: 7.7 G/DL (ref 6–8.4)
PROTHROMBIN TIME: 10.4 SEC (ref 9–12.5)
RBC # BLD AUTO: 4.57 M/UL (ref 4–5.4)
SODIUM SERPL-SCNC: 144 MMOL/L (ref 136–145)
TRIGL SERPL-MCNC: 87 MG/DL (ref 30–150)
TSH SERPL DL<=0.005 MIU/L-ACNC: 2.06 UIU/ML (ref 0.4–4)
VIT B12 SERPL-MCNC: 543 PG/ML (ref 210–950)
WBC # BLD AUTO: 9.03 K/UL (ref 3.9–12.7)

## 2025-01-07 PROCEDURE — 99214 OFFICE O/P EST MOD 30 MIN: CPT | Mod: S$GLB,,, | Performed by: FAMILY MEDICINE

## 2025-01-07 PROCEDURE — 83735 ASSAY OF MAGNESIUM: CPT | Performed by: FAMILY MEDICINE

## 2025-01-07 PROCEDURE — 1159F MED LIST DOCD IN RCRD: CPT | Mod: CPTII,S$GLB,, | Performed by: FAMILY MEDICINE

## 2025-01-07 PROCEDURE — 1160F RVW MEDS BY RX/DR IN RCRD: CPT | Mod: CPTII,S$GLB,, | Performed by: FAMILY MEDICINE

## 2025-01-07 PROCEDURE — 80061 LIPID PANEL: CPT | Performed by: FAMILY MEDICINE

## 2025-01-07 PROCEDURE — 1125F AMNT PAIN NOTED PAIN PRSNT: CPT | Mod: CPTII,S$GLB,, | Performed by: FAMILY MEDICINE

## 2025-01-07 PROCEDURE — 93010 ELECTROCARDIOGRAM REPORT: CPT | Mod: S$GLB,,, | Performed by: STUDENT IN AN ORGANIZED HEALTH CARE EDUCATION/TRAINING PROGRAM

## 2025-01-07 PROCEDURE — 85025 COMPLETE CBC W/AUTO DIFF WBC: CPT | Performed by: FAMILY MEDICINE

## 2025-01-07 PROCEDURE — 80053 COMPREHEN METABOLIC PANEL: CPT | Performed by: FAMILY MEDICINE

## 2025-01-07 PROCEDURE — 85610 PROTHROMBIN TIME: CPT | Performed by: FAMILY MEDICINE

## 2025-01-07 PROCEDURE — 3288F FALL RISK ASSESSMENT DOCD: CPT | Mod: CPTII,S$GLB,, | Performed by: FAMILY MEDICINE

## 2025-01-07 PROCEDURE — 3078F DIAST BP <80 MM HG: CPT | Mod: CPTII,S$GLB,, | Performed by: FAMILY MEDICINE

## 2025-01-07 PROCEDURE — 84134 ASSAY OF PREALBUMIN: CPT | Performed by: FAMILY MEDICINE

## 2025-01-07 PROCEDURE — 85730 THROMBOPLASTIN TIME PARTIAL: CPT | Performed by: FAMILY MEDICINE

## 2025-01-07 PROCEDURE — 1101F PT FALLS ASSESS-DOCD LE1/YR: CPT | Mod: CPTII,S$GLB,, | Performed by: FAMILY MEDICINE

## 2025-01-07 PROCEDURE — 84443 ASSAY THYROID STIM HORMONE: CPT | Performed by: FAMILY MEDICINE

## 2025-01-07 PROCEDURE — 36415 COLL VENOUS BLD VENIPUNCTURE: CPT | Performed by: FAMILY MEDICINE

## 2025-01-07 PROCEDURE — 82607 VITAMIN B-12: CPT | Performed by: FAMILY MEDICINE

## 2025-01-07 PROCEDURE — 93005 ELECTROCARDIOGRAM TRACING: CPT | Mod: S$GLB,,, | Performed by: FAMILY MEDICINE

## 2025-01-07 PROCEDURE — 82306 VITAMIN D 25 HYDROXY: CPT | Performed by: FAMILY MEDICINE

## 2025-01-07 PROCEDURE — 3008F BODY MASS INDEX DOCD: CPT | Mod: CPTII,S$GLB,, | Performed by: FAMILY MEDICINE

## 2025-01-07 PROCEDURE — 99999 PR PBB SHADOW E&M-EST. PATIENT-LVL V: CPT | Mod: PBBFAC,,, | Performed by: FAMILY MEDICINE

## 2025-01-07 PROCEDURE — 3074F SYST BP LT 130 MM HG: CPT | Mod: CPTII,S$GLB,, | Performed by: FAMILY MEDICINE

## 2025-01-07 NOTE — PATIENT INSTRUCTIONS
Stop 81 mg baby aspirin 1 week prior to surgery and resume day after surgery or per orthopedic protocol for aspirin dosing.    Advised to stop all vitamins/ supplements and  over-the-counter analgesics except Tylenol 1 week prior to surgery.  EXCEPT OK TO CONTINUE CALCIUM THROUGH THE NIGHT PRIOR TO SURGERY.     May take prescription medications including PPI(omeprazole), Lexapro, Lipitor through morning of surgery with tiny sip of water.      Preoperative labs and EKG pending, exercise tolerance greater than 4 Mets.      If no concerns on her preop labs and EKG will send a clearance note to Dr. Saldivar clearing her for her hip replacement surgery.

## 2025-01-07 NOTE — PROGRESS NOTES
Office Visit    Patient Name: Carlene Fong    : 1950  MRN: [id number]    Subjective:  Carlene is a 74 y.o. female who presents today for:    Pre-op Exam      Surgery: HIP REPLACEMENT-- RIGHT HIP   Indication: Pain, Advanced OA   Surgeon and anticipated date of surgery: Dr Saldivar @ [facility], Scheduled 2025    Feeling Healthy and Well Without Symptoms of Illness: yes, denies, f/c/n/v, sore throat/URI sx, dysuria, diarrhea, rash    Exercise Tolerance:  Can walk several block briskly with out chest pain or excess shortness of shortness of breath IF not limited by HIP PAIN. Denies palpitations, dizziness/lightheadedness, edema  4 MET ACTIVITIES- climbing a flight of stairs or walk 3-4mph on level ground     Previous Trouble with Anesthesia? NONE     Easy Bleeding/Bruising?  Some bruising. Use of anticoagulants, aspirin/ anti-platelets/ NSAIDS?: DAILY ASA 81, prior h/o otc NSAIDS    Chronic Conditions well Controlled: YES.     GERD:  Improved on daily PPI and ppi labs monitored    Hyperparathyroidism s/p Parathyroidectomy:  PTH AND VITAMIN-D WITH NORMALIZATION FOLLOWING PARATHYROIDECTOMY, RECHECK VITAMIN-D PRIOR TO UPCOMING SURGERY. SHE REQUIRES CALCIUM SUPPLEMENTS- Calcium has been stable.     Protein Calorie Malnutrition:  Recently had issues with poor p.o. 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  Reactive a small right hip joint effusion.  Preserved femoral head contour.   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   Significant reactive edema of the right adductor musculature.   Bilateral ischiofemoral interval narrowing, worse on the right with edema of the soft tissues at the right ischiofemoral interval.      DEXA 3/27/23:   Osteoporosis of the right femoral neck.   Osteopenia of the lumbar spine and left femoral neck.      ECHO 2/14/24:     Left Ventricle: There is concentric remodeling. Normal wall motion. There is normal systolic function. Biplane (2D) method of discs ejection fraction is 72%. There is normal diastolic function.    Right Ventricle: Normal right ventricular cavity size. Systolic function is normal. TAPSE is 1.90 cm.    Mitral Valve: There is mild regurgitation.    Tricuspid Valve: There is mild regurgitation.    Pulmonary Artery: The estimated pulmonary artery systolic pressure is 29 mmHg.    IVC/SVC: Normal venous pressure at 3 mmHg.    Study is negative for shunt.       PAST MEDICAL HISTORY, SURGICAL/SOCIAL/FAMILY HISTORY REVIEWED AS PER CHART, WITH PERTINENT FINDINGS INCLUDED IN HISTORY SECTION OF NOTE.     Current Medications    Medication List with Changes/Refills   Current Medications    ASPIRIN (ECOTRIN) 81 MG EC TABLET    Take 1 tablet (81 mg total) by mouth once daily.    ATORVASTATIN (LIPITOR) 10 MG TABLET    Take 1 tablet (10 mg total) by mouth after dinner.    CALCITONIN, SALMON, (FORTICAL) 200 UNIT/ACTUATION NASAL SPRAY    1 spray by Nasal route once daily.    CALCIUM PHOSPHATE-VITAMIN D3 250 MG-12.5 MCG (500 UNIT) CHEW    Take 2 tablets by mouth Daily.    CYANOCOBALAMIN, VITAMIN B-12, 1,000 MCG LOZG    Place 1 lozenge under the tongue once a week.    DICLOFENAC SODIUM (VOLTAREN) 1 % GEL    Apply 2 g topically 3 (three) times daily.    ESCITALOPRAM OXALATE (LEXAPRO) 10 MG TABLET    Take 1 tablet (10 mg total) by mouth once daily.    FLAXSEED ORAL    Take by mouth once daily.     FLUTICASONE PROPIONATE (FLONASE) 50 MCG/ACTUATION  "NASAL SPRAY    2 SPRAYS (100 MCG TOTAL) BY EACH NOSTRIL ROUTE NIGHTLY.    GABAPENTIN (NEURONTIN) 300 MG CAPSULE    Take 1 capsule (300 mg total) by mouth 2 (two) times daily.    IBUPROFEN (ADVIL,MOTRIN) 800 MG TABLET    Take 1 tablet (800 mg total) by mouth 3 (three) times daily as needed for Pain.    LORAZEPAM (ATIVAN) 1 MG TABLET    1/2-1 tablet daily as needed for severe anxiety    MECLIZINE (ANTIVERT) 25 MG TABLET    Take 1 tablet (25 mg total) by mouth 3 (three) times daily as needed for Dizziness.    OMEPRAZOLE (PRILOSEC) 40 MG CAPSULE    TAKE 1 CAPSULE BY MOUTH BEFORE BREAKFAST    ONDANSETRON (ZOFRAN) 8 MG TABLET    Take 1 tablet (8 mg total) by mouth every 8 (eight) hours as needed for Nausea.    VITAMIN D 1000 UNITS TAB    Take 1,000 Units by mouth once daily.       Allergies   Review of patient's allergies indicates:   Allergen Reactions    Ropinirole Swelling and Rash      palpitations, swelling of tongue   palpitations, swelling of tongue         Review of Systems (Pertinent positives)  Review of Systems   Constitutional:  Negative for fever and unexpected weight change.   HENT:  Negative for sore throat.    Respiratory:  Negative for cough and shortness of breath.    Cardiovascular:  Negative for chest pain and leg swelling.   Gastrointestinal:  Negative for constipation, diarrhea, nausea and vomiting.   Genitourinary:  Negative for dysuria and hematuria.   Musculoskeletal:  Positive for arthralgias (severe hip pain).   Skin:  Negative for rash.   Neurological:  Negative for dizziness and light-headedness.       /74 (BP Location: Right arm, Patient Position: Sitting)   Pulse 88   Temp 97.9 °F (36.6 °C)   Ht 5' 6" (1.676 m)   Wt 81 kg (178 lb 9.2 oz)   LMP  (LMP Unknown)   SpO2 97%   BMI 28.82 kg/m²     Physical Exam  Vitals reviewed.   Constitutional:       General: She is not in acute distress.     Appearance: Normal appearance. She is well-developed.   HENT:      Head: Normocephalic and " atraumatic.      Right Ear: Tympanic membrane normal.      Left Ear: Tympanic membrane normal.      Nose: No congestion or rhinorrhea.      Mouth/Throat:      Pharynx: No oropharyngeal exudate or posterior oropharyngeal erythema.   Eyes:      Conjunctiva/sclera: Conjunctivae normal.   Cardiovascular:      Rate and Rhythm: Normal rate and regular rhythm.   Pulmonary:      Effort: Pulmonary effort is normal.      Breath sounds: Normal breath sounds.   Abdominal:      General: Bowel sounds are normal.      Palpations: Abdomen is soft.      Tenderness: There is no abdominal tenderness.   Musculoskeletal:      Cervical back: Neck supple. No rigidity.      Right lower leg: No edema.      Left lower leg: No edema.   Lymphadenopathy:      Cervical: No cervical adenopathy.   Skin:     General: Skin is warm and dry.   Neurological:      General: No focal deficit present.      Mental Status: She is alert and oriented to person, place, and time.   Psychiatric:         Mood and Affect: Mood normal.         Behavior: Behavior normal.           Assessment/Plan:  Carlene Fong is a 74 y.o. female who presents today for :        ICD-10-CM ICD-9-CM    1. Hip pain, unspecified laterality  M25.559 719.45       2. Osteoarthrosis, hip  M16.9 715.35       3. Preop cardiovascular exam  Z01.810 V72.81 Comprehensive Metabolic Panel      CBC Auto Differential      Vitamin D      Magnesium      TSH      Lipid Panel      Vitamin B12      PREALBUMIN      EKG 12-lead      Urinalysis      4. History of transient ischemic attack (TIA)  Z86.73 V12.54       5. Mixed hyperlipidemia  E78.2 272.2 Comprehensive Metabolic Panel      CBC Auto Differential      TSH      Lipid Panel      PREALBUMIN      EKG 12-lead      6. Long-term use of aspirin therapy  Z79.82 V58.66       7. Vitamin D deficiency  E55.9 268.9 Vitamin D      8. Postmenopausal osteoporosis  M81.0 733.01 Comprehensive Metabolic Panel      Vitamin D      TSH      PREALBUMIN      9.  Mild protein-calorie malnutrition  E44.1 263.1 Comprehensive Metabolic Panel      CBC Auto Differential      Vitamin D      Magnesium      TSH      Lipid Panel      Vitamin B12      PREALBUMIN      EKG 12-lead      Urinalysis      10. Hiatal hernia with GERD without esophagitis  K44.9 553.3     K21.9 530.11       11. Low vitamin B12 level  R79.89 790.6 Vitamin B12      12. Encounter for monitoring long-term proton pump inhibitor therapy  Z51.81 V58.83     Z79.899 V58.69       13. Overweight (BMI 25.0-29.9)  E66.3 278.02       14. Medication management  Z79.899 V58.69 Comprehensive Metabolic Panel      CBC Auto Differential      Vitamin D      Magnesium      TSH      Lipid Panel      Vitamin B12      PREALBUMIN      EKG 12-lead      Urinalysis        74-year-old female with history of TIA, hyperlipidemia, GERD, status post parathyroidectomy secondary to osteoporosis and electrolyte abnormalities associated with hyperparathyroidism who presents today for preoperative clearance prior to hip replacement.      As per HPI her chronic conditions are well controlled.  Apart from TIA she has never had a cardiovascular event and she is cleared to stop her daily 81 mg aspirin 1 week prior to surgery and take as per ortho protocol.     Advised to stop all vitamins/ supplements and  over-the-counter analgesics except Tylenol 1 week prior to surgery.      May take prescription medications including PPI, Lexapro, Lipitor through morning of surgery with tiny sip of water.      Preoperative labs and EKG pending, exercise tolerance greater than 4 Mets.      If no concerns on her preop labs and EKG will send a clearance note to Dr. Saldivar clearing her for her hip replacement surgery.    Patient Instructions   Stop 81 mg baby aspirin 1 week prior to surgery and resume day after surgery or per orthopedic protocol for aspirin dosing.    Advised to stop all vitamins/ supplements and  over-the-counter analgesics except Tylenol 1 week prior  to surgery.      May take prescription medications including PPI, Lexapro, Lipitor through morning of surgery with tiny sip of water.      Preoperative labs and EKG pending, exercise tolerance greater than 4 Mets.      If no concerns on her preop labs and EKG will send a clearance note to Dr. Saldivar clearing her for her hip replacement surgery.      Follow up for to follow up on lab results, to review results of diagnostic procedure.

## 2025-01-08 ENCOUNTER — PATIENT MESSAGE (OUTPATIENT)
Dept: INTERNAL MEDICINE | Facility: CLINIC | Age: 75
End: 2025-01-08
Payer: COMMERCIAL

## 2025-01-08 ENCOUNTER — TELEPHONE (OUTPATIENT)
Dept: INTERNAL MEDICINE | Facility: CLINIC | Age: 75
End: 2025-01-08
Payer: COMMERCIAL

## 2025-01-08 NOTE — TELEPHONE ENCOUNTER
Please print all of the results from her preoperative labs and EKG that were performed yesterday.  Please also print the report of the echocardiogram performed on 02/14/2024 along with my office note from yesterday.     I will write on the top of my office note that she is cleared for surgery and we can fax the information over to Dr. Saldivar' office, thanks

## 2025-04-04 ENCOUNTER — TELEPHONE (OUTPATIENT)
Dept: INTERNAL MEDICINE | Facility: CLINIC | Age: 75
End: 2025-04-04
Payer: COMMERCIAL

## 2025-04-09 ENCOUNTER — LAB VISIT (OUTPATIENT)
Dept: LAB | Facility: HOSPITAL | Age: 75
End: 2025-04-09
Attending: FAMILY MEDICINE
Payer: COMMERCIAL

## 2025-04-09 ENCOUNTER — OFFICE VISIT (OUTPATIENT)
Dept: INTERNAL MEDICINE | Facility: CLINIC | Age: 75
End: 2025-04-09
Payer: COMMERCIAL

## 2025-04-09 VITALS
BODY MASS INDEX: 28.95 KG/M2 | TEMPERATURE: 98 F | OXYGEN SATURATION: 100 % | HEART RATE: 74 BPM | SYSTOLIC BLOOD PRESSURE: 124 MMHG | WEIGHT: 180.13 LBS | HEIGHT: 66 IN | DIASTOLIC BLOOD PRESSURE: 72 MMHG

## 2025-04-09 DIAGNOSIS — E78.2 MIXED HYPERLIPIDEMIA: ICD-10-CM

## 2025-04-09 DIAGNOSIS — K21.9 GASTROESOPHAGEAL REFLUX DISEASE, UNSPECIFIED WHETHER ESOPHAGITIS PRESENT: ICD-10-CM

## 2025-04-09 DIAGNOSIS — E55.9 VITAMIN D DEFICIENCY: ICD-10-CM

## 2025-04-09 DIAGNOSIS — Z00.00 ROUTINE GENERAL MEDICAL EXAMINATION AT A HEALTH CARE FACILITY: Primary | ICD-10-CM

## 2025-04-09 DIAGNOSIS — Z12.11 COLON CANCER SCREENING: ICD-10-CM

## 2025-04-09 DIAGNOSIS — Z12.31 SCREENING MAMMOGRAM FOR BREAST CANCER: ICD-10-CM

## 2025-04-09 DIAGNOSIS — Z79.899 MEDICATION MANAGEMENT: ICD-10-CM

## 2025-04-09 DIAGNOSIS — M17.0 BILATERAL PRIMARY OSTEOARTHRITIS OF KNEE: ICD-10-CM

## 2025-04-09 DIAGNOSIS — Z79.899 ENCOUNTER FOR MONITORING LONG-TERM PROTON PUMP INHIBITOR THERAPY: ICD-10-CM

## 2025-04-09 DIAGNOSIS — Z90.89 H/O PARATHYROIDECTOMY: ICD-10-CM

## 2025-04-09 DIAGNOSIS — Z23 NEED FOR TDAP VACCINATION: ICD-10-CM

## 2025-04-09 DIAGNOSIS — R76.8 RHEUMATOID FACTOR POSITIVE: ICD-10-CM

## 2025-04-09 DIAGNOSIS — Z78.0 POSTMENOPAUSAL: ICD-10-CM

## 2025-04-09 DIAGNOSIS — J30.89 NON-SEASONAL ALLERGIC RHINITIS DUE TO OTHER ALLERGIC TRIGGER: ICD-10-CM

## 2025-04-09 DIAGNOSIS — Z86.73 HISTORY OF TRANSIENT ISCHEMIC ATTACK (TIA): ICD-10-CM

## 2025-04-09 DIAGNOSIS — M81.0 POSTMENOPAUSAL OSTEOPOROSIS: ICD-10-CM

## 2025-04-09 DIAGNOSIS — Z51.81 ENCOUNTER FOR MONITORING LONG-TERM PROTON PUMP INHIBITOR THERAPY: ICD-10-CM

## 2025-04-09 DIAGNOSIS — Z96.641 HISTORY OF ARTHROPLASTY OF RIGHT HIP: ICD-10-CM

## 2025-04-09 DIAGNOSIS — F41.9 ANXIETY: ICD-10-CM

## 2025-04-09 DIAGNOSIS — Z98.890 H/O PARATHYROIDECTOMY: ICD-10-CM

## 2025-04-09 DIAGNOSIS — E44.1 MILD PROTEIN-CALORIE MALNUTRITION: ICD-10-CM

## 2025-04-09 DIAGNOSIS — Z79.82 LONG-TERM USE OF ASPIRIN THERAPY: ICD-10-CM

## 2025-04-09 DIAGNOSIS — H81.09 MENIERE'S DISEASE, UNSPECIFIED LATERALITY: ICD-10-CM

## 2025-04-09 DIAGNOSIS — E66.3 OVERWEIGHT (BMI 25.0-29.9): ICD-10-CM

## 2025-04-09 LAB
25(OH)D3+25(OH)D2 SERPL-MCNC: 74 NG/ML (ref 30–96)
ABSOLUTE EOSINOPHIL (OHS): 0 K/UL
ABSOLUTE MONOCYTE (OHS): 0.46 K/UL (ref 0.3–1)
ABSOLUTE NEUTROPHIL COUNT (OHS): 9.86 K/UL (ref 1.8–7.7)
ALBUMIN SERPL BCP-MCNC: 3.7 G/DL (ref 3.5–5.2)
ALP SERPL-CCNC: 133 UNIT/L (ref 40–150)
ALT SERPL W/O P-5'-P-CCNC: 7 UNIT/L (ref 10–44)
ANION GAP (OHS): 14 MMOL/L (ref 8–16)
AST SERPL-CCNC: 10 UNIT/L (ref 11–45)
BASOPHILS # BLD AUTO: 0.02 K/UL
BASOPHILS NFR BLD AUTO: 0.2 %
BILIRUB SERPL-MCNC: 0.4 MG/DL (ref 0.1–1)
BUN SERPL-MCNC: 16 MG/DL (ref 8–23)
CALCIUM SERPL-MCNC: 9.9 MG/DL (ref 8.7–10.5)
CHLORIDE SERPL-SCNC: 108 MMOL/L (ref 95–110)
CO2 SERPL-SCNC: 23 MMOL/L (ref 23–29)
CREAT SERPL-MCNC: 0.6 MG/DL (ref 0.5–1.4)
ERYTHROCYTE [DISTWIDTH] IN BLOOD BY AUTOMATED COUNT: 12.9 % (ref 11.5–14.5)
GFR SERPLBLD CREATININE-BSD FMLA CKD-EPI: >60 ML/MIN/1.73/M2
GLUCOSE SERPL-MCNC: 88 MG/DL (ref 70–110)
HCT VFR BLD AUTO: 41.5 % (ref 37–48.5)
HGB BLD-MCNC: 13 GM/DL (ref 12–16)
IMM GRANULOCYTES # BLD AUTO: 0.06 K/UL (ref 0–0.04)
IMM GRANULOCYTES NFR BLD AUTO: 0.5 % (ref 0–0.5)
LYMPHOCYTES # BLD AUTO: 1.39 K/UL (ref 1–4.8)
MAGNESIUM SERPL-MCNC: 2.3 MG/DL (ref 1.6–2.6)
MCH RBC QN AUTO: 29.9 PG (ref 27–31)
MCHC RBC AUTO-ENTMCNC: 31.3 G/DL (ref 32–36)
MCV RBC AUTO: 95 FL (ref 82–98)
NUCLEATED RBC (/100WBC) (OHS): 0 /100 WBC
PLATELET # BLD AUTO: 488 K/UL (ref 150–450)
PMV BLD AUTO: 9.4 FL (ref 9.2–12.9)
POTASSIUM SERPL-SCNC: 4.3 MMOL/L (ref 3.5–5.1)
PREALB SERPL-MCNC: 19 MG/DL (ref 20–43)
PROT SERPL-MCNC: 7.5 GM/DL (ref 6–8.4)
RBC # BLD AUTO: 4.35 M/UL (ref 4–5.4)
RELATIVE EOSINOPHIL (OHS): 0 %
RELATIVE LYMPHOCYTE (OHS): 11.8 % (ref 18–48)
RELATIVE MONOCYTE (OHS): 3.9 % (ref 4–15)
RELATIVE NEUTROPHIL (OHS): 83.6 % (ref 38–73)
SODIUM SERPL-SCNC: 145 MMOL/L (ref 136–145)
WBC # BLD AUTO: 11.79 K/UL (ref 3.9–12.7)

## 2025-04-09 PROCEDURE — 3078F DIAST BP <80 MM HG: CPT | Mod: CPTII,S$GLB,, | Performed by: FAMILY MEDICINE

## 2025-04-09 PROCEDURE — 82306 VITAMIN D 25 HYDROXY: CPT

## 2025-04-09 PROCEDURE — 3074F SYST BP LT 130 MM HG: CPT | Mod: CPTII,S$GLB,, | Performed by: FAMILY MEDICINE

## 2025-04-09 PROCEDURE — 85025 COMPLETE CBC W/AUTO DIFF WBC: CPT

## 2025-04-09 PROCEDURE — 3288F FALL RISK ASSESSMENT DOCD: CPT | Mod: CPTII,S$GLB,, | Performed by: FAMILY MEDICINE

## 2025-04-09 PROCEDURE — 1126F AMNT PAIN NOTED NONE PRSNT: CPT | Mod: CPTII,S$GLB,, | Performed by: FAMILY MEDICINE

## 2025-04-09 PROCEDURE — 1159F MED LIST DOCD IN RCRD: CPT | Mod: CPTII,S$GLB,, | Performed by: FAMILY MEDICINE

## 2025-04-09 PROCEDURE — 84134 ASSAY OF PREALBUMIN: CPT

## 2025-04-09 PROCEDURE — 99397 PER PM REEVAL EST PAT 65+ YR: CPT | Mod: S$GLB,,, | Performed by: FAMILY MEDICINE

## 2025-04-09 PROCEDURE — 83735 ASSAY OF MAGNESIUM: CPT

## 2025-04-09 PROCEDURE — 99999 PR PBB SHADOW E&M-EST. PATIENT-LVL V: CPT | Mod: PBBFAC,,, | Performed by: FAMILY MEDICINE

## 2025-04-09 PROCEDURE — 36415 COLL VENOUS BLD VENIPUNCTURE: CPT

## 2025-04-09 PROCEDURE — 3008F BODY MASS INDEX DOCD: CPT | Mod: CPTII,S$GLB,, | Performed by: FAMILY MEDICINE

## 2025-04-09 PROCEDURE — 1101F PT FALLS ASSESS-DOCD LE1/YR: CPT | Mod: CPTII,S$GLB,, | Performed by: FAMILY MEDICINE

## 2025-04-09 PROCEDURE — 82040 ASSAY OF SERUM ALBUMIN: CPT

## 2025-04-09 RX ORDER — ESCITALOPRAM OXALATE 10 MG/1
10 TABLET ORAL DAILY
Qty: 90 TABLET | Refills: 3 | Status: SHIPPED | OUTPATIENT
Start: 2025-04-09 | End: 2026-04-09

## 2025-04-09 RX ORDER — ATORVASTATIN CALCIUM 10 MG/1
10 TABLET, FILM COATED ORAL
Qty: 90 TABLET | Refills: 3 | Status: SHIPPED | OUTPATIENT
Start: 2025-04-09 | End: 2026-04-09

## 2025-04-09 RX ORDER — OMEPRAZOLE 40 MG/1
40 CAPSULE, DELAYED RELEASE ORAL
Qty: 90 CAPSULE | Refills: 3 | Status: SHIPPED | OUTPATIENT
Start: 2025-04-09

## 2025-04-09 RX ORDER — FLUTICASONE PROPIONATE 50 MCG
2 SPRAY, SUSPENSION (ML) NASAL NIGHTLY
Qty: 48 ML | Refills: 2 | Status: SHIPPED | OUTPATIENT
Start: 2025-04-09

## 2025-04-09 RX ORDER — GABAPENTIN 300 MG/1
300 CAPSULE ORAL 2 TIMES DAILY
Qty: 180 CAPSULE | Refills: 3 | Status: SHIPPED | OUTPATIENT
Start: 2025-04-09

## 2025-04-09 NOTE — PROGRESS NOTES
Office Visit    Patient Name: Carlene Fong    : 1950  MRN: [id number]    Subjective:  Carlene is a 74 y.o. female who presents today for:    Annual Exam    Prior Annual 3/22/25  Most recently seen by me 2025 for hip pain, pre-op R hip replacement-- Replaced 25 by Dr Saldivar  In PT 3 times per week--progressing and just transitioned from walker to cane  Pain is controlled and knee pain overall tolerable with period injections per dr Saldivar.   Post op xrays have looked good.     24 -- seen by me for hospital follow up for stroke like symptoms, though to be most likely anxiety in nature s/p (-) W/U & started on LIPITOR 40 & ASA 81 for possible TIA    Ortho Dr Dobbs 24 for OA of bilateral knees: palliative, knee injections with cortisone  10/23/23-- L rib fractures s/p fall     23 seen by me for follow up of Hiatal Hernia w/ GERD symptoms  significant improvement on daily omeprazole 40 (Pepcid prn in the PM but not needing lately), s/p weight loss, GERD friendly diet (does not eat late).  EGD with Dr Jaimes on 23 showing a small Hiatal Hernia that could be predisposing her to reflux.        Carlene presents today for annual wellness exam with lab reivew and monitoring of chronic conditions-- primary hyperparathyroidism associated with vitamin D deficiency & Osteoporosis now s/p 3 gland parathyroidectomy on 2019, obesity-->Overweight BMI, history of positive rheumatoid factor without clinical evidence of rheumatoid arthritis, left wrist carpal tunnel(s/p injection 10/2018), mild anxiety, GERD with recent exacerbation, bilateral knee pain followed by Ortho Dr Dobbs and managed with periodic cortisone injections.     Hyperparathyroidism with elevated Calcium: referred to [facility]/ Rm secondary to osteoporosis dx 1/15/2019 and hypercalcemia at 12.   S/p 3 gland parathyroidectomy 19 and most recently followed up with Endo Dr Balderas on 2019.   Her TSH/PTH/ and  The Acetametaphin 325 mg take two tablets every 6 hours as needed is not helping his headaches. She said he is taking more at one time and more often than he is suppose to. She thinks he may need a stronger dose. Renal function panel have normalized as of 3/10/21.   She is taking Calcium 500 BID and Vitamin D 1000 IU daily.      Sees gynecology for yearly physicals-- Dr De Dios 4/5/24. She has had a hysterectomy with removal of one ovary.      Today she is feeling overall well.    Her recent severe GERD symptoms are now improving on omeprazole 40 mg daily.   Allergies-- stable with prn zyrtec, Flonase.    No recurrence of stroke like symptoms since d/c from hospital for TIA work up.  Anxiety on Lexapro 10 -- starting to notice improvement in anxiety. Worrying still but more manageable.   She is easing back into a normal diet and activity routine.  Has upcoming [employer] Cruise scheduled for this summer.      LABS performed 01/07/2025 as part of preoperative clearance for knee replacement overall unremarkable with normal vitamin-D/B12, liver and kidney function, but she was noted to have low pre-albumin 19.  LDL 86.    General lifestyle habits are as follows:   Diet : healthy- eating consistent low carb and higher protein, healthy meals-- drinking plenty of water   Exercise: currently her exercise is primarily PT post op from hip replacement cardio- does do some bike in PT and hoping to ease back into he previous waling routine.   Sleep: Fair-- sleeps 7-8 hours nightly and does not snore. Currently sleeping adequately in a recliner since knee replacement   Weight:  Previously lost about 35 lb but gained about 10 lb over the last year related to decreased activity associated with hip and knee pain, status post recent right knee replacement     Immunizations: SHINGRIX 2/2/ 11/5/2018, TDaP 12/1/2015, PREVNAR 13 1/10/2018, Pneumovax 23 1/15/2019, FLU SHOT UTD 9/14/23, COVID-19 COMPLETED 3/2/21 w/ PFIZER BOOSTER 12/29/21 & FURTHER COVID VACCINES DECLINED, RSV VACCINE ADVISED     Screening tests: colonoscopy 8/2009-- normal-->repeat in 10 years-- DECLINES AND WISHES TO CONTINUE FIT TESTING(FIT - 4/18/23-REPEAT ORDERED), DEXA 3/16/21--  Osteoporosis of the hips & PROLIA ADVISED (endocrine agrees) but she declines starting it & UPDATED DEXA 3/27/23 SHOWED SOME IMPROVEMENT to borderline OP only w/ treatment of hyperparathyroidism-- REPEAT 2 YEARS (3/2025), mammograms-- up to date 11/24/23,  No longer needs PAPS-HYSTERECTOMY, , Hep C (-)  5/27/14     Eye/Dental: up to date with both         PAST MEDICAL HISTORY, SURGICAL/SOCIAL/FAMILY HISTORY REVIEWED AS PER CHART, WITH PERTINENT FINDINGS INCLUDED IN HISTORY SECTION OF NOTE.     Current Medications    Medication List with Changes/Refills   Current Medications    ASPIRIN (ECOTRIN) 81 MG EC TABLET    Take 1 tablet (81 mg total) by mouth once daily.    CALCIUM PHOSPHATE-VITAMIN D3 250 MG-12.5 MCG (500 UNIT) CHEW    Take 2 tablets by mouth Daily.    CYANOCOBALAMIN, VITAMIN B-12, 1,000 MCG LOZG    Place 1 lozenge under the tongue once a week.    DICLOFENAC SODIUM (VOLTAREN) 1 % GEL    Apply 2 g topically 3 (three) times daily.    FLUTICASONE PROPIONATE (FLONASE) 50 MCG/ACTUATION NASAL SPRAY    2 SPRAYS (100 MCG TOTAL) BY EACH NOSTRIL ROUTE NIGHTLY.    LORAZEPAM (ATIVAN) 1 MG TABLET    1/2-1 tablet daily as needed for severe anxiety    VITAMIN D 1000 UNITS TAB    Take 1,000 Units by mouth once daily.   Changed and/or Refilled Medications    Modified Medication Previous Medication    ATORVASTATIN (LIPITOR) 10 MG TABLET atorvastatin (LIPITOR) 10 MG tablet       Take 1 tablet (10 mg total) by mouth after dinner.    Take 1 tablet (10 mg total) by mouth after dinner.    ESCITALOPRAM OXALATE (LEXAPRO) 10 MG TABLET EScitalopram oxalate (LEXAPRO) 10 MG tablet       Take 1 tablet (10 mg total) by mouth once daily.    Take 1 tablet (10 mg total) by mouth once daily.    GABAPENTIN (NEURONTIN) 300 MG CAPSULE gabapentin (NEURONTIN) 300 MG capsule       Take 1 capsule (300 mg total) by mouth 2 (two) times daily.    Take 1 capsule (300 mg total) by mouth 2 (two) times daily.    OMEPRAZOLE (PRILOSEC) 40 MG CAPSULE  "omeprazole (PRILOSEC) 40 MG capsule       Take 1 capsule (40 mg total) by mouth before breakfast.    TAKE 1 CAPSULE BY MOUTH BEFORE BREAKFAST   Discontinued Medications    CALCITONIN, SALMON, (FORTICAL) 200 UNIT/ACTUATION NASAL SPRAY    1 spray by Nasal route once daily.    FLAXSEED ORAL    Take by mouth once daily.     IBUPROFEN (ADVIL,MOTRIN) 800 MG TABLET    Take 1 tablet (800 mg total) by mouth 3 (three) times daily as needed for Pain.    MECLIZINE (ANTIVERT) 25 MG TABLET    Take 1 tablet (25 mg total) by mouth 3 (three) times daily as needed for Dizziness.    ONDANSETRON (ZOFRAN) 8 MG TABLET    Take 1 tablet (8 mg total) by mouth every 8 (eight) hours as needed for Nausea.       Allergies   Review of patient's allergies indicates:   Allergen Reactions    Ropinirole Swelling and Rash      palpitations, swelling of tongue   palpitations, swelling of tongue         Review of Systems (Pertinent positives)  Review of Systems   Constitutional:  Positive for activity change. Negative for unexpected weight change.   HENT:  Negative for hearing loss, rhinorrhea and trouble swallowing.    Eyes:  Negative for discharge and visual disturbance.   Respiratory:  Negative for chest tightness and wheezing.    Cardiovascular:  Negative for chest pain and palpitations.   Gastrointestinal:  Negative for blood in stool, constipation, diarrhea and vomiting.   Endocrine: Negative for polydipsia and polyuria.   Genitourinary:  Negative for difficulty urinating, dysuria, hematuria and menstrual problem.   Musculoskeletal:  Positive for arthralgias and joint swelling. Negative for neck pain.   Neurological:  Negative for weakness and headaches.   Psychiatric/Behavioral:  Positive for sleep disturbance (post op sleeping in recliner). Negative for confusion and dysphoric mood.        /72 (BP Location: Left arm, Patient Position: Sitting)   Pulse 74   Temp 98.1 °F (36.7 °C)   Ht 5' 6" (1.676 m)   Wt 81.7 kg (180 lb 1.9 oz)   LMP "  (LMP Unknown)   SpO2 100%   BMI 29.07 kg/m²     Physical Exam  Vitals reviewed.   Constitutional:       General: She is not in acute distress.     Appearance: Normal appearance. She is well-developed.   HENT:      Head: Normocephalic and atraumatic.      Right Ear: Tympanic membrane and ear canal normal. Tympanic membrane is not erythematous or bulging.      Left Ear: Tympanic membrane and ear canal normal. Tympanic membrane is not erythematous or bulging.      Nose: Nose normal.      Mouth/Throat:      Mouth: Mucous membranes are moist.      Pharynx: No oropharyngeal exudate or posterior oropharyngeal erythema.   Eyes:      Extraocular Movements: Extraocular movements intact.      Conjunctiva/sclera: Conjunctivae normal.   Neck:      Thyroid: No thyroid mass or thyromegaly.      Vascular: No carotid bruit.   Cardiovascular:      Rate and Rhythm: Normal rate and regular rhythm.      Pulses:           Dorsalis pedis pulses are 2+ on the right side and 2+ on the left side.      Heart sounds: Normal heart sounds. No murmur heard.  Pulmonary:      Effort: Pulmonary effort is normal. No respiratory distress.      Breath sounds: Normal breath sounds.   Abdominal:      General: Bowel sounds are normal. There is no distension.      Palpations: Abdomen is soft. There is no mass.      Tenderness: There is no abdominal tenderness.   Musculoskeletal:         General: Normal range of motion.      Right lower leg: No edema.      Left lower leg: No edema.   Lymphadenopathy:      Cervical: No cervical adenopathy.   Skin:     General: Skin is warm and dry.      Findings: No rash.   Neurological:      General: No focal deficit present.      Mental Status: She is alert and oriented to person, place, and time.      Gait: Gait abnormal (Walking with cane and with gait imbalance following right hip replacement).   Psychiatric:         Mood and Affect: Mood normal.         Behavior: Behavior normal.           Assessment/Plan:  Carlene  Willis Fong is a 74 y.o. female who presents today for :        ICD-10-CM ICD-9-CM    1. Routine general medical examination at a health care facility  Z00.00 V70.0       2. Overweight (BMI 25.0-29.9)  E66.3 278.02       3. Mild protein-calorie malnutrition  E44.1 263.1 CBC Auto Differential      Comprehensive Metabolic Panel      Vitamin D      Magnesium      Prealbumin      4. History of arthroplasty of right hip  Z96.641 V43.64 gabapentin (NEURONTIN) 300 MG capsule    Jan 16 2025 Dr Saldivar      5. Bilateral primary osteoarthritis of knee  M17.0 715.16 gabapentin (NEURONTIN) 300 MG capsule      6. Rheumatoid factor positive  R76.8 795.79       7. Postmenopausal osteoporosis  M81.0 733.01 CBC Auto Differential      Comprehensive Metabolic Panel      Vitamin D      Magnesium      Prealbumin      8. Vitamin D deficiency  E55.9 268.9 Vitamin D      9. H/O parathyroidectomy  Z98.890 V45.89     Z90.89 V45.79       10. History of transient ischemic attack (TIA)  Z86.73 V12.54       11. Long-term use of aspirin therapy  Z79.82 V58.66       12. Mixed hyperlipidemia  E78.2 272.2 atorvastatin (LIPITOR) 10 MG tablet      13. Gastroesophageal reflux disease, unspecified whether esophagitis present  K21.9 530.81 omeprazole (PRILOSEC) 40 MG capsule      14. Encounter for monitoring long-term proton pump inhibitor therapy  Z51.81 V58.83     Z79.899 V58.69       15. Anxiety  F41.9 300.00 EScitalopram oxalate (LEXAPRO) 10 MG tablet      16. Meniere's disease, unspecified laterality  H81.09 386.00       17. Medication management  Z79.899 V58.69 CBC Auto Differential      Comprehensive Metabolic Panel      Vitamin D      Magnesium      Prealbumin      18. Colon cancer screening  Z12.11 V76.51 Cologuard Screening (Multitarget Stool DNA)      Cologuard Screening (Multitarget Stool DNA)      19. Postmenopausal  Z78.0 V49.81 DXA Bone Density Axial Skeleton 1 or more sites      20. Need for Tdap vaccination  Z23 V06.1       21. Screening  mammogram for breast cancer  Z12.31 V76.12 Mammo Digital Screening Bilat          ADVISED ON DIET/EXERCISE/SLEEP, ROUTINE EYE/DENTAL EXAMS, AND THE IMPORTANCE OF KEEPING UP WITH APPROPRIATE SCREENING TESTS BASED ON AGE AND RISK FACTORS.  HEALTH MAINTENANCE REVIEWED   DUE FOR COLON CANCER SCREENING-WILL ORDER COLOGUARD AND IF UNREMARKABLE DOES NOT NEED FURTHER SCREENINGS IN THIS REGARD   NEXT DEXA DUE 3/2025--ORDERED  NEXT MAMMO DUE 12/2025- ORDERED  IMMUNIZATIONS UTD EXCEPT DECLINES UPDATED COVID, AND SHE HAS NOT YET GOTTEN THE RSV VACCINE       STROKE LIKE SYMPTOMS SUSPECTED 2/2 TIA: Admitted 2/13-2/14/2024 for evaluation of Sudden dizziness, nausea, and transient memory loss, with difficulty expressing words, which resolved in less than 24hrs.     MRI brain was normal  CTA head/neck showed no occlusion  Echo results Unremarkable from 2/14/24: Est EF 72%, no shunt  Telemetry  Started statin and ASA     NOW ON LIPITOR 40 & ASA 81 W/ NO FOLLOW UP CONCERNS  & IMPROVEMENT IN LDL TO < 100 ON STATIN-- TAKING ASPIRIN 81 EVERY OTHER DAY SECONDARY TO BRUISING.     CONTINUE ASA 81/ LIPITOR 40 FOR SECONDARY PREVENTION AND REPEAT LIPIDS YEARLY.      H/O Parathyroidectomy: Electrolytes, PTH, Calcium and Vit D all WNL s/p parathyroidectomy and monitored. OSTEOPOROSIS IMPROVING w/ Next DEXA due 3/2025     ANXIETY:  Started on LEXAPRO 10. Occasional Ativan as needed for more targeted/situational anxiety.  Doing well in this regard and has not really needed Ativan    PROTEIN CALORIE MALNUTRITION ASSOCIATED w/ NEARLY 40 LB WEIGHT LOSS:  She is more mindful of incorporating protein in her diet, checking pre-albumin with labs today and will advise based on results.  Discussed the importance of protein for healing following her hip replacement.    GERD w/ HIATAL HERNIA: doing well on Omeprazole 40 mg Q AM with Pepcid 40 QHS PRN, s/p EGD 4/14/23.  Ppi labs monitored and she is on a vitamin-B and D supplement    OA of BI ALTERAL KNEES:  Periodic Cortisone injections for mgmt of arthritis symptoms per ortho     STATUS POST RIGHT HIP REPLACEMENT:  January 16, 2025 per Dr. Saldivar, initially was not progressing as expected so her PT was increased and now she is doing better with PT 3 times per week.  Has transitioned off of walker and now using a cane                  Patient Instructions   Obtain TDaP 10 year tetanus booster between now and the end of the year      Follow up for return as needed for new concerns, to follow up on lab results.

## 2025-04-09 NOTE — TELEPHONE ENCOUNTER
No care due was identified.  Health [facility] Embedded Care Due Messages. Reference number: [id number].   4/09/2025 12:06:48 AM CDT

## 2025-04-09 NOTE — TELEPHONE ENCOUNTER
Refill Decision Note   Carlene Fong  is requesting a refill authorization.  Brief Assessment and Rationale for Refill:  Approve     Medication Therapy Plan:         Comments:     Note composed:1:50 AM 04/09/2025

## 2025-04-13 ENCOUNTER — RESULTS FOLLOW-UP (OUTPATIENT)
Dept: INTERNAL MEDICINE | Facility: CLINIC | Age: 75
End: 2025-04-13

## 2025-04-13 ENCOUNTER — TELEPHONE (OUTPATIENT)
Dept: INTERNAL MEDICINE | Facility: CLINIC | Age: 75
End: 2025-04-13
Payer: COMMERCIAL

## 2025-04-13 DIAGNOSIS — R79.89 ABNORMAL CBC: ICD-10-CM

## 2025-04-13 DIAGNOSIS — E44.1 MILD PROTEIN-CALORIE MALNUTRITION: Primary | ICD-10-CM

## 2025-04-13 DIAGNOSIS — E66.3 OVERWEIGHT (BMI 25.0-29.9): ICD-10-CM

## 2025-04-13 DIAGNOSIS — M81.0 POSTMENOPAUSAL OSTEOPOROSIS: ICD-10-CM

## 2025-04-15 ENCOUNTER — HOSPITAL ENCOUNTER (OUTPATIENT)
Dept: RADIOLOGY | Facility: HOSPITAL | Age: 75
Discharge: HOME | End: 2025-04-15
Attending: FAMILY MEDICINE
Payer: COMMERCIAL

## 2025-04-15 DIAGNOSIS — Z78.0 POSTMENOPAUSAL: ICD-10-CM

## 2025-04-15 PROCEDURE — 77080 DXA BONE DENSITY AXIAL: CPT | Mod: 26,,, | Performed by: RADIOLOGY

## 2025-04-15 PROCEDURE — 77080 DXA BONE DENSITY AXIAL: CPT | Mod: TC

## 2025-04-17 ENCOUNTER — TELEPHONE (OUTPATIENT)
Dept: INTERNAL MEDICINE | Facility: CLINIC | Age: 75
End: 2025-04-17
Payer: COMMERCIAL

## 2025-05-01 ENCOUNTER — TELEPHONE (OUTPATIENT)
Dept: INTERNAL MEDICINE | Facility: CLINIC | Age: 75
End: 2025-05-01
Payer: COMMERCIAL

## 2025-05-01 RX ORDER — HEPARIN 100 UNIT/ML
500 SYRINGE INTRAVENOUS
OUTPATIENT
Start: 2025-05-01

## 2025-05-01 RX ORDER — ACETAMINOPHEN 325 MG/1
650 TABLET ORAL
OUTPATIENT
Start: 2025-05-01

## 2025-05-01 RX ORDER — SODIUM CHLORIDE 0.9 % (FLUSH) 0.9 %
10 SYRINGE (ML) INJECTION
OUTPATIENT
Start: 2025-05-01

## 2025-05-01 RX ORDER — ZOLEDRONIC ACID 5 MG/100ML
5 INJECTION, SOLUTION INTRAVENOUS
OUTPATIENT
Start: 2025-05-01

## 2025-05-01 NOTE — TELEPHONE ENCOUNTER
I changed therapy plan to Reclast (per her insurance preference) as the Prolia was denied (has to try Reclast first)-- she has severe GERD history so definitely cannot do oral bisphosphonates.    Can you all please try to get her scheduled for this instead? Thanks!  ELDA jarrell, please explain situation to patient and let her know that infusion center is looking into a yearly infusion of Reclast since insurance didn't cover Prolia.     Please explain it's an IV form similar to the oral Medication Fosamax. I don't want her to take any osteoporosis medications by mouth because they have a high likelihood of setting off the severe GERD she's had in the past.

## 2025-05-05 ENCOUNTER — TELEPHONE (OUTPATIENT)
Dept: INFUSION THERAPY | Facility: HOSPITAL | Age: 75
End: 2025-05-05
Payer: COMMERCIAL

## 2025-05-05 NOTE — TELEPHONE ENCOUNTER
(1st attempt) called the patient to schedule infusion. Pt states she is scheduled to have tooth extraction on 5/14. She will call back 3 months after dental work

## 2025-07-02 ENCOUNTER — TELEPHONE (OUTPATIENT)
Dept: ORTHOPEDICS | Facility: CLINIC | Age: 75
End: 2025-07-02
Payer: COMMERCIAL

## 2025-07-02 ENCOUNTER — PATIENT MESSAGE (OUTPATIENT)
Dept: INTERNAL MEDICINE | Facility: CLINIC | Age: 75
End: 2025-07-02
Payer: COMMERCIAL

## 2025-07-02 DIAGNOSIS — M79.643 PAIN OF HAND, UNSPECIFIED LATERALITY: Primary | ICD-10-CM

## 2025-07-03 ENCOUNTER — HOSPITAL ENCOUNTER (OUTPATIENT)
Facility: HOSPITAL | Age: 75
Discharge: HOME | End: 2025-07-03
Attending: ORTHOPAEDIC SURGERY
Payer: COMMERCIAL

## 2025-07-03 ENCOUNTER — OFFICE VISIT (OUTPATIENT)
Dept: ORTHOPEDICS | Facility: CLINIC | Age: 75
End: 2025-07-03
Payer: COMMERCIAL

## 2025-07-03 VITALS — BODY MASS INDEX: 29.07 KG/M2 | HEIGHT: 66 IN

## 2025-07-03 DIAGNOSIS — M79.643 PAIN OF HAND, UNSPECIFIED LATERALITY: ICD-10-CM

## 2025-07-03 DIAGNOSIS — M19.041 ARTHRITIS OF RIGHT HAND: Primary | ICD-10-CM

## 2025-07-03 PROCEDURE — 99213 OFFICE O/P EST LOW 20 MIN: CPT | Mod: S$GLB,,, | Performed by: ORTHOPAEDIC SURGERY

## 2025-07-03 PROCEDURE — 99999 PR PBB SHADOW E&M-EST. PATIENT-LVL III: CPT | Mod: PBBFAC,,, | Performed by: ORTHOPAEDIC SURGERY

## 2025-07-03 PROCEDURE — 3008F BODY MASS INDEX DOCD: CPT | Mod: CPTII,S$GLB,, | Performed by: ORTHOPAEDIC SURGERY

## 2025-07-03 PROCEDURE — 73130 X-RAY EXAM OF HAND: CPT | Mod: TC,50,PN

## 2025-07-03 PROCEDURE — 1125F AMNT PAIN NOTED PAIN PRSNT: CPT | Mod: CPTII,S$GLB,, | Performed by: ORTHOPAEDIC SURGERY

## 2025-07-03 PROCEDURE — 1101F PT FALLS ASSESS-DOCD LE1/YR: CPT | Mod: CPTII,S$GLB,, | Performed by: ORTHOPAEDIC SURGERY

## 2025-07-03 PROCEDURE — 1159F MED LIST DOCD IN RCRD: CPT | Mod: CPTII,S$GLB,, | Performed by: ORTHOPAEDIC SURGERY

## 2025-07-03 PROCEDURE — 3288F FALL RISK ASSESSMENT DOCD: CPT | Mod: CPTII,S$GLB,, | Performed by: ORTHOPAEDIC SURGERY

## 2025-07-03 RX ORDER — CELECOXIB 200 MG/1
200 CAPSULE ORAL 2 TIMES DAILY
Qty: 60 CAPSULE | Refills: 0 | Status: SHIPPED | OUTPATIENT
Start: 2025-07-03 | End: 2025-08-02

## 2025-07-03 NOTE — PROGRESS NOTES
Subjective:      Patient ID: Carlene Fong is a 74 y.o. female.  Chief Complaint: Carpal Tunnel (Bilateral hand/ swelling )      HPI  Carlene Fong is a  74 y.o. female presenting today for follow up of bilateral carpal tunnel syndrome.  She reports that she is not really having any symptoms like numbness tingling but now having some pain and swelling especially in the right hand  She does remember bumping into about a week or so ago.    Review of patient's allergies indicates:   Allergen Reactions    Ropinirole Swelling and Rash      palpitations, swelling of tongue   palpitations, swelling of tongue         Current Medications[1]    Past Medical History:   Diagnosis Date    Abnormal mammogram 8/10/2016    Followed up with diagnostic mammogram and ultrasound 10/22/2015at [facility]  that showed no concerning findings with recommendation to continue yearly screening.     Anxiety 8/8/2017    will need to readdress this if work up is negative and symptoms persist    Arthritis of knee 1/7/2015    Diverticulosis     Meniere's disease 8/15/2017    Osteopenia 12/1/2015    next bone density 4/2016, seeing endocrine for hyperparathyroidism     Positive anti-CCP test 4/7/2014    Primary hyperparathyroidism 8/26/2013    will be following up with Dr. [facility] in 2016, blood work today. stoped taking calcitonin due to nausea     Rheumatoid factor positive 4/7/2014    Vertigo 7/16/2017    Vitamin D deficiency 12/1/2015       Past Surgical History:   Procedure Laterality Date    APPENDECTOMY      BREAST BIOPSY      ESOPHAGOGASTRODUODENOSCOPY N/A 4/14/2023    Procedure: EGD (ESOPHAGOGASTRODUODENOSCOPY);  Surgeon: Hever Jaimes MD;  Location: [facility];  Service: Endoscopy;  Laterality: N/A;    HYSTERECTOMY      PARATHYROIDECTOMY N/A 05/22/2019    Procedure: PARATHYROIDECTOMY;  Surgeon: Andie Mcfarlane MD;  Location: [address];  Service: General;  Laterality: N/A;  NIMS / Intraop PTH Monitoring     "ROTATOR CUFF REPAIR Right 12/2014    TUBAL LIGATION         OBJECTIVE:   PHYSICAL EXAM:  Height: 5' 6" (167.6 cm)    Vitals:    07/03/25 1002   Height: 5' 6" (1.676 m)   PainSc:   3   PainLoc: Hand     Ortho/SPM Exam  Examination of the hands of the right hand does have some swelling dorsally slight bruising is noted   Mild tenderness range of motion slightly decreased   Tinel sign negative in both hands   No atrophy noted   There is some bony enlargement mainly at the D IP joints of all digits    RADIOGRAPHS:  AP lateral x-rays bilateral hands show severe arthritic changes of the D IP joints no fracture or other abnormalities  Comments: I have personally reviewed the imaging and I agree with the above radiologist's report.    ASSESSMENT/PLAN:     IMPRESSION:  1. Bilateral carpal tunnel syndrome stable.      2. Contusion right hand dorsal.      3. Bilateral hand arthritis mainly affecting the D IP joint    PLAN:  I explained the nature of the problem to the patient   I have recommended some warm water soaks range-of-motion exercises and a squeeze ball to prevent stiffness   I have also started her on Celebrex twice a day with food and recommended Voltaren gel topical       FOLLOW UP:  3-4 weeks    Disclaimer: This note has been generated using voice-recognition software. There may be typographical errors that have been missed during proof-reading.          [1]   Current Outpatient Medications   Medication Sig Dispense Refill    atorvastatin (LIPITOR) 10 MG tablet Take 1 tablet (10 mg total) by mouth after dinner. 90 tablet 3    calcium phosphate-vitamin D3 250 mg-12.5 mcg (500 unit) Chew Take 2 tablets by mouth Daily.      cyanocobalamin, vitamin B-12, 1,000 mcg Lozg Place 1 lozenge under the tongue once a week. 50 lozenge 2    diclofenac sodium (VOLTAREN) 1 % Gel Apply 2 g topically 3 (three) times daily. 100 g 2    EScitalopram oxalate (LEXAPRO) 10 MG tablet Take 1 tablet (10 mg total) by mouth once daily. 90 " tablet 3    fluticasone propionate (FLONASE) 50 mcg/actuation nasal spray 2 SPRAYS (100 MCG TOTAL) BY EACH NOSTRIL ROUTE NIGHTLY. 48 mL 2    gabapentin (NEURONTIN) 300 MG capsule Take 1 capsule (300 mg total) by mouth 2 (two) times daily. 180 capsule 3    LORazepam (ATIVAN) 1 MG tablet 1/2-1 tablet daily as needed for severe anxiety 10 tablet 1    omeprazole (PRILOSEC) 40 MG capsule Take 1 capsule (40 mg total) by mouth before breakfast. 90 capsule 3    vitamin D 1000 units Tab Take 1,000 Units by mouth once daily.      aspirin (ECOTRIN) 81 MG EC tablet Take 1 tablet (81 mg total) by mouth once daily. 360 tablet 0    celecoxib (CELEBREX) 200 MG capsule Take 1 capsule (200 mg total) by mouth 2 (two) times daily. 60 capsule 0     Current Facility-Administered Medications   Medication Dose Route Frequency Provider Last Rate Last Admin    sodium hyaluronate (EUFLEXXA) 10 mg/mL(mw 2.4 -3.6 million) injection 20 mg  20 mg Intra-articular 1 time in Clinic/HOD Michelle Frausto PA-C

## 2025-07-07 ENCOUNTER — LAB VISIT (OUTPATIENT)
Dept: LAB | Facility: HOSPITAL | Age: 75
End: 2025-07-07
Attending: FAMILY MEDICINE
Payer: COMMERCIAL

## 2025-07-07 DIAGNOSIS — E66.3 OVERWEIGHT (BMI 25.0-29.9): ICD-10-CM

## 2025-07-07 DIAGNOSIS — E44.1 MILD PROTEIN-CALORIE MALNUTRITION: ICD-10-CM

## 2025-07-07 DIAGNOSIS — R79.89 ABNORMAL CBC: ICD-10-CM

## 2025-07-07 DIAGNOSIS — M81.0 POSTMENOPAUSAL OSTEOPOROSIS: ICD-10-CM

## 2025-07-07 LAB
ABSOLUTE EOSINOPHIL (OHS): 0.51 K/UL
ABSOLUTE MONOCYTE (OHS): 0.38 K/UL (ref 0.3–1)
ABSOLUTE NEUTROPHIL COUNT (OHS): 5.2 K/UL (ref 1.8–7.7)
ALBUMIN SERPL BCP-MCNC: 3.6 G/DL (ref 3.5–5.2)
ALP SERPL-CCNC: 112 UNIT/L (ref 40–150)
ALT SERPL W/O P-5'-P-CCNC: 9 UNIT/L (ref 10–44)
ANION GAP (OHS): 10 MMOL/L (ref 8–16)
AST SERPL-CCNC: 14 UNIT/L (ref 11–45)
BASOPHILS # BLD AUTO: 0.06 K/UL
BASOPHILS NFR BLD AUTO: 0.8 %
BILIRUB SERPL-MCNC: 0.3 MG/DL (ref 0.1–1)
BUN SERPL-MCNC: 15 MG/DL (ref 8–23)
CALCIUM SERPL-MCNC: 8.7 MG/DL (ref 8.7–10.5)
CHLORIDE SERPL-SCNC: 109 MMOL/L (ref 95–110)
CO2 SERPL-SCNC: 23 MMOL/L (ref 23–29)
CREAT SERPL-MCNC: 0.6 MG/DL (ref 0.5–1.4)
ERYTHROCYTE [DISTWIDTH] IN BLOOD BY AUTOMATED COUNT: 14.2 % (ref 11.5–14.5)
GFR SERPLBLD CREATININE-BSD FMLA CKD-EPI: >60 ML/MIN/1.73/M2
GLUCOSE SERPL-MCNC: 73 MG/DL (ref 70–110)
HCT VFR BLD AUTO: 39.1 % (ref 37–48.5)
HGB BLD-MCNC: 12.1 GM/DL (ref 12–16)
IMM GRANULOCYTES # BLD AUTO: 0.02 K/UL (ref 0–0.04)
IMM GRANULOCYTES NFR BLD AUTO: 0.3 % (ref 0–0.5)
LYMPHOCYTES # BLD AUTO: 1.75 K/UL (ref 1–4.8)
MCH RBC QN AUTO: 29.7 PG (ref 27–31)
MCHC RBC AUTO-ENTMCNC: 30.9 G/DL (ref 32–36)
MCV RBC AUTO: 96 FL (ref 82–98)
NUCLEATED RBC (/100WBC) (OHS): 0 /100 WBC
PLATELET # BLD AUTO: 326 K/UL (ref 150–450)
PMV BLD AUTO: 10 FL (ref 9.2–12.9)
POTASSIUM SERPL-SCNC: 4 MMOL/L (ref 3.5–5.1)
PREALB SERPL-MCNC: 14 MG/DL (ref 20–43)
PROT SERPL-MCNC: 6.4 GM/DL (ref 6–8.4)
RBC # BLD AUTO: 4.07 M/UL (ref 4–5.4)
RELATIVE EOSINOPHIL (OHS): 6.4 %
RELATIVE LYMPHOCYTE (OHS): 22.1 % (ref 18–48)
RELATIVE MONOCYTE (OHS): 4.8 % (ref 4–15)
RELATIVE NEUTROPHIL (OHS): 65.6 % (ref 38–73)
SODIUM SERPL-SCNC: 142 MMOL/L (ref 136–145)
TSH SERPL-ACNC: 1.86 UIU/ML (ref 0.4–4)
WBC # BLD AUTO: 7.92 K/UL (ref 3.9–12.7)

## 2025-07-07 PROCEDURE — 84443 ASSAY THYROID STIM HORMONE: CPT

## 2025-07-07 PROCEDURE — 84134 ASSAY OF PREALBUMIN: CPT

## 2025-07-07 PROCEDURE — 36415 COLL VENOUS BLD VENIPUNCTURE: CPT | Mod: PO

## 2025-07-07 PROCEDURE — 85025 COMPLETE CBC W/AUTO DIFF WBC: CPT

## 2025-07-07 PROCEDURE — 80053 COMPREHEN METABOLIC PANEL: CPT

## 2025-07-13 PROBLEM — E44.0 MODERATE PROTEIN-CALORIE MALNUTRITION: Status: ACTIVE | Noted: 2024-03-21

## 2025-07-13 NOTE — PROGRESS NOTES
Office Visit    Patient Name: Carlene Fong    : 1950  MRN: [id number]    Subjective:  Carlene is a 74 y.o. female who presents today for:    No chief complaint on file.    The patient location is: HER HOME  The chief complaint leading to consultation is: LAB REVIEW, 3 MONTH Follow up, DISCUSS OSTEOPOROSIS treatment.     Visit type: audiovisual    Face to Face time with patient: START 1128 END 1145  28  minutes of total time spent on the encounter, which includes face to face time and non-face to face time preparing to see the patient (eg, review of tests), Obtaining and/or reviewing separately obtained history, Documenting clinical information in the electronic or other health record, Independently interpreting results (not separately reported) and communicating results to the patient/family/caregiver, or Care coordination (not separately reported).     Each patient to whom he or she provides medical services by telemedicine is:  (1) informed of the relationship between the physician and patient and the respective role of any other health care provider with respect to management of the patient; and (2) notified that he or she may decline to receive medical services by telemedicine and may withdraw from such care at any time.    Notes:     Prior Annual 25   S/P  R hip replacement-- Replaced 25 by Dr Saldivar-- has a an upcoming 6 month check up with Dr Saldivar.   Completed formal PT at end of may-- was in PT 3 times per week.   No longer using a cane and waling much better.   Much more active. She worries about falls still.    Post op xrays have looked good.     7/3/25 followed up with Ortho Hand Dr Dobbs for Bilateral Hand OA/ Carpal Tunnel  Plan: Celebrex twice a day with food and recommended Voltaren gel topical. Also a stress ball-- these are helping and she has prn follow up.     24 -- seen by me for hospital follow up for stroke like symptoms, thought to be most likely anxiety in  nature s/p (-) W/U & started on LIPITOR 40 & ASA 81 for possible TIA    Ortho Dr Dobbs 11/7/24 for OA of bilateral knees: periodic palliative knee injections with cortisone.  Knee pain is a bet better with walking better from the hip replacement.     9/12/23 seen by me for follow up of Hiatal Hernia w/ GERD symptoms  significant improvement on daily omeprazole 40 (Pepcid prn in the PM but not needing lately), s/p weight loss, GERD friendly diet (does not eat late).  EGD with Dr Jaimes on 4/14/23 showing a small Hiatal Hernia that could be predisposing her to reflux.     Carlene presents today for VV for 3 month follow up with lab reivew and monitoring of chronic conditions-- primary hyperparathyroidism associated with vitamin D deficiency & Osteoporosis now s/p 3 gland parathyroidectomy on 5/22/2019, obesity-->Overweight BMI, history of positive rheumatoid factor without clinical evidence of rheumatoid arthritis, left wrist carpal tunnel(s/p injection 10/2018), mild anxiety, GERD with recent exacerbation, bilateral knee pain followed by Ortho Dr Dobbs and managed with periodic cortisone injections.     Hyperparathyroidism with elevated Calcium: referred to [facility] Balderas/ Rm secondary to osteoporosis dx 1/15/2019 and hypercalcemia at 12.   S/p 3 gland parathyroidectomy 5/22/19 and most recently followed up with Endo Dr Balderas on 7/8/2019.   Her TSH/PTH/ and Renal function panel have normalized as of 3/10/21.   She is taking Calcium 500 BID and Vitamin D 1000 IU daily.      Gynecology Dr De Dios 4/5/24. She has had a hysterectomy with removal of one ovary.      Today she is feeling overall well though for while her eating was impacted by a dental extraction and a GI virus. She is now back on track with her high protein diet  Her recent severe GERD symptoms are now improving on omeprazole 40 mg daily.   Allergies-- stable with prn zyrtec and regular Flonase.   No recurrence of stroke like symptoms since d/c from  hospital for TIA work up.  Anxiety overall stable on Lexapro 10.      LABS 7/7/25 with DECLINE in PREALBUMIN from 19-->14.   TSH 1.8 w/ normal CMP.   Essentially normal CBC.     Prior labs 01/07/2025 w/ normal vitamin-D/B12 &  LDL 86 on Lipitor 40.     General lifestyle habits are as follows:   Diet : healthy- eating consistent low carb and higher protein, healthy meals-- drinking plenty of water   Exercise: currently her exercise is primarily a HEP to keep up with the PT she graduated from s/p hip replacement.  Easing back into a walking routine.   Sleep: Fair-- sleeps 7-8 hours nightly and does not snore.  Weight:  Previously lost about 35 lb but gained about 10 lb over the last year related to decreased activity associated w/ arthritis issues and surgical recovery but she is trying to get this weight back off.      Immunizations: SHINGRIX 2/2 11/5/2018, TDaP 12/1/2015, PREVNAR 13 1/10/2018, Pneumovax 23 1/15/2019, FLU SHOT UTD 9/17/24, COVID-19 COMPLETED  w/ PFIZER BOOSTER 12/29/21 & FURTHER COVID VACCINES DECLINED, RSV VACCINE ADVISED     Screening tests: colonoscopy 8/2009-- normal-->repeat in 10 years-- DECLINES AND WISHES TO CONTINUE FIT TESTING(FIT - 4/18/23-REPEAT ORDERED), DEXA 3/27/23 SHOWED SOME IMPROVEMENT to borderline OP only w/ treatment of hyperparathyroidism BUT w/ OSTEOPOROSIS of SPINE & HIP noted on DEXA 4/15/25 -- Insurance denied PROLIA & RECLAST INFUSION orders entered for SCHEDULING 8/2025, REPEAT DEXA 2 YEARS (4/2027), mammograms-- up to date 12/17/24,  No longer needs PAPS-HYSTERECTOMY, Hep C (-)  5/27/14     Eye/Dental: up to date with both         PAST MEDICAL HISTORY, SURGICAL/SOCIAL/FAMILY HISTORY REVIEWED AS PER CHART, WITH PERTINENT FINDINGS INCLUDED IN HISTORY SECTION OF NOTE.     Current Medications    Medication List with Changes/Refills   Current Medications    ASPIRIN (ECOTRIN) 81 MG EC TABLET    Take 1 tablet (81 mg total) by mouth once daily.    ATORVASTATIN (LIPITOR) 10 MG  TABLET    Take 1 tablet (10 mg total) by mouth after dinner.    CALCIUM PHOSPHATE-VITAMIN D3 250 MG-12.5 MCG (500 UNIT) CHEW    Take 2 tablets by mouth Daily.    CELECOXIB (CELEBREX) 200 MG CAPSULE    Take 1 capsule (200 mg total) by mouth 2 (two) times daily.    CYANOCOBALAMIN, VITAMIN B-12, 1,000 MCG LOZG    Place 1 lozenge under the tongue once a week.    DICLOFENAC SODIUM (VOLTAREN) 1 % GEL    Apply 2 g topically 3 (three) times daily.    ESCITALOPRAM OXALATE (LEXAPRO) 10 MG TABLET    Take 1 tablet (10 mg total) by mouth once daily.    FLUTICASONE PROPIONATE (FLONASE) 50 MCG/ACTUATION NASAL SPRAY    2 SPRAYS (100 MCG TOTAL) BY EACH NOSTRIL ROUTE NIGHTLY.    GABAPENTIN (NEURONTIN) 300 MG CAPSULE    Take 1 capsule (300 mg total) by mouth 2 (two) times daily.    LORAZEPAM (ATIVAN) 1 MG TABLET    1/2-1 tablet daily as needed for severe anxiety    OMEPRAZOLE (PRILOSEC) 40 MG CAPSULE    Take 1 capsule (40 mg total) by mouth before breakfast.    VITAMIN D 1000 UNITS TAB    Take 1,000 Units by mouth once daily.       Allergies   Review of patient's allergies indicates:   Allergen Reactions    Ropinirole Swelling and Rash      palpitations, swelling of tongue   palpitations, swelling of tongue         Review of Systems (Pertinent positives)  Review of Systems   Constitutional:  Negative for activity change and unexpected weight change.   HENT:  Negative for hearing loss, rhinorrhea and trouble swallowing.    Eyes:  Negative for discharge and visual disturbance.   Respiratory:  Negative for chest tightness and wheezing.    Cardiovascular:  Negative for chest pain and palpitations.   Gastrointestinal:  Negative for blood in stool, constipation, diarrhea and vomiting.   Endocrine: Negative for polydipsia and polyuria.   Genitourinary:  Negative for difficulty urinating, dysuria, hematuria and menstrual problem.   Musculoskeletal:  Positive for arthralgias (improving with exercises and Celebrex) and joint swelling. Negative  for neck pain.   Neurological:  Negative for weakness and headaches.   Psychiatric/Behavioral:  Negative for confusion and dysphoric mood.        LMP  (LMP Unknown)     Physical Exam  Vitals reviewed.   Constitutional:       General: She is not in acute distress.     Appearance: Normal appearance. She is well-developed.   HENT:      Head: Normocephalic and atraumatic.   Eyes:      Conjunctiva/sclera: Conjunctivae normal.   Pulmonary:      Effort: Pulmonary effort is normal.   Neurological:      Mental Status: She is alert and oriented to person, place, and time.   Psychiatric:         Mood and Affect: Mood normal.         Behavior: Behavior normal.           Assessment/Plan:  Carlene Fong is a 74 y.o. female who presents today for :        ICD-10-CM ICD-9-CM    1. Moderate protein-calorie malnutrition  E44.0 263.0 Comprehensive Metabolic Panel      Prealbumin      2. Postmenopausal osteoporosis  M81.0 733.01 Comprehensive Metabolic Panel      Prealbumin      3. Vitamin D deficiency  E55.9 268.9       4. History of transient ischemic attack (TIA)  Z86.73 V12.54       5. Long-term use of aspirin therapy  Z79.82 V58.66       6. Mixed hyperlipidemia  E78.2 272.2       7. Hiatal hernia with GERD without esophagitis  K44.9 553.3     K21.9 530.11       8. Encounter for monitoring long-term proton pump inhibitor therapy  Z51.81 V58.83     Z79.899 V58.69       9. H/O parathyroidectomy  Z98.890 V45.89     Z90.89 V45.79       10. History of arthroplasty of right hip  Z96.641 V43.64     January 16, 2025 per Dr. Saldivar,      11. NSAID long-term use  Z79.1 V58.64       12. Bilateral primary osteoarthritis of knee  M17.0 715.16       13. Pain in both hands  M79.641 729.5     M79.642        14. Medication management  Z79.899 V58.69             ADVISED ON DIET/EXERCISE/SLEEP, ROUTINE EYE/DENTAL EXAMS, AND THE IMPORTANCE OF KEEPING UP WITH APPROPRIATE SCREENING TESTS BASED ON AGE AND RISK FACTORS.  HEALTH MAINTENANCE REVIEWED    DUE FOR COLON CANCER SCREENING-WILL ORDER COLOGUARD AND IF UNREMARKABLE DOES NOT NEED FURTHER SCREENINGS IN THIS REGARD   NEXT DEXA DUE 4/2027-DEXA 4/15/25 w/ Osteoporosis of L-Spine & Hip   NEXT MAMMO DUE 12/2025   IMMUNIZATIONS UTD EXCEPT DECLINES UPDATED COVID, AND SHE HAS NOT YET GOTTEN THE RSV VACCINE       H/O TIA: Admitted 2/13-2/14/2024 for evaluation of Sudden dizziness, nausea, and transient memory loss, with difficulty expressing words, which resolved in less than 24hrs.  MRI brain was normal, CTA head/neck showed no occlusion  Echo results Unremarkable from 2/14/24: Est EF 72%, no shunt   NOW ON LIPITOR 40 & ASA 81 W/ NO FOLLOW UP CONCERNS  & IMPROVEMENT IN LDL TO < 100 ON STATIN-- TAKING ASPIRIN 81 DAILY w/o bleeding or bruising concerns currently.       H/O Parathyroidectomy,  OSTEOPOROSIS: Electrolytes, PTH, Calcium and Vit D all WNL s/p parathyroidectomy and monitored on supplementation.   Previously OSTEOPOROSIS w/ Some Improvement but DEXA 4/15/25 w/ OSTEOPOROSIS of HIP & SPINE.   Attempted PROLIA but declined by Insurance--> RECLAST is ORDERED and she open to scheduling this in late August-- 3 months out from her dental extraction-- INFUSION [facility]ENTER NOTIFIED and CMP to be SCHEDULED PRIOR.      ANXIETY: Table on LEXAPRO 10. Occasional Ativan as needed for more targeted/situational anxiety.  Doing well in this regard and has not really needed Ativan    PROTEIN CALORIE MALNUTRITION ASSOCIATED w/ NEARLY 40 LB WEIGHT LOSS:  She is more mindful of incorporating protein in her diet, but PRE-ALBUMIN w/ RECENT DECLINE from 19-->14.  Discussed the importance of protein for healing following her hip replacement. NUTRITION CONSULT ADVISED IF NEXT PREALBUMIN NOT IMPROVED. Diet was temporarily affected by dental extraction and a GI virus.    GERD w/ HIATAL HERNIA: doing well on Omeprazole 40 mg Q AM with Pepcid 40 QHS PRN, s/p EGD 4/14/23.  Ppi labs monitored and she is on a vitamin-B and D supplement    OA  of BILATERAL KNEES: Periodic Cortisone injections for mgmt of arthritis symptoms per ortho. Currently doing well and walking better since hip replacement.     HAND PAIN: Celebrex and Voltaren Gel per Ortho Hand Dr Horner    STATUS POST RIGHT HIP REPLACEMENT:  January 16, 2025 per Dr. Saldivar, initially was not progressing as expected so her PT was increased to  3 times per week. Sh has now completed PT as of a month ok and as overall walking well and increasing her activity. Needs to keep up with PT based HEP. Has upcoming 6 month post op check with Dr Saldivar. Advised on importance of treating underlying osteoporosis to optimize long term success of hip replacement.          There are no Patient Instructions on file for this visit.      Follow up in about 6 weeks (around 8/25/2025) for to follow up on lab results, return as needed for new concerns.

## 2025-07-14 ENCOUNTER — OFFICE VISIT (OUTPATIENT)
Dept: INTERNAL MEDICINE | Facility: CLINIC | Age: 75
End: 2025-07-14
Payer: COMMERCIAL

## 2025-07-14 ENCOUNTER — TELEPHONE (OUTPATIENT)
Dept: INFUSION THERAPY | Facility: HOSPITAL | Age: 75
End: 2025-07-14
Payer: COMMERCIAL

## 2025-07-14 ENCOUNTER — TELEPHONE (OUTPATIENT)
Dept: INTERNAL MEDICINE | Facility: CLINIC | Age: 75
End: 2025-07-14

## 2025-07-14 DIAGNOSIS — K21.9 HIATAL HERNIA WITH GERD WITHOUT ESOPHAGITIS: ICD-10-CM

## 2025-07-14 DIAGNOSIS — Z96.641 HISTORY OF ARTHROPLASTY OF RIGHT HIP: ICD-10-CM

## 2025-07-14 DIAGNOSIS — Z86.73 HISTORY OF TRANSIENT ISCHEMIC ATTACK (TIA): ICD-10-CM

## 2025-07-14 DIAGNOSIS — E55.9 VITAMIN D DEFICIENCY: ICD-10-CM

## 2025-07-14 DIAGNOSIS — E78.2 MIXED HYPERLIPIDEMIA: ICD-10-CM

## 2025-07-14 DIAGNOSIS — M17.0 BILATERAL PRIMARY OSTEOARTHRITIS OF KNEE: ICD-10-CM

## 2025-07-14 DIAGNOSIS — M79.642 PAIN IN BOTH HANDS: ICD-10-CM

## 2025-07-14 DIAGNOSIS — K44.9 HIATAL HERNIA WITH GERD WITHOUT ESOPHAGITIS: ICD-10-CM

## 2025-07-14 DIAGNOSIS — Z51.81 ENCOUNTER FOR MONITORING LONG-TERM PROTON PUMP INHIBITOR THERAPY: ICD-10-CM

## 2025-07-14 DIAGNOSIS — Z79.899 MEDICATION MANAGEMENT: ICD-10-CM

## 2025-07-14 DIAGNOSIS — E44.0 MODERATE PROTEIN-CALORIE MALNUTRITION: Primary | ICD-10-CM

## 2025-07-14 DIAGNOSIS — Z98.890 H/O PARATHYROIDECTOMY: ICD-10-CM

## 2025-07-14 DIAGNOSIS — Z79.1 NSAID LONG-TERM USE: ICD-10-CM

## 2025-07-14 DIAGNOSIS — Z90.89 H/O PARATHYROIDECTOMY: ICD-10-CM

## 2025-07-14 DIAGNOSIS — M79.641 PAIN IN BOTH HANDS: ICD-10-CM

## 2025-07-14 DIAGNOSIS — M81.0 POSTMENOPAUSAL OSTEOPOROSIS: ICD-10-CM

## 2025-07-14 DIAGNOSIS — Z79.899 ENCOUNTER FOR MONITORING LONG-TERM PROTON PUMP INHIBITOR THERAPY: ICD-10-CM

## 2025-07-14 DIAGNOSIS — Z79.82 LONG-TERM USE OF ASPIRIN THERAPY: ICD-10-CM

## 2025-07-14 PROCEDURE — 98006 SYNCH AUDIO-VIDEO EST MOD 30: CPT | Mod: 95,,, | Performed by: FAMILY MEDICINE

## 2025-07-14 NOTE — TELEPHONE ENCOUNTER
----- Message from Ashli Stubbs MD sent at 7/14/2025 11:48 AM CDT -----  Regarding: Reclast  Hi, patient has a therapy plan for Reclast that she is now ready to schedule at the end of August-- had to put it off until then due to a dental extraction but she will be 3+months out by then.    My MA is scheduling her for a CMP in mid August so if the reclast is scheduled some time in the last week or 2 of August that should work.    Please let me know if any issues, thanks! ELDA Stubbs

## 2025-07-14 NOTE — TELEPHONE ENCOUNTER
----- Message from Ashli Stubbs MD sent at 7/14/2025 11:45 AM CDT -----  Regarding: VV follow up  Please schedule CMP and prealbumin at [facility] (nonfasting) in 4 weeks.    She will be due for her annual in April if she can be put on the recall for this-- or in 4 weeks when I get her pre-Reclast blood work we can re-evaluate and maybe put something on the book then.    Thanks

## 2025-07-14 NOTE — TELEPHONE ENCOUNTER
Confirmed upcoming infusion appointments with the patient. Reviewed pre infusion instructions with the patient.   8/26 at 230p

## 2025-07-23 ENCOUNTER — OFFICE VISIT (OUTPATIENT)
Dept: URGENT CARE | Facility: CLINIC | Age: 75
End: 2025-07-23
Payer: COMMERCIAL

## 2025-07-23 VITALS
OXYGEN SATURATION: 98 % | DIASTOLIC BLOOD PRESSURE: 84 MMHG | RESPIRATION RATE: 20 BRPM | WEIGHT: 180 LBS | BODY MASS INDEX: 28.93 KG/M2 | HEART RATE: 68 BPM | HEIGHT: 66 IN | SYSTOLIC BLOOD PRESSURE: 128 MMHG | TEMPERATURE: 98 F

## 2025-07-23 DIAGNOSIS — J30.89 NON-SEASONAL ALLERGIC RHINITIS DUE TO OTHER ALLERGIC TRIGGER: ICD-10-CM

## 2025-07-23 DIAGNOSIS — R09.82 POST-NASAL DRIP: ICD-10-CM

## 2025-07-23 DIAGNOSIS — S05.02XA CORNEAL ABRASION, LEFT, INITIAL ENCOUNTER: Primary | ICD-10-CM

## 2025-07-23 DIAGNOSIS — H57.12 OCULAR PAIN, LEFT: ICD-10-CM

## 2025-07-23 PROCEDURE — 99213 OFFICE O/P EST LOW 20 MIN: CPT | Mod: S$GLB,,, | Performed by: PHYSICIAN ASSISTANT

## 2025-07-23 RX ORDER — OFLOXACIN 3 MG/ML
SOLUTION/ DROPS OPHTHALMIC
Qty: 10 ML | Refills: 0 | Status: SHIPPED | OUTPATIENT
Start: 2025-07-23 | End: 2025-07-23

## 2025-07-23 RX ORDER — OFLOXACIN 3 MG/ML
SOLUTION/ DROPS OPHTHALMIC
Qty: 10 ML | Refills: 0 | Status: SHIPPED | OUTPATIENT
Start: 2025-07-23

## 2025-07-23 NOTE — PROGRESS NOTES
"Subjective:      Patient ID: Carlene Fong is a 74 y.o. female.    Vitals:  height is 5' 6" (1.676 m) and weight is 81.6 kg (180 lb). Her oral temperature is 98 °F (36.7 °C). Her blood pressure is 128/84 and her pulse is 68. Her respiration is 20 and oxygen saturation is 98%.     Chief Complaint: left eye redness    Carlene Fong is a 74 y.o. female who complains of  left eye redness and burning , began this morning after her shower, denies any discharge.      Provider HPI  Patient states left eye has been watery  Used cold compresses and refresh tears; uses daily for dry eyes  Used zyrtec and flonase for post nasal drip  States she cleaned the house yesterday; slept under fan last night  Burning eye pain rated 3/10      Eye Problem   The left eye is affected. The current episode started today. The problem occurs constantly. The problem has been gradually worsening. There was no injury mechanism. The pain is at a severity of 3/10. The pain is mild. There is No known exposure to pink eye. She Does not wear contacts. Associated symptoms include eye redness and a foreign body sensation. Pertinent negatives include no blurred vision, eye discharge, double vision, fever, itching, nausea, photophobia, recent URI or vomiting. She has tried eye drops (cold compresses) for the symptoms. The treatment provided mild relief.       Constitution: Negative for fatigue and fever.   HENT:  Positive for congestion and postnasal drip. Negative for sinus pain, sinus pressure, sore throat, trouble swallowing and voice change.    Eyes:  Positive for eye pain and eye redness. Negative for eye trauma, foreign body in eye, eye discharge, eye itching, photophobia, vision loss, double vision, blurred vision and eyelid swelling.   Respiratory:  Negative for cough and shortness of breath.    Gastrointestinal:  Negative for nausea and vomiting.   Allergic/Immunologic: Positive for environmental allergies and seasonal allergies.    "   Objective:     Physical Exam   Constitutional: She is oriented to person, place, and time. She is cooperative. No distress.      Comments:Patient is awake and alert, sitting up in exam chair, speaking and answering in complete sentences     normalawake  HENT:   Head: Normocephalic and atraumatic.      Comments: Patient observed  frequently clearing throat.    Ears:   Right Ear: Tympanic membrane, external ear and ear canal normal.   Left Ear: Tympanic membrane, external ear and ear canal normal.   Nose: Mucosal edema present. No rhinorrhea or congestion.   Mouth/Throat: Uvula is midline and mucous membranes are normal. Mucous membranes are moist. Cobblestoning present. No posterior oropharyngeal erythema or tonsillar abscesses.      Comments:  postnasal discharge noted on the posterior pharyngeal wall    Eyes: Lids are normal. Pupils are equal, round, and reactive to light. Left conjunctiva is injected.   Slit lamp exam:       The left eye shows corneal abrasion and fluorescein uptake. Extraocular movement intact vision grossly intact gaze aligned appropriately   Neck: Neck supple.   Cardiovascular: Normal rate, regular rhythm, normal heart sounds and normal pulses.   Pulmonary/Chest: Effort normal and breath sounds normal. No respiratory distress. She has no wheezes. She has no rhonchi. She has no rales.   Abdominal: Normal appearance.   Musculoskeletal: Normal range of motion.         General: Normal range of motion.      Cervical back: She exhibits no tenderness.   Lymphadenopathy:     She has no cervical adenopathy.   Neurological: She is alert and oriented to person, place, and time.   Skin: Skin is warm.   Psychiatric: Her behavior is normal. Mood, judgment and thought content normal.   Nursing note and vitals reviewed.        Vision Screening    Right eye Left eye Both eyes   Without correction      With correction 20 25 20 30 20 20       Assessment:     1. Corneal abrasion, left, initial encounter    2.  Ocular pain, left    3. Non-seasonal allergic rhinitis due to other allergic trigger    4. Post-nasal drip      Patient presents with clinical exam findings and history consistent with above.      On exam, patient is nontoxic appearing and vitals are stable.        Diagnostic testing results were reviewed and discussed with patient/guardian.   Tests ordered in clinic: None  Previous progress notes/admissions/labs and medications were reviewed.  Reviewed GFR > 60 from [facility] on 7/7/25.      Plan:   Continue zyrtec and flonase prn post nasal drip  Patient reported eye pain resolved a few seconds after 1-2 drops of proparacaine.       Corneal abrasion, left, initial encounter  -     Discontinue: ofloxacin (OCUFLOX) 0.3 % ophthalmic solution; Instill 2 drops in left eye every 4 hours for the first 2 days (Days 1 and 2); then instill 2 drops every 6 hours daily for 5 days (Days 3 through 7)  Dispense: 10 mL; Refill: 0  -     Ambulatory referral/consult to Ophthalmology  -     ofloxacin (OCUFLOX) 0.3 % ophthalmic solution; Instill 2 drops in left eye every 4 hours for the first 2 days (Days 1 and 2); then instill 2 drops every 6 hours daily for 5 days (Days 3 through 7)  Dispense: 10 mL; Refill: 0    Ocular pain, left  -     Discontinue: ofloxacin (OCUFLOX) 0.3 % ophthalmic solution; Instill 2 drops in left eye every 4 hours for the first 2 days (Days 1 and 2); then instill 2 drops every 6 hours daily for 5 days (Days 3 through 7)  Dispense: 10 mL; Refill: 0  -     ofloxacin (OCUFLOX) 0.3 % ophthalmic solution; Instill 2 drops in left eye every 4 hours for the first 2 days (Days 1 and 2); then instill 2 drops every 6 hours daily for 5 days (Days 3 through 7)  Dispense: 10 mL; Refill: 0    Non-seasonal allergic rhinitis due to other allergic trigger    Post-nasal drip                  1) See orders for this visit as documented in the electronic medical record.  2) Symptomatic therapy suggested: use acetaminophen/ibuprofen  "every 6-8 hours prn pain or fever, push fluids.   3) Call or return to clinic prn if these symptoms worsen or fail to improve as anticipated.    Discussed results/diagnosis/plan with patient in clinic.  We had shared decision making for patient's treatment. Patient verbalized understanding and in agreement with current treatment plan.     Patient was instructed to return for re-evaluation with urgent care or PCP for continued outpatient workup and management if symptoms do not improve/worsening symptoms. Strict ED versus clinic precautions given in depth.    Discharge and follow-up instructions given verbally/printed with the patient who expressed understanding. The instructions and results are also available on [facility].              Mirtha "Yani" SREE Kelly          Patient Instructions   Prevention includes avoidance of the specific allergens that are causing symptoms in a specific patient. For those allergic to dust mites, helpful measures include replacing old pillows, blankets, and mattresses; using dust mite allergen impermeable covers for pillows, comforters, and mattresses; frequently washing beddings; reducing humidity; and frequently vacuuming and dusting the home. Additionally, other reservoirs of dust should be removed, such as old carpets, old furniture, and old curtains or drapes. When the culprit allergen is animal dander, the animal may need to be removed from the home, and old carpets, furniture, and curtains should be removed or cleaned thoroughly.     Common side effects include stinging and burning upon instillation. Patients may find it helpful to refrigerate the drops and/or use refrigerated artificial tears before using these medications. Other adverse effects include headache and increased ocular dryness.     When using eye drops for infection, do not touch your good eye after touching your infected eye. Also, do not touch the bottle or dropper directly onto one eye and then use it in the " other. Doing these things can cause the infection to spread from one eye to the other.      Apply cool compresses for discomfort.    ____________________________________________________  Recommend oral antihistamine (Claritin/zyrtec) +/- oral decongestant (pseudoephedrine) for rhinorrhea, steroid nasal spray (flonase),    For nose bleeds; recommend nasal irrigation with a saline spray/gel (AYR nasal gel) or Netti Pot like device per their directions is also recommended.  Please drink plenty of fluids.  Please get plenty of rest.  If you  smoke, please stop smoking.      Discussed prescriptions and over-the-counter medicines to help with patient's symptoms:  A steroid nose spray (flonase) can help with a stuffy nose. It can also help with drainage down the back of your throat.  An antihistamine (loratadine,zyrtec,allegra, xyzal) can help with itching, sneezing, or runny nose.  An antihistamine eye drop (patanol) can help with itchy eyes.  A decongestant (pseudoephedrine,  Phenylephrine) can help with a stuffy nose. Take <10 days for congestion and rhinorrhea. Once symptoms improve, proceed with loratadine/zyrtec once a day. These ingredients can keep you up all night, decrease appetite, feel jittery, and raise blood pressure with long term use.      Please remember that you have received care at an urgent care today. Urgent cares are not emergency rooms and are not equipped to handle life threatening emergencies and cannot rule in or out certain medical conditions and you may be released before all of your medical problems are known or treated. Please arrange follow up with your primary care physician or speciality clinic (optometry or opthalmology)  within 2-5 days if your signs and symptoms have not resolved or worsen. Patient can call our Referral Hotline at [phone] to make an appointment.    Please return here or go to the Emergency Department for any concerns or worsening of condition.Patient was educated  on signs/symptoms (Worsening vision, eye pain, sensitivity to light, increased eye discharge) that would warrant emergent medical attention. Patient verbalized understanding.

## 2025-07-23 NOTE — PATIENT INSTRUCTIONS
Prevention includes avoidance of the specific allergens that are causing symptoms in a specific patient. For those allergic to dust mites, helpful measures include replacing old pillows, blankets, and mattresses; using dust mite allergen impermeable covers for pillows, comforters, and mattresses; frequently washing beddings; reducing humidity; and frequently vacuuming and dusting the home. Additionally, other reservoirs of dust should be removed, such as old carpets, old furniture, and old curtains or drapes. When the culprit allergen is animal dander, the animal may need to be removed from the home, and old carpets, furniture, and curtains should be removed or cleaned thoroughly.     Common side effects include stinging and burning upon instillation. Patients may find it helpful to refrigerate the drops and/or use refrigerated artificial tears before using these medications. Other adverse effects include headache and increased ocular dryness.     When using eye drops for infection, do not touch your good eye after touching your infected eye. Also, do not touch the bottle or dropper directly onto one eye and then use it in the other. Doing these things can cause the infection to spread from one eye to the other.      Apply cool compresses for discomfort.    ____________________________________________________  Recommend oral antihistamine (Claritin/zyrtec) +/- oral decongestant (pseudoephedrine) for rhinorrhea, steroid nasal spray (flonase),    For nose bleeds; recommend nasal irrigation with a saline spray/gel (AYR nasal gel) or Netti Pot like device per their directions is also recommended.  Please drink plenty of fluids.  Please get plenty of rest.  If you  smoke, please stop smoking.      Discussed prescriptions and over-the-counter medicines to help with patient's symptoms:  A steroid nose spray (flonase) can help with a stuffy nose. It can also help with drainage down the back of your throat.  An antihistamine  (loratadine,zyrtec,allegra, xyzal) can help with itching, sneezing, or runny nose.  An antihistamine eye drop (patanol) can help with itchy eyes.  A decongestant (pseudoephedrine,  Phenylephrine) can help with a stuffy nose. Take <10 days for congestion and rhinorrhea. Once symptoms improve, proceed with loratadine/zyrtec once a day. These ingredients can keep you up all night, decrease appetite, feel jittery, and raise blood pressure with long term use.      Please remember that you have received care at an urgent care today. Urgent cares are not emergency rooms and are not equipped to handle life threatening emergencies and cannot rule in or out certain medical conditions and you may be released before all of your medical problems are known or treated. Please arrange follow up with your primary care physician or speciality clinic (optometry or opthalmology)  within 2-5 days if your signs and symptoms have not resolved or worsen. Patient can call our Referral Hotline at [phone] to make an appointment.    Please return here or go to the Emergency Department for any concerns or worsening of condition.Patient was educated on signs/symptoms (Worsening vision, eye pain, sensitivity to light, increased eye discharge) that would warrant emergent medical attention. Patient verbalized understanding.

## 2025-07-23 NOTE — LETTER
"  July 23, 2025      [facility] - [address]  [place] [address]  Phone: [phone]  Fax: [phone]       Patient: Carlene Fong   YOB: 1950  Date of Visit: 07/23/2025    To Whom It May Concern:    Claudia Fong  was at [facility] on 07/23/2025. The patient may return to work/school on 7/24/25 with no restrictions. If you have any questions or concerns, or if I can be of further assistance, please do not hesitate to contact me.    Sincerely,        Mirthapaul Kelly PA-C (Jackie)       "

## 2025-08-13 ENCOUNTER — OFFICE VISIT (OUTPATIENT)
Dept: OPTOMETRY | Facility: CLINIC | Age: 75
End: 2025-08-13
Payer: COMMERCIAL

## 2025-08-13 DIAGNOSIS — H15.102 EPISCLERITIS OF LEFT EYE: Primary | ICD-10-CM

## 2025-08-13 PROCEDURE — 1160F RVW MEDS BY RX/DR IN RCRD: CPT | Mod: CPTII,S$GLB,, | Performed by: OPTOMETRIST

## 2025-08-13 PROCEDURE — 3288F FALL RISK ASSESSMENT DOCD: CPT | Mod: CPTII,S$GLB,, | Performed by: OPTOMETRIST

## 2025-08-13 PROCEDURE — 99203 OFFICE O/P NEW LOW 30 MIN: CPT | Mod: S$GLB,,, | Performed by: OPTOMETRIST

## 2025-08-13 PROCEDURE — 1101F PT FALLS ASSESS-DOCD LE1/YR: CPT | Mod: CPTII,S$GLB,, | Performed by: OPTOMETRIST

## 2025-08-13 PROCEDURE — 99999 PR PBB SHADOW E&M-EST. PATIENT-LVL III: CPT | Mod: PBBFAC,,, | Performed by: OPTOMETRIST

## 2025-08-13 PROCEDURE — 1126F AMNT PAIN NOTED NONE PRSNT: CPT | Mod: CPTII,S$GLB,, | Performed by: OPTOMETRIST

## 2025-08-13 PROCEDURE — 1159F MED LIST DOCD IN RCRD: CPT | Mod: CPTII,S$GLB,, | Performed by: OPTOMETRIST

## 2025-08-13 RX ORDER — PREDNISOLONE ACETATE 10 MG/ML
1 SUSPENSION/ DROPS OPHTHALMIC 4 TIMES DAILY
Qty: 5 ML | Refills: 0 | Status: SHIPPED | OUTPATIENT
Start: 2025-08-13 | End: 2026-08-13

## 2025-08-14 ENCOUNTER — LAB VISIT (OUTPATIENT)
Dept: LAB | Facility: HOSPITAL | Age: 75
End: 2025-08-14
Attending: FAMILY MEDICINE
Payer: COMMERCIAL

## 2025-08-14 ENCOUNTER — PATIENT MESSAGE (OUTPATIENT)
Dept: INTERNAL MEDICINE | Facility: CLINIC | Age: 75
End: 2025-08-14
Payer: COMMERCIAL

## 2025-08-14 DIAGNOSIS — M81.0 POSTMENOPAUSAL OSTEOPOROSIS: ICD-10-CM

## 2025-08-14 DIAGNOSIS — E44.0 MODERATE PROTEIN-CALORIE MALNUTRITION: ICD-10-CM

## 2025-08-14 LAB
ALBUMIN SERPL BCP-MCNC: 3.8 G/DL (ref 3.5–5.2)
ALP SERPL-CCNC: 109 UNIT/L (ref 40–150)
ALT SERPL W/O P-5'-P-CCNC: 11 UNIT/L (ref 0–55)
ANION GAP (OHS): 12 MMOL/L (ref 8–16)
AST SERPL-CCNC: 18 UNIT/L (ref 0–50)
BILIRUB SERPL-MCNC: 0.4 MG/DL (ref 0.1–1)
BUN SERPL-MCNC: 15 MG/DL (ref 8–23)
CALCIUM SERPL-MCNC: 9.1 MG/DL (ref 8.7–10.5)
CHLORIDE SERPL-SCNC: 104 MMOL/L (ref 95–110)
CO2 SERPL-SCNC: 25 MMOL/L (ref 23–29)
CREAT SERPL-MCNC: 0.7 MG/DL (ref 0.5–1.4)
GFR SERPLBLD CREATININE-BSD FMLA CKD-EPI: >60 ML/MIN/1.73/M2
GLUCOSE SERPL-MCNC: 74 MG/DL (ref 70–110)
POTASSIUM SERPL-SCNC: 4.1 MMOL/L (ref 3.5–5.1)
PREALB SERPL-MCNC: 15 MG/DL (ref 20–43)
PROT SERPL-MCNC: 6.9 GM/DL (ref 6–8.4)
SODIUM SERPL-SCNC: 141 MMOL/L (ref 136–145)

## 2025-08-14 PROCEDURE — 36415 COLL VENOUS BLD VENIPUNCTURE: CPT | Mod: PO

## 2025-08-14 PROCEDURE — 80053 COMPREHEN METABOLIC PANEL: CPT

## 2025-08-14 PROCEDURE — 84134 ASSAY OF PREALBUMIN: CPT

## 2025-08-18 ENCOUNTER — PATIENT MESSAGE (OUTPATIENT)
Dept: OPTOMETRY | Facility: CLINIC | Age: 75
End: 2025-08-18

## 2025-08-18 ENCOUNTER — OFFICE VISIT (OUTPATIENT)
Dept: OPTOMETRY | Facility: CLINIC | Age: 75
End: 2025-08-18
Payer: COMMERCIAL

## 2025-08-18 DIAGNOSIS — H15.102 EPISCLERITIS OF LEFT EYE: Primary | ICD-10-CM

## 2025-08-18 PROCEDURE — 1101F PT FALLS ASSESS-DOCD LE1/YR: CPT | Mod: CPTII,S$GLB,, | Performed by: OPTOMETRIST

## 2025-08-18 PROCEDURE — 3288F FALL RISK ASSESSMENT DOCD: CPT | Mod: CPTII,S$GLB,, | Performed by: OPTOMETRIST

## 2025-08-18 PROCEDURE — 99213 OFFICE O/P EST LOW 20 MIN: CPT | Mod: S$GLB,,, | Performed by: OPTOMETRIST

## 2025-08-18 PROCEDURE — 99999 PR PBB SHADOW E&M-EST. PATIENT-LVL III: CPT | Mod: PBBFAC,,, | Performed by: OPTOMETRIST

## 2025-08-18 PROCEDURE — 1159F MED LIST DOCD IN RCRD: CPT | Mod: CPTII,S$GLB,, | Performed by: OPTOMETRIST

## 2025-08-18 PROCEDURE — 1160F RVW MEDS BY RX/DR IN RCRD: CPT | Mod: CPTII,S$GLB,, | Performed by: OPTOMETRIST

## 2025-08-18 PROCEDURE — 1126F AMNT PAIN NOTED NONE PRSNT: CPT | Mod: CPTII,S$GLB,, | Performed by: OPTOMETRIST

## 2025-08-18 RX ORDER — PREDNISOLONE ACETATE 10 MG/ML
1 SUSPENSION/ DROPS OPHTHALMIC 4 TIMES DAILY
Qty: 5 ML | Refills: 0 | Status: SHIPPED | OUTPATIENT
Start: 2025-08-18 | End: 2026-08-18

## 2025-08-19 ENCOUNTER — PATIENT MESSAGE (OUTPATIENT)
Dept: OPTOMETRY | Facility: CLINIC | Age: 75
End: 2025-08-19
Payer: COMMERCIAL

## 2025-08-19 DIAGNOSIS — H15.102 EPISCLERITIS OF LEFT EYE: ICD-10-CM

## 2025-08-26 ENCOUNTER — INFUSION (OUTPATIENT)
Dept: INFUSION THERAPY | Facility: HOSPITAL | Age: 75
End: 2025-08-26
Attending: FAMILY MEDICINE
Payer: COMMERCIAL

## 2025-08-26 VITALS
RESPIRATION RATE: 20 BRPM | BODY MASS INDEX: 28.32 KG/M2 | HEART RATE: 63 BPM | DIASTOLIC BLOOD PRESSURE: 79 MMHG | SYSTOLIC BLOOD PRESSURE: 151 MMHG | TEMPERATURE: 97 F | OXYGEN SATURATION: 99 % | WEIGHT: 175.5 LBS

## 2025-08-26 DIAGNOSIS — M81.0 POSTMENOPAUSAL OSTEOPOROSIS: Primary | ICD-10-CM

## 2025-08-26 PROCEDURE — 25000003 PHARM REV CODE 250: Performed by: FAMILY MEDICINE

## 2025-08-26 PROCEDURE — 96365 THER/PROPH/DIAG IV INF INIT: CPT

## 2025-08-26 PROCEDURE — A4216 STERILE WATER/SALINE, 10 ML: HCPCS | Performed by: FAMILY MEDICINE

## 2025-08-26 PROCEDURE — 63600175 PHARM REV CODE 636 W HCPCS: Performed by: FAMILY MEDICINE

## 2025-08-26 RX ORDER — SODIUM CHLORIDE 0.9 % (FLUSH) 0.9 %
10 SYRINGE (ML) INJECTION
Status: ACTIVE | OUTPATIENT
Start: 2025-08-26

## 2025-08-26 RX ORDER — ZOLEDRONIC ACID 5 MG/100ML
5 INJECTION, SOLUTION INTRAVENOUS
OUTPATIENT
Start: 2025-08-26 | End: 2025-08-26

## 2025-08-26 RX ORDER — HEPARIN 100 UNIT/ML
500 SYRINGE INTRAVENOUS
OUTPATIENT
Start: 2025-08-26

## 2025-08-26 RX ORDER — SODIUM CHLORIDE 0.9 % (FLUSH) 0.9 %
10 SYRINGE (ML) INJECTION
OUTPATIENT
Start: 2025-08-26

## 2025-08-26 RX ORDER — ZOLEDRONIC ACID 5 MG/100ML
5 INJECTION, SOLUTION INTRAVENOUS
Status: COMPLETED | OUTPATIENT
Start: 2025-08-26 | End: 2025-08-26

## 2025-08-26 RX ORDER — ACETAMINOPHEN 325 MG/1
650 TABLET ORAL
OUTPATIENT
Start: 2025-08-26 | End: 2025-08-26

## 2025-08-26 RX ORDER — ACETAMINOPHEN 325 MG/1
650 TABLET ORAL
Status: ACTIVE | OUTPATIENT
Start: 2025-08-26

## 2025-08-26 RX ADMIN — ZOLEDRONIC ACID 5 MG: 5 INJECTION, SOLUTION INTRAVENOUS at 03:08

## 2025-08-26 RX ADMIN — Medication 10 ML: at 03:08

## 2025-08-26 RX ADMIN — SODIUM CHLORIDE: 9 INJECTION, SOLUTION INTRAVENOUS at 02:08

## 2025-08-28 ENCOUNTER — PATIENT MESSAGE (OUTPATIENT)
Dept: OPTOMETRY | Facility: CLINIC | Age: 75
End: 2025-08-28
Payer: COMMERCIAL

## 2025-08-28 RX ORDER — PREDNISOLONE ACETATE 10 MG/ML
1 SUSPENSION/ DROPS OPHTHALMIC 4 TIMES DAILY
Qty: 5 ML | Refills: 0 | Status: SHIPPED | OUTPATIENT
Start: 2025-08-28 | End: 2025-08-29 | Stop reason: SDUPTHER

## 2025-08-29 ENCOUNTER — OFFICE VISIT (OUTPATIENT)
Dept: OPTOMETRY | Facility: CLINIC | Age: 75
End: 2025-08-29
Payer: COMMERCIAL

## 2025-08-29 DIAGNOSIS — H15.102 EPISCLERITIS OF LEFT EYE: Primary | ICD-10-CM

## 2025-08-29 PROCEDURE — 99999 PR PBB SHADOW E&M-EST. PATIENT-LVL III: CPT | Mod: PBBFAC,,, | Performed by: OPTOMETRIST

## 2025-08-29 RX ORDER — PREDNISOLONE ACETATE 10 MG/ML
1 SUSPENSION/ DROPS OPHTHALMIC 4 TIMES DAILY
Qty: 5 ML | Refills: 0 | Status: SHIPPED | OUTPATIENT
Start: 2025-08-29 | End: 2026-08-29

## (undated) DEVICE — SEE MEDLINE ITEM 157194

## (undated) DEVICE — ADHESIVE DERMABOND ADVANCED

## (undated) DEVICE — SUT VICRYL PLUS 3-0 SH 18IN

## (undated) DEVICE — BLADE SURG STAINLESS STEEL #15

## (undated) DEVICE — SEE MEDLINE ITEM 152622

## (undated) DEVICE — ELECTRODE BLADE INSULATED 1 IN

## (undated) DEVICE — CONTAINER SPECIMEN STRL 4OZ

## (undated) DEVICE — CORD BIPOLAR 12 FOOT

## (undated) DEVICE — SEE MEDLINE ITEM 152532

## (undated) DEVICE — SEE MEDLINE ITEM 157144

## (undated) DEVICE — SYS CLSR DERMABOND PRINEO 22CM

## (undated) DEVICE — SEE MEDLINE ITEM 154981

## (undated) DEVICE — NDL STRAIGHT 4CM LEIBINGER

## (undated) DEVICE — PACK UNIVERSAL SPLIT II

## (undated) DEVICE — ELECTRODE REM PLYHSV RETURN 9

## (undated) DEVICE — SET BASIN 48X48IN 6000ML RING

## (undated) DEVICE — DRESSING TELFA STRL 4X3 LF

## (undated) DEVICE — SUT MONOCRYL 5-0 P-3 UND 18

## (undated) DEVICE — PROBE SIMULATOR KRAFF

## (undated) DEVICE — GAUZE SPONGE PEANUT STRL

## (undated) DEVICE — GLOVE BIOGEL ORTHOPEDIC 7

## (undated) DEVICE — TRAY MINOR GEN SURG

## (undated) DEVICE — Device

## (undated) DEVICE — HEMOSTAT SURGICEL FIBRLR 1X2IN

## (undated) DEVICE — SEE MEDLINE ITEM 157116

## (undated) DEVICE — CLIP LIGACLIP XTRA TITANIUM

## (undated) DEVICE — SUT LIGACLIP SMALL XTRA

## (undated) DEVICE — SUT 4-0 12-18IN SILK BLACK

## (undated) DEVICE — NDL HYPO REG 25G X 1 1/2

## (undated) DEVICE — SEE MEDLINE ITEM 157131

## (undated) DEVICE — LOOP VESSEL BLUE MAXI

## (undated) DEVICE — SEE MEDLINE ITEM 157117

## (undated) DEVICE — SUT 3-0 12-18IN SILK

## (undated) DEVICE — SYR 30CC LUER LOCK

## (undated) DEVICE — SPONGE GAUZE 16PLY 4X4